# Patient Record
Sex: MALE | Race: WHITE | NOT HISPANIC OR LATINO
[De-identification: names, ages, dates, MRNs, and addresses within clinical notes are randomized per-mention and may not be internally consistent; named-entity substitution may affect disease eponyms.]

---

## 2017-12-08 ENCOUNTER — APPOINTMENT (OUTPATIENT)
Dept: HEART AND VASCULAR | Facility: CLINIC | Age: 67
End: 2017-12-08

## 2018-11-07 ENCOUNTER — APPOINTMENT (OUTPATIENT)
Dept: HEART AND VASCULAR | Facility: CLINIC | Age: 68
End: 2018-11-07
Payer: MEDICARE

## 2018-11-07 VITALS
WEIGHT: 217 LBS | SYSTOLIC BLOOD PRESSURE: 126 MMHG | HEIGHT: 70 IN | BODY MASS INDEX: 31.07 KG/M2 | DIASTOLIC BLOOD PRESSURE: 80 MMHG | HEART RATE: 64 BPM

## 2018-11-07 DIAGNOSIS — I07.1 RHEUMATIC TRICUSPID INSUFFICIENCY: ICD-10-CM

## 2018-11-07 DIAGNOSIS — Z01.810 ENCOUNTER FOR PREPROCEDURAL CARDIOVASCULAR EXAMINATION: ICD-10-CM

## 2018-11-07 PROCEDURE — 99214 OFFICE O/P EST MOD 30 MIN: CPT | Mod: 25

## 2018-11-07 PROCEDURE — 93306 TTE W/DOPPLER COMPLETE: CPT

## 2018-11-07 PROCEDURE — 93000 ELECTROCARDIOGRAM COMPLETE: CPT

## 2018-11-07 NOTE — DISCUSSION/SUMMARY
[FreeTextEntry1] : EKG:NSR,WNL\par ECHO:trivial TR, otherwise normal\par diet and f/u labs for lipids.\par His cardiac status is stable enough to permit ENT surgery

## 2018-11-07 NOTE — PHYSICAL EXAM
[General Appearance - Well Developed] : well developed [Normal Appearance] : normal appearance [Well Groomed] : well groomed [General Appearance - Well Nourished] : well nourished [No Deformities] : no deformities [General Appearance - In No Acute Distress] : no acute distress [Normal Conjunctiva] : the conjunctiva exhibited no abnormalities [Eyelids - No Xanthelasma] : the eyelids demonstrated no xanthelasmas [Normal Oral Mucosa] : normal oral mucosa [Normal Jugular Venous A Waves Present] : normal jugular venous A waves present [Normal Jugular Venous V Waves Present] : normal jugular venous V waves present [No Jugular Venous Velasco A Waves] : no jugular venous velasco A waves [] : no respiratory distress [Respiration, Rhythm And Depth] : normal respiratory rhythm and effort [Exaggerated Use Of Accessory Muscles For Inspiration] : no accessory muscle use [Heart Rate And Rhythm] : heart rate and rhythm were normal [Heart Sounds] : normal S1 and S2 [Murmurs] : no murmurs present [Arterial Pulses Normal] : the arterial pulses were normal [Edema] : no peripheral edema present [Bowel Sounds] : normal bowel sounds [Abdomen Soft] : soft [Abdomen Tenderness] : non-tender [Abnormal Walk] : normal gait [Nail Clubbing] : no clubbing of the fingernails [Skin Color & Pigmentation] : normal skin color and pigmentation [Oriented To Time, Place, And Person] : oriented to person, place, and time [FreeTextEntry1] : minimally decreased BS

## 2018-11-07 NOTE — REVIEW OF SYSTEMS
[Eyeglasses] : currently wearing eyeglasses [see HPI] : see HPI [Loss Of Hearing] : hearing loss [Nocturia] : nocturia [Joint Pain] : joint pain [Shoulder Problem] : shoulder problems [Hip Problem] : hip problems [Knee Problem] : knee problems [Numbness (Hypesthesia)] : numbness [Memory Lapses Or Loss] : memory lapses or loss [Negative] : Heme/Lymph [Hematuria] : no hematuria [Joint Swelling] : no joint swelling [Limb Weakness (Paresis)] : no limb weakness [Dizziness] : no dizziness [Tremor] : no tremor was seen [Convulsions] : no convulsions [Tingling (Paresthesia)] : no tingling [Confusion] : no confusion was observed [Depression] : no depression [Anxiety] : no anxiety [Under Stress] : not under stress [Suicidal] : not suicidal

## 2023-05-30 ENCOUNTER — APPOINTMENT (OUTPATIENT)
Dept: INTERNAL MEDICINE | Facility: CLINIC | Age: 73
End: 2023-05-30
Payer: MEDICARE

## 2023-05-30 VITALS
DIASTOLIC BLOOD PRESSURE: 89 MMHG | WEIGHT: 205 LBS | TEMPERATURE: 98.6 F | OXYGEN SATURATION: 92 % | BODY MASS INDEX: 29.35 KG/M2 | HEART RATE: 81 BPM | HEIGHT: 70 IN | SYSTOLIC BLOOD PRESSURE: 147 MMHG

## 2023-05-30 DIAGNOSIS — Z12.11 ENCOUNTER FOR SCREENING FOR MALIGNANT NEOPLASM OF COLON: ICD-10-CM

## 2023-05-30 DIAGNOSIS — Z00.00 ENCOUNTER FOR GENERAL ADULT MEDICAL EXAMINATION W/OUT ABNORMAL FINDINGS: ICD-10-CM

## 2023-05-30 PROCEDURE — 99204 OFFICE O/P NEW MOD 45 MIN: CPT | Mod: 25

## 2023-05-30 PROCEDURE — 36415 COLL VENOUS BLD VENIPUNCTURE: CPT

## 2023-05-30 RX ORDER — MEMANTINE HYDROCHLORIDE AND DONEPEZIL HYDROCHLORIDE 7; 10 MG/1; MG/1
7-10 CAPSULE ORAL
Qty: 90 | Refills: 0 | Status: ACTIVE | COMMUNITY
Start: 2023-04-27

## 2023-05-30 NOTE — ASSESSMENT
[FreeTextEntry1] : Labs drawn in office.\par Provided scripts for necessary imaging and/or referrals.\par Further management to be completed pending lab results and/or imaging studies.\par All of the patient's questions and concerns were answered in detail.\par \par Records to be requested from cardiologist office \par

## 2023-05-30 NOTE — HISTORY OF PRESENT ILLNESS
[FreeTextEntry1] : Establish care with new provider  [de-identified] : The patient is a 73 year old M who presents to the office for the following concerns:\par \par He has no acute concerns today. \par \par Patient says he completed stress test followed by angiogram at [place]\par \par Nancy \par Prilosec \par Claritin \par Statin therapy (patient unsure of the name)\par Namzaric \par \par Cardiologist: Dr. Kashmir Pope; [facility]. Last appointment in February 2023. \par Neurologist: Dr. Ruma King - seen for memory impairment. Started on Namzaric \par Smoked from 12 yo. Quit 20 years ago \par Colonoscopy: Never done

## 2023-05-30 NOTE — PHYSICAL EXAM
[Normal] : normal rate, regular rhythm, normal S1 and S2 and no murmur heard [No Edema] : there was no peripheral edema [Non-distended] : non-distended

## 2023-05-30 NOTE — HEALTH RISK ASSESSMENT
[Good] : ~his/her~  mood as  good [Yes] : Yes [Monthly or less (1 pt)] : Monthly or less (1 point) [1 or 2 (0 pts)] : 1 or 2 (0 points) [Never (0 pts)] : Never (0 points) [No] : In the past 12 months have you used drugs other than those required for medical reasons? No [No falls in past year] : Patient reported no falls in the past year [0] : 2) Feeling down, depressed, or hopeless: Not at all (0) [HIV test declined] : HIV test declined [Hepatitis C test declined] : Hepatitis C test declined [Alone] : lives alone [Retired] : retired [Single] : single [Fully functional (bathing, dressing, toileting, transferring, walking, feeding)] : Fully functional (bathing, dressing, toileting, transferring, walking, feeding) [Fully functional (using the telephone, shopping, preparing meals, housekeeping, doing laundry, using] : Fully functional and needs no help or supervision to perform IADLs (using the telephone, shopping, preparing meals, housekeeping, doing laundry, using transportation, managing medications and managing finances) [Reports changes in hearing] : Reports changes in hearing [Smoke Detector] : smoke detector [Carbon Monoxide Detector] : carbon monoxide detector [Seat Belt] :  uses seat belt [Sunscreen] : uses sunscreen [Former] : Former [< 15 Years] : < 15 Years [de-identified] : p [Audit-CScore] : 1 [de-identified] : walking  [de-identified] : fair [Reports changes in vision] : Reports no changes in vision [Reports changes in dental health] : Reports no changes in dental health [de-identified] : pt feels his hearing is getting work

## 2023-06-02 LAB
ALBUMIN SERPL ELPH-MCNC: 4.9 G/DL
ALP BLD-CCNC: 77 U/L
ALT SERPL-CCNC: 18 U/L
ANION GAP SERPL CALC-SCNC: 14 MMOL/L
AST SERPL-CCNC: 22 U/L
BILIRUB SERPL-MCNC: 0.9 MG/DL
BUN SERPL-MCNC: 15 MG/DL
CALCIUM SERPL-MCNC: 9.9 MG/DL
CHLORIDE SERPL-SCNC: 106 MMOL/L
CHOLEST SERPL-MCNC: 197 MG/DL
CO2 SERPL-SCNC: 23 MMOL/L
CREAT SERPL-MCNC: 1.05 MG/DL
EGFR: 74 ML/MIN/1.73M2
ESTIMATED AVERAGE GLUCOSE: 114 MG/DL
FOLATE SERPL-MCNC: >20 NG/ML
GLUCOSE SERPL-MCNC: 110 MG/DL
HBA1C MFR BLD HPLC: 5.6 %
HDLC SERPL-MCNC: 39 MG/DL
LDLC SERPL CALC-MCNC: 134 MG/DL
NONHDLC SERPL-MCNC: 158 MG/DL
POTASSIUM SERPL-SCNC: 4.5 MMOL/L
PROT SERPL-MCNC: 7.1 G/DL
PSA SERPL-MCNC: 4.23 NG/ML
SODIUM SERPL-SCNC: 143 MMOL/L
TRIGL SERPL-MCNC: 121 MG/DL
TSH SERPL-ACNC: 1.6 UIU/ML
VIT B12 SERPL-MCNC: 450 PG/ML

## 2023-08-30 ENCOUNTER — APPOINTMENT (OUTPATIENT)
Dept: INTERNAL MEDICINE | Facility: CLINIC | Age: 73
End: 2023-08-30

## 2023-11-25 ENCOUNTER — RX RENEWAL (OUTPATIENT)
Age: 73
End: 2023-11-25

## 2024-05-17 ENCOUNTER — APPOINTMENT (OUTPATIENT)
Dept: INTERNAL MEDICINE | Facility: CLINIC | Age: 74
End: 2024-05-17
Payer: MEDICARE

## 2024-05-17 VITALS
BODY MASS INDEX: 29.63 KG/M2 | HEIGHT: 70 IN | HEART RATE: 66 BPM | SYSTOLIC BLOOD PRESSURE: 138 MMHG | TEMPERATURE: 98.1 F | OXYGEN SATURATION: 97 % | DIASTOLIC BLOOD PRESSURE: 81 MMHG | WEIGHT: 207 LBS

## 2024-05-17 DIAGNOSIS — F01.50 VASCULAR DEMENTIA W/OUT BEHAVIORAL DISTURBANCE: ICD-10-CM

## 2024-05-17 DIAGNOSIS — R26.89 OTHER ABNORMALITIES OF GAIT AND MOBILITY: ICD-10-CM

## 2024-05-17 DIAGNOSIS — Z13.228 ENCOUNTER FOR SCREENING FOR OTHER METABOLIC DISORDERS: ICD-10-CM

## 2024-05-17 DIAGNOSIS — E78.2 MIXED HYPERLIPIDEMIA: ICD-10-CM

## 2024-05-17 PROCEDURE — G2211 COMPLEX E/M VISIT ADD ON: CPT

## 2024-05-17 PROCEDURE — 36415 COLL VENOUS BLD VENIPUNCTURE: CPT

## 2024-05-17 PROCEDURE — 99214 OFFICE O/P EST MOD 30 MIN: CPT

## 2024-05-17 NOTE — HEALTH RISK ASSESSMENT
[Yes] : Yes [Monthly or less (1 pt)] : Monthly or less (1 point) [1 or 2 (0 pts)] : 1 or 2 (0 points) [Never (0 pts)] : Never (0 points) [No] : In the past 12 months have you used drugs other than those required for medical reasons? No [No falls in past year] : Patient reported no falls in the past year [0] : 2) Feeling down, depressed, or hopeless: Not at all (0) [Former] : Former [de-identified] : No [de-identified] : Cardiologist Dr. Magana, Neurologist Dr. López [Audit-CScore] : 1 [de-identified] : Walking his dog Two to three times a day  [de-identified] : Regular  [de-identified] : Feels a little dizzy but no fall

## 2024-05-17 NOTE — HISTORY OF PRESENT ILLNESS
[Other: _____] : [unfilled] [de-identified] : The patient is a 74 year old M who presents to the office for the following concerns:  #Dementia (vascular) - Patient says he stopped taking the Namzaric due to side effects of hallucinations. He is having some difficulty with adjusting to diagnosis of dementia. Family is supportive.  #Depression - was taking sertraline for about 6 months but started having vivid dreams and hallucinations. So he stopped taking the medication. Notes mood is stable. 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 He has not had any falls. No dizziness or lightheadedness. MRI showed minor vascular dementia.   Patient had evaluation with cardiologist which was unremarkable.

## 2024-05-20 ENCOUNTER — RX RENEWAL (OUTPATIENT)
Age: 74
End: 2024-05-20

## 2024-05-22 ENCOUNTER — APPOINTMENT (OUTPATIENT)
Dept: UROLOGY | Facility: CLINIC | Age: 74
End: 2024-05-22

## 2024-05-23 LAB
ALBUMIN SERPL ELPH-MCNC: 4.9 G/DL
ALP BLD-CCNC: 68 U/L
ALT SERPL-CCNC: 18 U/L
ANION GAP SERPL CALC-SCNC: 12 MMOL/L
AST SERPL-CCNC: 21 U/L
BASOPHILS # BLD AUTO: 0.09 K/UL
BASOPHILS NFR BLD AUTO: 1.1 %
BILIRUB SERPL-MCNC: 0.9 MG/DL
BUN SERPL-MCNC: 16 MG/DL
CALCIUM SERPL-MCNC: 10.3 MG/DL
CHLORIDE SERPL-SCNC: 104 MMOL/L
CHOLEST SERPL-MCNC: 170 MG/DL
CO2 SERPL-SCNC: 24 MMOL/L
CREAT SERPL-MCNC: 1.09 MG/DL
EGFR: 71 ML/MIN/1.73M2
EOSINOPHIL # BLD AUTO: 0.13 K/UL
EOSINOPHIL NFR BLD AUTO: 1.6 %
ESTIMATED AVERAGE GLUCOSE: 105 MG/DL
FOLATE SERPL-MCNC: >20 NG/ML
GLUCOSE SERPL-MCNC: 113 MG/DL
HBA1C MFR BLD HPLC: 5.3 %
HCT VFR BLD CALC: 48.7 %
HDLC SERPL-MCNC: 41 MG/DL
HGB BLD-MCNC: 16.1 G/DL
IMM GRANULOCYTES NFR BLD AUTO: 0.1 %
LDLC SERPL CALC-MCNC: 101 MG/DL
LYMPHOCYTES # BLD AUTO: 1.8 K/UL
LYMPHOCYTES NFR BLD AUTO: 22.8 %
MAN DIFF?: NORMAL
MCHC RBC-ENTMCNC: 31.3 PG
MCHC RBC-ENTMCNC: 33.1 GM/DL
MCV RBC AUTO: 94.6 FL
MONOCYTES # BLD AUTO: 0.54 K/UL
MONOCYTES NFR BLD AUTO: 6.8 %
NEUTROPHILS # BLD AUTO: 5.32 K/UL
NEUTROPHILS NFR BLD AUTO: 67.6 %
NONHDLC SERPL-MCNC: 129 MG/DL
PLATELET # BLD AUTO: 249 K/UL
POTASSIUM SERPL-SCNC: 5.1 MMOL/L
PROT SERPL-MCNC: 7.5 G/DL
RBC # BLD: 5.15 M/UL
RBC # FLD: 14.8 %
SODIUM SERPL-SCNC: 140 MMOL/L
TRIGL SERPL-MCNC: 160 MG/DL
TSH SERPL-ACNC: 1.74 UIU/ML
VIT B12 SERPL-MCNC: 710 PG/ML
WBC # FLD AUTO: 7.89 K/UL

## 2024-05-23 RX ORDER — PRAVASTATIN SODIUM 10 MG/1
10 TABLET ORAL
Qty: 90 | Refills: 1 | Status: DISCONTINUED | COMMUNITY
Start: 2023-06-02 | End: 2024-05-23

## 2024-09-26 ENCOUNTER — APPOINTMENT (OUTPATIENT)
Dept: INTERNAL MEDICINE | Facility: CLINIC | Age: 74
End: 2024-09-26
Payer: MEDICARE

## 2024-09-26 VITALS
SYSTOLIC BLOOD PRESSURE: 117 MMHG | DIASTOLIC BLOOD PRESSURE: 74 MMHG | WEIGHT: 202 LBS | TEMPERATURE: 98.4 F | BODY MASS INDEX: 28.92 KG/M2 | HEIGHT: 70 IN | OXYGEN SATURATION: 97 % | HEART RATE: 71 BPM

## 2024-09-26 DIAGNOSIS — F01.50 VASCULAR DEMENTIA W/OUT BEHAVIORAL DISTURBANCE: ICD-10-CM

## 2024-09-26 DIAGNOSIS — F32.A DEPRESSION, UNSPECIFIED: ICD-10-CM

## 2024-09-26 DIAGNOSIS — E78.2 MIXED HYPERLIPIDEMIA: ICD-10-CM

## 2024-09-26 DIAGNOSIS — Z00.00 ENCOUNTER FOR GENERAL ADULT MEDICAL EXAMINATION W/OUT ABNORMAL FINDINGS: ICD-10-CM

## 2024-09-26 PROCEDURE — 36415 COLL VENOUS BLD VENIPUNCTURE: CPT

## 2024-09-26 PROCEDURE — G0439: CPT

## 2024-09-26 NOTE — HEALTH RISK ASSESSMENT
[Yes] : Yes [Monthly or less (1 pt)] : Monthly or less (1 point) [1 or 2 (0 pts)] : 1 or 2 (0 points) [Never (0 pts)] : Never (0 points) [No] : In the past 12 months have you used drugs other than those required for medical reasons? No [No falls in past year] : Patient reported no falls in the past year [HIV test declined] : HIV test declined [Hepatitis C test declined] : Hepatitis C test declined [Fully functional (bathing, dressing, toileting, transferring, walking, feeding)] : Fully functional (bathing, dressing, toileting, transferring, walking, feeding) [Fully functional (using the telephone, shopping, preparing meals, housekeeping, doing laundry, using] : Fully functional and needs no help or supervision to perform IADLs (using the telephone, shopping, preparing meals, housekeeping, doing laundry, using transportation, managing medications and managing finances) [Smoke Detector] : smoke detector [Carbon Monoxide Detector] : carbon monoxide detector [Seat Belt] :  uses seat belt [Sunscreen] : uses sunscreen [Good] : ~his/her~ current health as good [Fair] :  ~his/her~ mood as fair [3] : 2) Feeling down, depressed, or hopeless for nearly every day (3) [Former] : Former [Patient declined colonoscopy] : Patient declined colonoscopy [Reports changes in vision] : Reports changes in vision [Reports changes in hearing] : Reports changes in hearing [FreeTextEntry1] : Coughing and sneezing (ongoing for months) & memory loss  [PHQ-2 Positive] : PHQ-2 Positive [de-identified] : no [de-identified] : Opthamologist & Cardiologist  [Audit-CScore] : 1 [de-identified] : walking the dog  [de-identified] : regular  [de-identified] : Off balance at times  [DXB2Txkoa] : 6 [> 15 Years] : > 15 Years [Change in mental status noted] : No change in mental status noted [Reports changes in dental health] : Reports no changes in dental health [de-identified] : some hearing loss  [de-identified] : small cataract, blurry vision

## 2024-09-26 NOTE — ASSESSMENT
[FreeTextEntry1] : The patient is a 74 year old M who presents to the office for His annual wellness examination  - Fasting labs to screen for anemia, electrolyte disturbances, DM, lipid disorders, and additional metabolic disorders - PHQ2 performed: positive. Declines medications now. Recommend therapy. Depression surrounding memory loss. No SI or HI. Has good family support  - Encouraged routine dental, vision, dermatology screenings & age-appropriate physical activity   Labs drawn in office. Appropriate medication renewal(s) provided. Provided scripts for necessary imaging and/or referrals. Further management to be completed pending lab results and/or imaging studies. All of the patient's questions and concerns were answered in detail.

## 2024-09-26 NOTE — HISTORY OF PRESENT ILLNESS
[Other: _____] : [unfilled] [FreeTextEntry1] : JYOTI [de-identified] : The patient is a 74 year old M who presents to the office for annual wellness examination.  Patient has known h/o vascular dementia, which is stable. 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 Daughter notes he is highly independent and completes ADLs and IADLS on his own.   Pt d/c'd pravastatin. Notes distrust with medications.    Routine Health maintenance (as applicable) Colonoscopy (45+):declines COVID vaccine series: Flu: declines  Shingles (50+, immunocompetent): PCV (>64yo/other conditions):

## 2024-10-18 ENCOUNTER — APPOINTMENT (OUTPATIENT)
Dept: INTERNAL MEDICINE | Facility: CLINIC | Age: 74
End: 2024-10-18
Payer: MEDICARE

## 2024-10-18 VITALS
WEIGHT: 204 LBS | SYSTOLIC BLOOD PRESSURE: 123 MMHG | TEMPERATURE: 98.1 F | DIASTOLIC BLOOD PRESSURE: 78 MMHG | BODY MASS INDEX: 29.2 KG/M2 | HEART RATE: 70 BPM | HEIGHT: 70 IN | OXYGEN SATURATION: 95 %

## 2024-10-18 DIAGNOSIS — R59.0 LOCALIZED ENLARGED LYMPH NODES: ICD-10-CM

## 2024-10-18 DIAGNOSIS — J30.9 ALLERGIC RHINITIS, UNSPECIFIED: ICD-10-CM

## 2024-10-18 PROCEDURE — 99213 OFFICE O/P EST LOW 20 MIN: CPT

## 2024-10-18 PROCEDURE — G2211 COMPLEX E/M VISIT ADD ON: CPT

## 2024-10-22 LAB
RAPID RVP RESULT: NOT DETECTED
SARS-COV-2 RNA PNL RESP NAA+PROBE: NOT DETECTED

## 2024-10-30 DIAGNOSIS — D49.0 NEOPLASM OF UNSPECIFIED BEHAVIOR OF DIGESTIVE SYSTEM: ICD-10-CM

## 2024-11-05 ENCOUNTER — NON-APPOINTMENT (OUTPATIENT)
Age: 74
End: 2024-11-05

## 2024-11-05 PROBLEM — D49.0: Status: ACTIVE | Noted: 2024-11-05

## 2024-11-11 ENCOUNTER — APPOINTMENT (OUTPATIENT)
Dept: OTOLARYNGOLOGY | Facility: CLINIC | Age: 74
End: 2024-11-11
Payer: MEDICARE

## 2024-11-11 VITALS
OXYGEN SATURATION: 96 % | HEART RATE: 67 BPM | SYSTOLIC BLOOD PRESSURE: 168 MMHG | WEIGHT: 204 LBS | HEIGHT: 70 IN | BODY MASS INDEX: 29.2 KG/M2 | DIASTOLIC BLOOD PRESSURE: 84 MMHG

## 2024-11-11 PROBLEM — R59.0 CERVICAL ADENOPATHY: Status: ACTIVE | Noted: 2024-11-11

## 2024-11-11 PROCEDURE — 99204 OFFICE O/P NEW MOD 45 MIN: CPT | Mod: 25

## 2024-11-11 PROCEDURE — 31575 DIAGNOSTIC LARYNGOSCOPY: CPT

## 2024-11-12 ENCOUNTER — NON-APPOINTMENT (OUTPATIENT)
Age: 74
End: 2024-11-12

## 2024-11-19 ENCOUNTER — LABORATORY RESULT (OUTPATIENT)
Age: 74
End: 2024-11-19

## 2024-11-19 ENCOUNTER — APPOINTMENT (OUTPATIENT)
Dept: OTOLARYNGOLOGY | Facility: CLINIC | Age: 74
End: 2024-11-19
Payer: MEDICARE

## 2024-11-19 VITALS
SYSTOLIC BLOOD PRESSURE: 135 MMHG | DIASTOLIC BLOOD PRESSURE: 89 MMHG | OXYGEN SATURATION: 98 % | BODY MASS INDEX: 29.35 KG/M2 | HEART RATE: 67 BPM | HEIGHT: 70 IN | WEIGHT: 205 LBS

## 2024-11-19 DIAGNOSIS — R59.0 LOCALIZED ENLARGED LYMPH NODES: ICD-10-CM

## 2024-11-19 DIAGNOSIS — D49.0 NEOPLASM OF UNSPECIFIED BEHAVIOR OF DIGESTIVE SYSTEM: ICD-10-CM

## 2024-11-19 PROCEDURE — 76536 US EXAM OF HEAD AND NECK: CPT

## 2024-11-19 PROCEDURE — 10005 FNA BX W/US GDN 1ST LES: CPT

## 2024-11-19 PROCEDURE — 99214 OFFICE O/P EST MOD 30 MIN: CPT

## 2024-11-26 DIAGNOSIS — C10.9 MALIGNANT NEOPLASM OF OROPHARYNX, UNSPECIFIED: ICD-10-CM

## 2024-12-07 ENCOUNTER — APPOINTMENT (OUTPATIENT)
Dept: NUCLEAR MEDICINE | Facility: CLINIC | Age: 74
End: 2024-12-07

## 2024-12-07 PROCEDURE — 78815 PET IMAGE W/CT SKULL-THIGH: CPT | Mod: PI

## 2024-12-07 PROCEDURE — A9552: CPT

## 2024-12-17 ENCOUNTER — OUTPATIENT (OUTPATIENT)
Dept: OUTPATIENT SERVICES | Facility: HOSPITAL | Age: 74
LOS: 1 days | Discharge: HOME | End: 2024-12-17
Payer: MEDICARE

## 2024-12-17 ENCOUNTER — APPOINTMENT (OUTPATIENT)
Dept: OTOLARYNGOLOGY | Facility: CLINIC | Age: 74
End: 2024-12-17
Payer: MEDICARE

## 2024-12-17 PROCEDURE — 99215 OFFICE O/P EST HI 40 MIN: CPT | Mod: 25

## 2024-12-17 PROCEDURE — 31575 DIAGNOSTIC LARYNGOSCOPY: CPT

## 2024-12-18 ENCOUNTER — APPOINTMENT (OUTPATIENT)
Dept: RADIATION ONCOLOGY | Facility: CLINIC | Age: 74
End: 2024-12-18
Payer: MEDICARE

## 2024-12-18 ENCOUNTER — RESULT REVIEW (OUTPATIENT)
Age: 74
End: 2024-12-18

## 2024-12-18 ENCOUNTER — APPOINTMENT (OUTPATIENT)
Dept: HEMATOLOGY ONCOLOGY | Facility: CLINIC | Age: 74
End: 2024-12-18
Payer: MEDICARE

## 2024-12-18 VITALS
TEMPERATURE: 97 F | WEIGHT: 211.06 LBS | HEART RATE: 68 BPM | BODY MASS INDEX: 30.22 KG/M2 | DIASTOLIC BLOOD PRESSURE: 82 MMHG | RESPIRATION RATE: 16 BRPM | HEIGHT: 70 IN | SYSTOLIC BLOOD PRESSURE: 157 MMHG | OXYGEN SATURATION: 99 %

## 2024-12-18 VITALS
RESPIRATION RATE: 16 BRPM | TEMPERATURE: 97.3 F | HEART RATE: 66 BPM | SYSTOLIC BLOOD PRESSURE: 149 MMHG | OXYGEN SATURATION: 97 % | DIASTOLIC BLOOD PRESSURE: 92 MMHG | HEIGHT: 70 IN | BODY MASS INDEX: 29.98 KG/M2 | WEIGHT: 209.44 LBS

## 2024-12-18 DIAGNOSIS — R91.1 SOLITARY PULMONARY NODULE: ICD-10-CM

## 2024-12-18 DIAGNOSIS — K62.1 RECTAL POLYP: ICD-10-CM

## 2024-12-18 DIAGNOSIS — F01.50 VASCULAR DEMENTIA W/OUT BEHAVIORAL DISTURBANCE: ICD-10-CM

## 2024-12-18 DIAGNOSIS — Z01.812 ENCOUNTER FOR PREPROCEDURAL LABORATORY EXAMINATION: ICD-10-CM

## 2024-12-18 DIAGNOSIS — C10.9 MALIGNANT NEOPLASM OF OROPHARYNX, UNSPECIFIED: ICD-10-CM

## 2024-12-18 DIAGNOSIS — C09.9 MALIGNANT NEOPLASM OF TONSIL, UNSPECIFIED: ICD-10-CM

## 2024-12-18 DIAGNOSIS — Z78.9 OTHER SPECIFIED HEALTH STATUS: ICD-10-CM

## 2024-12-18 DIAGNOSIS — Z60.2 PROBLEMS RELATED TO LIVING ALONE: ICD-10-CM

## 2024-12-18 DIAGNOSIS — C77.0 SECONDARY AND UNSPECIFIED MALIGNANT NEOPLASM OF LYMPH NODES OF HEAD, FACE AND NECK: ICD-10-CM

## 2024-12-18 LAB
BASOPHILS # BLD AUTO: 0.08 K/UL — SIGNIFICANT CHANGE UP (ref 0–0.2)
BASOPHILS NFR BLD AUTO: 1.1 % — SIGNIFICANT CHANGE UP (ref 0–2)
EOSINOPHIL # BLD AUTO: 0.12 K/UL — SIGNIFICANT CHANGE UP (ref 0–0.5)
EOSINOPHIL NFR BLD AUTO: 1.6 % — SIGNIFICANT CHANGE UP (ref 0–6)
HCT VFR BLD CALC: 44.5 % — SIGNIFICANT CHANGE UP (ref 39–50)
HGB BLD-MCNC: 15.8 G/DL — SIGNIFICANT CHANGE UP (ref 13–17)
IMM GRANULOCYTES NFR BLD AUTO: 0.1 % — SIGNIFICANT CHANGE UP (ref 0–0.9)
LYMPHOCYTES # BLD AUTO: 1.85 K/UL — SIGNIFICANT CHANGE UP (ref 1–3.3)
LYMPHOCYTES # BLD AUTO: 24.8 % — SIGNIFICANT CHANGE UP (ref 13–44)
MCHC RBC-ENTMCNC: 31.4 PG — SIGNIFICANT CHANGE UP (ref 27–34)
MCHC RBC-ENTMCNC: 35.5 G/DL — SIGNIFICANT CHANGE UP (ref 32–36)
MCV RBC AUTO: 88.5 FL — SIGNIFICANT CHANGE UP (ref 80–100)
MONOCYTES # BLD AUTO: 0.54 K/UL — SIGNIFICANT CHANGE UP (ref 0–0.9)
MONOCYTES NFR BLD AUTO: 7.2 % — SIGNIFICANT CHANGE UP (ref 2–14)
NEUTROPHILS # BLD AUTO: 4.85 K/UL — SIGNIFICANT CHANGE UP (ref 1.8–7.4)
NEUTROPHILS NFR BLD AUTO: 65.2 % — SIGNIFICANT CHANGE UP (ref 43–77)
NRBC # BLD: 0 /100 WBCS — SIGNIFICANT CHANGE UP (ref 0–0)
NRBC BLD-RTO: 0 /100 WBCS — SIGNIFICANT CHANGE UP (ref 0–0)
PLATELET # BLD AUTO: 228 K/UL — SIGNIFICANT CHANGE UP (ref 150–400)
RBC # BLD: 5.03 M/UL — SIGNIFICANT CHANGE UP (ref 4.2–5.8)
RBC # FLD: 13.2 % — SIGNIFICANT CHANGE UP (ref 10.3–14.5)
WBC # BLD: 7.45 K/UL — SIGNIFICANT CHANGE UP (ref 3.8–10.5)
WBC # FLD AUTO: 7.45 K/UL — SIGNIFICANT CHANGE UP (ref 3.8–10.5)

## 2024-12-18 PROCEDURE — G2212 PROLONG OUTPT/OFFICE VIS: CPT

## 2024-12-18 PROCEDURE — 99205 OFFICE O/P NEW HI 60 MIN: CPT

## 2024-12-18 PROCEDURE — G2211 COMPLEX E/M VISIT ADD ON: CPT

## 2024-12-18 RX ORDER — METOCLOPRAMIDE 10 MG/1
10 TABLET ORAL
Qty: 60 | Refills: 5 | Status: ACTIVE | COMMUNITY
Start: 2024-12-18 | End: 1900-01-01

## 2024-12-18 RX ORDER — FEXOFENADINE HCL 60 MG
CAPSULE ORAL
Refills: 0 | Status: ACTIVE | COMMUNITY

## 2024-12-18 SDOH — SOCIAL STABILITY - SOCIAL INSECURITY: PROBLEMS RELATED TO LIVING ALONE: Z60.2

## 2024-12-19 LAB
ALBUMIN SERPL ELPH-MCNC: 4.9 G/DL
ALP BLD-CCNC: 76 U/L
ALT SERPL-CCNC: 15 U/L
ANION GAP SERPL CALC-SCNC: 16 MMOL/L
AST SERPL-CCNC: 15 U/L
BILIRUB SERPL-MCNC: 0.8 MG/DL
BUN SERPL-MCNC: 19 MG/DL
CALCIUM SERPL-MCNC: 10.2 MG/DL
CHLORIDE SERPL-SCNC: 99 MMOL/L
CO2 SERPL-SCNC: 24 MMOL/L
CREAT SERPL-MCNC: 1.1 MG/DL
EGFR: 70 ML/MIN/1.73M2
GLUCOSE SERPL-MCNC: 96 MG/DL
HBV CORE IGG+IGM SER QL: NONREACTIVE
HBV SURFACE AB SER QL: NONREACTIVE
HBV SURFACE AG SER QL: NONREACTIVE
HCV AB SER QL: NONREACTIVE
HCV S/CO RATIO: 0.12 S/CO
LDH SERPL-CCNC: 186 U/L
MAGNESIUM SERPL-MCNC: 2.1 MG/DL
POTASSIUM SERPL-SCNC: 4.4 MMOL/L
PROT SERPL-MCNC: 7.7 G/DL
SODIUM SERPL-SCNC: 138 MMOL/L
TSH SERPL-ACNC: 2.63 UIU/ML

## 2024-12-23 ENCOUNTER — NON-APPOINTMENT (OUTPATIENT)
Age: 74
End: 2024-12-23

## 2024-12-27 PROCEDURE — 77470 SPECIAL RADIATION TREATMENT: CPT | Mod: 26

## 2024-12-27 PROCEDURE — 77263 THER RADIOLOGY TX PLNG CPLX: CPT

## 2024-12-27 PROCEDURE — 77334 RADIATION TREATMENT AID(S): CPT | Mod: 26

## 2025-01-05 PROCEDURE — 77338 DESIGN MLC DEVICE FOR IMRT: CPT | Mod: 26

## 2025-01-05 PROCEDURE — 77300 RADIATION THERAPY DOSE PLAN: CPT | Mod: 26

## 2025-01-05 PROCEDURE — 77301 RADIOTHERAPY DOSE PLAN IMRT: CPT | Mod: 26

## 2025-01-14 ENCOUNTER — NON-APPOINTMENT (OUTPATIENT)
Age: 75
End: 2025-01-14

## 2025-01-16 ENCOUNTER — OUTPATIENT (OUTPATIENT)
Dept: OUTPATIENT SERVICES | Facility: HOSPITAL | Age: 75
LOS: 1 days | Discharge: HOME | End: 2025-01-16

## 2025-01-16 ENCOUNTER — APPOINTMENT (OUTPATIENT)
Dept: OTOLARYNGOLOGY | Facility: CLINIC | Age: 75
End: 2025-01-16

## 2025-01-16 PROCEDURE — XXXXX: CPT | Mod: 1L

## 2025-01-16 PROCEDURE — 92610 EVALUATE SWALLOWING FUNCTION: CPT | Mod: NC,1L,GN

## 2025-01-21 ENCOUNTER — NON-APPOINTMENT (OUTPATIENT)
Age: 75
End: 2025-01-21

## 2025-01-21 PROCEDURE — 77427 RADIATION TX MANAGEMENT X5: CPT

## 2025-01-21 PROCEDURE — 77387B: CUSTOM | Mod: 26

## 2025-01-22 PROCEDURE — 77387B: CUSTOM | Mod: 26

## 2025-01-23 PROCEDURE — 77387B: CUSTOM | Mod: 26

## 2025-01-24 ENCOUNTER — APPOINTMENT (OUTPATIENT)
Dept: INFUSION THERAPY | Facility: HOSPITAL | Age: 75
End: 2025-01-24

## 2025-01-24 ENCOUNTER — NON-APPOINTMENT (OUTPATIENT)
Age: 75
End: 2025-01-24

## 2025-01-24 PROCEDURE — 93010 ELECTROCARDIOGRAM REPORT: CPT

## 2025-01-24 PROCEDURE — 77014: CPT | Mod: 26

## 2025-01-27 ENCOUNTER — NON-APPOINTMENT (OUTPATIENT)
Age: 75
End: 2025-01-27

## 2025-01-27 DIAGNOSIS — Z51.11 ENCOUNTER FOR ANTINEOPLASTIC CHEMOTHERAPY: ICD-10-CM

## 2025-01-27 DIAGNOSIS — R11.2 NAUSEA WITH VOMITING, UNSPECIFIED: ICD-10-CM

## 2025-01-27 PROCEDURE — 77387B: CUSTOM | Mod: 26

## 2025-01-28 ENCOUNTER — APPOINTMENT (OUTPATIENT)
Dept: INFUSION THERAPY | Facility: HOSPITAL | Age: 75
End: 2025-01-28

## 2025-01-28 ENCOUNTER — NON-APPOINTMENT (OUTPATIENT)
Age: 75
End: 2025-01-28

## 2025-01-28 ENCOUNTER — RESULT REVIEW (OUTPATIENT)
Age: 75
End: 2025-01-28

## 2025-01-28 VITALS
SYSTOLIC BLOOD PRESSURE: 158 MMHG | DIASTOLIC BLOOD PRESSURE: 87 MMHG | TEMPERATURE: 98 F | WEIGHT: 203 LBS | HEART RATE: 82 BPM | BODY MASS INDEX: 29.46 KG/M2 | RESPIRATION RATE: 16 BRPM | OXYGEN SATURATION: 99 %

## 2025-01-28 PROCEDURE — 77427 RADIATION TX MANAGEMENT X5: CPT

## 2025-01-28 PROCEDURE — 77387B: CUSTOM | Mod: 26

## 2025-01-29 ENCOUNTER — NON-APPOINTMENT (OUTPATIENT)
Age: 75
End: 2025-01-29

## 2025-01-29 DIAGNOSIS — E86.0 DEHYDRATION: ICD-10-CM

## 2025-01-29 LAB
ANION GAP SERPL CALC-SCNC: 14 MMOL/L — SIGNIFICANT CHANGE UP (ref 5–17)
BUN SERPL-MCNC: 17 MG/DL — SIGNIFICANT CHANGE UP (ref 7–23)
CALCIUM SERPL-MCNC: 9.5 MG/DL — SIGNIFICANT CHANGE UP (ref 8.4–10.5)
CHLORIDE SERPL-SCNC: 101 MMOL/L — SIGNIFICANT CHANGE UP (ref 96–108)
CO2 SERPL-SCNC: 24 MMOL/L — SIGNIFICANT CHANGE UP (ref 22–31)
CREAT SERPL-MCNC: 0.94 MG/DL — SIGNIFICANT CHANGE UP (ref 0.5–1.3)
EGFR: 85 ML/MIN/1.73M2 — SIGNIFICANT CHANGE UP
GLUCOSE SERPL-MCNC: 55 MG/DL — LOW (ref 70–99)
MAGNESIUM SERPL-MCNC: 2.2 MG/DL — SIGNIFICANT CHANGE UP (ref 1.6–2.6)
POTASSIUM SERPL-MCNC: 5.3 MMOL/L — SIGNIFICANT CHANGE UP (ref 3.5–5.3)
POTASSIUM SERPL-SCNC: 5.3 MMOL/L — SIGNIFICANT CHANGE UP (ref 3.5–5.3)
SODIUM SERPL-SCNC: 138 MMOL/L — SIGNIFICANT CHANGE UP (ref 135–145)

## 2025-01-29 PROCEDURE — 77387B: CUSTOM | Mod: 26

## 2025-01-30 PROCEDURE — 77387B: CUSTOM | Mod: 26

## 2025-01-31 ENCOUNTER — APPOINTMENT (OUTPATIENT)
Dept: INFUSION THERAPY | Facility: HOSPITAL | Age: 75
End: 2025-01-31

## 2025-01-31 ENCOUNTER — RESULT REVIEW (OUTPATIENT)
Age: 75
End: 2025-01-31

## 2025-01-31 ENCOUNTER — APPOINTMENT (OUTPATIENT)
Dept: HEMATOLOGY ONCOLOGY | Facility: CLINIC | Age: 75
End: 2025-01-31
Payer: MEDICARE

## 2025-01-31 PROBLEM — R43.2 DYSGEUSIA: Status: ACTIVE | Noted: 2025-01-31

## 2025-01-31 PROBLEM — K11.7 XEROSTOMIA DUE TO RADIOTHERAPY: Status: ACTIVE | Noted: 2025-01-31

## 2025-01-31 PROBLEM — K59.00 CONSTIPATION: Status: ACTIVE | Noted: 2025-01-31

## 2025-01-31 LAB
BASOPHILS # BLD AUTO: 0.04 K/UL — SIGNIFICANT CHANGE UP (ref 0–0.2)
BASOPHILS NFR BLD AUTO: 0.6 % — SIGNIFICANT CHANGE UP (ref 0–2)
EOSINOPHIL # BLD AUTO: 0.08 K/UL — SIGNIFICANT CHANGE UP (ref 0–0.5)
EOSINOPHIL NFR BLD AUTO: 1.2 % — SIGNIFICANT CHANGE UP (ref 0–6)
HCT VFR BLD CALC: 39.6 % — SIGNIFICANT CHANGE UP (ref 39–50)
HGB BLD-MCNC: 14.2 G/DL — SIGNIFICANT CHANGE UP (ref 13–17)
IMM GRANULOCYTES NFR BLD AUTO: 0.9 % — SIGNIFICANT CHANGE UP (ref 0–0.9)
LYMPHOCYTES # BLD AUTO: 0.95 K/UL — LOW (ref 1–3.3)
LYMPHOCYTES # BLD AUTO: 14.2 % — SIGNIFICANT CHANGE UP (ref 13–44)
MCHC RBC-ENTMCNC: 31.2 PG — SIGNIFICANT CHANGE UP (ref 27–34)
MCHC RBC-ENTMCNC: 35.9 G/DL — SIGNIFICANT CHANGE UP (ref 32–36)
MCV RBC AUTO: 87 FL — SIGNIFICANT CHANGE UP (ref 80–100)
MONOCYTES # BLD AUTO: 0.55 K/UL — SIGNIFICANT CHANGE UP (ref 0–0.9)
MONOCYTES NFR BLD AUTO: 8.2 % — SIGNIFICANT CHANGE UP (ref 2–14)
NEUTROPHILS # BLD AUTO: 5.01 K/UL — SIGNIFICANT CHANGE UP (ref 1.8–7.4)
NEUTROPHILS NFR BLD AUTO: 74.9 % — SIGNIFICANT CHANGE UP (ref 43–77)
NRBC # BLD: 0 /100 WBCS — SIGNIFICANT CHANGE UP (ref 0–0)
NRBC BLD-RTO: 0 /100 WBCS — SIGNIFICANT CHANGE UP (ref 0–0)
PLATELET # BLD AUTO: 195 K/UL — SIGNIFICANT CHANGE UP (ref 150–400)
RBC # BLD: 4.55 M/UL — SIGNIFICANT CHANGE UP (ref 4.2–5.8)
RBC # FLD: 12.9 % — SIGNIFICANT CHANGE UP (ref 10.3–14.5)
WBC # BLD: 6.69 K/UL — SIGNIFICANT CHANGE UP (ref 3.8–10.5)
WBC # FLD AUTO: 6.69 K/UL — SIGNIFICANT CHANGE UP (ref 3.8–10.5)

## 2025-01-31 PROCEDURE — 99214 OFFICE O/P EST MOD 30 MIN: CPT

## 2025-01-31 PROCEDURE — 77014: CPT | Mod: 26

## 2025-01-31 RX ORDER — SENNOSIDES 8.6 MG TABLETS 8.6 MG/1
8.6 TABLET ORAL
Qty: 60 | Refills: 3 | Status: ACTIVE | COMMUNITY
Start: 2025-01-31 | End: 1900-01-01

## 2025-01-31 RX ORDER — DOCUSATE SODIUM 100 MG/1
100 CAPSULE, LIQUID FILLED ORAL TWICE DAILY
Qty: 60 | Refills: 3 | Status: ACTIVE | COMMUNITY
Start: 2025-01-31 | End: 1900-01-01

## 2025-02-03 ENCOUNTER — APPOINTMENT (OUTPATIENT)
Dept: OTOLARYNGOLOGY | Facility: CLINIC | Age: 75
End: 2025-02-03

## 2025-02-03 DIAGNOSIS — R52 PAIN, UNSPECIFIED: ICD-10-CM

## 2025-02-03 PROCEDURE — 77387B: CUSTOM | Mod: 26

## 2025-02-04 ENCOUNTER — RESULT REVIEW (OUTPATIENT)
Age: 75
End: 2025-02-04

## 2025-02-04 ENCOUNTER — APPOINTMENT (OUTPATIENT)
Dept: INFUSION THERAPY | Facility: HOSPITAL | Age: 75
End: 2025-02-04

## 2025-02-04 ENCOUNTER — NON-APPOINTMENT (OUTPATIENT)
Age: 75
End: 2025-02-04

## 2025-02-04 VITALS
BODY MASS INDEX: 28.88 KG/M2 | HEART RATE: 71 BPM | RESPIRATION RATE: 16 BRPM | HEIGHT: 69 IN | TEMPERATURE: 96.98 F | WEIGHT: 195 LBS | OXYGEN SATURATION: 97 % | SYSTOLIC BLOOD PRESSURE: 159 MMHG | DIASTOLIC BLOOD PRESSURE: 90 MMHG

## 2025-02-04 LAB
ALBUMIN SERPL ELPH-MCNC: 4.3 G/DL — SIGNIFICANT CHANGE UP (ref 3.3–5)
ALP SERPL-CCNC: 58 U/L — SIGNIFICANT CHANGE UP (ref 40–120)
ALT FLD-CCNC: 12 U/L — SIGNIFICANT CHANGE UP (ref 10–45)
ANION GAP SERPL CALC-SCNC: 11 MMOL/L — SIGNIFICANT CHANGE UP (ref 5–17)
AST SERPL-CCNC: 23 U/L — SIGNIFICANT CHANGE UP (ref 10–40)
BILIRUB SERPL-MCNC: 1.2 MG/DL — SIGNIFICANT CHANGE UP (ref 0.2–1.2)
BUN SERPL-MCNC: 18 MG/DL — SIGNIFICANT CHANGE UP (ref 7–23)
CALCIUM SERPL-MCNC: 9.7 MG/DL — SIGNIFICANT CHANGE UP (ref 8.4–10.5)
CHLORIDE SERPL-SCNC: 104 MMOL/L — SIGNIFICANT CHANGE UP (ref 96–108)
CO2 SERPL-SCNC: 24 MMOL/L — SIGNIFICANT CHANGE UP (ref 22–31)
CREAT SERPL-MCNC: 0.98 MG/DL — SIGNIFICANT CHANGE UP (ref 0.5–1.3)
EGFR: 80 ML/MIN/1.73M2 — SIGNIFICANT CHANGE UP
GLUCOSE SERPL-MCNC: 94 MG/DL — SIGNIFICANT CHANGE UP (ref 70–99)
MAGNESIUM SERPL-MCNC: 2.1 MG/DL — SIGNIFICANT CHANGE UP (ref 1.6–2.6)
POTASSIUM SERPL-MCNC: 4.7 MMOL/L — SIGNIFICANT CHANGE UP (ref 3.5–5.3)
POTASSIUM SERPL-SCNC: 4.7 MMOL/L — SIGNIFICANT CHANGE UP (ref 3.5–5.3)
PROT SERPL-MCNC: 7.3 G/DL — SIGNIFICANT CHANGE UP (ref 6–8.3)
SODIUM SERPL-SCNC: 139 MMOL/L — SIGNIFICANT CHANGE UP (ref 135–145)

## 2025-02-04 PROCEDURE — 77427 RADIATION TX MANAGEMENT X5: CPT

## 2025-02-04 PROCEDURE — 77387B: CUSTOM | Mod: 26

## 2025-02-05 ENCOUNTER — NON-APPOINTMENT (OUTPATIENT)
Age: 75
End: 2025-02-05

## 2025-02-05 PROCEDURE — 77387B: CUSTOM | Mod: 26

## 2025-02-06 PROCEDURE — 77387B: CUSTOM | Mod: 26

## 2025-02-07 ENCOUNTER — RESULT REVIEW (OUTPATIENT)
Age: 75
End: 2025-02-07

## 2025-02-07 ENCOUNTER — APPOINTMENT (OUTPATIENT)
Dept: HEMATOLOGY ONCOLOGY | Facility: CLINIC | Age: 75
End: 2025-02-07
Payer: MEDICARE

## 2025-02-07 ENCOUNTER — APPOINTMENT (OUTPATIENT)
Dept: INFUSION THERAPY | Facility: HOSPITAL | Age: 75
End: 2025-02-07

## 2025-02-07 ENCOUNTER — APPOINTMENT (OUTPATIENT)
Dept: OTOLARYNGOLOGY | Facility: CLINIC | Age: 75
End: 2025-02-07
Payer: MEDICARE

## 2025-02-07 ENCOUNTER — NON-APPOINTMENT (OUTPATIENT)
Age: 75
End: 2025-02-07

## 2025-02-07 DIAGNOSIS — R43.2 PARAGEUSIA: ICD-10-CM

## 2025-02-07 DIAGNOSIS — C10.9 MALIGNANT NEOPLASM OF OROPHARYNX, UNSPECIFIED: ICD-10-CM

## 2025-02-07 DIAGNOSIS — B37.0 CANDIDAL STOMATITIS: ICD-10-CM
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
## 2025-02-07 DIAGNOSIS — K11.7 DISTURBANCES OF SALIVARY SECRETION: ICD-10-CM

## 2025-02-07 PROBLEM — R13.10 ODYNOPHAGIA: Status: ACTIVE | Noted: 2025-02-07

## 2025-02-07 LAB
BASOPHILS # BLD AUTO: 0.05 K/UL — SIGNIFICANT CHANGE UP (ref 0–0.2)
BASOPHILS NFR BLD AUTO: 0.7 % — SIGNIFICANT CHANGE UP (ref 0–2)
EOSINOPHIL # BLD AUTO: 0.05 K/UL — SIGNIFICANT CHANGE UP (ref 0–0.5)
EOSINOPHIL NFR BLD AUTO: 0.7 % — SIGNIFICANT CHANGE UP (ref 0–6)
HCT VFR BLD CALC: 38.3 % — LOW (ref 39–50)
HGB BLD-MCNC: 13.7 G/DL — SIGNIFICANT CHANGE UP (ref 13–17)
IMM GRANULOCYTES NFR BLD AUTO: 0.5 % — SIGNIFICANT CHANGE UP (ref 0–0.9)
LYMPHOCYTES # BLD AUTO: 0.55 K/UL — LOW (ref 1–3.3)
LYMPHOCYTES # BLD AUTO: 7.4 % — LOW (ref 13–44)
MCHC RBC-ENTMCNC: 31.4 PG — SIGNIFICANT CHANGE UP (ref 27–34)
MCHC RBC-ENTMCNC: 35.8 G/DL — SIGNIFICANT CHANGE UP (ref 32–36)
MCV RBC AUTO: 87.8 FL — SIGNIFICANT CHANGE UP (ref 80–100)
MONOCYTES # BLD AUTO: 0.51 K/UL — SIGNIFICANT CHANGE UP (ref 0–0.9)
MONOCYTES NFR BLD AUTO: 6.9 % — SIGNIFICANT CHANGE UP (ref 2–14)
NEUTROPHILS # BLD AUTO: 6.2 K/UL — SIGNIFICANT CHANGE UP (ref 1.8–7.4)
NEUTROPHILS NFR BLD AUTO: 83.8 % — HIGH (ref 43–77)
NRBC # BLD: 0 /100 WBCS — SIGNIFICANT CHANGE UP (ref 0–0)
NRBC BLD-RTO: 0 /100 WBCS — SIGNIFICANT CHANGE UP (ref 0–0)
PLATELET # BLD AUTO: 186 K/UL — SIGNIFICANT CHANGE UP (ref 150–400)
RBC # BLD: 4.36 M/UL — SIGNIFICANT CHANGE UP (ref 4.2–5.8)
RBC # FLD: 12.9 % — SIGNIFICANT CHANGE UP (ref 10.3–14.5)
WBC # BLD: 7.4 K/UL — SIGNIFICANT CHANGE UP (ref 3.8–10.5)
WBC # FLD AUTO: 7.4 K/UL — SIGNIFICANT CHANGE UP (ref 3.8–10.5)

## 2025-02-07 PROCEDURE — 99214 OFFICE O/P EST MOD 30 MIN: CPT | Mod: 25

## 2025-02-07 PROCEDURE — 99214 OFFICE O/P EST MOD 30 MIN: CPT

## 2025-02-07 PROCEDURE — 77014: CPT | Mod: 26

## 2025-02-07 PROCEDURE — 31575 DIAGNOSTIC LARYNGOSCOPY: CPT

## 2025-02-07 RX ORDER — NYSTATIN 100000 [USP'U]/ML
100000 SUSPENSION ORAL 4 TIMES DAILY
Qty: 150 | Refills: 0 | Status: ACTIVE | COMMUNITY
Start: 2025-02-07 | End: 1900-01-01

## 2025-02-07 RX ORDER — DIPHENHYDRAMINE HYDROCHLORIDE AND LIDOCAINE HYDROCHLORIDE AND ALUMINUM HYDROXIDE AND MAGNESIUM HYDRO
KIT EVERY 4 HOURS
Qty: 1 | Refills: 4 | Status: DISCONTINUED | COMMUNITY
Start: 2025-01-31 | End: 2025-02-07

## 2025-02-07 RX ORDER — LIDOCAINE HYDROCHLORIDE 20 MG/ML
2 SOLUTION ORAL; TOPICAL
Qty: 240 | Refills: 2 | Status: ACTIVE | COMMUNITY
Start: 2025-02-07 | End: 1900-01-01

## 2025-02-10 PROCEDURE — 77387B: CUSTOM | Mod: 26

## 2025-02-11 ENCOUNTER — APPOINTMENT (OUTPATIENT)
Dept: OTOLARYNGOLOGY | Facility: CLINIC | Age: 75
End: 2025-02-11

## 2025-02-11 ENCOUNTER — NON-APPOINTMENT (OUTPATIENT)
Age: 75
End: 2025-02-11

## 2025-02-11 ENCOUNTER — RESULT REVIEW (OUTPATIENT)
Age: 75
End: 2025-02-11

## 2025-02-11 ENCOUNTER — APPOINTMENT (OUTPATIENT)
Dept: INFUSION THERAPY | Facility: HOSPITAL | Age: 75
End: 2025-02-11

## 2025-02-11 VITALS
RESPIRATION RATE: 16 BRPM | TEMPERATURE: 96.98 F | OXYGEN SATURATION: 99 % | WEIGHT: 189 LBS | SYSTOLIC BLOOD PRESSURE: 152 MMHG | BODY MASS INDEX: 27.99 KG/M2 | DIASTOLIC BLOOD PRESSURE: 88 MMHG | HEIGHT: 69 IN | HEART RATE: 79 BPM

## 2025-02-11 PROCEDURE — 77427 RADIATION TX MANAGEMENT X5: CPT

## 2025-02-11 PROCEDURE — 77387B: CUSTOM | Mod: 26

## 2025-02-12 ENCOUNTER — NON-APPOINTMENT (OUTPATIENT)
Age: 75
End: 2025-02-12

## 2025-02-12 LAB
ANION GAP SERPL CALC-SCNC: 16 MMOL/L — SIGNIFICANT CHANGE UP (ref 5–17)
BUN SERPL-MCNC: 29 MG/DL — HIGH (ref 7–23)
CALCIUM SERPL-MCNC: 10.1 MG/DL — SIGNIFICANT CHANGE UP (ref 8.4–10.5)
CHLORIDE SERPL-SCNC: 97 MMOL/L — SIGNIFICANT CHANGE UP (ref 96–108)
CO2 SERPL-SCNC: 23 MMOL/L — SIGNIFICANT CHANGE UP (ref 22–31)
CREAT SERPL-MCNC: 1.08 MG/DL — SIGNIFICANT CHANGE UP (ref 0.5–1.3)
EGFR: 72 ML/MIN/1.73M2 — SIGNIFICANT CHANGE UP
GLUCOSE SERPL-MCNC: 52 MG/DL — CRITICAL LOW (ref 70–99)
MAGNESIUM SERPL-MCNC: 2.2 MG/DL — SIGNIFICANT CHANGE UP (ref 1.6–2.6)
POTASSIUM SERPL-MCNC: 5.5 MMOL/L — HIGH (ref 3.5–5.3)
POTASSIUM SERPL-SCNC: 5.5 MMOL/L — HIGH (ref 3.5–5.3)
SODIUM SERPL-SCNC: 136 MMOL/L — SIGNIFICANT CHANGE UP (ref 135–145)

## 2025-02-12 PROCEDURE — 77387B: CUSTOM | Mod: 26

## 2025-02-13 PROCEDURE — 77387B: CUSTOM | Mod: 26

## 2025-02-14 ENCOUNTER — APPOINTMENT (OUTPATIENT)
Dept: INFUSION THERAPY | Facility: HOSPITAL | Age: 75
End: 2025-02-14

## 2025-02-14 ENCOUNTER — RESULT REVIEW (OUTPATIENT)
Age: 75
End: 2025-02-14

## 2025-02-14 ENCOUNTER — APPOINTMENT (OUTPATIENT)
Dept: HEMATOLOGY ONCOLOGY | Facility: CLINIC | Age: 75
End: 2025-02-14
Payer: MEDICARE

## 2025-02-14 DIAGNOSIS — C09.9 MALIGNANT NEOPLASM OF TONSIL, UNSPECIFIED: ICD-10-CM

## 2025-02-14 LAB
BASOPHILS # BLD AUTO: 0.02 K/UL — SIGNIFICANT CHANGE UP (ref 0–0.2)
BASOPHILS NFR BLD AUTO: 0.4 % — SIGNIFICANT CHANGE UP (ref 0–2)
EOSINOPHIL # BLD AUTO: 0.02 K/UL — SIGNIFICANT CHANGE UP (ref 0–0.5)
EOSINOPHIL NFR BLD AUTO: 0.4 % — SIGNIFICANT CHANGE UP (ref 0–6)
HCT VFR BLD CALC: 34.9 % — LOW (ref 39–50)
HGB BLD-MCNC: 12.8 G/DL — LOW (ref 13–17)
IMM GRANULOCYTES NFR BLD AUTO: 0.2 % — SIGNIFICANT CHANGE UP (ref 0–0.9)
LYMPHOCYTES # BLD AUTO: 0.4 K/UL — LOW (ref 1–3.3)
LYMPHOCYTES # BLD AUTO: 8.3 % — LOW (ref 13–44)
MCHC RBC-ENTMCNC: 31.4 PG — SIGNIFICANT CHANGE UP (ref 27–34)
MCHC RBC-ENTMCNC: 36.7 G/DL — HIGH (ref 32–36)
MCV RBC AUTO: 85.7 FL — SIGNIFICANT CHANGE UP (ref 80–100)
MONOCYTES # BLD AUTO: 0.59 K/UL — SIGNIFICANT CHANGE UP (ref 0–0.9)
MONOCYTES NFR BLD AUTO: 12.2 % — SIGNIFICANT CHANGE UP (ref 2–14)
NEUTROPHILS # BLD AUTO: 3.8 K/UL — SIGNIFICANT CHANGE UP (ref 1.8–7.4)
NEUTROPHILS NFR BLD AUTO: 78.5 % — HIGH (ref 43–77)
NRBC BLD AUTO-RTO: 0 /100 WBCS — SIGNIFICANT CHANGE UP (ref 0–0)
PLATELET # BLD AUTO: 186 K/UL — SIGNIFICANT CHANGE UP (ref 150–400)
RBC # BLD: 4.07 M/UL — LOW (ref 4.2–5.8)
RBC # FLD: 13.1 % — SIGNIFICANT CHANGE UP (ref 10.3–14.5)
WBC # BLD: 4.84 K/UL — SIGNIFICANT CHANGE UP (ref 3.8–10.5)
WBC # FLD AUTO: 4.84 K/UL — SIGNIFICANT CHANGE UP (ref 3.8–10.5)

## 2025-02-14 PROCEDURE — 99214 OFFICE O/P EST MOD 30 MIN: CPT

## 2025-02-14 PROCEDURE — 77014: CPT | Mod: 26

## 2025-02-17 ENCOUNTER — OUTPATIENT (OUTPATIENT)
Dept: OUTPATIENT SERVICES | Facility: HOSPITAL | Age: 75
LOS: 1 days | Discharge: HOME | End: 2025-02-17

## 2025-02-18 ENCOUNTER — RESULT REVIEW (OUTPATIENT)
Age: 75
End: 2025-02-18

## 2025-02-18 ENCOUNTER — NON-APPOINTMENT (OUTPATIENT)
Age: 75
End: 2025-02-18

## 2025-02-18 ENCOUNTER — APPOINTMENT (OUTPATIENT)
Dept: INFUSION THERAPY | Facility: HOSPITAL | Age: 75
End: 2025-02-18

## 2025-02-18 PROCEDURE — 77387B: CUSTOM | Mod: 26

## 2025-02-19 ENCOUNTER — NON-APPOINTMENT (OUTPATIENT)
Age: 75
End: 2025-02-19

## 2025-02-19 VITALS
HEIGHT: 69 IN | SYSTOLIC BLOOD PRESSURE: 149 MMHG | OXYGEN SATURATION: 98 % | BODY MASS INDEX: 27.99 KG/M2 | DIASTOLIC BLOOD PRESSURE: 85 MMHG | HEART RATE: 73 BPM | TEMPERATURE: 96.98 F | WEIGHT: 189 LBS | RESPIRATION RATE: 16 BRPM

## 2025-02-19 VITALS — BODY MASS INDEX: 28.5 KG/M2 | WEIGHT: 193 LBS

## 2025-02-19 DIAGNOSIS — E86.0 DEHYDRATION: ICD-10-CM

## 2025-02-19 LAB
ALBUMIN SERPL ELPH-MCNC: 3.8 G/DL — SIGNIFICANT CHANGE UP (ref 3.3–5)
ALP SERPL-CCNC: 60 U/L — SIGNIFICANT CHANGE UP (ref 40–120)
ALT FLD-CCNC: 12 U/L — SIGNIFICANT CHANGE UP (ref 10–45)
ANION GAP SERPL CALC-SCNC: 14 MMOL/L — SIGNIFICANT CHANGE UP (ref 5–17)
AST SERPL-CCNC: 39 U/L — SIGNIFICANT CHANGE UP (ref 10–40)
BILIRUB SERPL-MCNC: 0.9 MG/DL — SIGNIFICANT CHANGE UP (ref 0.2–1.2)
BUN SERPL-MCNC: 33 MG/DL — HIGH (ref 7–23)
CALCIUM SERPL-MCNC: 9 MG/DL — SIGNIFICANT CHANGE UP (ref 8.4–10.5)
CHLORIDE SERPL-SCNC: 97 MMOL/L — SIGNIFICANT CHANGE UP (ref 96–108)
CO2 SERPL-SCNC: 21 MMOL/L — LOW (ref 22–31)
CREAT SERPL-MCNC: 1.03 MG/DL — SIGNIFICANT CHANGE UP (ref 0.5–1.3)
EGFR: 76 ML/MIN/1.73M2 — SIGNIFICANT CHANGE UP
EGFR: 76 ML/MIN/1.73M2 — SIGNIFICANT CHANGE UP
GLUCOSE SERPL-MCNC: 115 MG/DL — HIGH (ref 70–99)
MAGNESIUM SERPL-MCNC: 1.9 MG/DL — SIGNIFICANT CHANGE UP (ref 1.6–2.6)
POTASSIUM SERPL-MCNC: 5.6 MMOL/L — HIGH (ref 3.5–5.3)
POTASSIUM SERPL-SCNC: 5.6 MMOL/L — HIGH (ref 3.5–5.3)
PROT SERPL-MCNC: 7.2 G/DL — SIGNIFICANT CHANGE UP (ref 6–8.3)
SODIUM SERPL-SCNC: 132 MMOL/L — LOW (ref 135–145)

## 2025-02-19 PROCEDURE — 77427 RADIATION TX MANAGEMENT X5: CPT

## 2025-02-19 PROCEDURE — 77387B: CUSTOM | Mod: 26

## 2025-02-20 PROCEDURE — 77387B: CUSTOM | Mod: 26

## 2025-02-21 ENCOUNTER — RESULT REVIEW (OUTPATIENT)
Age: 75
End: 2025-02-21

## 2025-02-21 ENCOUNTER — APPOINTMENT (OUTPATIENT)
Dept: HEMATOLOGY ONCOLOGY | Facility: CLINIC | Age: 75
End: 2025-02-21
Payer: MEDICARE

## 2025-02-21 ENCOUNTER — APPOINTMENT (OUTPATIENT)
Dept: INFUSION THERAPY | Facility: HOSPITAL | Age: 75
End: 2025-02-21

## 2025-02-21 DIAGNOSIS — C10.9 MALIGNANT NEOPLASM OF OROPHARYNX, UNSPECIFIED: ICD-10-CM

## 2025-02-21 DIAGNOSIS — B37.0 CANDIDAL STOMATITIS: ICD-10-CM

## 2025-02-21 PROBLEM — K12.1 STOMATITIS: Status: ACTIVE | Noted: 2025-02-21

## 2025-02-21 LAB
BASOPHILS # BLD AUTO: 0.03 K/UL — SIGNIFICANT CHANGE UP (ref 0–0.2)
BASOPHILS NFR BLD AUTO: 0.7 % — SIGNIFICANT CHANGE UP (ref 0–2)
EOSINOPHIL # BLD AUTO: 0.01 K/UL — SIGNIFICANT CHANGE UP (ref 0–0.5)
EOSINOPHIL NFR BLD AUTO: 0.2 % — SIGNIFICANT CHANGE UP (ref 0–6)
HCT VFR BLD CALC: 32.4 % — LOW (ref 39–50)
HGB BLD-MCNC: 11.9 G/DL — LOW (ref 13–17)
IMM GRANULOCYTES NFR BLD AUTO: 0.7 % — SIGNIFICANT CHANGE UP (ref 0–0.9)
LYMPHOCYTES # BLD AUTO: 0.37 K/UL — LOW (ref 1–3.3)
LYMPHOCYTES # BLD AUTO: 8.7 % — LOW (ref 13–44)
MCHC RBC-ENTMCNC: 31.5 PG — SIGNIFICANT CHANGE UP (ref 27–34)
MCHC RBC-ENTMCNC: 36.7 G/DL — HIGH (ref 32–36)
MCV RBC AUTO: 85.7 FL — SIGNIFICANT CHANGE UP (ref 80–100)
MONOCYTES # BLD AUTO: 0.46 K/UL — SIGNIFICANT CHANGE UP (ref 0–0.9)
MONOCYTES NFR BLD AUTO: 10.8 % — SIGNIFICANT CHANGE UP (ref 2–14)
NEUTROPHILS # BLD AUTO: 3.34 K/UL — SIGNIFICANT CHANGE UP (ref 1.8–7.4)
NEUTROPHILS NFR BLD AUTO: 78.9 % — HIGH (ref 43–77)
NRBC BLD AUTO-RTO: 0 /100 WBCS — SIGNIFICANT CHANGE UP (ref 0–0)
PLATELET # BLD AUTO: 137 K/UL — LOW (ref 150–400)
RBC # BLD: 3.78 M/UL — LOW (ref 4.2–5.8)
RBC # FLD: 13.4 % — SIGNIFICANT CHANGE UP (ref 10.3–14.5)
WBC # BLD: 4.24 K/UL — SIGNIFICANT CHANGE UP (ref 3.8–10.5)
WBC # FLD AUTO: 4.24 K/UL — SIGNIFICANT CHANGE UP (ref 3.8–10.5)

## 2025-02-21 PROCEDURE — 99214 OFFICE O/P EST MOD 30 MIN: CPT

## 2025-02-21 PROCEDURE — 77014: CPT | Mod: 26

## 2025-02-21 RX ORDER — DEXAMETHASONE 0.5 MG/5ML
0.5 ELIXIR ORAL 4 TIMES DAILY
Qty: 200 | Refills: 0 | Status: ACTIVE | COMMUNITY
Start: 2025-02-21 | End: 1900-01-01

## 2025-02-21 RX ORDER — IBUPROFEN 100 MG/5ML
100 SUSPENSION ORAL EVERY 6 HOURS
Qty: 500 | Refills: 1 | Status: ACTIVE | COMMUNITY
Start: 2025-02-21 | End: 1900-01-01

## 2025-02-21 RX ORDER — ACETAMINOPHEN 160 MG/5ML
160 SOLUTION ORAL EVERY 6 HOURS
Qty: 800 | Refills: 1 | Status: ACTIVE | COMMUNITY
Start: 2025-02-21 | End: 1900-01-01

## 2025-02-24 ENCOUNTER — NON-APPOINTMENT (OUTPATIENT)
Age: 75
End: 2025-02-24

## 2025-02-24 DIAGNOSIS — R11.2 NAUSEA WITH VOMITING, UNSPECIFIED: ICD-10-CM

## 2025-02-24 DIAGNOSIS — Z51.11 ENCOUNTER FOR ANTINEOPLASTIC CHEMOTHERAPY: ICD-10-CM

## 2025-02-24 PROCEDURE — 77387B: CUSTOM | Mod: 26

## 2025-02-25 ENCOUNTER — NON-APPOINTMENT (OUTPATIENT)
Age: 75
End: 2025-02-25

## 2025-02-25 ENCOUNTER — RESULT REVIEW (OUTPATIENT)
Age: 75
End: 2025-02-25

## 2025-02-25 ENCOUNTER — APPOINTMENT (OUTPATIENT)
Dept: HEMATOLOGY ONCOLOGY | Facility: CLINIC | Age: 75
End: 2025-02-25
Payer: MEDICARE

## 2025-02-25 ENCOUNTER — APPOINTMENT (OUTPATIENT)
Dept: INFUSION THERAPY | Facility: HOSPITAL | Age: 75
End: 2025-02-25

## 2025-02-25 ENCOUNTER — APPOINTMENT (OUTPATIENT)
Dept: OTOLARYNGOLOGY | Facility: CLINIC | Age: 75
End: 2025-02-25

## 2025-02-25 VITALS
SYSTOLIC BLOOD PRESSURE: 135 MMHG | BODY MASS INDEX: 27.47 KG/M2 | OXYGEN SATURATION: 100 % | HEART RATE: 69 BPM | WEIGHT: 186 LBS | TEMPERATURE: 96.8 F | RESPIRATION RATE: 16 BRPM | DIASTOLIC BLOOD PRESSURE: 82 MMHG

## 2025-02-25 DIAGNOSIS — F01.50 VASCULAR DEMENTIA W/OUT BEHAVIORAL DISTURBANCE: ICD-10-CM

## 2025-02-25 DIAGNOSIS — K11.7 DISTURBANCES OF SALIVARY SECRETION: ICD-10-CM

## 2025-02-25 DIAGNOSIS — K59.00 CONSTIPATION, UNSPECIFIED: ICD-10-CM
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
## 2025-02-25 LAB
ANION GAP SERPL CALC-SCNC: 10 MMOL/L — SIGNIFICANT CHANGE UP (ref 5–17)
BUN SERPL-MCNC: 31 MG/DL — HIGH (ref 7–23)
CALCIUM SERPL-MCNC: 9.3 MG/DL — SIGNIFICANT CHANGE UP (ref 8.4–10.5)
CHLORIDE SERPL-SCNC: 99 MMOL/L — SIGNIFICANT CHANGE UP (ref 96–108)
CO2 SERPL-SCNC: 28 MMOL/L — SIGNIFICANT CHANGE UP (ref 22–31)
CREAT SERPL-MCNC: 1.12 MG/DL — SIGNIFICANT CHANGE UP (ref 0.5–1.3)
EGFR: 69 ML/MIN/1.73M2 — SIGNIFICANT CHANGE UP
EGFR: 69 ML/MIN/1.73M2 — SIGNIFICANT CHANGE UP
GLUCOSE SERPL-MCNC: 96 MG/DL — SIGNIFICANT CHANGE UP (ref 70–99)
MAGNESIUM SERPL-MCNC: 2.3 MG/DL — SIGNIFICANT CHANGE UP (ref 1.6–2.6)
POTASSIUM SERPL-MCNC: 4.5 MMOL/L — SIGNIFICANT CHANGE UP (ref 3.5–5.3)
POTASSIUM SERPL-SCNC: 4.5 MMOL/L — SIGNIFICANT CHANGE UP (ref 3.5–5.3)
SODIUM SERPL-SCNC: 137 MMOL/L — SIGNIFICANT CHANGE UP (ref 135–145)

## 2025-02-25 PROCEDURE — 99214 OFFICE O/P EST MOD 30 MIN: CPT

## 2025-02-25 PROCEDURE — 77387B: CUSTOM | Mod: 26

## 2025-02-25 RX ORDER — GABAPENTIN 250 MG/5ML
300 SOLUTION ORAL 3 TIMES DAILY
Qty: 300 | Refills: 0 | Status: ACTIVE | COMMUNITY
Start: 2025-02-25 | End: 1900-01-01

## 2025-02-26 PROCEDURE — 77387B: CUSTOM | Mod: 26

## 2025-02-26 PROCEDURE — 77427 RADIATION TX MANAGEMENT X5: CPT

## 2025-02-27 PROCEDURE — 77387B: CUSTOM | Mod: 26

## 2025-02-28 ENCOUNTER — APPOINTMENT (OUTPATIENT)
Dept: INFUSION THERAPY | Facility: HOSPITAL | Age: 75
End: 2025-02-28

## 2025-02-28 ENCOUNTER — RESULT REVIEW (OUTPATIENT)
Age: 75
End: 2025-02-28

## 2025-02-28 ENCOUNTER — APPOINTMENT (OUTPATIENT)
Dept: HEMATOLOGY ONCOLOGY | Facility: CLINIC | Age: 75
End: 2025-02-28

## 2025-02-28 LAB
BASOPHILS # BLD AUTO: 0.02 K/UL — SIGNIFICANT CHANGE UP (ref 0–0.2)
BASOPHILS NFR BLD AUTO: 0.6 % — SIGNIFICANT CHANGE UP (ref 0–2)
EOSINOPHIL # BLD AUTO: 0.01 K/UL — SIGNIFICANT CHANGE UP (ref 0–0.5)
EOSINOPHIL NFR BLD AUTO: 0.3 % — SIGNIFICANT CHANGE UP (ref 0–6)
HCT VFR BLD CALC: 30.2 % — LOW (ref 39–50)
HGB BLD-MCNC: 10.9 G/DL — LOW (ref 13–17)
IMM GRANULOCYTES NFR BLD AUTO: 1.3 % — HIGH (ref 0–0.9)
LYMPHOCYTES # BLD AUTO: 0.23 K/UL — LOW (ref 1–3.3)
LYMPHOCYTES # BLD AUTO: 7.3 % — LOW (ref 13–44)
MCHC RBC-ENTMCNC: 31.1 PG — SIGNIFICANT CHANGE UP (ref 27–34)
MCHC RBC-ENTMCNC: 36.1 G/DL — HIGH (ref 32–36)
MCV RBC AUTO: 86.3 FL — SIGNIFICANT CHANGE UP (ref 80–100)
MONOCYTES # BLD AUTO: 0.35 K/UL — SIGNIFICANT CHANGE UP (ref 0–0.9)
MONOCYTES NFR BLD AUTO: 11.2 % — SIGNIFICANT CHANGE UP (ref 2–14)
NEUTROPHILS # BLD AUTO: 2.48 K/UL — SIGNIFICANT CHANGE UP (ref 1.8–7.4)
NEUTROPHILS NFR BLD AUTO: 79.3 % — HIGH (ref 43–77)
NRBC BLD AUTO-RTO: 0 /100 WBCS — SIGNIFICANT CHANGE UP (ref 0–0)
PLATELET # BLD AUTO: 141 K/UL — LOW (ref 150–400)
RBC # BLD: 3.5 M/UL — LOW (ref 4.2–5.8)
RBC # FLD: 13.7 % — SIGNIFICANT CHANGE UP (ref 10.3–14.5)
WBC # BLD: 3.13 K/UL — LOW (ref 3.8–10.5)
WBC # FLD AUTO: 3.13 K/UL — LOW (ref 3.8–10.5)

## 2025-02-28 PROCEDURE — 77014: CPT | Mod: 26

## 2025-03-03 ENCOUNTER — APPOINTMENT (OUTPATIENT)
Dept: OTOLARYNGOLOGY | Facility: CLINIC | Age: 75
End: 2025-03-03

## 2025-03-03 PROCEDURE — 77387B: CUSTOM | Mod: 26

## 2025-03-04 ENCOUNTER — APPOINTMENT (OUTPATIENT)
Dept: HEMATOLOGY ONCOLOGY | Facility: CLINIC | Age: 75
End: 2025-03-04
Payer: MEDICARE

## 2025-03-04 ENCOUNTER — NON-APPOINTMENT (OUTPATIENT)
Age: 75
End: 2025-03-04

## 2025-03-04 ENCOUNTER — APPOINTMENT (OUTPATIENT)
Dept: INFUSION THERAPY | Facility: HOSPITAL | Age: 75
End: 2025-03-04

## 2025-03-04 ENCOUNTER — RESULT REVIEW (OUTPATIENT)
Age: 75
End: 2025-03-04

## 2025-03-04 DIAGNOSIS — R43.2 PARAGEUSIA: ICD-10-CM

## 2025-03-04 DIAGNOSIS — C77.0 SECONDARY AND UNSPECIFIED MALIGNANT NEOPLASM OF LYMPH NODES OF HEAD, FACE AND NECK: ICD-10-CM

## 2025-03-04 DIAGNOSIS — R63.0 ANOREXIA: ICD-10-CM

## 2025-03-04 DIAGNOSIS — R23.4 CHANGES IN SKIN TEXTURE: ICD-10-CM

## 2025-03-04 DIAGNOSIS — R63.4 ABNORMAL WEIGHT LOSS: ICD-10-CM

## 2025-03-04 DIAGNOSIS — C09.9 MALIGNANT NEOPLASM OF TONSIL, UNSPECIFIED: ICD-10-CM

## 2025-03-04 DIAGNOSIS — K12.1 OTHER FORMS OF STOMATITIS: ICD-10-CM

## 2025-03-04 DIAGNOSIS — R13.10 DYSPHAGIA, UNSPECIFIED: ICD-10-CM

## 2025-03-04 LAB
ALBUMIN SERPL ELPH-MCNC: 4.1 G/DL — SIGNIFICANT CHANGE UP (ref 3.3–5)
ALP SERPL-CCNC: 76 U/L — SIGNIFICANT CHANGE UP (ref 40–120)
ALT FLD-CCNC: 18 U/L — SIGNIFICANT CHANGE UP (ref 10–45)
ANION GAP SERPL CALC-SCNC: 8 MMOL/L — SIGNIFICANT CHANGE UP (ref 5–17)
AST SERPL-CCNC: 23 U/L — SIGNIFICANT CHANGE UP (ref 10–40)
BILIRUB SERPL-MCNC: 1.3 MG/DL — HIGH (ref 0.2–1.2)
BUN SERPL-MCNC: 38 MG/DL — HIGH (ref 7–23)
CALCIUM SERPL-MCNC: 9.3 MG/DL — SIGNIFICANT CHANGE UP (ref 8.4–10.5)
CHLORIDE SERPL-SCNC: 102 MMOL/L — SIGNIFICANT CHANGE UP (ref 96–108)
CO2 SERPL-SCNC: 27 MMOL/L — SIGNIFICANT CHANGE UP (ref 22–31)
CREAT SERPL-MCNC: 1.16 MG/DL — SIGNIFICANT CHANGE UP (ref 0.5–1.3)
EGFR: 66 ML/MIN/1.73M2 — SIGNIFICANT CHANGE UP
EGFR: 66 ML/MIN/1.73M2 — SIGNIFICANT CHANGE UP
GLUCOSE SERPL-MCNC: 106 MG/DL — HIGH (ref 70–99)
MAGNESIUM SERPL-MCNC: 2 MG/DL — SIGNIFICANT CHANGE UP (ref 1.6–2.6)
POTASSIUM SERPL-MCNC: 5.1 MMOL/L — SIGNIFICANT CHANGE UP (ref 3.5–5.3)
POTASSIUM SERPL-SCNC: 5.1 MMOL/L — SIGNIFICANT CHANGE UP (ref 3.5–5.3)
PROT SERPL-MCNC: 7.2 G/DL — SIGNIFICANT CHANGE UP (ref 6–8.3)
SODIUM SERPL-SCNC: 137 MMOL/L — SIGNIFICANT CHANGE UP (ref 135–145)

## 2025-03-04 PROCEDURE — 77387B: CUSTOM | Mod: 26

## 2025-03-04 PROCEDURE — 99214 OFFICE O/P EST MOD 30 MIN: CPT

## 2025-03-04 RX ORDER — SILVER SULFADIAZINE 10 MG/G
1 CREAM TOPICAL TWICE DAILY
Qty: 1 | Refills: 0 | Status: ACTIVE | COMMUNITY
Start: 2025-03-04 | End: 1900-01-01

## 2025-03-05 ENCOUNTER — NON-APPOINTMENT (OUTPATIENT)
Age: 75
End: 2025-03-05

## 2025-03-05 ENCOUNTER — EMERGENCY (EMERGENCY)
Facility: HOSPITAL | Age: 75
LOS: 1 days | Discharge: HOME | End: 2025-03-05
Attending: EMERGENCY MEDICINE | Admitting: EMERGENCY MEDICINE
Payer: MEDICARE

## 2025-03-05 VITALS
TEMPERATURE: 98 F | RESPIRATION RATE: 18 BRPM | OXYGEN SATURATION: 100 % | HEIGHT: 70 IN | WEIGHT: 177.91 LBS | SYSTOLIC BLOOD PRESSURE: 149 MMHG | HEART RATE: 83 BPM | DIASTOLIC BLOOD PRESSURE: 87 MMHG

## 2025-03-05 LAB
ALBUMIN SERPL ELPH-MCNC: 4.1 G/DL — SIGNIFICANT CHANGE UP (ref 3.3–5)
ALP SERPL-CCNC: 77 U/L — SIGNIFICANT CHANGE UP (ref 40–120)
ALT FLD-CCNC: 21 U/L — SIGNIFICANT CHANGE UP (ref 4–41)
ANION GAP SERPL CALC-SCNC: 12 MMOL/L — SIGNIFICANT CHANGE UP (ref 7–14)
ANISOCYTOSIS BLD QL: SLIGHT — SIGNIFICANT CHANGE UP
AST SERPL-CCNC: 23 U/L — SIGNIFICANT CHANGE UP (ref 4–40)
BASOPHILS # BLD AUTO: 0.02 K/UL — SIGNIFICANT CHANGE UP (ref 0–0.2)
BASOPHILS NFR BLD AUTO: 0.9 % — SIGNIFICANT CHANGE UP (ref 0–2)
BILIRUB SERPL-MCNC: 1.3 MG/DL — HIGH (ref 0.2–1.2)
BUN SERPL-MCNC: 38 MG/DL — HIGH (ref 7–23)
CALCIUM SERPL-MCNC: 9.3 MG/DL — SIGNIFICANT CHANGE UP (ref 8.4–10.5)
CHLORIDE SERPL-SCNC: 101 MMOL/L — SIGNIFICANT CHANGE UP (ref 98–107)
CO2 SERPL-SCNC: 24 MMOL/L — SIGNIFICANT CHANGE UP (ref 22–31)
CREAT SERPL-MCNC: 1.11 MG/DL — SIGNIFICANT CHANGE UP (ref 0.5–1.3)
EGFR: 69 ML/MIN/1.73M2 — SIGNIFICANT CHANGE UP
EGFR: 69 ML/MIN/1.73M2 — SIGNIFICANT CHANGE UP
EOSINOPHIL # BLD AUTO: 0.02 K/UL — SIGNIFICANT CHANGE UP (ref 0–0.5)
EOSINOPHIL NFR BLD AUTO: 0.8 % — SIGNIFICANT CHANGE UP (ref 0–6)
GAS PNL BLDV: SIGNIFICANT CHANGE UP
GLUCOSE SERPL-MCNC: 99 MG/DL — SIGNIFICANT CHANGE UP (ref 70–99)
HCT VFR BLD CALC: 27.1 % — LOW (ref 39–50)
HGB BLD-MCNC: 9.7 G/DL — LOW (ref 13–17)
HYPOCHROMIA BLD QL: SLIGHT — SIGNIFICANT CHANGE UP
IANC: 1.95 K/UL — SIGNIFICANT CHANGE UP (ref 1.8–7.4)
LYMPHOCYTES # BLD AUTO: 0.24 K/UL — LOW (ref 1–3.3)
LYMPHOCYTES # BLD AUTO: 9.5 % — LOW (ref 13–44)
MAGNESIUM SERPL-MCNC: 2.2 MG/DL — SIGNIFICANT CHANGE UP (ref 1.6–2.6)
MCHC RBC-ENTMCNC: 31.4 PG — SIGNIFICANT CHANGE UP (ref 27–34)
MCHC RBC-ENTMCNC: 35.8 G/DL — SIGNIFICANT CHANGE UP (ref 32–36)
MCV RBC AUTO: 87.7 FL — SIGNIFICANT CHANGE UP (ref 80–100)
MONOCYTES # BLD AUTO: 0.24 K/UL — SIGNIFICANT CHANGE UP (ref 0–0.9)
MONOCYTES NFR BLD AUTO: 9.5 % — SIGNIFICANT CHANGE UP (ref 2–14)
NEUTROPHILS # BLD AUTO: 2.04 K/UL — SIGNIFICANT CHANGE UP (ref 1.8–7.4)
NEUTROPHILS NFR BLD AUTO: 78.4 % — HIGH (ref 43–77)
NEUTS BAND # BLD: 0.9 % — SIGNIFICANT CHANGE UP (ref 0–6)
NEUTS BAND NFR BLD: 0.9 % — SIGNIFICANT CHANGE UP (ref 0–6)
PHOSPHATE SERPL-MCNC: 2.6 MG/DL — SIGNIFICANT CHANGE UP (ref 2.5–4.5)
PLAT MORPH BLD: NORMAL — SIGNIFICANT CHANGE UP
PLATELET # BLD AUTO: 125 K/UL — LOW (ref 150–400)
PLATELET COUNT - ESTIMATE: ABNORMAL
POLYCHROMASIA BLD QL SMEAR: SLIGHT — SIGNIFICANT CHANGE UP
POTASSIUM SERPL-MCNC: 4.9 MMOL/L — SIGNIFICANT CHANGE UP (ref 3.5–5.3)
POTASSIUM SERPL-SCNC: 4.9 MMOL/L — SIGNIFICANT CHANGE UP (ref 3.5–5.3)
PROT SERPL-MCNC: 6.9 G/DL — SIGNIFICANT CHANGE UP (ref 6–8.3)
RBC # BLD: 3.09 M/UL — LOW (ref 4.2–5.8)
RBC # FLD: 14.3 % — SIGNIFICANT CHANGE UP (ref 10.3–14.5)
RBC BLD AUTO: ABNORMAL
SODIUM SERPL-SCNC: 137 MMOL/L — SIGNIFICANT CHANGE UP (ref 135–145)
WBC # BLD: 2.57 K/UL — LOW (ref 3.8–10.5)
WBC # FLD AUTO: 2.57 K/UL — LOW (ref 3.8–10.5)

## 2025-03-05 PROCEDURE — 71046 X-RAY EXAM CHEST 2 VIEWS: CPT | Mod: 26

## 2025-03-05 PROCEDURE — 70491 CT SOFT TISSUE NECK W/DYE: CPT | Mod: 26

## 2025-03-05 PROCEDURE — 99285 EMERGENCY DEPT VISIT HI MDM: CPT

## 2025-03-05 RX ORDER — LIDOCAINE HYDROCHLORIDE 20 MG/ML
20 JELLY TOPICAL ONCE
Refills: 0 | Status: COMPLETED | OUTPATIENT
Start: 2025-03-05 | End: 2025-03-05

## 2025-03-05 RX ORDER — ACETAMINOPHEN 500 MG/5ML
1000 LIQUID (ML) ORAL ONCE
Refills: 0 | Status: COMPLETED | OUTPATIENT
Start: 2025-03-05 | End: 2025-03-05

## 2025-03-05 RX ADMIN — Medication 1000 MILLILITER(S): at 19:56

## 2025-03-05 RX ADMIN — Medication 400 MILLIGRAM(S): at 19:57

## 2025-03-05 RX ADMIN — LIDOCAINE HYDROCHLORIDE 20 MILLILITER(S): 20 JELLY TOPICAL at 20:40

## 2025-03-06 ENCOUNTER — INPATIENT (INPATIENT)
Facility: HOSPITAL | Age: 75
LOS: 31 days | Discharge: NURSING, REHAB OR LONG-TERM CARE | End: 2025-04-07
Attending: HOSPITALIST | Admitting: HOSPITALIST
Payer: MEDICARE

## 2025-03-06 ENCOUNTER — APPOINTMENT (OUTPATIENT)
Dept: INFUSION THERAPY | Facility: HOSPITAL | Age: 75
End: 2025-03-06

## 2025-03-06 VITALS
OXYGEN SATURATION: 100 % | SYSTOLIC BLOOD PRESSURE: 127 MMHG | TEMPERATURE: 99 F | HEIGHT: 70 IN | DIASTOLIC BLOOD PRESSURE: 60 MMHG | HEART RATE: 86 BPM | RESPIRATION RATE: 18 BRPM

## 2025-03-06 VITALS
DIASTOLIC BLOOD PRESSURE: 81 MMHG | RESPIRATION RATE: 18 BRPM | HEART RATE: 72 BPM | OXYGEN SATURATION: 98 % | SYSTOLIC BLOOD PRESSURE: 128 MMHG | TEMPERATURE: 98 F

## 2025-03-06 DIAGNOSIS — C09.9 MALIGNANT NEOPLASM OF TONSIL, UNSPECIFIED: ICD-10-CM

## 2025-03-06 DIAGNOSIS — R62.7 ADULT FAILURE TO THRIVE: ICD-10-CM

## 2025-03-06 DIAGNOSIS — R13.10 DYSPHAGIA, UNSPECIFIED: ICD-10-CM

## 2025-03-06 LAB
ALBUMIN SERPL ELPH-MCNC: 4.3 G/DL — SIGNIFICANT CHANGE UP (ref 3.3–5)
ALP SERPL-CCNC: 83 U/L — SIGNIFICANT CHANGE UP (ref 40–120)
ALT FLD-CCNC: 19 U/L — SIGNIFICANT CHANGE UP (ref 4–41)
ANION GAP SERPL CALC-SCNC: 13 MMOL/L — SIGNIFICANT CHANGE UP (ref 7–14)
APPEARANCE UR: CLEAR — SIGNIFICANT CHANGE UP
AST SERPL-CCNC: 17 U/L — SIGNIFICANT CHANGE UP (ref 4–40)
BASOPHILS # BLD AUTO: 0.01 K/UL — SIGNIFICANT CHANGE UP (ref 0–0.2)
BASOPHILS NFR BLD AUTO: 0.4 % — SIGNIFICANT CHANGE UP (ref 0–2)
BILIRUB SERPL-MCNC: 1.2 MG/DL — SIGNIFICANT CHANGE UP (ref 0.2–1.2)
BILIRUB UR-MCNC: ABNORMAL
BUN SERPL-MCNC: 34 MG/DL — HIGH (ref 7–23)
CALCIUM SERPL-MCNC: 9.5 MG/DL — SIGNIFICANT CHANGE UP (ref 8.4–10.5)
CHLORIDE SERPL-SCNC: 103 MMOL/L — SIGNIFICANT CHANGE UP (ref 98–107)
CO2 SERPL-SCNC: 22 MMOL/L — SIGNIFICANT CHANGE UP (ref 22–31)
COLOR SPEC: ABNORMAL
CREAT SERPL-MCNC: 1.04 MG/DL — SIGNIFICANT CHANGE UP (ref 0.5–1.3)
DIFF PNL FLD: NEGATIVE — SIGNIFICANT CHANGE UP
EGFR: 75 ML/MIN/1.73M2 — SIGNIFICANT CHANGE UP
EGFR: 75 ML/MIN/1.73M2 — SIGNIFICANT CHANGE UP
EOSINOPHIL # BLD AUTO: 0.01 K/UL — SIGNIFICANT CHANGE UP (ref 0–0.5)
EOSINOPHIL NFR BLD AUTO: 0.4 % — SIGNIFICANT CHANGE UP (ref 0–6)
GAS PNL BLDV: SIGNIFICANT CHANGE UP
GLUCOSE SERPL-MCNC: 103 MG/DL — HIGH (ref 70–99)
GLUCOSE UR QL: NEGATIVE MG/DL — SIGNIFICANT CHANGE UP
HCT VFR BLD CALC: 28.4 % — LOW (ref 39–50)
HGB BLD-MCNC: 10.1 G/DL — LOW (ref 13–17)
IANC: 2.11 K/UL — SIGNIFICANT CHANGE UP (ref 1.8–7.4)
IMM GRANULOCYTES NFR BLD AUTO: 0.4 % — SIGNIFICANT CHANGE UP (ref 0–0.9)
KETONES UR-MCNC: 15 MG/DL
LEUKOCYTE ESTERASE UR-ACNC: NEGATIVE — SIGNIFICANT CHANGE UP
LYMPHOCYTES # BLD AUTO: 0.25 K/UL — LOW (ref 1–3.3)
LYMPHOCYTES # BLD AUTO: 9.2 % — LOW (ref 13–44)
MAGNESIUM SERPL-MCNC: 2.1 MG/DL — SIGNIFICANT CHANGE UP (ref 1.6–2.6)
MCHC RBC-ENTMCNC: 31.1 PG — SIGNIFICANT CHANGE UP (ref 27–34)
MCHC RBC-ENTMCNC: 35.6 G/DL — SIGNIFICANT CHANGE UP (ref 32–36)
MCV RBC AUTO: 87.4 FL — SIGNIFICANT CHANGE UP (ref 80–100)
MONOCYTES # BLD AUTO: 0.32 K/UL — SIGNIFICANT CHANGE UP (ref 0–0.9)
MONOCYTES NFR BLD AUTO: 11.8 % — SIGNIFICANT CHANGE UP (ref 2–14)
NEUTROPHILS # BLD AUTO: 2.11 K/UL — SIGNIFICANT CHANGE UP (ref 1.8–7.4)
NEUTROPHILS NFR BLD AUTO: 77.8 % — HIGH (ref 43–77)
NITRITE UR-MCNC: NEGATIVE — SIGNIFICANT CHANGE UP
NRBC # BLD AUTO: 0 K/UL — SIGNIFICANT CHANGE UP (ref 0–0)
NRBC # FLD: 0 K/UL — SIGNIFICANT CHANGE UP (ref 0–0)
NRBC BLD AUTO-RTO: 0 /100 WBCS — SIGNIFICANT CHANGE UP (ref 0–0)
PH UR: 5.5 — SIGNIFICANT CHANGE UP (ref 5–8)
PLATELET # BLD AUTO: 138 K/UL — LOW (ref 150–400)
POTASSIUM SERPL-MCNC: 5 MMOL/L — SIGNIFICANT CHANGE UP (ref 3.5–5.3)
POTASSIUM SERPL-SCNC: 5 MMOL/L — SIGNIFICANT CHANGE UP (ref 3.5–5.3)
PROT SERPL-MCNC: 7.4 G/DL — SIGNIFICANT CHANGE UP (ref 6–8.3)
PROT UR-MCNC: 30 MG/DL
RBC # BLD: 3.25 M/UL — LOW (ref 4.2–5.8)
RBC # FLD: 14.3 % — SIGNIFICANT CHANGE UP (ref 10.3–14.5)
SODIUM SERPL-SCNC: 138 MMOL/L — SIGNIFICANT CHANGE UP (ref 135–145)
SP GR SPEC: 1.05 — HIGH (ref 1–1.03)
UROBILINOGEN FLD QL: 2 MG/DL (ref 0.2–1)
WBC # BLD: 2.71 K/UL — LOW (ref 3.8–10.5)
WBC # FLD AUTO: 2.71 K/UL — LOW (ref 3.8–10.5)

## 2025-03-06 PROCEDURE — 99223 1ST HOSP IP/OBS HIGH 75: CPT

## 2025-03-06 PROCEDURE — 99285 EMERGENCY DEPT VISIT HI MDM: CPT

## 2025-03-06 PROCEDURE — 99497 ADVNCD CARE PLAN 30 MIN: CPT | Mod: 25

## 2025-03-06 RX ORDER — ENOXAPARIN SODIUM 100 MG/ML
40 INJECTION SUBCUTANEOUS EVERY 24 HOURS
Refills: 0 | Status: DISCONTINUED | OUTPATIENT
Start: 2025-03-07 | End: 2025-03-17

## 2025-03-06 RX ORDER — MELATONIN 5 MG
3 TABLET ORAL AT BEDTIME
Refills: 0 | Status: DISCONTINUED | OUTPATIENT
Start: 2025-03-06 | End: 2025-03-10

## 2025-03-06 RX ORDER — SODIUM CHLORIDE 9 G/1000ML
1000 INJECTION, SOLUTION INTRAVENOUS ONCE
Refills: 0 | Status: COMPLETED | OUTPATIENT
Start: 2025-03-06 | End: 2025-03-06

## 2025-03-06 RX ORDER — DIPHENHYDRAMINE HYDROCHLORIDE AND LIDOCAINE HYDROCHLORIDE AND ALUMINUM HYDROXIDE AND MAGNESIUM HYDRO
5 KIT EVERY 4 HOURS
Refills: 0 | Status: DISCONTINUED | OUTPATIENT
Start: 2025-03-06 | End: 2025-03-07

## 2025-03-06 RX ORDER — SODIUM CHLORIDE 9 G/1000ML
1000 INJECTION, SOLUTION INTRAVENOUS
Refills: 0 | Status: DISCONTINUED | OUTPATIENT
Start: 2025-03-06 | End: 2025-03-08

## 2025-03-06 RX ORDER — ACETAMINOPHEN 500 MG/5ML
650 LIQUID (ML) ORAL EVERY 6 HOURS
Refills: 0 | Status: DISCONTINUED | OUTPATIENT
Start: 2025-03-06 | End: 2025-03-08

## 2025-03-06 RX ORDER — ACETAMINOPHEN 500 MG/5ML
1000 LIQUID (ML) ORAL ONCE
Refills: 0 | Status: COMPLETED | OUTPATIENT
Start: 2025-03-06 | End: 2025-03-06

## 2025-03-06 RX ORDER — GABAPENTIN 400 MG/1
300 CAPSULE ORAL THREE TIMES A DAY
Refills: 0 | Status: DISCONTINUED | OUTPATIENT
Start: 2025-03-06 | End: 2025-03-07

## 2025-03-06 RX ORDER — MAGNESIUM, ALUMINUM HYDROXIDE 200-200 MG
30 TABLET,CHEWABLE ORAL EVERY 4 HOURS
Refills: 0 | Status: DISCONTINUED | OUTPATIENT
Start: 2025-03-06 | End: 2025-03-20

## 2025-03-06 RX ORDER — LIDOCAINE HYDROCHLORIDE 20 MG/ML
20 JELLY TOPICAL ONCE
Refills: 0 | Status: COMPLETED | OUTPATIENT
Start: 2025-03-06 | End: 2025-03-06

## 2025-03-06 RX ORDER — ONDANSETRON HCL/PF 4 MG/2 ML
4 VIAL (ML) INJECTION EVERY 8 HOURS
Refills: 0 | Status: DISCONTINUED | OUTPATIENT
Start: 2025-03-06 | End: 2025-04-07

## 2025-03-06 RX ORDER — LIDOCAINE HYDROCHLORIDE 20 MG/ML
15 JELLY TOPICAL
Qty: 7 | Refills: 0
Start: 2025-03-06 | End: 2025-03-19

## 2025-03-06 RX ADMIN — SODIUM CHLORIDE 1000 MILLILITER(S): 9 INJECTION, SOLUTION INTRAVENOUS at 17:47

## 2025-03-06 RX ADMIN — SODIUM CHLORIDE 75 MILLILITER(S): 9 INJECTION, SOLUTION INTRAVENOUS at 22:24

## 2025-03-06 RX ADMIN — GABAPENTIN 300 MILLIGRAM(S): 400 CAPSULE ORAL at 22:24

## 2025-03-06 RX ADMIN — Medication 400 MILLIGRAM(S): at 19:01

## 2025-03-06 NOTE — ED ADULT TRIAGE NOTE - CHIEF COMPLAINT QUOTE
Pt coming in for decreased po intake and throat pain related to throat cancer (does radiation 5x a week and chemo x1 a week). Pt able to swallow own spit. Seen yesterday and chose to not be admitted but would like to be admitted today. Denies chest pain, SOB, worsening sx, dizziness, n/v. phx throat CA , hernia, dementia

## 2025-03-06 NOTE — H&P ADULT - NSICDXPASTMEDICALHX_GEN_ALL_CORE_FT
PAST MEDICAL HISTORY:  Acid reflux     Osteoarthritis of right hip     Tonsil cancer     Ventral hernia

## 2025-03-06 NOTE — H&P ADULT - PROBLEM SELECTOR PLAN 3
Active treatment   Follows with Dr. Dominic Gibson -Oncology and Dr. [facility]- OncologyOncology not emailed as admitted to Onc[facility] service

## 2025-03-06 NOTE — H&P ADULT - NSHPREVIEWOFSYSTEMS_GEN_ALL_CORE
REVIEW OF SYSTEMS:    CONSTITUTIONAL: No weakness, fevers or chills + decreased PO intake   EYES/ENT: No visual changes;  +odynophagia ; No sore throat; No rhinorrhea; No sinus pain/pressure  NECK: No pain or stiffness  RESPIRATORY: No cough, wheezing, hemoptysis; No shortness of breath  CARDIOVASCULAR: No chest pain or palpitations; No lower extremity edema  GASTROINTESTINAL: No abdominal or epigastric pain. No nausea, vomiting, or hematemesis; No diarrhea or constipation. No melena or hematochezia.  GENITOURINARY: No dysuria, frequency or hematuria  NEUROLOGICAL: No numbness or weakness  MSK: ambulates without assistance   SKIN: No itching, burning, rashes, or lesions   All other review of systems is negative unless indicated above.

## 2025-03-06 NOTE — ED ADULT NURSE NOTE - IN THE PAST 12 MONTHS HAVE YOU USED DRUGS OTHER THAN THOSE REQUIRED FOR MEDICAL REASON?
ER Documentation


Chief Complaint


Date/Time


DATE: 17 


TIME: 19:24


Chief Complaint


Cough x 1 wk intermittent with post tussive emesis





HPI


4-year-old girl who was accompanied by Chela, her mother here in the 

emergency department for productive cough for about 1 week.  Mother stated that 

she had a fever at home.





Patients mother said that patient has no ear discharges, difficulty swallowing

, loss of appetite, cough, difficulty breathing, nausea, vomiting, changes in 

bowel or bladder habits, recent exposure to illness, night sweats, chills, 

recent antibiotic use in the last three months, exposure to cigarette smoking.





Good hydration at home. Good intake and output at home. Age-appropriate. Acting 

appropriately.





Allergy: No known drug allergies.


Full term when born. Normal vaginal delivery.  No complications.


Last Pediatric visit: 


PMH: Denies.


Family medical history: Denies.


Surgery: Denies.


Medications:


Up-to-date on vaccinations.





ROS


All systems reviewed and are negative except as per history of present illness.





Medications


Home Meds


Active Scripts


Dextromethorphan Hb-Promethazine Hcl (Promethazine DM Syrup) 473 Ml Syrup, 5 ML 

PO Q6H Y for COUGH, #4 OZ


   Prov:ALFONSO HAGEN F         17


Azithromycin* (Azithromycin*) 200 Mg/5 Ml Susp.recon, 7.7 ML PO DAILY for 1 Day

, BOTTLE


   Prov:GILBERTILAANIKAPORTERAR F         17


Azithromycin* (Azithromycin*) 200 Mg/5 Ml Susp.recon, 3.8 ML PO DAILY for 4 Days

, BOTTLE


   Prov:GILBERTILAPORTER DONALDSONAR F         17


Neomycin/Polymyxin/Hydrocort* (Cortisporin* Otic) 10 Ml Susp, 4 DROP RIGHT EAR 

QID for 7 Days, EA


   Prov:CARLOS BOWMAN MD         3/10/17


Amoxicillin/Potassium Clav* (Augmentin*) 250 Mg/5 Ml Susp.recon, 25 ML PO Q12 

for 10 Days


   Prov:CARLOS BOWMAN MD         3/10/17


Acetaminophen* (Tylenol*) 160 Mg/5 Ml Soln, 10 ML PO Q4H Y for PAIN AND OR 

ELEVATED TEMP, #4 OZ


   Prov:JOSE LUIS RICHEY NP         16


Cephalexin* (Keflex* Susp) 50 Mg/Ml Susp, 5 ML PO Q6 for 7 Days, BOTTLE


   Prov:JOSE LUIS RICHEY NP         16


Acetaminophen* (Tylenol*) 160 Mg/5 Ml Soln, 10 ML PO Q8H Y for PAIN AND OR 

ELEVATED TEMP, #4 OZ


   Prov:ADALBERTO KEITA PA-C         12/29/15


Cephalexin* (Keflex* Susp) 50 Mg/Ml Susp, 5 ML PO QID for 7 Days, BOTTLE


   Prov:ADALBERTO KEITA PA-C         12/29/15


Reported Medications


[Unk]   No Conflict Check


   7/3/12





Allergies


Allergies:  


Coded Allergies:  


     No Known Drug Allergies (Verified  Allergy, Unknown, 16)





PMhx/Soc


History of Surgery:  No


Anesthesia Reaction:  No


Hx Neurological Disorder:  No


Hx Respiratory Disorders:  No


Hx Cardiac Disorders:  No


Hx Psychiatric Problems:  No


Hx Miscellaneous Medical Probl:  No


Hx Alcohol Use:  No


Hx Substance Use:  No


Hx Tobacco Use:  No





Physical Exam


Vitals





Vital Signs








  Date Time  Temp Pulse Resp B/P Pulse Ox O2 Delivery O2 Flow Rate FiO2


 


17 19:08 99.6 113 20  99   








Physical Exam


GENERAL SURVEY: Alert, oriented and playful. Age appropriate.


HEENT:


Head: Atraumatic, normocephalic


EARS: 


Right Ear: External canal has no erythema or edema. Tympanic membrane pearly 

gray and intact. There is no obstructions or discharges noted.


Left Ear: External canal has no erythema or edema. Tympanic membrane pearly 

gray and intact. There is no obstructions or discharges noted.


EYES: PERRLA. No redness, discharges or obstructions noted.


NOSE: Mild congestion.  Midline without deviation. No polyps or exudates noted. 

Frontal and maxillary sinuses are non-tender to palpation.


THROAT: Right tonsils grade is +1 left tonsils grade is +1. No redness. No 

exudates. Oral mucosa, pink, and intact, and uvula is in midline.


NECK: Supple, without lymphadenopathy, or swelling.


LYMPH: Supple, without lymphadenopathy, or swelling. No masses. 


CARDIO:RRR. No murmur, gallops, or thrills


RESP/CHEST: Chest is symmetrical. No accessory muscle use. Clear to 

auscultation. No retractions noted


GI: Active bowel sounds. Soft, round, non-distended, non-guarding, non-tender 

to light and deep palpation. No peritoneal signs. 


: N/A    


SKIN: Skin is intact and warm to touch. No rashes noted. No hives. No vesicular 

rash. No lesions. 


MUSC: Ambulatory with steady gait/moves all of extremities with good ROM and 

has no limitations.


NEURO: Alert and oriented. Age appropriate.





Procedures/MDM


Examination: Please see physical examination.





Disease process, medical treatment was explained to parents. They verbalized 

understanding and agreed with the medical treatment, and follow-up care.





Differential diagnosis: Pneumonia versus bronchitis versus upper respiratory 

infection





Medical decision m[age]-year-old girl who was accompanied by Chela, her 

mother here in the emergency department for productive cough for about 1 week.  

Mother stated that she had a fever at home.  Mother's history about the patient'

s complaint, my physical findings are consistent with my final diagnosis of 

bronchitis.





Medications prescribed are the following: Azithromycin.  Promethazine.





Patient and family member are made aware of the side effects and adverse 

reactions of the medications prescribed. Instructed on when to seek emergent 

and medical attention in case allergic/anaphylactic reactions or severe side 

effects and or adverse reactions to medications. Patient and family member 

verbalized understanding. 





Patient instructed


Instructed to follow-up with his Pediatrician in 24 hours.  Community resources 

was also provided.


Instructed to Call 911 for chest pain, shortness of breath. Advised to come 

back here in ED as soon as possible for severity of symptoms which includes but 

not limited to: any new symptoms; shortness of breath/difficulty of breathing; 

cardiovascular changes; severe gastrointestinal symptoms; signs and symptoms of 

bleeding and or infection; signs of compartment syndrome/neurovascular changes; 

neurological changes/deficits. 


Patient and family member verbalized understanding. 





Pediatrics: 


Upon discharge, patient is alert, age appropriate, and playful. Speaks full and 

clear sentences; no difficulty swallowing; tolerating secretions; denies pain, 

has no neurological deficits; has no neurovascular deficits; has no difficulty 

of breathing. Breathing even, regular and unlabored. Lung sounds are clear to 

auscultation. Not in distress. Appears comfortable.  Moves all 4 extremities. 

Parents appears satisfied with the care provided here in ED.





Departure


Diagnosis:  


 Primary Impression:  


 Cough


Condition:  Good





Additional Instructions:  


Patient instructed


Instructed to follow-up with his Pediatrician in 24 hours.  Community resources 

was also provided.


Instructed to Call 911 for chest pain, shortness of breath. Advised to come 

back here in ED as soon as possible for severity of symptoms which includes but 

not limited to: any new symptoms; shortness of breath/difficulty of breathing; 

cardiovascular changes; severe gastrointestinal symptoms; signs and symptoms of 

bleeding and or infection; signs of compartment syndrome/neurovascular changes; 

neurological changes/deficits. 


Patient and family member verbalized understanding.











ALFONSO HAGEN 2017 19:28


Patient and family member verbalized understanding.











ALFONSO HAGEN 2017 19:28 No

## 2025-03-06 NOTE — ED PROVIDER NOTE - PHYSICAL EXAMINATION
On Physical Exam:  General: ill appearing, in NAD, speaking clearly in full sentences and without difficulty; cooperative with exam  HEENT: PERRL, MMM  Neck: no neck tenderness, no nuchal rigidity  Cardiac: normal s1, s2; RRR; no MGR  Lungs: CTABL  Abdomen: soft nontender/nondistended  : no bladder tenderness or distension  Skin: pale warm, intact, no rash  Extremities: no peripheral edema, no gross deformities  Neuro: no gross neurologic deficits On Physical Exam:  General: ill appearing, in NAD, speaking clearly in full sentences and without difficulty; cooperative with exam  HEENT: PERRL, MMM  Neck: no neck tenderness, no nuchal rigidity  Cardiac: normal s1, s2; RRR; no MGR  Lungs: CTABL  Abdomen: soft nontender/nondistended  : no bladder tenderness or distension  Skin: pale warm, intact, no rash  Extremities: no peripheral edema, no gross deformities  Neuro: no gross neurologic deficits  ATTENDING PHYSICAL EXAM  GEN - NAD; well appearing; A+O x3  HEAD - NC/AT; EYES/NOSE - PERRL, EOMI, mucous membranes moist, no discharge; THROAT: mild erythema on hard palate.  NECK: Neck supple, non-tender without lymphadenopathy, no masses, no JVD  PULMONARY - CTA b/l, symmetric breath sounds, no w/r/r  CARDIAC -s1s2, RRR, no M,R,G  ABDOMEN - +NABS, ND, NT, soft, no guarding, no rebound, no masses   BACK - no CVA tenderness, No vertebral or paravertebral tenderness  EXTREMITIES - symmetric pulses, 2+ dp, capillary refill < 2 seconds, no clubbing, no cyanosis, no edema  SKIN - no rash or bruising   NEUROLOGIC - alert, CN 2-12 intact

## 2025-03-06 NOTE — H&P ADULT - HISTORY OF PRESENT ILLNESS
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 He does not like to take magic mouthwash due to the way it makes things taste. Reports his taste buds are already altered from the radiation therapy and the magic mouthwash makes it worse. Does not take his gabapentin as prescribed either due to the odynophagia.   Seen in ED 3/5/25 but opted to not be admitted. Presenting today as no improvement in symptoms.   Denies  headache, dizziness, chest pain, palpitations, SOB, abdominal pain, joint pain, diarrhea urinary symptoms.   Vitals: T99.7, HR 80, /66, RR 18 satting 99% RA

## 2025-03-06 NOTE — H&P ADULT - NSHPPHYSICALEXAM_GEN_ALL_CORE
PHYSICAL EXAM:  GENERAL: NAD, comfortable at bedside   HEAD:  Atraumatic, Normocephalic  EYES: EOMI, PERRL, conjunctiva and sclera clear  NECK: Supple, No JVD  CHEST/LUNG: Clear to auscultation bilaterally; No wheezes, rales or rhonchi  HEART: Regular rate and rhythm; No murmurs, rubs, or gallops, (+)S1, S2  ABDOMEN: Soft, Nontender, Nondistended; Normal Bowel sounds   EXTREMITIES:  2+ Peripheral Pulses, No clubbing, cyanosis, or edema  PSYCH: normal mood and affect  NEUROLOGY:  moving all extremities spontaneously  SKIN: post radiation changes on the neck

## 2025-03-06 NOTE — ED PROVIDER NOTE - NSICDXPASTSURGICALHX_GEN_ALL_CORE_FT
PAST SURGICAL HISTORY:  History of bilateral inguinal hernia repair 1974    S/P carpal tunnel release right ; 1993

## 2025-03-06 NOTE — ED ADULT NURSE NOTE - NSFALLHARMRISKINTERV_ED_ALL_ED
Assistance OOB with selected safe patient handling equipment if applicable/Assistance with ambulation/Communicate risk of Fall with Harm to all staff, patient, and family/Monitor gait and stability/Provide patient with walking aids/Provide visual cue: red socks, yellow wristband, yellow gown, etc/Reinforce activity limits and safety measures with patient and family/Use of alarms - bed, stretcher, chair and/or video monitoring/Bed in lowest position, wheels locked, appropriate side rails in place/Call bell, personal items and telephone in reach/Instruct patient to call for assistance before getting out of bed/chair/stretcher/Non-slip footwear applied when patient is off stretcher/[place] to call system/Physically safe environment - no spills, clutter or unnecessary equipment/Purposeful Proactive Rounding/Room/bathroom lighting operational, light cord in reach

## 2025-03-06 NOTE — ED PROVIDER NOTE - OBJECTIVE STATEMENT
- A 64-year-old male with a past medical history of stage 4 tonsil cancer and vascular dementia presents to the emergency department for failure to thrive, dehydration and pain management. The patient is currently undergoing chemo-radiation treatment at [facility] and is on day 29 of 35 radiation/ chemo treatments. He has been experiencing difficulty eating and drinking due to pain when swallowing, resulting in poor oral intake for more than 10 days. The patient was seen in the ED the last night told to be admitted but did not want to stay and was discharged after chest CT, chest x-ray, and electrolytes were found to be normal. pt reportedly has been not having any oral intake since DC. The patient's mental status has reportedly worsened during the course of treatment. patient reporting no injuries or complaints at this time. - A 64-year-old male with a past medical history of stage 4 tonsil cancer and vascular dementia presents to the emergency department for failure to thrive, dehydration and pain management. The patient is currently undergoing chemo-radiation treatment at [facility] and is on day 29 of 35 radiation/ chemo treatments. He has been experiencing difficulty eating and drinking due to pain when swallowing, resulting in poor oral intake for more than 10 days. The patient was seen in the ED the last night told to be admitted but did not want to stay and was discharged after chest CT, chest x-ray, and electrolytes were found to be normal. pt reportedly has been not having any oral intake since DC. The patient's mental status has reportedly worsened during the course of treatment. patient reporting no injuries or complaints at this time.  Attending - Agree with above.  I evaluated patient myself. 73 y/o M with son and daughter at bedside. hx of tonsillar cancer, receiving EBR since 1/21/25.  Onc Dr. Macdonald.  Reports increasing throat pain with swallowing.  Decreased PO intake.  To ED yesterday.  RTER as unable to tolerate any PO.

## 2025-03-06 NOTE — H&P ADULT - PROBLEM SELECTOR PLAN 1
Progressive   Encourage magic mouthwash - will order at 5ml Q4   Encourage prescribed gabapentin 300mmg TID (solution)  Speech eval  Palliative care and Nutritional consult

## 2025-03-06 NOTE — H&P ADULT - NSHPPOAPULMEMBOLUS_GEN_A_CORE
Miconazole - monostat or other over the counter products    For pediatric patients under 12 and younger, please schedule any follow-up with Dr. Kaylee Morin    If you would prefer to schedule at another clinic the below locations in our Medical group are accepting new pediatric patients. [address] [facility]. [address]  [address] [facility], [address]  Get Directions  Tel: 115 Norma Correa of [address]   [address] [address]  Willian Shukla  Get Directions  Tel: [phone]    For patients 15 or older, the following family practice locations are available.      113 Nadia Wallace NP   - Leonardo Anderson NP    [address] [facility]  [facility]  [place], [address]  Tel: [phone]    [facility] - ages 11 and older   - Felipe James MD      [address] [place], [place] [id number] [facility] no

## 2025-03-06 NOTE — ED ADULT NURSE NOTE - OBJECTIVE STATEMENT
Receive dpt in bed  and Ox3in NAD c/o feeling cold, not eating or drinking properly for the past week, reports being treated for tonsilar chancer, on chemo and radiation, last completed treatement yesterday, reports it is painful to swallow. Pt's skin color appropriate for ethnicity, breathing is even and unlabored, abd soft non distended non tender, IV initialed 20 G right CA labs drawn and sent.

## 2025-03-06 NOTE — ED PROVIDER NOTE - CLINICAL SUMMARY MEDICAL DECISION MAKING FREE TEXT BOX
- A 64-year-old male with a past medical history of stage 4 tonsil cancer and vascular dementia presents to the emergency department for failure to thrive, dehydration and pain management. The patient is currently undergoing chemo-radiation treatment at [facility] and is on day 29 of 35 radiation/ chemo treatments. He has been experiencing difficulty eating and drinking due to pain when swallowing, resulting in poor oral intake for more than 10 days. The patient was seen in the ED the last night told to be admitted but did not want to stay and was discharged after chest CT, chest x-ray, and electrolytes were found to be normal. pt reportedly has been not having any oral intake since DC. The patient's mental status has reportedly worsened during the course of treatment.  failure to thrive  labs done last night will repeat and admit, reassess

## 2025-03-06 NOTE — PATIENT PROFILE ADULT - SAFE PLACE TO LIVE
Clinic Note  [facility]    Subjective:      Ming Harrington is a 58 y.o. male with PMHx COPD, CKD3b with chronic BL leg swelling, long-term methadone maintenance, HTN, poor adherence. Patient here today all-around feeling better than baseline, for follow up and refills.    HTN- hasn't taken anything in past week, normally on losartan 100 mg daily    CKD/swelling - down 13 lbs since last time, taking 40 mg BID lasix. Says he missed nephro appt Couldn't get ride to nephrologist. Says he'll call again.    COPD - Reports adherence to symbicort and albuterol     Opioid induced constipation - now taking sennosides    Review of Systems   Constitutional:  Negative for chills and fever.   HENT:  Negative for congestion and sore throat.    Respiratory:  Positive for cough.    Cardiovascular:  Positive for leg swelling. Negative for chest pain and palpitations.   Gastrointestinal:  Negative for abdominal pain, diarrhea, nausea and vomiting.   Genitourinary:  Negative for dysuria.   Musculoskeletal:  Negative for joint pain and myalgias.   Neurological:  Negative for dizziness, tingling, weakness and headaches.   Psychiatric/Behavioral:  Negative for depression. The patient is not nervous/anxious.           Objective:      Vitals:    12/21/23 1039   BP: (!) 151/94   Pulse: 87     Body mass index is 43.01 kg/m².      Physical Exam  Constitutional:       Appearance: Normal appearance.   HENT:      Mouth/Throat:      Mouth: Mucous membranes are moist.      Pharynx: Oropharynx is clear.   Cardiovascular:      Rate and Rhythm: Normal rate and regular rhythm.      Pulses: Normal pulses.      Heart sounds: Normal heart sounds.   Pulmonary:      Effort: Pulmonary effort is normal.      Breath sounds: Normal breath sounds.   Abdominal:      General: Abdomen is flat. Bowel sounds are normal.      Palpations: Abdomen is soft.   Musculoskeletal:      Right lower leg: No edema.      Left lower leg: No edema.   Skin:     General: Skin is  warm and dry.      Capillary Refill: Capillary refill takes less than 2 seconds.   Neurological:      General: No focal deficit present.      Mental Status: He is alert and oriented to person, place, and time. Mental status is at baseline.   Psychiatric:         Mood and Affect: Mood normal.         Behavior: Behavior normal.            Assessment/Plan:      In spite week of meds, BP only moderately elevated - patient states motivation to re-start losartan today. Still needs nephro - though fluid status is improved today from last visit. Given methadone use, phenergan syrup for nausea/cough liquid to be switched to tablet form. RTC in 1 month for follow up.    1. Stage 3b chronic kidney disease    - losartan (COZAAR) 100 MG tablet; Take 1 tablet (100 mg total) by mouth once daily.  Dispense: 180 tablet; Refill: 1  - furosemide (LASIX) 40 MG tablet; Take 1 tablet (40 mg total) by mouth 2 (two) times a day.  Dispense: 180 tablet; Refill: 1  - CBC Auto Differential; Future  - Comprehensive Metabolic Panel; Future  - Hemoglobin A1C; Future  - Lipid Panel; Future  - Microalbumin/Creatinine Ratio, Urine; Future    2. Chronic obstructive pulmonary disease, unspecified COPD type    - albuterol (VENTOLIN HFA) 90 mcg/actuation inhaler; Inhale 2 puffs into the lungs every 6 (six) hours as needed for Wheezing. Rescue  Dispense: 18 g; Refill: 2  - SYMBICORT 160-4.5 mcg/actuation HFAA; Inhale 2 puffs into the lungs every 12 (twelve) hours. Controller  Dispense: 10.2 g; Refill: 2    3. Hypertension, well controlled    - losartan (COZAAR) 100 MG tablet; Take 1 tablet (100 mg total) by mouth once daily.  Dispense: 180 tablet; Refill: 1  - CBC Auto Differential; Future  - Comprehensive Metabolic Panel; Future  - Hemoglobin A1C; Future  - Lipid Panel; Future  - Microalbumin/Creatinine Ratio, Urine; Future    4. Generalized anxiety disorder    - EScitalopram oxalate (LEXAPRO) 10 MG tablet; Take 1 tablet (10 mg total) by mouth once  daily.  Dispense: 180 tablet; Refill: 1    5. Chronic cough    - promethazine (PHENERGAN) 25 MG tablet; Take 1 tablet (25 mg total) by mouth every 4 (four) hours.  Dispense: 90 tablet; Refill: 1      Patient discussed with attending physician, Dr. Beth Berumen MD  [facility] PGY-3  12/21/2023      The following information is provided to all patients.  This information is to help you find resources for any of the problems found today that may be affecting your health:                Living healthy guide: www.[facility].[place].[facility]       Understanding Diabetes: www.diabetes.org       Eating healthy: www.cdc.gov/healthyweight      CDC home safety checklist: www.cdc.gov/steadi/patient.html      Agency on Aging: www.goea.[place].gov       Alcoholics anonymous (AA): www.aa.org      Physical Activity: www.richard.nih.gov/rg2axdm       Tobacco use: www.quitwithusla.org             no

## 2025-03-06 NOTE — H&P ADULT - NSHPLABSRESULTS_GEN_ALL_CORE
10.1   2.71  )-----------( 138      ( 06 Mar 2025 18:10 )             28.4     138  |  103  |  34[H]  ----------------------------<  103[H]     03-06  5.0   |  22  |  1.04    Ca    9.5      06 Mar 2025 18:10  Phos  2.6     03-05  Mg     2.10     -06    TPro  7.4  /  Alb  4.3  /  TBili  1.2  /  DBili  x   /  AST  17  /  ALT  19  /  AlkPhos  83  03-06        18:07 - VBG - pH: 7.37  | pCO2: 48    | pO2: <20   | Lactate: 1.1    20:05 - VBG - pH: 7.39  | pCO2: 46    | pO2: 20    | Lactate: 0.9        Urinalysis Basic - ( 06 Mar 2025 18:07 )  Color: Orange / Appearance: Clear / S.052 / pH: 5.5  Gluc: Negative mg/dL / Ketone: 15 mg/dL  / Bili: Small / Urobili: 2.0 mg/dL   Blood: Negative / Protein: 30 mg/dL / Nitrite: Negative   Leuk Esterase: Negative / RBC: 4 /HPF / WBC 2 /HPF   Sq Epi: 2 /HPF / Bacteria: Negative /HPF  Hyaline Casts: x/WBC Casts: x          Urinalysis with Rflx Culture (collected 06 Mar 2025 18:07)    CT neck soft tissue interpreted by radiology:   1. Mucosal thickening and enhancement in the larynx and oropharynx may be due  to infectious laryngopharyngitis or sequela of previous radiation performed.  2. No evidence of residual or recurrent tumor within the left tonsillar location.  3. No new or enlarging cervical lymph nodes.  4. Small fluid aspiration in the left lung.  5. Advanced degenerative spondylosis causing multilevel cervical spinal canal    EKG interpreted by myself: nsr   stenosis.

## 2025-03-06 NOTE — H&P ADULT - CONVERSATION DETAILS
Pt initially said " I would not want to burden my children" but then discussing with HCP at bedside they/he would like to remain full code at this time until further discussions are had once the pt feels better.

## 2025-03-06 NOTE — PATIENT PROFILE ADULT - FALL HARM RISK - HARM RISK INTERVENTIONS

## 2025-03-07 ENCOUNTER — APPOINTMENT (OUTPATIENT)
Dept: HEMATOLOGY ONCOLOGY | Facility: CLINIC | Age: 75
End: 2025-03-07

## 2025-03-07 ENCOUNTER — APPOINTMENT (OUTPATIENT)
Dept: INFUSION THERAPY | Facility: HOSPITAL | Age: 75
End: 2025-03-07

## 2025-03-07 DIAGNOSIS — Z71.89 OTHER SPECIFIED COUNSELING: ICD-10-CM

## 2025-03-07 DIAGNOSIS — Z51.5 ENCOUNTER FOR PALLIATIVE CARE: ICD-10-CM

## 2025-03-07 DIAGNOSIS — C76.0 MALIGNANT NEOPLASM OF HEAD, FACE AND NECK: ICD-10-CM

## 2025-03-07 DIAGNOSIS — C09.9 MALIGNANT NEOPLASM OF TONSIL, UNSPECIFIED: ICD-10-CM

## 2025-03-07 LAB
ANION GAP SERPL CALC-SCNC: 12 MMOL/L — SIGNIFICANT CHANGE UP (ref 7–14)
BASOPHILS # BLD AUTO: 0.01 K/UL — SIGNIFICANT CHANGE UP (ref 0–0.2)
BASOPHILS NFR BLD AUTO: 0.5 % — SIGNIFICANT CHANGE UP (ref 0–2)
BUN SERPL-MCNC: 26 MG/DL — HIGH (ref 7–23)
CALCIUM SERPL-MCNC: 8.7 MG/DL — SIGNIFICANT CHANGE UP (ref 8.4–10.5)
CHLORIDE SERPL-SCNC: 100 MMOL/L — SIGNIFICANT CHANGE UP (ref 98–107)
CO2 SERPL-SCNC: 22 MMOL/L — SIGNIFICANT CHANGE UP (ref 22–31)
CREAT SERPL-MCNC: 0.9 MG/DL — SIGNIFICANT CHANGE UP (ref 0.5–1.3)
EGFR: 89 ML/MIN/1.73M2 — SIGNIFICANT CHANGE UP
EGFR: 89 ML/MIN/1.73M2 — SIGNIFICANT CHANGE UP
EOSINOPHIL # BLD AUTO: 0.01 K/UL — SIGNIFICANT CHANGE UP (ref 0–0.5)
EOSINOPHIL NFR BLD AUTO: 0.5 % — SIGNIFICANT CHANGE UP (ref 0–6)
FOLATE SERPL-MCNC: 7.6 NG/ML — SIGNIFICANT CHANGE UP (ref 3.1–17.5)
GLUCOSE SERPL-MCNC: 90 MG/DL — SIGNIFICANT CHANGE UP (ref 70–99)
HCT VFR BLD CALC: 25.3 % — LOW (ref 39–50)
HGB BLD-MCNC: 9.3 G/DL — LOW (ref 13–17)
IANC: 1.76 K/UL — LOW (ref 1.8–7.4)
IMM GRANULOCYTES NFR BLD AUTO: 0.5 % — SIGNIFICANT CHANGE UP (ref 0–0.9)
LYMPHOCYTES # BLD AUTO: 0.15 K/UL — LOW (ref 1–3.3)
LYMPHOCYTES # BLD AUTO: 6.8 % — LOW (ref 13–44)
MAGNESIUM SERPL-MCNC: 1.9 MG/DL — SIGNIFICANT CHANGE UP (ref 1.6–2.6)
MCHC RBC-ENTMCNC: 32.3 PG — SIGNIFICANT CHANGE UP (ref 27–34)
MCHC RBC-ENTMCNC: 36.8 G/DL — HIGH (ref 32–36)
MCV RBC AUTO: 87.8 FL — SIGNIFICANT CHANGE UP (ref 80–100)
MONOCYTES # BLD AUTO: 0.27 K/UL — SIGNIFICANT CHANGE UP (ref 0–0.9)
MONOCYTES NFR BLD AUTO: 12.2 % — SIGNIFICANT CHANGE UP (ref 2–14)
NEUTROPHILS # BLD AUTO: 1.76 K/UL — LOW (ref 1.8–7.4)
NEUTROPHILS NFR BLD AUTO: 79.5 % — HIGH (ref 43–77)
NRBC # BLD AUTO: 0 K/UL — SIGNIFICANT CHANGE UP (ref 0–0)
NRBC # FLD: 0 K/UL — SIGNIFICANT CHANGE UP (ref 0–0)
NRBC BLD AUTO-RTO: 0 /100 WBCS — SIGNIFICANT CHANGE UP (ref 0–0)
PHOSPHATE SERPL-MCNC: 2.6 MG/DL — SIGNIFICANT CHANGE UP (ref 2.5–4.5)
PLATELET # BLD AUTO: 129 K/UL — LOW (ref 150–400)
POTASSIUM SERPL-MCNC: 4.2 MMOL/L — SIGNIFICANT CHANGE UP (ref 3.5–5.3)
POTASSIUM SERPL-SCNC: 4.2 MMOL/L — SIGNIFICANT CHANGE UP (ref 3.5–5.3)
RBC # BLD: 2.88 M/UL — LOW (ref 4.2–5.8)
RBC # FLD: 14.6 % — HIGH (ref 10.3–14.5)
SODIUM SERPL-SCNC: 134 MMOL/L — LOW (ref 135–145)
T4 FREE+ TSH PNL SERPL: 0.32 UIU/ML — SIGNIFICANT CHANGE UP (ref 0.27–4.2)
VIT B12 SERPL-MCNC: 867 PG/ML — SIGNIFICANT CHANGE UP (ref 200–900)
WBC # BLD: 2.21 K/UL — LOW (ref 3.8–10.5)
WBC # FLD AUTO: 2.21 K/UL — LOW (ref 3.8–10.5)

## 2025-03-07 PROCEDURE — 99232 SBSQ HOSP IP/OBS MODERATE 35: CPT

## 2025-03-07 PROCEDURE — 99223 1ST HOSP IP/OBS HIGH 75: CPT

## 2025-03-07 RX ORDER — LIDOCAINE HYDROCHLORIDE 20 MG/ML
20 JELLY TOPICAL EVERY 6 HOURS
Refills: 0 | Status: DISCONTINUED | OUTPATIENT
Start: 2025-03-07 | End: 2025-03-10

## 2025-03-07 RX ADMIN — LIDOCAINE HYDROCHLORIDE 20 MILLILITER(S): 20 JELLY TOPICAL at 14:49

## 2025-03-07 RX ADMIN — LIDOCAINE HYDROCHLORIDE 20 MILLILITER(S): 20 JELLY TOPICAL at 17:12

## 2025-03-07 RX ADMIN — Medication 2 MILLIGRAM(S): at 12:17

## 2025-03-07 RX ADMIN — Medication 2 MILLIGRAM(S): at 13:09

## 2025-03-07 RX ADMIN — ENOXAPARIN SODIUM 40 MILLIGRAM(S): 100 INJECTION SUBCUTANEOUS at 17:12

## 2025-03-07 RX ADMIN — Medication 2 MILLIGRAM(S): at 17:48

## 2025-03-07 RX ADMIN — Medication 2 MILLIGRAM(S): at 18:34

## 2025-03-07 NOTE — DIETITIAN INITIAL EVALUATION ADULT - ADD RECOMMEND
1. Defer fluid management to medical team   2. Monitor weights, labs, BM's, skin integrity, PO intake   3. Please monitor % PO intake on flowsheets   4. Honor food preferences as able within therapeutic diet order

## 2025-03-07 NOTE — PROGRESS NOTE ADULT - ASSESSMENT
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     Active problems  tonsillar ca  vascular dementia   odynophagia/lack of appetite   FTT  Anemia in neoplastic disease  hyponatremia  hypercoagulable state        tonsillar ca  odynophagia/lack of appetite   Hyponatremia  FTT  (s/p C6 of 7 of cisplatin and on RT -should've been completed on 3/7)  Palliative care consulted,   Will start 2mg IV morphine q6hr to be given 30 min prior to schedule feeds, can give in conjunction with magic mouthwash  - Continue with liquid gabapentin TID.   Hyponatremia likely 2/2 reduced po intake, repelete and monitor   Will continue to optimize nutrition for FTT  ENT consulted, airway patent on scope    Hypercoagulable state 2/2 malignancy  On lovenox 40mg daily

## 2025-03-07 NOTE — CONSULT NOTE ADULT - ASSESSMENT
74-year-old M Phmx Right hip OA, ventral hernia, vascular dementia, stage 4 tonsil cancer (T2 N2, p16 negative, concurrent chemo/RT 1/21/25, Started first weekly cisplatin 1/24/25, last Tx on 2/28, was to complete 7cycles of Cisplatin on 3/7) who presented to the emergency department for failure to thrive, dehydration and pain management. Patient is on day 29 of 35 radiation/ chemo treatments. ENT consulted for dysphagia and airway evaluation. Physical exam with + left tonsillar mass and left level II cervical lymphopathy FOE with post radiation changes of thickened nonobstructive epiglottis and widely patent airway.

## 2025-03-07 NOTE — CONSULT NOTE ADULT - PROBLEM SELECTOR RECOMMENDATION 5
[facility]   Symptom mgmt     In the event of worsening symptoms, please contact the Palliative Medicine team via pager (if the patient is at [facility] #[phone] or if the patient is at [facility] #[phone]) The Geriatric and Palliative Medicine service has coverage 24 hours a day/ 7 days a week to provide medical recommendations regarding symptom management needs via telephone [facility]   Symptom mgmt    In the event of worsening symptoms, please contact the Palliative Medicine team via pager (if the patient is at [facility] #[phone] or if the patient is at [facility] #[phone]) The Geriatric and Palliative Medicine service has coverage 24 hours a day/ 7 days a week to provide medical recommendations regarding symptom management needs via telephone

## 2025-03-07 NOTE — CONSULT NOTE ADULT - PROBLEM SELECTOR RECOMMENDATION 9
Per family patient has been having progressive decrease in PO intake over course of last few weeks, it has now progressed to virtually zero. He has been following with his oncologist who have been doing IV hydration however he continues to exhibit dehydration and weight loss. He has been prescribed magic mouthwash and liquid lidocaine however this hasnt improved his symptoms. Patient states that everything tastes off. Currently he mixes lidocaine and maalox which he states just makes his tongue numb and food taste poor. He states that itr wears off quickly and for these reasons he has not been interested or compliant with the medication and only takes it 1-2 per day despite family encouragement. Son states that he had a good response with the gabapentin however he decided he wanted to stop taking it one day and convincing him to take it has been difficult. They report he hasnt ate anything in 2 weeks and the last calories he consumed were on Monday and he has only had sips of water since. He doesnt have interest in food due to the taste and pain, however he is reluctant to take anything for pain.     Plan:  - Continue to encourage magic mouthwash and liquid gabapentin (patient currently refusing)  - Patient with significant oral and esophogeal pain with swallowing, has discolored mucoid oral lesions- would consider infectious causes   - Given patients significant pain and inability to trial anything PO, offering IV pain medications prior to feeding. Family reluctant at this time given potential side effects of narcotics. Risk/benefit discussion was had and ultimate decision was to trial one time dose of IV morphine 2mg to see if improves ability to tolerate PO Per family patient has been having progressive decrease in PO intake over course of last few weeks, it has now progressed to virtually zero. He has been following with his oncologist who have been doing IV hydration however he continues to exhibit dehydration and weight loss. He has been prescribed magic mouthwash and liquid lidocaine however this hasnt improved his symptoms. Patient states that everything tastes off. Currently he mixes lidocaine and maalox which he states just makes his tongue numb and food taste poor. He states that itr wears off quickly and for these reasons he has not been interested or compliant with the medication and only takes it 1-2 per day despite family encouragement. Son states that he had a good response with the gabapentin however he decided he wanted to stop taking it one day and convincing him to take it has been difficult. They report he hasnt ate anything in 2 weeks and the last calories he consumed were on Monday and he has only had sips of water since. He doesnt have interest in food due to the taste and pain, however he is reluctant to take anything for pain.     Plan:  - Patient with significant oral and esophogeal pain with swallowing, has discolored mucoid oral lesions- would consider infectious causes   - Given patients significant pain and inability to trial anything PO, offering IV pain medications prior to feeding. Family reluctant at this time given potential side effects of narcotics. Risk/benefit discussion was had and ultimate decision was to trial one time dose of IV morphine 2mg to see if improves ability to tolerate PO- patient had some improvement  - Will start 2mg IV morphine q6hr to be given 30 min prior to schedule feeds, can give in conjunction with magic mouthwash  - Continue with liquid gabapentin TID Per family patient has been having progressive decrease in PO intake over course of last few weeks, it has now progressed to virtually zero. He has been following with his oncologist who have been doing IV hydration however he continues to exhibit dehydration and weight loss. He has been prescribed magic mouthwash and liquid lidocaine however this hasn't improved his symptoms. Patient states that everything tastes off. Currently he mixes lidocaine and maalox which he states just makes his tongue numb and food taste poor. He states that it wears off quickly and for these reasons he has not been interested or compliant with the medication and only takes it 1-2 per day despite family encouragement. Son states that he had a good response with the gabapentin however he decided he wanted to stop taking it one day and convincing him to take it has been difficult. They report he hasn't ate anything in 2 weeks and the last calories he consumed were on Monday and he has only had sips of water since. He doesn't have interest in food due to the taste and pain, however he is reluctant to take anything for pain.     Plan:  - Patient with significant oral and esophogeal pain with swallowing, has discolored mucoid oral lesions- would consider infectious causes   - Given patients significant pain and inability to trial anything PO, offering IV pain medications prior to feeding. Family reluctant at this time given potential side effects of narcotics. Risk/benefit discussion was had and ultimate decision was to trial one time dose of IV morphine 2mg to see if improves ability to tolerate PO- patient had some improvement  - Will start 2mg IV morphine q6hr to be given 30 min prior to schedule feeds, can give in conjunction with magic mouthwash  - Continue with liquid gabapentin 300 mg TID

## 2025-03-07 NOTE — CONSULT NOTE ADULT - SUBJECTIVE AND OBJECTIVE BOX
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 He does not like to take magic mouthwash due to the way it makes things taste. Reports his taste buds are already altered from the radiation therapy and the magic mouthwash makes it worse. Does not take his gabapentin as prescribed either due to the odynophagia.   Seen in ED 3/5/25 but opted to not be admitted. Presenting today as no improvement in symptoms.   Denies  headache, dizziness, chest pain, palpitations, SOB, abdominal pain, joint pain, diarrhea urinary symptoms.   Vitals: T99.7, HR 80, /66, RR 18 satting 99% RA (06 Mar 2025 19:39)    Additional History:    Patient lives alone with his dog in [place], he is no longer , he has 5 children, three of whom live close and are very involved. His Daughter, Ellen is his health care proxy. She and his son Raphael are at bedside. Patient is retired from his job at [employer] where he was a very successful employee for a long time. In 2022, family began noticing subtle signs of dementia. However he was only diagnosed in 2024. Patient was doing well independently and doing all his ADL/IADLs until about the time of his cancer diagnosis which was in Oct 2024. Family states that he used to drive, go shopping, take the dogs out on walks, manage finances/medications etc, however now they have noticed that his ability to perform these tasks has deteriorated. He can still do laundry, eat, toilet and bath himself, however they feel like his cancer treatment has really "accelerated the dementia". Family has really had to step and become more involved. Multiple different family members visit him throughout the day however no one formally lives with him.     Patient officially started treatment with chemo and radiation in Jan, he has been receiving weekly cisplatin and has completed 30/35 RT sessions. According to family recent visits with oncologist have been optimistic in regards to his treatment response. 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 He was scheduled to complete his last chemo session this week. Per family patient has been having progressive decrease in PO intake over course of last few weeks, it has now progressed to virtually zero. He has been following with his oncologist who have been doing IV hydration however he continues to exhibit dehydration and weight loss. He has been prescribed magic mouthwash and liquid lidocaine however this hasnt improved his symptoms. Patient states that everything tastes off. Currently he mixes lidocaine and maalox which he states just makes his tongue numb and food taste poor. He states that itr wears off quickly and for these reasons he has not been interested or compliant with the medication and only takes it 1-2 per day despite family encouragement. Son states that he had a good response with the gabapentin however he decided he wanted to stop taking it one day and convincing him to take it has been difficult. They report he hasnt ate anything in 2 weeks and the last calories he consumed were on Monday and he has only had sips of water since. He doesnt have interest in food due to the taste and pain, however he is reluctant to take anything for pain. Patients family states that he has no prior MOLST forms on record.        PERTINENT PM/SXH:   Ventral hernia    Acid reflux    Osteoarthritis of right hip    Tonsil cancer    History of bilateral inguinal hernia repair    S/P carpal tunnel release    FAMILY HISTORY:  No pertinent family history in first degree relatives    Family Hx substance abuse [ ]yes [x ]no    ITEMS NOT CHECKED ARE NOT PRESENT    SOCIAL HISTORY:   Significant other/partner[ ]  Children[x ]  Mormon/Spirituality:  Substance hx:  [ ]   Tobacco hx:  [x ]   Alcohol hx: [ ]   Home Opioid hx:  [ ] I-Stop Reference No:  Living Situation: [x ]Home  [ ]Long term care  [ ]Rehab [ ]Other  Home Services: [ ] HHA [ ] Visting RN [ ] Hospice      ADVANCE DIRECTIVES:    MOLST  [ ]  Living Will  [ ]   DECISION MAKER(s):  [x ] Health Care Proxy(s)  [ ] Surrogate(s)  [ ] Guardian           Name(s): Ellen Sanchez Phone Number(s):[phone]    BASELINE (I)ADL(s) (prior to admission): IADLs, needs help with many ADLS  [place]: [ ]Total  [x ] Moderate [ ]Dependent    Allergies    No Known Allergies    Intolerances    MEDICATIONS  (STANDING):  enoxaparin Injectable 40 milliGRAM(s) SubCutaneous every 24 hours  FIRST- Mouthwash  BLM 5 milliLiter(s) Swish and Swallow every 4 hours  gabapentin Solution 300 milliGRAM(s) Oral three times a day  lactated ringers. 1000 milliLiter(s) (75 mL/Hr) IV Continuous <Continuous>    MEDICATIONS  (PRN):  acetaminophen     Tablet .. 650 milliGRAM(s) Oral every 6 hours PRN Temp greater or equal to 38C (100.4F), Mild Pain (1 - 3)  aluminum hydroxide/magnesium hydroxide/simethicone Suspension 30 milliLiter(s) Oral every 4 hours PRN Dyspepsia  melatonin 3 milliGRAM(s) Oral at bedtime PRN Insomnia  ondansetron Injectable 4 milliGRAM(s) IV Push every 8 hours PRN Nausea and/or Vomiting    ITEMS UNCHECKED ARE NOT PRESENT     PRESENT SYMPTOMS: [ ]Unable to self-report due to altered mental status  [ ] CPOT [ ] PAINADs [ ] RDOS  Source if other than patient:  [x ]Family   [ ]Team     Pain: [x ]yes [ ]no  QOL impact - Severe  Location -    Throat and esophagus                 Aggravating factors - swallowing   Quality - sharp  Radiation - retrosternal  Timing-  Severity (0-10 scale): 10  Minimal acceptable level / Pain goal (0-10 scale):     CPOT:    https://www.[facility].org/getattachment/[email or web address]-[email or web address]-4t4b-2455v9716l8h/Critical-Care-Pain-Observation-Tool-(CPOT)    Dyspnea:                           [ ]Mild [ ]Moderate [ ]Severe  Anxiety:                             [ ]Mild [ ]Moderate [ ]Severe  Agitation:                          [ ]Mild [ ]Moderate [ ]Severe  Fatigue:                             [ ]Mild [ ]Moderate [ ]Severe  Nausea:                             [ ]Mild [ ]Moderate [ ]Severe  Loss of appetite:              [ ]Mild [ ]Moderate [ ]Severe  Constipation:                   [ ]Mild [ ]Moderate [ ]Severe  Diarrhea:                          [ ]Mild [ ]Moderate [ ]Severe  Pruritus:                            [ ]Mild [ ]Moderate [ ]Severe  Depression:                      [ ]Mild [ ]Moderate [ ]Severe    PCSSQ[Palliative Care Spiritual Screening Question]   Severity (0-10):  Score of 4 or > indicate consideration of Chaplaincy referral.  Chaplaincy Referral: [ ] yes [ ] refused [ ] following [ x] deferred    Caregiver [place]? : [ ] yes [ ] no [ ] Declined   [x ] Deferred            Social work referral [ ] Patient & Family Centered Care Referral [ ]     Anticipatory Grief present?:  [ ] yes [ ] no  [x ] Deferred                  Social work referral [ ] Chaplaincy Referral[ ] Caregiver Support Referral [ ]     Other Symptoms:  [ x]All other review of systems negative   [ ] Unable to obtain due to poor mentation     PHYSICAL EXAM:  Vital Signs Last 24 Hrs  T(C): 37 (07 Mar 2025 05:21), Max: 37.6 (06 Mar 2025 16:49)  T(F): 98.6 (07 Mar 2025 05:21), Max: 99.7 (06 Mar 2025 16:49)  HR: 83 (07 Mar 2025 05:21) (78 - 86)  BP: 137/70 (07 Mar 2025 05:21) (112/66 - 138/80)  BP(mean): --  RR: 17 (07 Mar 2025 05:21) (17 - 18)  SpO2: 98% (07 Mar 2025 05:21) (98% - 100%)    Parameters below as of 07 Mar 2025 05:21  Patient On (Oxygen Delivery Method): room air         I&O's Summary    06 Mar 2025 07:01  -  07 Mar 2025 07:00  --------------------------------------------------------  IN: 450 mL / OUT: 0 mL / NET: 450 mL        GENERAL:  [x ]Alert  [x ]Oriented x 2  [ x]Lethargic  [ ]Cachexia  [ ]Unarousable  [ x]Verbal  [ ]Non-Verbal  [ ] No Distress  Behavioral:   [ ] Anxiety  [ ] Delirium [x ] Agitation [ ] Calm  [ ] Other  HEENT:  [ ]Normal  [ ] Temporal Wasting  [x ]Dry mouth   [ ]ET Tube/Trach  [x ]Oral lesions  [ x] Mucositis  PULMONARY: Breathing comfortably   [ ]Clear [ ]Tachypnea  [ ]Audible excessive secretions   [ ]Rhonchi        [ ]Right [ ]Left [ ]Bilateral  [ ]Crackles        [ ]Right [ ]Left [ ]Bilateral  [ ]Wheezing     [ ]Right [ ]Left [ ]Bilateral  [ ]Diminished breath sounds [ ]right [ ]left [ ]bilateral  CARDIOVASCULAR:    [x ]Regular [ ]Irregular [ ]Tachy  [ ]Hua [ ]Murmur [ ]Other  GASTROINTESTINAL:  [ ]Soft  [ ]Distended   [ ]+BS  [ ]Non tender [ ]Tender  [ ]PEG [ ]OGT/ NGT  Last BM:   GENITOURINARY:  [ ]Normal [ ] Incontinent   [ ]Oliguria/Anuria   [ ]Alan  MUSCULOSKELETAL:   [ ]Normal   [ ]Weakness  [ ]Bed/Wheelchair bound [ ]Edema  [  ] amputation  [  ] contraction  NEUROLOGIC:   [x ]No focal deficits  [ x]Cognitive impairment  [ ]Dysphagia [ ]Dysarthria [ ]Paresis [ ]Other   SKIN: See Nursing Skin Assessment for further details  [ x]Normal    [ ]Rash  [ ]Pressure ulcer(s)       Present on admission [ ]y [ ]n   [  ]  Wound    [  ] hyperpigmentation    CRITICAL CARE:  [ ] Shock Present  [ ]Septic [ ]Cardiogenic [ ]Neurologic [ ]Hypovolemic  [ ]  Vasopressors [ ]  Inotropes   [ ]Respiratory failure present [ ]Mechanical ventilation [ ]Non-invasive ventilatory support [ ]High flow    [ ]Acute  [ ]Chronic [ ]Hypoxic  [ ]Hypercarbic [ ]Other  [ ]Other organ failure     LABS:  reviewed                         9.3    2.21  )-----------( 129      ( 07 Mar 2025 05:25 )             25.3   03-07    134[L]  |  100  |  26[H]  ----------------------------<  90  4.2   |  22  |  0.90    Ca    8.7      07 Mar 2025 05:25  Phos  2.6     03-07  Mg     1.90     03-07    TPro  7.4  /  Alb  4.3  /  TBili  1.2  /  DBili  x   /  AST  17  /  ALT  19  /  AlkPhos  83  03-06    Urinalysis Basic - ( 07 Mar 2025 05:25 )    Color: x / Appearance: x / SG: x / pH: x  Gluc: 90 mg/dL / Ketone: x  / Bili: x / Urobili: x   Blood: x / Protein: x / Nitrite: x   Leuk Esterase: x / RBC: x / WBC x   Sq Epi: x / Non Sq Epi: x / Bacteria: x    CAPILLARY BLOOD GLUCOSE      POCT Blood Glucose.: 111 mg/dL (06 Mar 2025 15:52)      RADIOLOGY & ADDITIONAL STUDIES: reviewed     PROTEIN CALORIE MALNUTRITION PRESENT: [ ]mild [ ]moderate [ ]severe [ ]underweight [ ]morbid obesity  https://www.andeal.org/vault/2440/web/files/ONC/Table_Clinical%20Characteristics%20to%20Document%20Malnutrition-White%20JV%20et%20al%202012.pdf    Height (cm): 177.8 (03-06-25 @ 15:47), 177.8 (03-05-25 @ 18:20), 175 (02-28-25 @ 15:55)  Weight (kg): 81.3 (03-07-25 @ 05:32), 80.7 (03-05-25 @ 18:20), 80.8 (03-04-25 @ 16:37)  BMI (kg/m2): 25.7 (03-07-25 @ 05:32), 25.5 (03-06-25 @ 15:47), 25.5 (03-05-25 @ 18:20)    [ ]PPSV2 < or = to 30% [ ]significant weight loss  [ ]poor nutritional intake  [ ]anasarca [ ]Artificial Nutrition      REFERRALS:   [ ]Chaplaincy  [ ]Hospice  [ ]Child Life  [ ]Social Work  [ ]Case management [ ]Holistic Therapy     Goals of Care Document:  Date of Service 03-07-25 @ 12:24     Reason for Consultation:	[x] Pain		[] Goals of Care		[] Non-pain symptoms    [] End of life discussion		[] Other:    HPI:  64 yr old amel with a pmh of Vascular dementia and 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 He does not like to take magic mouthwash due to the way it makes things taste. Reports his taste buds are already altered from the radiation therapy and the magic mouthwash makes it worse. Does not take his gabapentin as prescribed either due to the odynophagia.   Seen in ED 3/5/25 but opted to not be admitted. Presenting today as no improvement in symptoms.   Denies  headache, dizziness, chest pain, palpitations, SOB, abdominal pain, joint pain, diarrhea urinary symptoms.   Vitals: T99.7, HR 80, /66, RR 18 satting 99% RA (06 Mar 2025 19:39)    Additional History:    Patient lives alone with his dog in [place], he is no longer , he has 5 children, three of whom live close and are very involved. His Daughter, Ellen is his health care proxy. She and his son Raphael are at bedside. Patient is retired from his job at [employer] where he was a very successful employee for a long time. In 2022, family began noticing subtle signs of dementia. However he was only diagnosed in 2024. Patient was doing well independently and doing all his ADL/IADLs until about the time of his cancer diagnosis which was in Oct 2024. Family states that he used to drive, go shopping, take the dogs out on walks, manage finances/medications etc, however now they have noticed that his ability to perform these tasks has deteriorated. He can still do laundry, eat, toilet and bath himself, however they feel like his cancer treatment has really "accelerated the dementia". Family has really had to step and become more involved. Multiple different family members visit him throughout the day however no one formally lives with him.     Patient officially started treatment with chemo and radiation in Jan, he has been receiving weekly cisplatin and has completed 30/35 RT sessions. According to family recent visits with oncologist have been optimistic in regards to his treatment response. 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 He was scheduled to complete his last chemo session this week. Per family patient has been having progressive decrease in PO intake over course of last few weeks, it has now progressed to virtually zero. He has been following with his oncologist who have been doing IV hydration however he continues to exhibit dehydration and weight loss. He has been prescribed magic mouthwash and liquid lidocaine however this hasn't improved his symptoms. Patient states that everything tastes off. Currently he mixes lidocaine and maalox which he states just makes his tongue numb and food taste poor. He states that itr wears off quickly and for these reasons he has not been interested or compliant with the medication and only takes it 1-2 per day despite family encouragement. Son states that he had a good response with the gabapentin however he decided he wanted to stop taking it one day and convincing him to take it has been difficult. They report he hasn't ate anything in 2 weeks and the last calories he consumed were on Monday and he has only had sips of water since. He doesn't have interest in food due to the taste and pain, however he is reluctant to take anything for pain.    PERTINENT PM/SXH:   Ventral hernia    Acid reflux    Osteoarthritis of right hip    Tonsil cancer    History of bilateral inguinal hernia repair    S/P carpal tunnel release    FAMILY HISTORY:  No pertinent family history in first degree relatives    Family Hx substance abuse [ ]yes [x ]no    ITEMS NOT CHECKED ARE NOT PRESENT    SOCIAL HISTORY:   Significant other/partner[ ]  Children[x ]  Caodaism/Spirituality:  Substance hx:  [ ]   Tobacco hx:  [x ]   Alcohol hx: [ ]   Home Opioid hx:  [ ] I-Stop Reference No: [id number]  Living Situation: [x ]Home  [ ]Long term care  [ ]Rehab [ ]Other  Home Services: [ ] HHA [ ] Visting RN [ ] Hospice      ADVANCE DIRECTIVES:    MOLST  [ ]  Living Will  [ ]   DECISION MAKER(s):  [x ] Health Care Proxy(s)  [ ] Surrogate(s)  [ ] Guardian           Name(s): Ellen Sanchez Phone Number(s):[phone]    BASELINE (I)ADL(s) (prior to admission): IADLs, needs help with many ADLS  [place]: [ ]Total  [x ] Moderate [ ]Dependent    Allergies    No Known Allergies    Intolerances    MEDICATIONS  (STANDING):  enoxaparin Injectable 40 milliGRAM(s) SubCutaneous every 24 hours  FIRST- Mouthwash  BLM 5 milliLiter(s) Swish and Swallow every 4 hours  gabapentin Solution 300 milliGRAM(s) Oral three times a day  lactated ringers. 1000 milliLiter(s) (75 mL/Hr) IV Continuous <Continuous>    MEDICATIONS  (PRN):  acetaminophen     Tablet .. 650 milliGRAM(s) Oral every 6 hours PRN Temp greater or equal to 38C (100.4F), Mild Pain (1 - 3)  aluminum hydroxide/magnesium hydroxide/simethicone Suspension 30 milliLiter(s) Oral every 4 hours PRN Dyspepsia  melatonin 3 milliGRAM(s) Oral at bedtime PRN Insomnia  ondansetron Injectable 4 milliGRAM(s) IV Push every 8 hours PRN Nausea and/or Vomiting    ITEMS UNCHECKED ARE NOT PRESENT     PRESENT SYMPTOMS: [ ]Unable to self-report due to altered mental status  [ ] CPOT [ ] PAINADs [ ] RDOS  Source if other than patient:  [x ]Family   [ ]Team     Pain: [x ]yes [ ]no  QOL impact - Severe  Location -    Throat and esophagus                 Aggravating factors - swallowing   Quality - sharp  Radiation - retrosternal  Timing-  Severity (0-10 scale): 10  Minimal acceptable level / Pain goal (0-10 scale):     CPOT:    https://www.[facility].org/getattachment/[email or web address]-5z1n-3870o8108o8s/Critical-Care-Pain-Observation-Tool-(CPOT)    Dyspnea:                           [ ]Mild [ ]Moderate [ ]Severe  Anxiety:                             [ ]Mild [ ]Moderate [ ]Severe  Agitation:                          [ ]Mild [ ]Moderate [ ]Severe  Fatigue:                             [ ]Mild [ ]Moderate [ ]Severe  Nausea:                             [ ]Mild [ ]Moderate [ ]Severe  Loss of appetite:              [ ]Mild [ ]Moderate [ ]Severe  Constipation:                   [ ]Mild [ ]Moderate [ ]Severe  Diarrhea:                          [ ]Mild [ ]Moderate [ ]Severe  Pruritus:                            [ ]Mild [ ]Moderate [ ]Severe  Depression:                      [ ]Mild [ ]Moderate [ ]Severe    PCSSQ[Palliative Care Spiritual Screening Question]   Severity (0-10):  Score of 4 or > indicate consideration of Chaplaincy referral.  Chaplaincy Referral: [ ] yes [ ] refused [ ] following [ x] deferred    Caregiver [place]? : [x ] yes [ ] no [ ] Declined   [ ] Deferred            Social work referral [ ] Patient & Family Centered Care Referral [ ]     Anticipatory Grief present?:  [ ] yes [ ] no  [x ] Deferred                  Social work referral [ ] Chaplaincy Referral[ ] Caregiver Support Referral [x ]     Other Symptoms:  [ x]All other review of systems negative   [ ] Unable to obtain due to poor mentation     PHYSICAL EXAM:  Vital Signs Last 24 Hrs  T(C): 37 (07 Mar 2025 05:21), Max: 37.6 (06 Mar 2025 16:49)  T(F): 98.6 (07 Mar 2025 05:21), Max: 99.7 (06 Mar 2025 16:49)  HR: 83 (07 Mar 2025 05:21) (78 - 86)  BP: 137/70 (07 Mar 2025 05:21) (112/66 - 138/80)  BP(mean): --  RR: 17 (07 Mar 2025 05:21) (17 - 18)  SpO2: 98% (07 Mar 2025 05:21) (98% - 100%)    Parameters below as of 07 Mar 2025 05:21  Patient On (Oxygen Delivery Method): room air         I&O's Summary    06 Mar 2025 07:01  -  07 Mar 2025 07:00  --------------------------------------------------------  IN: 450 mL / OUT: 0 mL / NET: 450 mL        GENERAL:  [x ]Alert  [x ]Oriented x 2  [ x]Lethargic  [ ]Cachexia  [ ]Unarousable  [ x]Verbal  [ ]Non-Verbal  [ ] No Distress  Behavioral:   [ ] Anxiety  [ ] Delirium [x ] Agitation [ ] Calm  [ ] Other  HEENT:  [ ]Normal  [ ] Temporal Wasting  [x ]Dry mouth   [ ]ET Tube/Trach  [x ]Oral lesions  [ x] Mucositis  PULMONARY: Breathing comfortably   [ ]Clear [ ]Tachypnea  [ ]Audible excessive secretions   [ ]Rhonchi        [ ]Right [ ]Left [ ]Bilateral  [ ]Crackles        [ ]Right [ ]Left [ ]Bilateral  [ ]Wheezing     [ ]Right [ ]Left [ ]Bilateral  [ ]Diminished breath sounds [ ]right [ ]left [ ]bilateral  CARDIOVASCULAR:    [x ]Regular [ ]Irregular [ ]Tachy  [ ]Hua [ ]Murmur [ ]Other  GASTROINTESTINAL:  [x ]Soft  [ ]Distended   [x ]+BS  [x ]Non tender [ ]Tender  [ ]PEG [ ]OGT/ NGT  Last BM:   GENITOURINARY:  [x ]Normal [ ] Incontinent   [ ]Oliguria/Anuria   [ ]Alan  MUSCULOSKELETAL:   [ ]Normal   [ ]Weakness  [ ]Bed/Wheelchair bound [ ]Edema  [  ] amputation  [  ] contraction  NEUROLOGIC:   [x ]No focal deficits  [ x]Cognitive impairment  [ ]Dysphagia [ ]Dysarthria [ ]Paresis [ ]Other   SKIN: See Nursing Skin Assessment for further details  [ x]Normal    [ ]Rash  [ ]Pressure ulcer(s)       Present on admission [ ]y [ ]n   [  ]  Wound    [  ] hyperpigmentation    CRITICAL CARE:  [ ] Shock Present  [ ]Septic [ ]Cardiogenic [ ]Neurologic [ ]Hypovolemic  [ ]  Vasopressors [ ]  Inotropes   [ ]Respiratory failure present [ ]Mechanical ventilation [ ]Non-invasive ventilatory support [ ]High flow    [ ]Acute  [ ]Chronic [ ]Hypoxic  [ ]Hypercarbic [ ]Other  [ ]Other organ failure     LABS:  reviewed                         9.3    2.21  )-----------( 129      ( 07 Mar 2025 05:25 )             25.3   03-07    134[L]  |  100  |  26[H]  ----------------------------<  90  4.2   |  22  |  0.90    Ca    8.7      07 Mar 2025 05:25  Phos  2.6     03-07  Mg     1.90     03-07    TPro  7.4  /  Alb  4.3  /  TBili  1.2  /  DBili  x   /  AST  17  /  ALT  19  /  AlkPhos  83  03-06    Urinalysis Basic - ( 07 Mar 2025 05:25 )    Color: x / Appearance: x / SG: x / pH: x  Gluc: 90 mg/dL / Ketone: x  / Bili: x / Urobili: x   Blood: x / Protein: x / Nitrite: x   Leuk Esterase: x / RBC: x / WBC x   Sq Epi: x / Non Sq Epi: x / Bacteria: x    CAPILLARY BLOOD GLUCOSE      POCT Blood Glucose.: 111 mg/dL (06 Mar 2025 15:52)      RADIOLOGY & ADDITIONAL STUDIES: reviewed     PROTEIN CALORIE MALNUTRITION PRESENT: [ ]mild [ ]moderate [ ]severe [ ]underweight [ ]morbid obesity  https://www.andeal.org/vault/2440/web/files/ONC/Table_Clinical%20Characteristics%20to%20Document%20Malnutrition-White%20JV%20et%20al%202012.pdf    Height (cm): 177.8 (03-06-25 @ 15:47), 177.8 (03-05-25 @ 18:20), 175 (02-28-25 @ 15:55)  Weight (kg): 81.3 (03-07-25 @ 05:32), 80.7 (03-05-25 @ 18:20), 80.8 (03-04-25 @ 16:37)  BMI (kg/m2): 25.7 (03-07-25 @ 05:32), 25.5 (03-06-25 @ 15:47), 25.5 (03-05-25 @ 18:20)    [ ]PPSV2 < or = to 30% [ ]significant weight loss  [x ]poor nutritional intake  [ ]anasarca [ ]Artificial Nutrition      REFERRALS:   [ ]Chaplaincy  [ ]Hospice  [ ]Child Life  [ ]Social Work  [ ]Case management [ ]Holistic Therapy     Goals of Care Document:

## 2025-03-07 NOTE — DIETITIAN INITIAL EVALUATION ADULT - PERTINENT LABORATORY DATA
03-07    134[L]  |  100  |  26[H]  ----------------------------<  90  4.2   |  22  |  0.90    Ca    8.7      07 Mar 2025 05:25  Phos  2.6     03-07  Mg     1.90     03-07    TPro  7.4  /  Alb  4.3  /  TBili  1.2  /  DBili  x   /  AST  17  /  ALT  19  /  AlkPhos  83  03-06  POCT Blood Glucose.: 111 mg/dL (03-06-25 @ 15:52)

## 2025-03-07 NOTE — DIETITIAN INITIAL EVALUATION ADULT - OTHER CALCULATIONS
IBW: 166 lb +/- 10%, %%  Per Annelise POLANCO, noted the following weight history: 80.8kg (3/7), 88.5kg (2/7), 95.7kg (12/18)  Objective weight measurements suggest 14.9kg (16%) weight loss x3 months period of time (significant)

## 2025-03-07 NOTE — CONSULT NOTE ADULT - SUBJECTIVE AND OBJECTIVE BOX
CC: Dysphagia    HPI: 74 year old Male PHMx of Right hip OA, ventral hernia, vascular dementia, stage 4 tonsil cancer (T2 N2, p16 negative, concurrent chemo/RT 1/21/25, Started first weekly cisplatin 1/24/25, last Tx on 2/28, was to complete 7cycles of Cisplatin on 3/7) who presented to the emergency department for failure to thrive, dehydration and pain management. The patient is currently undergoing chemo-radiation treatment at [facility] and is on day 29 of 35 radiation/ chemo treatments. He has been experiencing difficulty eating and drinking due to pain when swallowing, resulting in poor oral intake for more than 10 days. ENT consulted for dysphagia and airway evaluation. Patient seen and examined at bedside with daughter. Reports feeling tired and weak in general. Denies fever, chills and SOB.     PAST MEDICAL & SURGICAL HISTORY:  Ventral hernia      Acid reflux      Osteoarthritis of right hip      Tonsil cancer      History of bilateral inguinal hernia repair  1974      S/P carpal tunnel release  right ; 1993        Allergies    No Known Allergies    Intolerances      MEDICATIONS  (STANDING):  enoxaparin Injectable 40 milliGRAM(s) SubCutaneous every 24 hours  FIRST- Mouthwash  BLM 5 milliLiter(s) Swish and Swallow every 4 hours  gabapentin Solution 300 milliGRAM(s) Oral three times a day  lactated ringers. 1000 milliLiter(s) (75 mL/Hr) IV Continuous <Continuous>    MEDICATIONS  (PRN):  acetaminophen     Tablet .. 650 milliGRAM(s) Oral every 6 hours PRN Temp greater or equal to 38C (100.4F), Mild Pain (1 - 3)  aluminum hydroxide/magnesium hydroxide/simethicone Suspension 30 milliLiter(s) Oral every 4 hours PRN Dyspepsia  melatonin 3 milliGRAM(s) Oral at bedtime PRN Insomnia  ondansetron Injectable 4 milliGRAM(s) IV Push every 8 hours PRN Nausea and/or Vomiting      Social History: no ETOH use    Family history: No pertinent family history in first degree relatives    ROS:   ENT: all negative except as noted in HPI   CV: denies palpitations  Pulm: denies SOB, cough, hemoptysis  GI: denies change in appetite, indigestion, n/v  : denies pertinent urinary symptoms, urgency  Neuro: denies numbness/tingling, loss of sensation  Psych: denies anxiety  MS: denies muscle weakness, instability  Heme: denies easy bruising or bleeding  Endo: denies heat/cold intolerance, excessive sweating  Vascular: denies LE edema    Vital Signs Last 24 Hrs  T(C): 37 (07 Mar 2025 05:21), Max: 37.6 (06 Mar 2025 16:49)  T(F): 98.6 (07 Mar 2025 05:21), Max: 99.7 (06 Mar 2025 16:49)  HR: 83 (07 Mar 2025 05:21) (78 - 86)  BP: 137/70 (07 Mar 2025 05:21) (112/66 - 138/80)  BP(mean): --  RR: 17 (07 Mar 2025 05:21) (17 - 18)  SpO2: 98% (07 Mar 2025 05:21) (98% - 100%)    Parameters below as of 07 Mar 2025 05:21  Patient On (Oxygen Delivery Method): room air                              9.3    2.21  )-----------( 129      ( 07 Mar 2025 05:25 )             25.3    03-07    134[L]  |  100  |  26[H]  ----------------------------<  90  4.2   |  22  |  0.90    Ca    8.7      07 Mar 2025 05:25  Phos  2.6     03-07  Mg     1.90     03-07    TPro  7.4  /  Alb  4.3  /  TBili  1.2  /  DBili  x   /  AST  17  /  ALT  19  /  AlkPhos  83  03-06       PHYSICAL EXAM:  Gen: NAD  Skin: No rashes, bruises, or lesions  Head: Normocephalic, Atraumatic  Face: no edema, erythema, or fluctuance. Parotid glands soft without mass  Eyes: no scleral injection  Ears: No evidence of any fluid drainage. No mastoid tenderness, erythema, or ear bulging  Nose: Nares bilaterally patent, no discharge  Mouth: + left tonsillar mass. No stridor, no drooling, no trismus.  Mucosa moist, tongue/uvula midline  Neck:  Firm, somewhat immobile mass in left level II, approx. 2.5-3 cm   Lymphatic: left cervical lymphadenopathy  Resp: breathing easily, no stridor  CV: no peripheral edema/cyanosis  GI: nondistended   Peripheral vascular: no JVD or edema  Neuro: facial nerve intact, no facial droop      Fiberoptic Indirect laryngoscopy:  (Scope #2 used)  Flexible laryngoscopy was performed and revealed the following:    -- Nasopharynx had no mass or exudate.    -- Posterior pharyngeal wall clear, no edema, and no erythema    -- Base of tongue was symmetric and not enlarged     -- Vallecula was clear    -- thickened Epiglottis, likely post radiation changes    -- Arytenoids both without edema and erythema     -- True vocal folds were fully mobile and without lesions.     -- Post cricoid area clear, no edema and no erythema    -- Interarytenoid edema was absent    -- Airway patent

## 2025-03-07 NOTE — DIETITIAN INITIAL EVALUATION ADULT - ORAL NUTRITION SUPPLEMENTS
Continue Ensure Plus High Protein (provides 350kcal, 20gms protein per serving) TID  Recommend Magic Cup (provides 290kcal, 9gms protein per serving) BID which Food and Nutrition Department will provide

## 2025-03-07 NOTE — DIETITIAN INITIAL EVALUATION ADULT - PERTINENT MEDS FT
MEDICATIONS  (STANDING):  enoxaparin Injectable 40 milliGRAM(s) SubCutaneous every 24 hours  FIRST- Mouthwash  BLM 5 milliLiter(s) Swish and Swallow every 4 hours  gabapentin Solution 300 milliGRAM(s) Oral three times a day  lactated ringers. 1000 milliLiter(s) (75 mL/Hr) IV Continuous <Continuous>    MEDICATIONS  (PRN):  acetaminophen     Tablet .. 650 milliGRAM(s) Oral every 6 hours PRN Temp greater or equal to 38C (100.4F), Mild Pain (1 - 3)  aluminum hydroxide/magnesium hydroxide/simethicone Suspension 30 milliLiter(s) Oral every 4 hours PRN Dyspepsia  melatonin 3 milliGRAM(s) Oral at bedtime PRN Insomnia  ondansetron Injectable 4 milliGRAM(s) IV Push every 8 hours PRN Nausea and/or Vomiting

## 2025-03-07 NOTE — CONSULT NOTE ADULT - PROBLEM SELECTOR RECOMMENDATION 2
Significant weight loss, decline in ADLS  PPS 50-60  Awaiting PT and speech eval  Continue to encourage PO intake with aid of pain medications  IV hydration per primary team

## 2025-03-07 NOTE — PROGRESS NOTE ADULT - SUBJECTIVE AND OBJECTIVE BOX
Cruz Diaz MD  Academic Hospitalist  Pager [phone]  Email: [email or web address]  Available on [employer] Teams        PROGRESS NOTE:     Patient is a 74y old  Male who presents with a chief complaint of odynophagia with decreased PO intake (07 Mar 2025 13:01)      SUBJECTIVE / OVERNIGHT EVENTS:  Patient seen and examined this morning. Patient continues to report that he is unable to eat anything including ice cream. He describes a sensation on his throat like it's being frozen, just like when fingers get frostbites. Family at bedside.       MEDICATIONS  (STANDING):  enoxaparin Injectable 40 milliGRAM(s) SubCutaneous every 24 hours  FIRST- Mouthwash  BLM 5 milliLiter(s) Swish and Swallow every 4 hours  gabapentin Solution 300 milliGRAM(s) Oral three times a day  lactated ringers. 1000 milliLiter(s) (75 mL/Hr) IV Continuous <Continuous>  lidocaine 2% Viscous 20 milliLiter(s) Swish and Spit every 6 hours    MEDICATIONS  (PRN):  acetaminophen     Tablet .. 650 milliGRAM(s) Oral every 6 hours PRN Temp greater or equal to 38C (100.4F), Mild Pain (1 - 3)  aluminum hydroxide/magnesium hydroxide/simethicone Suspension 30 milliLiter(s) Oral every 4 hours PRN Dyspepsia  melatonin 3 milliGRAM(s) Oral at bedtime PRN Insomnia  morphine  - Injectable 2 milliGRAM(s) IV Push every 6 hours PRN Severe Pain (7 - 10)  ondansetron Injectable 4 milliGRAM(s) IV Push every 8 hours PRN Nausea and/or Vomiting      CAPILLARY BLOOD GLUCOSE      POCT Blood Glucose.: 111 mg/dL (06 Mar 2025 15:52)    I&O's Summary    06 Mar 2025 07:01  -  07 Mar 2025 07:00  --------------------------------------------------------  IN: 450 mL / OUT: 0 mL / NET: 450 mL        PHYSICAL EXAM:  Vital Signs Last 24 Hrs  T(C): 36.7 (07 Mar 2025 14:36), Max: 37.6 (06 Mar 2025 16:49)  T(F): 98 (07 Mar 2025 14:36), Max: 99.7 (06 Mar 2025 16:49)  HR: 81 (07 Mar 2025 14:36) (78 - 86)  BP: 116/73 (07 Mar 2025 14:36) (112/66 - 138/80)  BP(mean): --  RR: 17 (07 Mar 2025 14:36) (17 - 18)  SpO2: 96% (07 Mar 2025 14:36) (96% - 100%)    Parameters below as of 07 Mar 2025 14:36  Patient On (Oxygen Delivery Method): room air        CONSTITUTIONAL: NAD, pleasant, no thrush  RESPIRATORY: Normal respiratory effort; no respiratory distress, CTAB  CARDIOVASCULAR: No visible JVD, No lower extremity edema; S1S2, no m,r,g  ABDOMEN: Not guarding, does not appear distended, BS+  MUSCLOSKELETAL: no clubbing or cyanosis of digits; no joint swelling   PSYCH: AOx3    LABS:                        9.3    2.21  )-----------( 129      ( 07 Mar 2025 05:25 )             25.3     03-07    134[L]  |  100  |  26[H]  ----------------------------<  90  4.2   |  22  |  0.90    Ca    8.7      07 Mar 2025 05:25  Phos  2.6     03-07  Mg     1.90     03-07    TPro  7.4  /  Alb  4.3  /  TBili  1.2  /  DBili  x   /  AST  17  /  ALT  19  /  AlkPhos  83  03-06          Urinalysis Basic - ( 07 Mar 2025 05:25 )    Color: x / Appearance: x / SG: x / pH: x  Gluc: 90 mg/dL / Ketone: x  / Bili: x / Urobili: x   Blood: x / Protein: x / Nitrite: x   Leuk Esterase: x / RBC: x / WBC x   Sq Epi: x / Non Sq Epi: x / Bacteria: x        Urinalysis with Rflx Culture (collected 06 Mar 2025 18:07)        RADIOLOGY & ADDITIONAL TESTS:  Results Reviewed:   Imaging Personally Reviewed:  Electrocardiogram Personally Reviewed:    COORDINATION OF CARE:  Care Discussed with Consultants/Other Providers [Y/N]: Case discussed during interdisciplinary rounds with social work and case management  Prior or Outpatient Records Reviewed [Y/N]:

## 2025-03-07 NOTE — DIETITIAN INITIAL EVALUATION ADULT - REASON INDICATOR FOR ASSESSMENT
Nutrition Consult for MST score 2 or greater, Assessment    Per chart, patient is a 74y Male with PMH stage 4 tonsil cancer and vascular dementia who presented to [facility] for failure to thrive, dehydration, and pain management.

## 2025-03-07 NOTE — CHART NOTE - NSCHARTNOTEFT_GEN_A_CORE
Palliative Care Biopsychosocial Assessment:     Discussed patient in morning Interdisciplinary rounds   Patient identified for need of assessment by Palliative Care .  Pt is a 64 yr old amel with a pmh of Vascular dementia and 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 Introduced [facility] team to patient and explained our role in their care. Patient amenable to our participation in their care. Reviewed patient's medical and social history, as well as the events leading to her hospitalization. Pt follows with Dr. Gibson at [facility] [place].  Goal is to f/u with Dr. Gibson and Dr. Mitchell for 5 more RT sessions.     Patient Mental Status:   [ X  ]   alert     [ X  ]  oriented   [    ]  confused   [   ] lethargic  [   ]   non-responsive        Patient Coping Status:     [   ] coping well    [  X ] coping with  some difficulty    [   ] difficulty coping   [   ] Unable to assess due to mental status                                                 Patient Emotional Status:   [   ] anxious   [   ] depressed    [   ] overwhelmed    [   ] angry   [  X ]accepting    [   ] not accepting          Advance Directives: HCP form signed and placed in chart, Ellen (Daughter) is his HCP  - MOLST form and Code status brought up, no conversations/decisions on code status to date, patient remains full code at this time  [   ]    Health Care Surrogate:                            [  X ]  Health Care Proxy: Ellen Santacruz Daniel [phone]  [   ]    MOLST  [   ]    Living Will  [   ]    DNR  [   ]    DNI    Social Support:   Evaluated patients social support system. Pt has strong support system.    Patient lives alone with his dog in [place], he is no longer , he has 5 children, three of whom live close and are very involved. His Daughter, Ellen is his health care proxy. She and his son Raphael are at bedside. Patient is retired from his job at [employer] where he was a very successful employee for a long time. In 2022, family began noticing subtle signs of dementia. However he was only diagnosed in 2024. Patient was doing well independently and doing all his ADL/IADLs until about the time of his cancer diagnosis which was in Oct 2024. Family states that he used to drive, go shopping, take the dogs out on walks, manage finances/medications etc, however now they have noticed that his ability to perform these tasks has deteriorated. He can still do laundry, eat, toilet and bath himself, however they feel like his cancer treatment has really "accelerated the dementia". Family has really had to step and become more involved. Multiple different family members visit him throughout the day however no one formally lives with him.     Patient Needs:  [   ] Supportive Counseling      [   ] Family Meeting     [   ] Education     [ X  ] Advance Care Planning         Caregiver needs:   [ X  ]  Supportive Counseling    [   ] Family Conference     [   ] Education      Referral:    [   ]  Community Resources    [   ]  Cancer Support Group     [   ]  Hospice     [   ]  Bereavement support     [   ]   Pastoral Care      [   ]  Live On [place]    [   ]  Child Life Services   [ X  ]  Caregiver Support Group   [  ] Supportive Oncology  [   ] Behavioral Health   [   ]  No needs identified no referrals made    No further palliative care  intervention indicated at this time. Palliative care attending to follow for optimization of symptom management. Goals to pursue DMT. Pt with strong social supports.         Maggie Shelton LMSW  Palliative Care   Geriatrics and Palliative Care Division at [facility]  Pager #[phone] Adcbezbii [phone]

## 2025-03-07 NOTE — CONSULT NOTE ADULT - PROBLEM SELECTOR RECOMMENDATION 4
- HCP form signed and placed in chart, Ellen (Daughter) is his HCP  - MOLST form and Code status brought up, no conversations/decisions on code status to date, patient remains full code at this time  - At this time patient is responding to treatment and will to pursue further DMT   - Patient likely to be discharged home, given further decline in ADLs and inability to reliably take medications to aid with feeding, explained to family the benefit of getting additional layer of support in home such as HHA

## 2025-03-07 NOTE — CONSULT NOTE ADULT - ASSESSMENT
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

## 2025-03-07 NOTE — DIETITIAN NUTRITION RISK NOTIFICATION - ADDITIONAL COMMENTS/DIETITIAN RECOMMENDATIONS
Please see Dietitian Initial Assessment for complete recommendations.    Marce Michel MS RDN CDN  On Microsoft Teams (Preferred), Pager #[phone]

## 2025-03-07 NOTE — DIETITIAN INITIAL EVALUATION ADULT - ORAL INTAKE PTA/DIET HISTORY
Patient seen at bedside with son and daughter present. Patient reports no known food allergies or food intolerances. Patient reports he has received 30 radiation treatments, and starting with the 12th treatment onward his appetite/PO intake has significantly declined (>1 month period of time) to small amounts of food such as a couple bites of eggs. Radiation-related side effects reported include taste changes and "throat burning" thus making it difficult for him to comfortably consume solids. "Slowly weaned himself off solids" to then consuming Ensure shakes only. Stopped consuming the Ensure shakes on Monday (3/3/25). Likely meeting <75% estimated energy needs.

## 2025-03-07 NOTE — CONSULT NOTE ADULT - PROBLEM SELECTOR RECOMMENDATION 3
- Follows with Dr. Gibson and Dr. Mitchell at [facility] for onc/rad onc  - ultrasound of his neck performed in mid October 2024 revealing a left cervical 2.4 cm nodule suspicious for enlarged lymph node.   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   - Patient was sent for PET CT scan performed in early December 2024 that revealed left tonsillar primary tumor with findings concerning for bilateral cervical lymph node involvement. There was a focus of uptake in the anterior mediastinum for which dedicated CT chest with contrast was unremarkable; there was also findings concerning for possible rectal polyp. Patient was referred for nonoperative management. He started concurrent chemo/RT 1/21/25 with weekly cisplatin started 1/24/25  - He has completed 30/35 RT sessions. According to family recent visits with oncologist have been optimistic in regards to his treatment response. 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 He was scheduled to complete his last chemo session this week.

## 2025-03-07 NOTE — CONSULT NOTE ADULT - PROBLEM SELECTOR RECOMMENDATION 9
- Patent airway on scope, able to be intubated orally  - Pain management per palliative and primary team  - Encourge PO intake  - No acute ENT intervention needed at this time  - Appreciated [facility] recs  - Case discussed with Dr. Macdonald

## 2025-03-07 NOTE — DIETITIAN INITIAL EVALUATION ADULT - OTHER INFO
Patient is currently ordered for a PO diet with soft and bite sized textures. Consult for SLP evaluation pending, follow recommendations once completed. Patient reports a continued poor appetite and PO intake at meals during course of admission secondary to taste changes and "throat burning." Writer offered to downgrade diet texture in the interim, however patient deferred. Amenable to trial Magic Cup supplementation (chocolate flavor), coordinated via CBORD. No report of GI distress (nausea, vomiting, diarrhea, constipation). Last  3/5/25 RN flowsheet documentation. Not noted to be on a bowel regimen. On IV hydration support with lactated ringers.    Writer provided verbal education regarding current diet order, plan for SLP evaluation, and nutrition recommendations for after discharge. Patient and family verbalized understanding to the discussion.

## 2025-03-08 LAB
ALBUMIN SERPL ELPH-MCNC: 3.9 G/DL — SIGNIFICANT CHANGE UP (ref 3.3–5)
ALP SERPL-CCNC: 75 U/L — SIGNIFICANT CHANGE UP (ref 40–120)
ALT FLD-CCNC: 12 U/L — SIGNIFICANT CHANGE UP (ref 4–41)
ANION GAP SERPL CALC-SCNC: 13 MMOL/L — SIGNIFICANT CHANGE UP (ref 7–14)
AST SERPL-CCNC: 13 U/L — SIGNIFICANT CHANGE UP (ref 4–40)
BASOPHILS # BLD AUTO: 0.01 K/UL — SIGNIFICANT CHANGE UP (ref 0–0.2)
BASOPHILS NFR BLD AUTO: 0.4 % — SIGNIFICANT CHANGE UP (ref 0–2)
BILIRUB SERPL-MCNC: 0.9 MG/DL — SIGNIFICANT CHANGE UP (ref 0.2–1.2)
BUN SERPL-MCNC: 24 MG/DL — HIGH (ref 7–23)
CALCIUM SERPL-MCNC: 9.2 MG/DL — SIGNIFICANT CHANGE UP (ref 8.4–10.5)
CHLORIDE SERPL-SCNC: 103 MMOL/L — SIGNIFICANT CHANGE UP (ref 98–107)
CO2 SERPL-SCNC: 22 MMOL/L — SIGNIFICANT CHANGE UP (ref 22–31)
CREAT SERPL-MCNC: 0.91 MG/DL — SIGNIFICANT CHANGE UP (ref 0.5–1.3)
EGFR: 88 ML/MIN/1.73M2 — SIGNIFICANT CHANGE UP
EGFR: 88 ML/MIN/1.73M2 — SIGNIFICANT CHANGE UP
EOSINOPHIL # BLD AUTO: 0.01 K/UL — SIGNIFICANT CHANGE UP (ref 0–0.5)
EOSINOPHIL NFR BLD AUTO: 0.4 % — SIGNIFICANT CHANGE UP (ref 0–6)
GLUCOSE SERPL-MCNC: 86 MG/DL — SIGNIFICANT CHANGE UP (ref 70–99)
HCT VFR BLD CALC: 26.8 % — LOW (ref 39–50)
HGB BLD-MCNC: 9.6 G/DL — LOW (ref 13–17)
IANC: 1.99 K/UL — SIGNIFICANT CHANGE UP (ref 1.8–7.4)
IMM GRANULOCYTES NFR BLD AUTO: 0.4 % — SIGNIFICANT CHANGE UP (ref 0–0.9)
LYMPHOCYTES # BLD AUTO: 0.31 K/UL — LOW (ref 1–3.3)
LYMPHOCYTES # BLD AUTO: 11.8 % — LOW (ref 13–44)
MAGNESIUM SERPL-MCNC: 1.9 MG/DL — SIGNIFICANT CHANGE UP (ref 1.6–2.6)
MCHC RBC-ENTMCNC: 31.3 PG — SIGNIFICANT CHANGE UP (ref 27–34)
MCHC RBC-ENTMCNC: 35.8 G/DL — SIGNIFICANT CHANGE UP (ref 32–36)
MCV RBC AUTO: 87.3 FL — SIGNIFICANT CHANGE UP (ref 80–100)
MONOCYTES # BLD AUTO: 0.3 K/UL — SIGNIFICANT CHANGE UP (ref 0–0.9)
MONOCYTES NFR BLD AUTO: 11.4 % — SIGNIFICANT CHANGE UP (ref 2–14)
NEUTROPHILS # BLD AUTO: 1.99 K/UL — SIGNIFICANT CHANGE UP (ref 1.8–7.4)
NEUTROPHILS NFR BLD AUTO: 75.6 % — SIGNIFICANT CHANGE UP (ref 43–77)
NRBC # BLD AUTO: 0 K/UL — SIGNIFICANT CHANGE UP (ref 0–0)
NRBC # FLD: 0 K/UL — SIGNIFICANT CHANGE UP (ref 0–0)
NRBC BLD AUTO-RTO: 0 /100 WBCS — SIGNIFICANT CHANGE UP (ref 0–0)
PHOSPHATE SERPL-MCNC: 2.6 MG/DL — SIGNIFICANT CHANGE UP (ref 2.5–4.5)
PLATELET # BLD AUTO: 139 K/UL — LOW (ref 150–400)
POTASSIUM SERPL-MCNC: 4.4 MMOL/L — SIGNIFICANT CHANGE UP (ref 3.5–5.3)
POTASSIUM SERPL-SCNC: 4.4 MMOL/L — SIGNIFICANT CHANGE UP (ref 3.5–5.3)
PROT SERPL-MCNC: 6.8 G/DL — SIGNIFICANT CHANGE UP (ref 6–8.3)
RBC # BLD: 3.07 M/UL — LOW (ref 4.2–5.8)
RBC # FLD: 14.5 % — SIGNIFICANT CHANGE UP (ref 10.3–14.5)
SODIUM SERPL-SCNC: 138 MMOL/L — SIGNIFICANT CHANGE UP (ref 135–145)
WBC # BLD: 2.63 K/UL — LOW (ref 3.8–10.5)
WBC # FLD AUTO: 2.63 K/UL — LOW (ref 3.8–10.5)

## 2025-03-08 PROCEDURE — 99232 SBSQ HOSP IP/OBS MODERATE 35: CPT

## 2025-03-08 RX ADMIN — ENOXAPARIN SODIUM 40 MILLIGRAM(S): 100 INJECTION SUBCUTANEOUS at 18:05

## 2025-03-08 RX ADMIN — Medication 2 MILLIGRAM(S): at 14:50

## 2025-03-08 RX ADMIN — Medication 2 MILLIGRAM(S): at 09:05

## 2025-03-08 RX ADMIN — LIDOCAINE HYDROCHLORIDE 20 MILLILITER(S): 20 JELLY TOPICAL at 14:50

## 2025-03-08 RX ADMIN — Medication 2 MILLIGRAM(S): at 15:46

## 2025-03-08 RX ADMIN — LIDOCAINE HYDROCHLORIDE 20 MILLILITER(S): 20 JELLY TOPICAL at 08:22

## 2025-03-08 RX ADMIN — Medication 2 MILLIGRAM(S): at 10:03

## 2025-03-08 NOTE — PROGRESS NOTE ADULT - ASSESSMENT
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     Active problems  tonsillar ca  vascular dementia   odynophagia/lack of appetite   FTT  Anemia in neoplastic disease  hyponatremia  hypercoagulable state        tonsillar ca  odynophagia/lack of appetite   Hyponatremia  FTT  (s/p C6 of 7 of cisplatin and on RT -should've been completed on 3/7)  Palliative care consulted, morphine and lidocaine q4 before meals  - Continue with liquid gabapentin TID.   Hyponatremia likely 2/2 reduced po intake, repelete and monitor   Will continue to optimize nutrition for FTT  ENT consulted, airway patent on scope    Hypercoagulable state 2/2 malignancy  On lovenox 40mg daily

## 2025-03-08 NOTE — SWALLOW BEDSIDE ASSESSMENT ADULT - SWALLOW EVAL: RECOMMENDED DIET
Unable to make diet recommendations secondary to patient refusal of PO trials; defer diet to physician discretion

## 2025-03-08 NOTE — SWALLOW BEDSIDE ASSESSMENT ADULT - SWALLOW EVAL: DIAGNOSIS
This is a limited assessment as patient roused, participate in oral motor exam, accepted 1 teaspoon mildly thick liquids, and 1 sip of water, orally held bolus despite cues/encouragement to initiate swallow. Patient eventually spit out bolus and reported "it's burning, I cannot take anything right now." Unable to assess oral/pharyngeal phases of the swallow.

## 2025-03-08 NOTE — PROGRESS NOTE ADULT - SUBJECTIVE AND OBJECTIVE BOX
Cruz Diaz MD  Academic Hospitalist  Pager [phone]  Email: [email or web address]  Available on Microsoft Teams        PROGRESS NOTE:     Patient is a 74y old  Male who presents with a chief complaint of odynophagia with decreased PO intake (07 Mar 2025 14:58)      SUBJECTIVE / OVERNIGHT EVENTS:  Patient seen and examined this morning. Patient feeling better with lidocaine and morphine. Before stated that food tastes bad, but now just states that the food has no taste.   ADDITIONAL REVIEW OF SYSTEMS:  No f.c    MEDICATIONS  (STANDING):  enoxaparin Injectable 40 milliGRAM(s) SubCutaneous every 24 hours  lidocaine 2% Viscous 20 milliLiter(s) Swish and Spit every 6 hours    MEDICATIONS  (PRN):  aluminum hydroxide/magnesium hydroxide/simethicone Suspension 30 milliLiter(s) Oral every 4 hours PRN Dyspepsia  melatonin 3 milliGRAM(s) Oral at bedtime PRN Insomnia  morphine  - Injectable 2 milliGRAM(s) IV Push every 6 hours PRN Severe Pain (7 - 10)  ondansetron Injectable 4 milliGRAM(s) IV Push every 8 hours PRN Nausea and/or Vomiting      CAPILLARY BLOOD GLUCOSE        I&O's Summary    07 Mar 2025 07:01  -  08 Mar 2025 07:00  --------------------------------------------------------  IN: 125 mL / OUT: 0 mL / NET: 125 mL        PHYSICAL EXAM:  Vital Signs Last 24 Hrs  T(C): 37 (08 Mar 2025 12:51), Max: 37.5 (07 Mar 2025 20:35)  T(F): 98.6 (08 Mar 2025 12:51), Max: 99.5 (07 Mar 2025 20:35)  HR: 83 (08 Mar 2025 12:51) (70 - 85)  BP: 155/78 (08 Mar 2025 12:51) (116/73 - 155/78)  BP(mean): --  RR: 18 (08 Mar 2025 12:51) (17 - 18)  SpO2: 98% (08 Mar 2025 12:51) (94% - 98%)    Parameters below as of 08 Mar 2025 12:51  Patient On (Oxygen Delivery Method): room air            CONSTITUTIONAL: NAD, pleasant, no thrush  RESPIRATORY: Normal respiratory effort; no respiratory distress, CTAB  CARDIOVASCULAR: No visible JVD, No lower extremity edema; S1S2, no m,r,g  ABDOMEN: Not guarding, does not appear distended, BS+  MUSCLOSKELETAL: no clubbing or cyanosis of digits; no joint swelling   PSYCH: Calm and pleasant, sometime has trouble finding words    LABS:                        9.6    2.63  )-----------( 139      ( 08 Mar 2025 05:43 )             26.8     03-08    138  |  103  |  24[H]  ----------------------------<  86  4.4   |  22  |  0.91    Ca    9.2      08 Mar 2025 05:43  Phos  2.6     03-08  Mg     1.90     03-08    TPro  6.8  /  Alb  3.9  /  TBili  0.9  /  DBili  x   /  AST  13  /  ALT  12  /  AlkPhos  75  03-08          Urinalysis Basic - ( 08 Mar 2025 05:43 )    Color: x / Appearance: x / SG: x / pH: x  Gluc: 86 mg/dL / Ketone: x  / Bili: x / Urobili: x   Blood: x / Protein: x / Nitrite: x   Leuk Esterase: x / RBC: x / WBC x   Sq Epi: x / Non Sq Epi: x / Bacteria: x        Urinalysis with Rflx Culture (collected 06 Mar 2025 18:07)        RADIOLOGY & ADDITIONAL TESTS:  Results Reviewed:   Imaging Personally Reviewed:  Electrocardiogram Personally Reviewed:    COORDINATION OF CARE:  Care Discussed with Consultants/Other Providers [Y/N]:  Prior or Outpatient Records Reviewed [Y/N]:

## 2025-03-08 NOTE — SWALLOW BEDSIDE ASSESSMENT ADULT - ADDITIONAL RECOMMENDATIONS
Medical team advised to reconsult this service once patient becomes willing to participate in PO trials for swallow assessment.     Recommend follow up for Swallowing Therapy pending discharge plans. Outpatient Speech/Swallow Clinic at [facility] (#[phone]).

## 2025-03-08 NOTE — SWALLOW BEDSIDE ASSESSMENT ADULT - COMMENTS
ENT PA 3/7: "74-year-old M Phmx Right hip OA, ventral hernia, vascular dementia, stage 4 tonsil cancer (T2 N2, p16 negative, concurrent chemo/RT 1/21/25, Started first weekly cisplatin 1/24/25, last Tx on 2/28, was to complete 7cycles of Cisplatin on 3/7) who presented to the emergency department for failure to thrive, dehydration and pain management. Patient is on day 29 of 35 radiation/ chemo treatments. ENT consulted for dysphagia and airway evaluation. Physical exam with + left tonsillar mass and left level II cervical lymphopathy FOE with post radiation changes of thickened nonobstructive epiglottis and widely patent airway."    CXR 3/5: IMPRESSION: Clear lungs.    OF Note: patient known to [facility] (see allscripts for detailed notes).     Patient received asleep, wakened easily, responded to simple questions, complains of odynophagia 7/10, recently received morphine prior to SLP arrival per daughter. Patient's daughter states ~20lb weight loss since initiation of radiation and most recently notes patient has sores making it verbal painful to tolerate oral intake.

## 2025-03-09 LAB
ALBUMIN SERPL ELPH-MCNC: 4 G/DL — SIGNIFICANT CHANGE UP (ref 3.3–5)
ALP SERPL-CCNC: 76 U/L — SIGNIFICANT CHANGE UP (ref 40–120)
ALT FLD-CCNC: 11 U/L — SIGNIFICANT CHANGE UP (ref 4–41)
ANION GAP SERPL CALC-SCNC: 16 MMOL/L — HIGH (ref 7–14)
AST SERPL-CCNC: 13 U/L — SIGNIFICANT CHANGE UP (ref 4–40)
BASOPHILS # BLD AUTO: 0.01 K/UL — SIGNIFICANT CHANGE UP (ref 0–0.2)
BASOPHILS NFR BLD AUTO: 0.4 % — SIGNIFICANT CHANGE UP (ref 0–2)
BILIRUB SERPL-MCNC: 0.9 MG/DL — SIGNIFICANT CHANGE UP (ref 0.2–1.2)
BUN SERPL-MCNC: 27 MG/DL — HIGH (ref 7–23)
CALCIUM SERPL-MCNC: 9.4 MG/DL — SIGNIFICANT CHANGE UP (ref 8.4–10.5)
CHLORIDE SERPL-SCNC: 99 MMOL/L — SIGNIFICANT CHANGE UP (ref 98–107)
CO2 SERPL-SCNC: 21 MMOL/L — LOW (ref 22–31)
CREAT SERPL-MCNC: 0.98 MG/DL — SIGNIFICANT CHANGE UP (ref 0.5–1.3)
EGFR: 80 ML/MIN/1.73M2 — SIGNIFICANT CHANGE UP
EGFR: 80 ML/MIN/1.73M2 — SIGNIFICANT CHANGE UP
EOSINOPHIL # BLD AUTO: 0.01 K/UL — SIGNIFICANT CHANGE UP (ref 0–0.5)
EOSINOPHIL NFR BLD AUTO: 0.4 % — SIGNIFICANT CHANGE UP (ref 0–6)
GLUCOSE SERPL-MCNC: 83 MG/DL — SIGNIFICANT CHANGE UP (ref 70–99)
HCT VFR BLD CALC: 26.7 % — LOW (ref 39–50)
HGB BLD-MCNC: 9.6 G/DL — LOW (ref 13–17)
IANC: 1.91 K/UL — SIGNIFICANT CHANGE UP (ref 1.8–7.4)
IMM GRANULOCYTES NFR BLD AUTO: 0.4 % — SIGNIFICANT CHANGE UP (ref 0–0.9)
LYMPHOCYTES # BLD AUTO: 0.26 K/UL — LOW (ref 1–3.3)
LYMPHOCYTES # BLD AUTO: 10.3 % — LOW (ref 13–44)
MAGNESIUM SERPL-MCNC: 1.9 MG/DL — SIGNIFICANT CHANGE UP (ref 1.6–2.6)
MCHC RBC-ENTMCNC: 31.2 PG — SIGNIFICANT CHANGE UP (ref 27–34)
MCHC RBC-ENTMCNC: 36 G/DL — SIGNIFICANT CHANGE UP (ref 32–36)
MCV RBC AUTO: 86.7 FL — SIGNIFICANT CHANGE UP (ref 80–100)
MONOCYTES # BLD AUTO: 0.33 K/UL — SIGNIFICANT CHANGE UP (ref 0–0.9)
MONOCYTES NFR BLD AUTO: 13 % — SIGNIFICANT CHANGE UP (ref 2–14)
NEUTROPHILS # BLD AUTO: 1.91 K/UL — SIGNIFICANT CHANGE UP (ref 1.8–7.4)
NEUTROPHILS NFR BLD AUTO: 75.5 % — SIGNIFICANT CHANGE UP (ref 43–77)
NRBC # BLD AUTO: 0 K/UL — SIGNIFICANT CHANGE UP (ref 0–0)
NRBC # FLD: 0 K/UL — SIGNIFICANT CHANGE UP (ref 0–0)
NRBC BLD AUTO-RTO: 0 /100 WBCS — SIGNIFICANT CHANGE UP (ref 0–0)
PHOSPHATE SERPL-MCNC: 3 MG/DL — SIGNIFICANT CHANGE UP (ref 2.5–4.5)
PLATELET # BLD AUTO: 161 K/UL — SIGNIFICANT CHANGE UP (ref 150–400)
POTASSIUM SERPL-MCNC: 4.3 MMOL/L — SIGNIFICANT CHANGE UP (ref 3.5–5.3)
POTASSIUM SERPL-SCNC: 4.3 MMOL/L — SIGNIFICANT CHANGE UP (ref 3.5–5.3)
PROT SERPL-MCNC: 7.2 G/DL — SIGNIFICANT CHANGE UP (ref 6–8.3)
RBC # BLD: 3.08 M/UL — LOW (ref 4.2–5.8)
RBC # FLD: 14.8 % — HIGH (ref 10.3–14.5)
SODIUM SERPL-SCNC: 136 MMOL/L — SIGNIFICANT CHANGE UP (ref 135–145)
WBC # BLD: 2.53 K/UL — LOW (ref 3.8–10.5)
WBC # FLD AUTO: 2.53 K/UL — LOW (ref 3.8–10.5)

## 2025-03-09 PROCEDURE — 99232 SBSQ HOSP IP/OBS MODERATE 35: CPT

## 2025-03-09 RX ORDER — MIRTAZAPINE 30 MG/1
15 TABLET, FILM COATED ORAL AT BEDTIME
Refills: 0 | Status: DISCONTINUED | OUTPATIENT
Start: 2025-03-09 | End: 2025-03-10

## 2025-03-09 RX ADMIN — ENOXAPARIN SODIUM 40 MILLIGRAM(S): 100 INJECTION SUBCUTANEOUS at 17:23

## 2025-03-09 RX ADMIN — LIDOCAINE HYDROCHLORIDE 20 MILLILITER(S): 20 JELLY TOPICAL at 11:09

## 2025-03-09 RX ADMIN — Medication 2 MILLIGRAM(S): at 13:45

## 2025-03-09 RX ADMIN — Medication 2 MILLIGRAM(S): at 14:45

## 2025-03-09 NOTE — PROGRESS NOTE ADULT - ASSESSMENT
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     Active problems  tonsillar ca  vascular dementia   odynophagia/lack of appetite   FTT  Anemia in neoplastic disease  hyponatremia  Vascular dementia  hypercoagulable state        tonsillar ca  odynophagia/lack of appetite   Hyponatremia  FTT  (s/p C6 of 7 of cisplatin and on RT -should've been completed on 3/7)  Palliative care consulted, morphine and lidocaine q4 before meals  Continue with liquid gabapentin TID.  Hyponatremia likely 2/2 reduced po intake, improved  Will continue to optimize nutrition for FTT  ENT consulted, airway is patent on scope    Anemia in neoplastic disease  Hb 9.6, otherwise stable, continue to monitor    Vascular dementia  Frequent reorientation     Hypercoagulable state 2/2 malignancy  On lovenox 40mg daily

## 2025-03-09 NOTE — PROGRESS NOTE ADULT - SUBJECTIVE AND OBJECTIVE BOX
Cruz Diaz MD  Academic Hospitalist  Pager [phone]  Email: [email or web address]  Available on [employer] Teams        PROGRESS NOTE:     Patient is a 74y old  Male who presents with a chief complaint of odynophagia with decreased PO intake (08 Mar 2025 13:09)      SUBJECTIVE / OVERNIGHT EVENTS:  Patient seen and examined this morning. Pain improved, however food still doesn't have much of taste.   ADDITIONAL REVIEW OF SYSTEMS:  No f/c    MEDICATIONS  (STANDING):  enoxaparin Injectable 40 milliGRAM(s) SubCutaneous every 24 hours  lidocaine 2% Viscous 20 milliLiter(s) Swish and Spit every 6 hours    MEDICATIONS  (PRN):  aluminum hydroxide/magnesium hydroxide/simethicone Suspension 30 milliLiter(s) Oral every 4 hours PRN Dyspepsia  melatonin 3 milliGRAM(s) Oral at bedtime PRN Insomnia  morphine  - Injectable 2 milliGRAM(s) IV Push every 4 hours PRN Severe Pain (7 - 10)  ondansetron Injectable 4 milliGRAM(s) IV Push every 8 hours PRN Nausea and/or Vomiting      CAPILLARY BLOOD GLUCOSE        I&O's Summary    08 Mar 2025 06:01  -  09 Mar 2025 07:00  --------------------------------------------------------  IN: 890 mL / OUT: 0 mL / NET: 890 mL        PHYSICAL EXAM:  Vital Signs Last 24 Hrs  T(C): 37.2 (09 Mar 2025 04:48), Max: 37.7 (08 Mar 2025 21:00)  T(F): 98.9 (09 Mar 2025 04:48), Max: 99.9 (08 Mar 2025 21:00)  HR: 73 (09 Mar 2025 04:48) (73 - 84)  BP: 133/78 (09 Mar 2025 04:48) (133/78 - 155/78)  BP(mean): --  RR: 18 (09 Mar 2025 04:48) (18 - 18)  SpO2: 99% (09 Mar 2025 04:48) (97% - 99%)    Parameters below as of 09 Mar 2025 04:48  Patient On (Oxygen Delivery Method): room air          CONSTITUTIONAL: NAD, pleasant, no thrush  RESPIRATORY: Normal respiratory effort; no respiratory distress, CTAB  CARDIOVASCULAR: No visible JVD, No lower extremity edema; S1S2, no m,r,g  ABDOMEN: Not guarding, does not appear distended, BS+  MUSCLOSKELETAL: no clubbing or cyanosis of digits; no joint swelling   PSYCH: Calm and pleasant, sometimes has trouble finding words    LABS:                        9.6    2.53  )-----------( 161      ( 09 Mar 2025 05:30 )             26.7     03-09    136  |  99  |  27[H]  ----------------------------<  83  4.3   |  21[L]  |  0.98    Ca    9.4      09 Mar 2025 05:30  Phos  3.0     03-09  Mg     1.90     03-09    TPro  7.2  /  Alb  4.0  /  TBili  0.9  /  DBili  x   /  AST  13  /  ALT  11  /  AlkPhos  76  03-09          Urinalysis Basic - ( 09 Mar 2025 05:30 )    Color: x / Appearance: x / SG: x / pH: x  Gluc: 83 mg/dL / Ketone: x  / Bili: x / Urobili: x   Blood: x / Protein: x / Nitrite: x   Leuk Esterase: x / RBC: x / WBC x   Sq Epi: x / Non Sq Epi: x / Bacteria: x        Urinalysis with Rflx Culture (collected 06 Mar 2025 18:07)        RADIOLOGY & ADDITIONAL TESTS:  Results Reviewed:   Imaging Personally Reviewed:  Electrocardiogram Personally Reviewed:    COORDINATION OF CARE:  Care Discussed with Consultants/Other Providers [Y/N]:  Prior or Outpatient Records Reviewed [Y/N]:

## 2025-03-10 DIAGNOSIS — F01.50 VASCULAR DEMENTIA, UNSPECIFIED SEVERITY, WITHOUT BEHAVIORAL DISTURBANCE, PSYCHOTIC DISTURBANCE, MOOD DISTURBANCE, AND ANXIETY: ICD-10-CM

## 2025-03-10 LAB
ALBUMIN SERPL ELPH-MCNC: 3.7 G/DL — SIGNIFICANT CHANGE UP (ref 3.3–5)
ALP SERPL-CCNC: 67 U/L — SIGNIFICANT CHANGE UP (ref 40–120)
ALT FLD-CCNC: 10 U/L — SIGNIFICANT CHANGE UP (ref 4–41)
ANION GAP SERPL CALC-SCNC: 15 MMOL/L — HIGH (ref 7–14)
AST SERPL-CCNC: 12 U/L — SIGNIFICANT CHANGE UP (ref 4–40)
BASOPHILS # BLD AUTO: 0.01 K/UL — SIGNIFICANT CHANGE UP (ref 0–0.2)
BASOPHILS NFR BLD AUTO: 0.5 % — SIGNIFICANT CHANGE UP (ref 0–2)
BILIRUB SERPL-MCNC: 0.8 MG/DL — SIGNIFICANT CHANGE UP (ref 0.2–1.2)
BUN SERPL-MCNC: 30 MG/DL — HIGH (ref 7–23)
CALCIUM SERPL-MCNC: 9.3 MG/DL — SIGNIFICANT CHANGE UP (ref 8.4–10.5)
CHLORIDE SERPL-SCNC: 102 MMOL/L — SIGNIFICANT CHANGE UP (ref 98–107)
CO2 SERPL-SCNC: 22 MMOL/L — SIGNIFICANT CHANGE UP (ref 22–31)
CREAT SERPL-MCNC: 0.88 MG/DL — SIGNIFICANT CHANGE UP (ref 0.5–1.3)
EGFR: 90 ML/MIN/1.73M2 — SIGNIFICANT CHANGE UP
EGFR: 90 ML/MIN/1.73M2 — SIGNIFICANT CHANGE UP
EOSINOPHIL # BLD AUTO: 0.01 K/UL — SIGNIFICANT CHANGE UP (ref 0–0.5)
EOSINOPHIL NFR BLD AUTO: 0.5 % — SIGNIFICANT CHANGE UP (ref 0–6)
GLUCOSE SERPL-MCNC: 79 MG/DL — SIGNIFICANT CHANGE UP (ref 70–99)
HCT VFR BLD CALC: 23.8 % — LOW (ref 39–50)
HGB BLD-MCNC: 8.6 G/DL — LOW (ref 13–17)
IANC: 1.36 K/UL — LOW (ref 1.8–7.4)
IMM GRANULOCYTES NFR BLD AUTO: 0 % — SIGNIFICANT CHANGE UP (ref 0–0.9)
LYMPHOCYTES # BLD AUTO: 0.21 K/UL — LOW (ref 1–3.3)
LYMPHOCYTES # BLD AUTO: 11.1 % — LOW (ref 13–44)
MAGNESIUM SERPL-MCNC: 2 MG/DL — SIGNIFICANT CHANGE UP (ref 1.6–2.6)
MCHC RBC-ENTMCNC: 31.3 PG — SIGNIFICANT CHANGE UP (ref 27–34)
MCHC RBC-ENTMCNC: 36.1 G/DL — HIGH (ref 32–36)
MCV RBC AUTO: 86.5 FL — SIGNIFICANT CHANGE UP (ref 80–100)
MONOCYTES # BLD AUTO: 0.3 K/UL — SIGNIFICANT CHANGE UP (ref 0–0.9)
MONOCYTES NFR BLD AUTO: 15.9 % — HIGH (ref 2–14)
NEUTROPHILS # BLD AUTO: 1.36 K/UL — LOW (ref 1.8–7.4)
NEUTROPHILS NFR BLD AUTO: 72 % — SIGNIFICANT CHANGE UP (ref 43–77)
NRBC # BLD AUTO: 0 K/UL — SIGNIFICANT CHANGE UP (ref 0–0)
NRBC # FLD: 0 K/UL — SIGNIFICANT CHANGE UP (ref 0–0)
NRBC BLD AUTO-RTO: 0 /100 WBCS — SIGNIFICANT CHANGE UP (ref 0–0)
PHOSPHATE SERPL-MCNC: 2.9 MG/DL — SIGNIFICANT CHANGE UP (ref 2.5–4.5)
PLATELET # BLD AUTO: 131 K/UL — LOW (ref 150–400)
POTASSIUM SERPL-MCNC: 4.2 MMOL/L — SIGNIFICANT CHANGE UP (ref 3.5–5.3)
POTASSIUM SERPL-SCNC: 4.2 MMOL/L — SIGNIFICANT CHANGE UP (ref 3.5–5.3)
PROT SERPL-MCNC: 6.5 G/DL — SIGNIFICANT CHANGE UP (ref 6–8.3)
RBC # BLD: 2.75 M/UL — LOW (ref 4.2–5.8)
RBC # FLD: 14.6 % — HIGH (ref 10.3–14.5)
SODIUM SERPL-SCNC: 139 MMOL/L — SIGNIFICANT CHANGE UP (ref 135–145)
WBC # BLD: 1.89 K/UL — LOW (ref 3.8–10.5)
WBC # FLD AUTO: 1.89 K/UL — LOW (ref 3.8–10.5)

## 2025-03-10 PROCEDURE — 99233 SBSQ HOSP IP/OBS HIGH 50: CPT

## 2025-03-10 RX ORDER — GABAPENTIN 400 MG/1
300 CAPSULE ORAL THREE TIMES A DAY
Refills: 0 | Status: DISCONTINUED | OUTPATIENT
Start: 2025-03-10 | End: 2025-03-10

## 2025-03-10 RX ORDER — FLUCONAZOLE 150 MG
100 TABLET ORAL DAILY
Refills: 0 | Status: DISCONTINUED | OUTPATIENT
Start: 2025-03-11 | End: 2025-03-11

## 2025-03-10 RX ORDER — FLUCONAZOLE 150 MG
200 TABLET ORAL ONCE
Refills: 0 | Status: COMPLETED | OUTPATIENT
Start: 2025-03-10 | End: 2025-03-10

## 2025-03-10 RX ORDER — MELATONIN 5 MG
3 TABLET ORAL AT BEDTIME
Refills: 0 | Status: DISCONTINUED | OUTPATIENT
Start: 2025-03-10 | End: 2025-03-14

## 2025-03-10 RX ADMIN — ENOXAPARIN SODIUM 40 MILLIGRAM(S): 100 INJECTION SUBCUTANEOUS at 18:39

## 2025-03-10 RX ADMIN — Medication 2 MILLIGRAM(S): at 13:06

## 2025-03-10 RX ADMIN — Medication 2 MILLIGRAM(S): at 13:45

## 2025-03-10 RX ADMIN — Medication 2 MILLIGRAM(S): at 19:24

## 2025-03-10 RX ADMIN — Medication 2 MILLIGRAM(S): at 19:02

## 2025-03-10 NOTE — PROGRESS NOTE ADULT - PROBLEM SELECTOR PLAN 2
Significant weight loss, decline in ADLS  PPS 50-60  Severe protein caloric malnutrition per dietary   No skilled PT needs   Unwilling to do PO trial during speech eval  Continue to encourage PO intake with aid of pain medications  IV hydration per primary team.

## 2025-03-10 NOTE — PROGRESS NOTE ADULT - TIME BILLING
Review of laboratory data, radiology results, consultants' recommendations, documentation in [facility], discussion with patient/advanced care providers and interdisciplinary staff (such as , social workers, etc). Interventions were performed as documented above.

## 2025-03-10 NOTE — PROGRESS NOTE ADULT - SUBJECTIVE AND OBJECTIVE BOX
Date of Service    Indication for Geriatrics and Palliative Care Services: Symptom mgmt     SUBJECTIVE AND OBJECTIVE:  Patient seen and examined at bedside. Patient being followed up for : his odynophagia     INTERVAL HPI/OVERNIGHT EVENTS:    Seen at bedside with three of his children. Trialed IV morphine periodically throughout the weekend. Per family somewhat helpful for pain, however does not want the viscious lidocaine because it results in significant oropharyngeal burning from ulcers. They think he has been negatively reinforced from taking that to the point where anything "swish or spit" would be triggering to him and he would not want to take it. He has mild pain relief and PO intake was not much improved over the weekend. They report that the morphine has been making him sleep as well throughout the day. Not sleeping at night because of bed alarm, loud neighbors, unfamiliar place etc. Primary team had recommended remeron over the weekend to aid with sleep and appetite, however family not amendable to this due to prior AE to anti depressants and potential sedating SE.     Allergies    No Known Allergies    Intolerances    MEDICATIONS  (STANDING):  enoxaparin Injectable 40 milliGRAM(s) SubCutaneous every 24 hours  lidocaine 2% Viscous 20 milliLiter(s) Swish and Spit every 6 hours  mirtazapine 15 milliGRAM(s) Oral at bedtime    MEDICATIONS  (PRN):  aluminum hydroxide/magnesium hydroxide/simethicone Suspension 30 milliLiter(s) Oral every 4 hours PRN Dyspepsia  melatonin 3 milliGRAM(s) Oral at bedtime PRN Insomnia  morphine  - Injectable 2 milliGRAM(s) IV Push every 4 hours PRN Severe Pain (7 - 10)  ondansetron Injectable 4 milliGRAM(s) IV Push every 8 hours PRN Nausea and/or Vomiting      ITEMS UNCHECKED ARE NOT PRESENT    PRESENT SYMPTOMS: [x ]Unable to self-report due to altered mental status- see [ ] CPOT [ ] PAINADS [ ] RDOS  Source if other than patient:  [x ]Family   [ ]Team     Pain:  [x ]yes [ ]no  QOL impact - Severe  Location -  Oropharyngeal                   Aggravating factors - Eating, swish and spit medications  Quality - Burning   Radiation - oral and then down esophagus   Timing- some base level pain 24/7   Severity (0-10 scale):  Minimal acceptable level / Pain Goal (0-10 scale):     Dyspnea:                           [ ]Mild [ ]Moderate [ ]Severe    Anxiety:                             [ ]Mild [ ]Moderate [ ]Severe  Agitation:                          [x ]Mild [ ]Moderate [ ]Severe  Fatigue:                             [x]Mild [ ]Moderate [ ]Severe  Nausea:                             [ ]Mild [ ]Moderate [ ]Severe  Loss of appetite:              [x ]Mild [ ]Moderate [ ]Severe  Constipation:                   [ ]Mild [ ]Moderate [ ]Severe  Diarrhea:                          [ ]Mild [ ]Moderate [ ]Severe  Pruritus:                            [ ]Mild [ ]Moderate [ ]Severe  Depression:                      [ ]Mild [ ]Moderate [ ]Severe    CPOT:    https://www.[facility].org/getattachment/[email or web address]/Critical-Care-Pain-Observation-Tool-(CPOT)    PCSSQ[Palliative Care Spiritual Screening Question]   Severity (0-10):  Score of 4 or > indicate consideration of Chaplaincy referral.  Chaplaincy Referral: [ ] yes [ ] refused [ ] following [x ] deferred    Caregiver [place]? : [ ] yes [ ] no [ ] Declined [x ] Deferred              Social work referral [ ] Patient & Family Centered Care Referral [ ]     Anticipatory Grief present?:  [ ] yes [ ] no  [ x] Deferred                  Social work referral [ ] Chaplaincy Referral[ ] Caregiver Support Referral [ ]    Other Symptoms:  [ ]All other review of systems negative   [ ] Unable to obtain due to poor mentation     PHYSICAL EXAM:  Vital Signs Last 24 Hrs  T(C): 37.3 (10 Mar 2025 12:31), Max: 37.3 (10 Mar 2025 05:23)  T(F): 99.1 (10 Mar 2025 12:31), Max: 99.1 (10 Mar 2025 05:23)  HR: 77 (10 Mar 2025 12:31) (77 - 88)  BP: 131/76 (10 Mar 2025 12:31) (131/76 - 138/80)  BP(mean): --  RR: 18 (10 Mar 2025 12:31) (18 - 18)  SpO2: 95% (10 Mar 2025 12:31) (95% - 98%)    Parameters below as of 10 Mar 2025 12:31  Patient On (Oxygen Delivery Method): room air    I&O's Summary     GENERAL:  [x ]Alert  [x ]Oriented x  1 [x] Lethargic  [ ]Cachexia  [ ]Unarousable  [x ]Verbal  [ ]Non-Verbal  [x ] No Distress  Behavioral:   [ ] Anxiety  [ ] Delirium [ ] Agitation [x ] Calm  [ ] Other  HEENT:  [ ]Normal  [ ] Temporal Wasting  [x ]Dry mouth   [ ]ET Tube/Trach  [ ]Oral lesions  [ ] Mucositis  PULMONARY: Breathing comfortably   [ ]Clear [ ]Tachypnea  [ ]Audible excessive secretions   [ ]Rhonchi        [ ]Right [ ]Left [ ]Bilateral  [ ]Crackles        [ ]Right [ ]Left [ ]Bilateral  [ ]Wheezing     [ ]Right [ ]Left [ ]Bilateral  [ ]Diminished breath sounds [ ]right [ ]left [ ]bilateral  CARDIOVASCULAR:    [x ]Regular [ ]Irregular [ ]Tachy  [ ]Hua [ ]Murmur [ ]Other  GASTROINTESTINAL:  [ ]Soft  [ ]Distended   [ ]+BS  [ ]Non tender [ ]Tender  [ ]PEG [ ]OGT/ NGT  Last BM:   GENITOURINARY:  [x ]Normal [ ] Incontinent   [ ]Oliguria/Anuria   [ ]Alan  MUSCULOSKELETAL:   [ ]Normal   [x ]Weakness  [ ]Bed/Wheelchair bound [ ]Edema  [  ] amputation  [  ] contraction  NEUROLOGIC:   [ ]No focal deficits  [x ]Cognitive impairment  [ ]Dysphagia [ ]Dysarthria [ ]Paresis [ ]Other   SKIN: See Nursing Skin Assessment for further details  [x ]Normal    [ ]Rash  [ ]Pressure ulcer(s)       Present on admission [ ]y [ ]n   [  ]  Wound    [  ] hyperpigmentation    CRITICAL CARE:  [ ]Shock Present  [ ]Septic [ ]Cardiogenic [ ]Neurologic [ ]Hypovolemic  [ ]Vasopressors [ ]Inotropes  [ ]Respiratory failure present [ ]Mechanical Ventilation [ ]Non-invasive ventilatory support [ ]High-Flow   [ ]Acute  [ ]Chronic [ ]Hypoxic  [ ]Hypercarbic [ ]Other  [ ]Other organ failure     LABS:  reviewed                         8.6    1.89  )-----------( 131      ( 10 Mar 2025 06:20 )             23.8   03-10    139  |  102  |  30[H]  ----------------------------<  79  4.2   |  22  |  0.88    Ca    9.3      10 Mar 2025 06:20  Phos  2.9     03-10  Mg     2.00     03-10    TPro  6.5  /  Alb  3.7  /  TBili  0.8  /  DBili  x   /  AST  12  /  ALT  10  /  AlkPhos  67  03-10    Urinalysis Basic - ( 10 Mar 2025 06:20 )    Color: x / Appearance: x / SG: x / pH: x  Gluc: 79 mg/dL / Ketone: x  / Bili: x / Urobili: x   Blood: x / Protein: x / Nitrite: x   Leuk Esterase: x / RBC: x / WBC x   Sq Epi: x / Non Sq Epi: x / Bacteria: x      CAPILLARY BLOOD GLUCOSE      RADIOLOGY & ADDITIONAL STUDIES: reviewed     Protein Calorie Malnutrition Present: [ ]mild [ ]moderate [x]severe [ ]underweight [ ]morbid obesity  https://www.andeal.org/vault/2440/web/files/ONC/Table_Clinical%20Characteristics%20to%20Document%20Malnutrition-White%20JV%20et%20al%202012.pdf    Height (cm): 177.8 (03-06-25 @ 15:47), 177.8 (03-05-25 @ 18:20), 175 (02-28-25 @ 15:55)  Weight (kg): 81.3 (03-07-25 @ 05:32), 80.7 (03-05-25 @ 18:20), 80.8 (03-04-25 @ 16:37)  BMI (kg/m2): 25.7 (03-07-25 @ 05:32), 25.5 (03-06-25 @ 15:47), 25.5 (03-05-25 @ 18:20)    [ ]PPSV2 < or = 30%  [ ]significant weight loss [x ]poor nutritional intake [ ]anasarca   [ ]Artificial Nutrition    REFERRALS:   [ ]Chaplaincy  [ ]Hospice  [ ]Child Life  [ x]Social Work  [ ]Case management [ ]Holistic Therapy     Goals of Care Document:     Date of Service    Indication for Geriatrics and Palliative Care Services: Symptom mgmt     SUBJECTIVE AND OBJECTIVE:  Patient seen and examined at bedside. Patient being followed up for : his odynophagia     INTERVAL HPI/OVERNIGHT EVENTS:    Seen at bedside with three of his children. Trialed IV morphine periodically throughout the weekend. Per family somewhat helpful for pain, however does not want the viscious lidocaine because it results in significant oropharyngeal burning from ulcers. They think he has been negatively reinforced from taking that to the point where anything "swish or spit" would be triggering to him and he would not want to take it. He has mild pain relief and PO intake was not much improved over the weekend. They report that the morphine has been making him sleep as well throughout the day. Not sleeping at night because of bed alarm, loud neighbors, unfamiliar place etc. Primary team had recommended remeron over the weekend to aid with sleep and appetite, however family not amenable to this due to prior AE to anti depressants and potential sedating SE.     Allergies    No Known Allergies    Intolerances    MEDICATIONS  (STANDING):  enoxaparin Injectable 40 milliGRAM(s) SubCutaneous every 24 hours  lidocaine 2% Viscous 20 milliLiter(s) Swish and Spit every 6 hours  mirtazapine 15 milliGRAM(s) Oral at bedtime    MEDICATIONS  (PRN):  aluminum hydroxide/magnesium hydroxide/simethicone Suspension 30 milliLiter(s) Oral every 4 hours PRN Dyspepsia  melatonin 3 milliGRAM(s) Oral at bedtime PRN Insomnia  morphine  - Injectable 2 milliGRAM(s) IV Push every 4 hours PRN Severe Pain (7 - 10)  ondansetron Injectable 4 milliGRAM(s) IV Push every 8 hours PRN Nausea and/or Vomiting      ITEMS UNCHECKED ARE NOT PRESENT    PRESENT SYMPTOMS: [x ]Unable to self-report due to altered mental status- see [ ] CPOT [ ] PAINADS [ ] RDOS  Source if other than patient:  [x ]Family   [ ]Team     Pain:  [x ]yes [ ]no  QOL impact - Severe  Location -  Oropharyngeal                   Aggravating factors - Eating, swish and spit medications  Quality - Burning   Radiation - oral and then down esophagus   Timing- some base level pain 24/7   Severity (0-10 scale):  Minimal acceptable level / Pain Goal (0-10 scale):     Dyspnea:                           [ ]Mild [ ]Moderate [ ]Severe    Anxiety:                             [ ]Mild [ ]Moderate [ ]Severe  Agitation:                          [x ]Mild [ ]Moderate [ ]Severe  Fatigue:                             [x]Mild [ ]Moderate [ ]Severe  Nausea:                             [ ]Mild [ ]Moderate [ ]Severe  Loss of appetite:              [x ]Mild [ ]Moderate [ ]Severe  Constipation:                   [ ]Mild [ ]Moderate [ ]Severe  Diarrhea:                          [ ]Mild [ ]Moderate [ ]Severe  Pruritus:                            [ ]Mild [ ]Moderate [ ]Severe  Depression:                      [ ]Mild [ ]Moderate [ ]Severe    CPOT:    https://www.[facility].org/getattachment/[email or web address]/Critical-Care-Pain-Observation-Tool-(CPOT)    PCSSQ[Palliative Care Spiritual Screening Question]   Severity (0-10):  Score of 4 or > indicate consideration of Chaplaincy referral.  Chaplaincy Referral: [ ] yes [ ] refused [ ] following [x ] deferred    Caregiver [place]? : [ ] yes [ ] no [ ] Declined [x ] Deferred              Social work referral [ ] Patient & Family Centered Care Referral [ ]     Anticipatory Grief present?:  [ ] yes [ ] no  [ x] Deferred                  Social work referral [ ] Chaplaincy Referral[ ] Caregiver Support Referral [ ]    Other Symptoms:  [ ]All other review of systems negative   [ ] Unable to obtain due to poor mentation     PHYSICAL EXAM:  Vital Signs Last 24 Hrs  T(C): 37.3 (10 Mar 2025 12:31), Max: 37.3 (10 Mar 2025 05:23)  T(F): 99.1 (10 Mar 2025 12:31), Max: 99.1 (10 Mar 2025 05:23)  HR: 77 (10 Mar 2025 12:31) (77 - 88)  BP: 131/76 (10 Mar 2025 12:31) (131/76 - 138/80)  BP(mean): --  RR: 18 (10 Mar 2025 12:31) (18 - 18)  SpO2: 95% (10 Mar 2025 12:31) (95% - 98%)    Parameters below as of 10 Mar 2025 12:31  Patient On (Oxygen Delivery Method): room air    I&O's Summary     GENERAL:  [x ]Alert  [x ]Oriented x  1 [x] Lethargic  [ ]Cachexia  [ ]Unarousable  [x ]Verbal  [ ]Non-Verbal  [x ] No Distress  Behavioral:   [ ] Anxiety  [ ] Delirium [ ] Agitation [x ] Calm  [ ] Other  HEENT:  [ ]Normal  [ ] Temporal Wasting  [x ]Dry mouth   [ ]ET Tube/Trach  [ ]Oral lesions  [ ] Mucositis  PULMONARY: Breathing comfortably   [ ]Clear [ ]Tachypnea  [ ]Audible excessive secretions   [ ]Rhonchi        [ ]Right [ ]Left [ ]Bilateral  [ ]Crackles        [ ]Right [ ]Left [ ]Bilateral  [ ]Wheezing     [ ]Right [ ]Left [ ]Bilateral  [ ]Diminished breath sounds [ ]right [ ]left [ ]bilateral  CARDIOVASCULAR:    [x ]Regular [ ]Irregular [ ]Tachy  [ ]Hua [ ]Murmur [ ]Other  GASTROINTESTINAL:  [ ]Soft  [ ]Distended   [ ]+BS  [ ]Non tender [ ]Tender  [ ]PEG [ ]OGT/ NGT  Last BM:   GENITOURINARY:  [x ]Normal [ ] Incontinent   [ ]Oliguria/Anuria   [ ]Alan  MUSCULOSKELETAL:   [ ]Normal   [x ]Weakness  [ ]Bed/Wheelchair bound [ ]Edema  [  ] amputation  [  ] contraction  NEUROLOGIC:   [ ]No focal deficits  [x ]Cognitive impairment  [ ]Dysphagia [ ]Dysarthria [ ]Paresis [ ]Other   SKIN: See Nursing Skin Assessment for further details  [x ]Normal    [ ]Rash  [ ]Pressure ulcer(s)       Present on admission [ ]y [ ]n   [  ]  Wound    [  ] hyperpigmentation    CRITICAL CARE:  [ ]Shock Present  [ ]Septic [ ]Cardiogenic [ ]Neurologic [ ]Hypovolemic  [ ]Vasopressors [ ]Inotropes  [ ]Respiratory failure present [ ]Mechanical Ventilation [ ]Non-invasive ventilatory support [ ]High-Flow   [ ]Acute  [ ]Chronic [ ]Hypoxic  [ ]Hypercarbic [ ]Other  [ ]Other organ failure     LABS:  reviewed                         8.6    1.89  )-----------( 131      ( 10 Mar 2025 06:20 )             23.8   03-10    139  |  102  |  30[H]  ----------------------------<  79  4.2   |  22  |  0.88    Ca    9.3      10 Mar 2025 06:20  Phos  2.9     03-10  Mg     2.00     03-10    TPro  6.5  /  Alb  3.7  /  TBili  0.8  /  DBili  x   /  AST  12  /  ALT  10  /  AlkPhos  67  03-10    Urinalysis Basic - ( 10 Mar 2025 06:20 )    Color: x / Appearance: x / SG: x / pH: x  Gluc: 79 mg/dL / Ketone: x  / Bili: x / Urobili: x   Blood: x / Protein: x / Nitrite: x   Leuk Esterase: x / RBC: x / WBC x   Sq Epi: x / Non Sq Epi: x / Bacteria: x      CAPILLARY BLOOD GLUCOSE      RADIOLOGY & ADDITIONAL STUDIES: reviewed     Protein Calorie Malnutrition Present: [ ]mild [ ]moderate [x]severe [ ]underweight [ ]morbid obesity  https://www.andeal.org/vault/2440/web/files/ONC/Table_Clinical%20Characteristics%20to%20Document%20Malnutrition-White%20JV%20et%20al%202012.pdf    Height (cm): 177.8 (03-06-25 @ 15:47), 177.8 (03-05-25 @ 18:20), 175 (02-28-25 @ 15:55)  Weight (kg): 81.3 (03-07-25 @ 05:32), 80.7 (03-05-25 @ 18:20), 80.8 (03-04-25 @ 16:37)  BMI (kg/m2): 25.7 (03-07-25 @ 05:32), 25.5 (03-06-25 @ 15:47), 25.5 (03-05-25 @ 18:20)    [ ]PPSV2 < or = 30%  [ ]significant weight loss [x ]poor nutritional intake [ ]anasarca   [ ]Artificial Nutrition    REFERRALS:   [ ]Chaplaincy  [ ]Hospice  [ ]Child Life  [ x]Social Work  [ ]Case management [ ]Holistic Therapy     Goals of Care Document:

## 2025-03-10 NOTE — PROGRESS NOTE ADULT - ASSESSMENT
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     Active problems  tonsillar ca  vascular dementia   odynophagia/lack of appetite   FTT  Anemia in neoplastic disease  hyponatremia  Vascular dementia  hypercoagulable state        tonsillar ca  odynophagia/lack of appetite   Hyponatremia  FTT  Pt reporting odynophagia and dysphagia w/ regurgitation of food. Dysphagia to solids and liquids.   (s/p C6 of 7 of cisplatin and on RT -should've been completed on 3/7)  Reviewed CT neck,  mucosal thickening and enhancement in the larynx and oropharynx may be due to infectious laryngopharyngitis or sequelaof previous radiation performed.  Palliative care consulted  Patient does not like viscous lidocaine or liquid gabapentin - will d/c   Pain control with morphine 30 minutes prior to meals   Patient did not participate with S&S  Will obtain barium esophogram to assess dysphagia   Empiric treatment of thrush/esophagitis with fluconazole   CMV PCR in AM pending     Anemia in neoplastic disease  Hb 9.6, otherwise stable, continue to monitor    Vascular dementia  Frequent reorientation     Hypercoagulable state 2/2 malignancy  On lovenox 40mg daily

## 2025-03-10 NOTE — PROGRESS NOTE ADULT - PROBLEM SELECTOR PLAN 3
Follows with Dr. Gibson and Dr. 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   - Patient was sent for PET CT scan performed in December 2024 that revealed left tonsillar primary tumor with findings concerning for bilateral cervical lymph node involvement. There was a focus of uptake in the anterior mediastinum for which dedicated CT chest with contrast was unremarkable; there was also findings concerning for possible rectal polyp. Patient was referred for nonoperative management. He started concurrent chemo/RT 1/21/25 with weekly cisplatin started 1/24/25  - He has completed 30/35 RT sessions. According to family recent visits with oncologist have been optimistic in regards to his treatment response. 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 He was scheduled to complete his last chemo session this week.

## 2025-03-10 NOTE — PROGRESS NOTE ADULT - PROBLEM SELECTOR PLAN 1
On admission: "Per family patient has been having progressive decrease in PO intake over course of last few weeks, it has now progressed to virtually zero. He has been prescribed magic mouthwash and liquid gabapentin however this hasn't improved his symptoms. Patient states that everything tastes off. Prior to admission, he mixes lidocaine and maalox which he states just makes his tongue numb and food taste poor. He states that it wears off quickly and for these reasons he has not been interested or compliant with the medication and only takes it 1-2 per day despite family encouragement. They report he hasn't ate anything in 2 weeks and the last calories he consumed were on Monday and he has only had sips of water since. He doesn't have interest in food due to the taste and pain, however he is reluctant to take anything for pain."     Over the weekend- IV morphine was trialed 1-2 x per day, family reports some mild improvements in pain but only marginal improvements in intake. Unwilling to try liquid solutions as they trigger patient due to prior bad experiences from viscous lidocaine. Also concerned about going up on doses due to level of sedation.     Plan:  - Patient with significant oral and esophogeal pain with swallowing, has discolored mucoid oral lesions- would consider infectious causes such as candida and trial empiric treatment    - Please discontinue viscous lidocaine and liquid gabapentin   - Please continue IV morphine 2mg PRN q4hrs for odynophagia, please give 30 min prior to meals. Asked patients family to stimulate during the day and continue to encourage medication compliance and PO intake. Also asked to keep pain and sedation log to aid us with medication titration

## 2025-03-10 NOTE — PROGRESS NOTE ADULT - SUBJECTIVE AND OBJECTIVE BOX
Hospitalist Progress Note  Laurel Payan MD Pager # [phone]    OVERNIGHT EVENTS: CHELE     SUBJECTIVE / INTERVAL HPI: Patient seen and examined at bedside. Family is at bedside and helps to describe symptoms. Pt has been experiencing pain with eating. There is pain/burning in his mouth and also with swallowing. He also has been experiencing inability to swallow and will regurgitate his food. Family reporting that he does not like viscous lidocaine and that it causes burning so he has an aversion to it.     VITAL SIGNS:  Vital Signs Last 24 Hrs  T(C): 37.3 (10 Mar 2025 12:31), Max: 37.3 (10 Mar 2025 05:23)  T(F): 99.1 (10 Mar 2025 12:31), Max: 99.1 (10 Mar 2025 05:23)  HR: 77 (10 Mar 2025 12:31) (77 - 88)  BP: 131/76 (10 Mar 2025 12:31) (131/76 - 138/80)  BP(mean): --  RR: 18 (10 Mar 2025 12:31) (18 - 18)  SpO2: 95% (10 Mar 2025 12:31) (95% - 98%)    Parameters below as of 10 Mar 2025 12:31  Patient On (Oxygen Delivery Method): room air        PHYSICAL EXAM:    General: Comfortable  HEENT: NC/AT; PERRL, anicteric sclera; left palate with white plaque - no other white plaques seen. Oropharynx appears normal.   Neck: supple  Cardiovascular: +S1/S2; RRR  Respiratory: CTA B/L; no W/R/R  Gastrointestinal: soft, NT/ND; +BSx4  Extremities: WWP; no edema, clubbing or cyanosis  Vascular: 2+ radial, DP/PT pulses B/L  Neurological: AAOx2-3; no focal deficits    MEDICATIONS:  MEDICATIONS  (STANDING):  enoxaparin Injectable 40 milliGRAM(s) SubCutaneous every 24 hours  gabapentin Solution 300 milliGRAM(s) Oral three times a day  lidocaine 2% Viscous 20 milliLiter(s) Swish and Spit every 6 hours  mirtazapine 15 milliGRAM(s) Oral at bedtime    MEDICATIONS  (PRN):  aluminum hydroxide/magnesium hydroxide/simethicone Suspension 30 milliLiter(s) Oral every 4 hours PRN Dyspepsia  melatonin 3 milliGRAM(s) Oral at bedtime PRN Insomnia  morphine  - Injectable 2 milliGRAM(s) IV Push every 4 hours PRN Severe Pain (7 - 10)  ondansetron Injectable 4 milliGRAM(s) IV Push every 8 hours PRN Nausea and/or Vomiting      ALLERGIES:  Allergies    No Known Allergies    Intolerances        LABS:                        8.6    1.89  )-----------( 131      ( 10 Mar 2025 06:20 )             23.8     03-10    139  |  102  |  30[H]  ----------------------------<  79  4.2   |  22  |  0.88    Ca    9.3      10 Mar 2025 06:20  Phos  2.9     03-10  Mg     2.00     03-10    TPro  6.5  /  Alb  3.7  /  TBili  0.8  /  DBili  x   /  AST  12  /  ALT  10  /  AlkPhos  67  03-10      Urinalysis Basic - ( 10 Mar 2025 06:20 )    Color: x / Appearance: x / SG: x / pH: x  Gluc: 79 mg/dL / Ketone: x  / Bili: x / Urobili: x   Blood: x / Protein: x / Nitrite: x   Leuk Esterase: x / RBC: x / WBC x   Sq Epi: x / Non Sq Epi: x / Bacteria: x      CAPILLARY BLOOD GLUCOSE          RADIOLOGY & ADDITIONAL TESTS: Reviewed.    ASSESSMENT:    PLAN:

## 2025-03-10 NOTE — PROGRESS NOTE ADULT - PROBLEM SELECTOR PLAN 4
Patient with mild to moderate vascular dementia with some intermittent agitation. He currently is living independently, however cancer treatment has likely contributed to a precipitous decline in ADLs  - Patient with mild agitation and defiance to certain medications at times, but overall redirectable by family. Currently not amenable to psychoactive medications such as antidepressants incl remeron.   - Also having altered sleep wake cycle, encouraged stimulation during the day in order to encourage him to sleep at night, can also consider melatonin to aid with sleep/wake.

## 2025-03-10 NOTE — CHART NOTE - NSCHARTNOTEFT_GEN_A_CORE
Follow-up with patient and family to address psychosocial needs, assess coping with recent diagnosis & prognosis, and provide emotional support. Mr. Sanchez was sleeping during encounter.  Family shared he has been up at night secondary to bed alarm, loud neighbors and unfamiliar environment. One of his children shared that he was "sundowning" at home experiencing altered sleep wake cycle.  Attempted to broach advanced care planning with his daughter, Ellen, pt's HCP, however she stated the family was not ready to discuss code status at this time. Patient remains a full code. In regards to pt's diminished PO intake family did share they would not want a NG tube  or PEG for artificial nutrition. Family are hopeful that pt's PO intake will improve with symptom management and time for recovery. He has completed 30/35 RT sessions. Family shared that pt's oncologist have been optimistic in regards to his treatment response. Goal remains for Mr. Sanchez to pursue further DMT. Family are all supportive and involved in pt's care. Support provided to family. Palliative care team to remain for on-going symptom management and goals of care.

## 2025-03-10 NOTE — PROGRESS NOTE ADULT - PROBLEM SELECTOR PLAN 6
[facility]   Symptom mgmt    In the event of worsening symptoms, please contact the Palliative Medicine team via pager (if the patient is at [facility] #[phone] or if the patient is at [facility] #[phone]) The Geriatric and Palliative Medicine service has coverage 24 hours a day/ 7 days a week to provide medical recommendations regarding symptom management needs via telephone.

## 2025-03-10 NOTE — PROGRESS NOTE ADULT - PROBLEM SELECTOR PLAN 5
- HCP form signed and placed in chart, Ellen (Daughter) is his HCP  - Attempted to bring up MOLST form and Code status again today- family not amenable to discussing at this time,, no conversations/decisions on code status to date, patient remains full code at this time  - Per family they would not want anything drastic such as Nasogastric or PEG tube for artificial nutrition   - At this time patient is responding to treatment and will to pursue further DMT   - Patient likely to be discharged home, given further decline in ADLs and inability to reliably take medications to aid with feeding, explained to family the benefit of getting additional layer of support in home such as HHA.

## 2025-03-11 ENCOUNTER — APPOINTMENT (OUTPATIENT)
Dept: OTOLARYNGOLOGY | Facility: CLINIC | Age: 75
End: 2025-03-11

## 2025-03-11 ENCOUNTER — APPOINTMENT (OUTPATIENT)
Dept: INFUSION THERAPY | Facility: HOSPITAL | Age: 75
End: 2025-03-11

## 2025-03-11 DIAGNOSIS — F03.918 UNSPECIFIED DEMENTIA, UNSPECIFIED SEVERITY, WITH OTHER BEHAVIORAL DISTURBANCE: ICD-10-CM

## 2025-03-11 LAB
APPEARANCE UR: ABNORMAL
BACTERIA # UR AUTO: NEGATIVE /HPF — SIGNIFICANT CHANGE UP
BILIRUB UR-MCNC: ABNORMAL
CAST: 8 /LPF — HIGH (ref 0–4)
COLOR SPEC: ABNORMAL
DIFF PNL FLD: NEGATIVE — SIGNIFICANT CHANGE UP
GLUCOSE UR QL: NEGATIVE MG/DL — SIGNIFICANT CHANGE UP
KETONES UR-MCNC: 40 MG/DL
LEUKOCYTE ESTERASE UR-ACNC: ABNORMAL
NITRITE UR-MCNC: NEGATIVE — SIGNIFICANT CHANGE UP
PH UR: 5.5 — SIGNIFICANT CHANGE UP (ref 5–8)
PROT UR-MCNC: 30 MG/DL
RBC CASTS # UR COMP ASSIST: 4 /HPF — SIGNIFICANT CHANGE UP (ref 0–4)
REVIEW: SIGNIFICANT CHANGE UP
SP GR SPEC: 1.03 — SIGNIFICANT CHANGE UP (ref 1–1.03)
SQUAMOUS # UR AUTO: 2 /HPF — SIGNIFICANT CHANGE UP (ref 0–5)
UROBILINOGEN FLD QL: 2 MG/DL (ref 0.2–1)
WBC UR QL: 3 /HPF — SIGNIFICANT CHANGE UP (ref 0–5)

## 2025-03-11 PROCEDURE — 90792 PSYCH DIAG EVAL W/MED SRVCS: CPT

## 2025-03-11 PROCEDURE — 99233 SBSQ HOSP IP/OBS HIGH 50: CPT

## 2025-03-11 PROCEDURE — G0316 PROLONG INPT EVAL ADD15 M: CPT

## 2025-03-11 RX ORDER — FLUCONAZOLE 150 MG
200 TABLET ORAL ONCE
Refills: 0 | Status: COMPLETED | OUTPATIENT
Start: 2025-03-11 | End: 2025-03-11

## 2025-03-11 RX ORDER — DIPHENHYDRAMINE HCL 12.5MG/5ML
25 ELIXIR ORAL ONCE
Refills: 0 | Status: DISCONTINUED | OUTPATIENT
Start: 2025-03-11 | End: 2025-03-11

## 2025-03-11 RX ORDER — FLUCONAZOLE 150 MG
200 TABLET ORAL ONCE
Refills: 0 | Status: DISCONTINUED | OUTPATIENT
Start: 2025-03-11 | End: 2025-03-11

## 2025-03-11 RX ORDER — DIPHENHYDRAMINE HCL 12.5MG/5ML
50 ELIXIR ORAL ONCE
Refills: 0 | Status: DISCONTINUED | OUTPATIENT
Start: 2025-03-11 | End: 2025-03-11

## 2025-03-11 RX ORDER — LORAZEPAM 4 MG/ML
2 VIAL (ML) INJECTION ONCE
Refills: 0 | Status: DISCONTINUED | OUTPATIENT
Start: 2025-03-11 | End: 2025-03-11

## 2025-03-11 RX ORDER — LORAZEPAM 4 MG/ML
0.5 VIAL (ML) INJECTION EVERY 8 HOURS
Refills: 0 | Status: DISCONTINUED | OUTPATIENT
Start: 2025-03-11 | End: 2025-03-11

## 2025-03-11 RX ORDER — HALOPERIDOL 10 MG/1
0.5 TABLET ORAL EVERY 6 HOURS
Refills: 0 | Status: DISCONTINUED | OUTPATIENT
Start: 2025-03-11 | End: 2025-03-23

## 2025-03-11 RX ORDER — FLUCONAZOLE 150 MG
100 TABLET ORAL EVERY 24 HOURS
Refills: 0 | Status: DISCONTINUED | OUTPATIENT
Start: 2025-03-12 | End: 2025-03-20

## 2025-03-11 RX ORDER — LORAZEPAM 4 MG/ML
0.5 VIAL (ML) INJECTION EVERY 6 HOURS
Refills: 0 | Status: DISCONTINUED | OUTPATIENT
Start: 2025-03-11 | End: 2025-03-11

## 2025-03-11 RX ORDER — FLUCONAZOLE 150 MG
TABLET ORAL
Refills: 0 | Status: DISCONTINUED | OUTPATIENT
Start: 2025-03-11 | End: 2025-03-11

## 2025-03-11 RX ORDER — FLUCONAZOLE 150 MG
TABLET ORAL
Refills: 0 | Status: DISCONTINUED | OUTPATIENT
Start: 2025-03-11 | End: 2025-03-20

## 2025-03-11 RX ADMIN — Medication 2 MILLIGRAM(S): at 01:56

## 2025-03-11 RX ADMIN — Medication 100 MILLIGRAM(S): at 16:23

## 2025-03-11 RX ADMIN — Medication 0.5 MILLIGRAM(S): at 08:53

## 2025-03-11 RX ADMIN — ENOXAPARIN SODIUM 40 MILLIGRAM(S): 100 INJECTION SUBCUTANEOUS at 18:27

## 2025-03-11 NOTE — PROGRESS NOTE ADULT - PROBLEM SELECTOR PLAN 3
Follows with Dr. Gibson and Dr. Mitchell at [facility] for onc/rad onc  - ultrasound of his neck in mid October 2024 revealing a left cervical 2.4 cm nodule suspicious   - He started concurrent chemo/RT 1/21/25 with weekly cisplatin started 1/24/25  - He has completed 30/35 RT sessions. According to family recent visits with oncologist have been optimistic in regards to his treatment response. 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 He was scheduled to complete his last chemo session

## 2025-03-11 NOTE — PROGRESS NOTE ADULT - TIME BILLING
Review of laboratory data, radiology results, consultants' recommendations, documentation in [place], discussion with patient/advanced care providers and interdisciplinary staff (such as , social workers, etc). Interventions were performed as documented above.

## 2025-03-11 NOTE — CHART NOTE - NSCHARTNOTEFT_GEN_A_CORE
MEDICINE [facility] OVERNIGHT COVERAGE      Code MIKE called on overhead for patient. Notified by RN patient was out of bed, agitated, and unable to be reoriented. Patient was confrontational and attempted to attack staff. Patient was placed in 4 point restraints, given 2mg Ativan, and placed on constant observation. Urinalysis ordered.     Philomena Arguelles PA-C  Department of Medicine  Available on [id number] MEDICINE [facility] OVERNIGHT COVERAGE      Code MIKE called on overhead for patient. Notified by RN patient was out of bed, agitated, and unable to be reoriented. Patient was confrontational and attempted to attack staff. Patient was given 2mg Ativan without relief, placed on 4 pt restraints and placed on constant observation. Urinalysis ordered.     Philomena Arguelles PA-C  Department of Medicine  Available on [id number] MEDICINE [facility] OVERNIGHT COVERAGE      Code MIKE called on overhead for patient. Notified by RN patient was out of bed, agitated, and unable to be reoriented. Patient was confrontational and attempted to attack staff. Patient was given 2mg Ativan without relief, placed on 4 pt restraints and placed on constant observation. Safety maintained. Urinalysis ordered.     Philomena Arguelles PA-C  Department of Medicine  Available on [id number]

## 2025-03-11 NOTE — BH CONSULTATION LIAISON ASSESSMENT NOTE - HPI (INCLUDE ILLNESS QUALITY, SEVERITY, DURATION, TIMING, CONTEXT, MODIFYING FACTORS, ASSOCIATED SIGNS AND SYMPTOMS)
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 Psychiatry consulted for aggression last night. Received Ativan prn.     Met with patient. Drowsy, confused, restless when woken up. Otherwise unable to engage in a logical manner. Examined-- no cogwheeling.     Discussed extensively with daughter Janae in person [phone]. States that patient had a fulfilling career, was a drummer in a band, is retired and has a very supportive family- 2 daughters+ 3 son+ 11 grandchildren. States that patient has been declining cognitively for the last 4-5 years, and was diagnosed with dementia by a neurologist. Lived alone. Does not drive. In the last several months, has been struggling with STM, orientation, ability to take care of dog, adl's and iadl's. Daughter Janae has been staying with him for the last 3 weeks. Has been having poor sleep, confusion, distress, intermittent paranoia, possible AH which has been much worse since start of RT. Painful throat ulcers affect his ability to swallow. Pain better managed with morphine now. Last alcohol use as per daughter-- months ago.   Family struggles with coming to terms with his much declined cognition, and his prognosis.   Educated daughter about the below plan. Daughter keen on focusing on reducing patient's distress, agitation. Discussed black box warning of use of antipsychotics in patients with dementia (2-4% increased risk for stroke or sudden death). Daughter consents.

## 2025-03-11 NOTE — PROGRESS NOTE ADULT - ASSESSMENT
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     Active problems  tonsillar ca  vascular dementia   odynophagia/lack of appetite   FTT  Anemia in neoplastic disease  hyponatremia  Vascular dementia  hypercoagulable state    tonsillar ca  odynophagia/lack of appetite   Hyponatremia  FTT  Pt reporting odynophagia and dysphagia w/ regurgitation of food. Dysphagia to solids and liquids.   (s/p C6 of 7 of cisplatin and on RT -should've been completed on 3/7)  Reviewed CT neck,  mucosal thickening and enhancement in the larynx and oropharynx may be due to infectious laryngopharyngitis or sequelaof previous radiation performed.  Palliative care consulted  Patient does not like viscous lidocaine or liquid gabapentin - will d/c   Pain control with morphine 30 minutes prior to meals   Patient did not participate with S&S  Will obtain barium esophogram to assess dysphagia - pt unable to participate today d/t lethargy  Empiric treatment of thrush/esophagitis with fluconazole IV  CMV PCR pending     Anemia in neoplastic disease  Hb 9.6, otherwise stable, continue to monitor    Vascular dementia  - Pt agitated overnight requiring ativan - sleeping today.   - Psych consulted.   - Given decline in mental status over 3 week period - will obtain CTH    Hypercoagulable state 2/2 malignancy  On lovenox 40mg daily

## 2025-03-11 NOTE — BH CONSULTATION LIAISON ASSESSMENT NOTE - CURRENT MEDICATION
MEDICATIONS  (STANDING):  enoxaparin Injectable 40 milliGRAM(s) SubCutaneous every 24 hours  fluconAZOLE   Tablet 100 milliGRAM(s) Oral daily  fluconAZOLE IVPB 200 milliGRAM(s) IV Intermittent once  fluconAZOLE IVPB      melatonin 3 milliGRAM(s) Oral at bedtime    MEDICATIONS  (PRN):  aluminum hydroxide/magnesium hydroxide/simethicone Suspension 30 milliLiter(s) Oral every 4 hours PRN Dyspepsia  morphine  - Injectable 2 milliGRAM(s) IV Push every 4 hours PRN Severe Pain (7 - 10)  ondansetron Injectable 4 milliGRAM(s) IV Push every 8 hours PRN Nausea and/or Vomiting

## 2025-03-11 NOTE — PROGRESS NOTE ADULT - SUBJECTIVE AND OBJECTIVE BOX
Hospitalist Progress Note  Laurel Payan MD Pager # [phone]    OVERNIGHT EVENTS: Agitated - threw garbage can. Received ativan.     SUBJECTIVE / INTERVAL HPI: Patient seen and examined at bedside. Patient is sleeping. ROS not obtained from patient. Daughter at bedside reporting that he was able to eat peas yesterday.     VITAL SIGNS:  Vital Signs Last 24 Hrs  T(C): 37.2 (10 Mar 2025 20:48), Max: 37.2 (10 Mar 2025 20:48)  T(F): 98.9 (10 Mar 2025 20:48), Max: 98.9 (10 Mar 2025 20:48)  HR: 87 (10 Mar 2025 20:48) (87 - 87)  BP: 123/71 (10 Mar 2025 20:48) (123/71 - 123/71)  BP(mean): --  RR: 18 (10 Mar 2025 20:48) (18 - 18)  SpO2: 95% (10 Mar 2025 20:48) (95% - 95%)    Parameters below as of 10 Mar 2025 20:48  Patient On (Oxygen Delivery Method): room air        PHYSICAL EXAM:    General: Sleeping/lethargic s/p ativan administration - protecting airway   HEENT: NC/AT; PERRL, anicteric sclera; MMM  Neck: supple  Cardiovascular: +S1/S2; RRR  Respiratory: CTA B/L; no W/R/R  Gastrointestinal: soft, NT/ND; +BSx4  Extremities: WWP; no edema, clubbing or cyanosis  Vascular: 2+ radial, DP/PT pulses B/L  Neurological: unable to assess     MEDICATIONS:  MEDICATIONS  (STANDING):  enoxaparin Injectable 40 milliGRAM(s) SubCutaneous every 24 hours  fluconAZOLE   Tablet 100 milliGRAM(s) Oral daily  fluconAZOLE IVPB      melatonin 3 milliGRAM(s) Oral at bedtime    MEDICATIONS  (PRN):  aluminum hydroxide/magnesium hydroxide/simethicone Suspension 30 milliLiter(s) Oral every 4 hours PRN Dyspepsia  haloperidol    Injectable 0.5 milliGRAM(s) IV Push every 6 hours PRN Combative/agitation  morphine  - Injectable 2 milliGRAM(s) IV Push every 4 hours PRN Severe Pain (7 - 10)  ondansetron Injectable 4 milliGRAM(s) IV Push every 8 hours PRN Nausea and/or Vomiting      ALLERGIES:  Allergies    No Known Allergies    Intolerances        LABS:                        8.6    1.89  )-----------( 131      ( 10 Mar 2025 06:20 )             23.8     03-10    139  |  102  |  30[H]  ----------------------------<  79  4.2   |  22  |  0.88    Ca    9.3      10 Mar 2025 06:20  Phos  2.9     03-10  Mg     2.00     03-10    TPro  6.5  /  Alb  3.7  /  TBili  0.8  /  DBili  x   /  AST  12  /  ALT  10  /  AlkPhos  67  03-10      Urinalysis Basic - ( 10 Mar 2025 06:20 )    Color: x / Appearance: x / SG: x / pH: x  Gluc: 79 mg/dL / Ketone: x  / Bili: x / Urobili: x   Blood: x / Protein: x / Nitrite: x   Leuk Esterase: x / RBC: x / WBC x   Sq Epi: x / Non Sq Epi: x / Bacteria: x      CAPILLARY BLOOD GLUCOSE          RADIOLOGY & ADDITIONAL TESTS: Reviewed.    ASSESSMENT:    PLAN:

## 2025-03-11 NOTE — BH CONSULTATION LIAISON ASSESSMENT NOTE - NSBHCHARTREVIEWVS_PSY_A_CORE FT
Vital Signs Last 24 Hrs  T(C): 37.2 (10 Mar 2025 20:48), Max: 37.2 (10 Mar 2025 20:48)  T(F): 98.9 (10 Mar 2025 20:48), Max: 98.9 (10 Mar 2025 20:48)  HR: 87 (10 Mar 2025 20:48) (87 - 87)  BP: 123/71 (10 Mar 2025 20:48) (123/71 - 123/71)  BP(mean): --  RR: 18 (10 Mar 2025 20:48) (18 - 18)  SpO2: 95% (10 Mar 2025 20:48) (95% - 95%)    Parameters below as of 10 Mar 2025 20:48  Patient On (Oxygen Delivery Method): room air

## 2025-03-11 NOTE — PROGRESS NOTE ADULT - PROBLEM SELECTOR PLAN 1
- CT neck - Mucosal thickening and enhancement in the larynx and oropharynx may be due to infectious laryngopharyngitis or sequela of previous radiation performed.  - c/w IV morphine 2mg PRN q4hrs for odynophagia, please give 30 min prior to meals. Patient having some improvement,   - c/w fluconazole -> to be changed from PO to IV

## 2025-03-11 NOTE — PROGRESS NOTE ADULT - PROBLEM SELECTOR PLAN 5
[facility]   Symptom mgmt    In the event of worsening symptoms, please contact the Palliative Medicine team via pager (if the patient is at [facility] #[phone] or if the patient is at [facility] #[phone]) The Geriatric and Palliative Medicine service has coverage 24 hours a day/ 7 days a week to provide medical recommendations regarding symptom management needs via telephone.

## 2025-03-11 NOTE — PROGRESS NOTE ADULT - SUBJECTIVE AND OBJECTIVE BOX
Date of Service    Indication for Geriatrics and Palliative Care Services: Symptom mgmt     SUBJECTIVE AND OBJECTIVE:  Patient seen and examined at bedside. Patient being followed up for : his odynophagia     INTERVAL HPI/OVERNIGHT EVENTS:  Patient seen this AM with primary team, with daughter Janae at bedside. Last night patient had become aggressive and MIKE called, was given 2 mg IV ativan. Patient has been somnolent, unable to do barium swallow this AM. Extensive discussions with daughter about plan, will involve psych. For pain management, daughter states yesterday patient was able to eat some peas and drink some water.     Allergies    No Known Allergies    Intolerances    MEDICATIONS  (STANDING):  enoxaparin Injectable 40 milliGRAM(s) SubCutaneous every 24 hours  fluconAZOLE   Tablet 100 milliGRAM(s) Oral daily  melatonin 3 milliGRAM(s) Oral at bedtime    MEDICATIONS  (PRN):  aluminum hydroxide/magnesium hydroxide/simethicone Suspension 30 milliLiter(s) Oral every 4 hours PRN Dyspepsia  LORazepam   Injectable 0.5 milliGRAM(s) IV Push every 6 hours PRN Agitation  morphine  - Injectable 2 milliGRAM(s) IV Push every 4 hours PRN Severe Pain (7 - 10)  ondansetron Injectable 4 milliGRAM(s) IV Push every 8 hours PRN Nausea and/or Vomiting      ITEMS UNCHECKED ARE NOT PRESENT    PRESENT SYMPTOMS: [x ]Unable to self-report due to altered mental status- see [ ] CPOT [ ] PAINADS [ ] RDOS  Source if other than patient:  [x ]Family   [ ]Team     Pain:  [x ]yes [ ]no  QOL impact - Severe  Location -  Oropharyngeal                   Aggravating factors - Eating, swish and spit medications  Quality - Burning   Radiation - oral and then down esophagus   Timing- some base level pain 24/7   Severity (0-10 scale):  Minimal acceptable level / Pain Goal (0-10 scale):     Dyspnea:                           [ ]Mild [ ]Moderate [ ]Severe    Anxiety:                             [ ]Mild [ ]Moderate [ ]Severe  Agitation:                          [x ]Mild [ ]Moderate [ ]Severe  Fatigue:                             [x]Mild [ ]Moderate [ ]Severe  Nausea:                             [ ]Mild [ ]Moderate [ ]Severe  Loss of appetite:              [x ]Mild [ ]Moderate [ ]Severe  Constipation:                   [ ]Mild [ ]Moderate [ ]Severe  Diarrhea:                          [ ]Mild [ ]Moderate [ ]Severe  Pruritus:                            [ ]Mild [ ]Moderate [ ]Severe  Depression:                      [ ]Mild [ ]Moderate [ ]Severe    CPOT:    https://www.[email or web address]/getattachment/[email or web address]/Critical-Care-Pain-Observation-Tool-(CPOT)    PCSSQ[Palliative Care Spiritual Screening Question]   Severity (0-10):  Score of 4 or > indicate consideration of Chaplaincy referral.  Chaplaincy Referral: [ ] yes [ ] refused [ ] following [x ] deferred    Caregiver Kirby? : [ ] yes [ ] no [ ] Declined [x ] Deferred              Social work referral [ ] Patient & Family Centered Care Referral [ ]     Anticipatory Grief present?:  [ ] yes [ ] no  [ x] Deferred                  Social work referral [ ] Chaplaincy Referral[ ] Caregiver Support Referral [ ]    Other Symptoms:  [ ]All other review of systems negative   [ ] Unable to obtain due to poor mentation     PHYSICAL EXAM:  Vital Signs Last 24 Hrs  T(C): 37.2 (10 Mar 2025 20:48), Max: 37.2 (10 Mar 2025 20:48)  T(F): 98.9 (10 Mar 2025 20:48), Max: 98.9 (10 Mar 2025 20:48)  HR: 87 (10 Mar 2025 20:48) (87 - 87)  BP: 123/71 (10 Mar 2025 20:48) (123/71 - 123/71)  BP(mean): --  RR: 18 (10 Mar 2025 20:48) (18 - 18)  SpO2: 95% (10 Mar 2025 20:48) (95% - 95%)    Parameters below as of 10 Mar 2025 20:48  Patient On (Oxygen Delivery Method): room air      GENERAL:  [x ]Alert  [x ]Oriented x  1 [x] Lethargic  [ ]Cachexia  [ ]Unarousable  [x ]Verbal  [ ]Non-Verbal  [x ] No Distress  Behavioral:   [ ] Anxiety  [ ] Delirium [ ] Agitation [x ] Calm  [ ] Other  HEENT:  [ ]Normal  [ ] Temporal Wasting  [x ]Dry mouth   [ ]ET Tube/Trach  [ ]Oral lesions  [ ] Mucositis  PULMONARY: Breathing comfortably   [ ]Clear [ ]Tachypnea  [ ]Audible excessive secretions   [ ]Rhonchi        [ ]Right [ ]Left [ ]Bilateral  [ ]Crackles        [ ]Right [ ]Left [ ]Bilateral  [ ]Wheezing     [ ]Right [ ]Left [ ]Bilateral  [ ]Diminished breath sounds [ ]right [ ]left [ ]bilateral  CARDIOVASCULAR:    [x ]Regular [ ]Irregular [ ]Tachy  [ ]Hua [ ]Murmur [ ]Other  GASTROINTESTINAL:  [ ]Soft  [ ]Distended   [ ]+BS  [ ]Non tender [ ]Tender  [ ]PEG [ ]OGT/ NGT  Last BM:   GENITOURINARY:  [x ]Normal [ ] Incontinent   [ ]Oliguria/Anuria   [ ]Alan  MUSCULOSKELETAL:   [ ]Normal   [x ]Weakness  [ ]Bed/Wheelchair bound [ ]Edema  [  ] amputation  [  ] contraction  NEUROLOGIC:   [ ]No focal deficits  [x ]Cognitive impairment  [ ]Dysphagia [ ]Dysarthria [ ]Paresis [ ]Other   SKIN: See Nursing Skin Assessment for further details  [x ]Normal    [ ]Rash  [ ]Pressure ulcer(s)       Present on admission [ ]y [ ]n   [  ]  Wound    [  ] hyperpigmentation    CRITICAL CARE:  [ ]Shock Present  [ ]Septic [ ]Cardiogenic [ ]Neurologic [ ]Hypovolemic  [ ]Vasopressors [ ]Inotropes  [ ]Respiratory failure present [ ]Mechanical Ventilation [ ]Non-invasive ventilatory support [ ]High-Flow   [ ]Acute  [ ]Chronic [ ]Hypoxic  [ ]Hypercarbic [ ]Other  [ ]Other organ failure     LABS:  reviewed                                    8.6    1.89  )-----------( 131      ( 10 Mar 2025 06:20 )             23.8     03-10    139  |  102  |  30[H]  ----------------------------<  79  4.2   |  22  |  0.88    Ca    9.3      10 Mar 2025 06:20  Phos  2.9     03-10  Mg     2.00     03-10    TPro  6.5  /  Alb  3.7  /  TBili  0.8  /  DBili  x   /  AST  12  /  ALT  10  /  AlkPhos  67  03-10      CAPILLARY BLOOD GLUCOSE      RADIOLOGY & ADDITIONAL STUDIES: reviewed     Protein Calorie Malnutrition Present: [ ]mild [ ]moderate [x]severe [ ]underweight [ ]morbid obesity  https://www.andeal.org/vault/4959/web/files/ONC/Table_Clinical%20Characteristics%20to%20Document%20Malnutrition-White%20JV%20et%20al%358133.pdf    Height (cm): 177.8 (03-06-25 @ 15:47), 177.8 (03-05-25 @ 18:20), 175 (02-28-25 @ 15:55)  Weight (kg): 81.3 (03-07-25 @ 05:32), 80.7 (03-05-25 @ 18:20), 80.8 (03-04-25 @ 16:37)  BMI (kg/m2): 25.7 (03-07-25 @ 05:32), 25.5 (03-06-25 @ 15:47), 25.5 (03-05-25 @ 18:20)    [ ]PPSV2 < or = 30%  [ ]significant weight loss [x ]poor nutritional intake [ ]anasarca   [ ]Artificial Nutrition    REFERRALS:   [ ]Chaplaincy  [ ]Hospice  [ ]Child Life  [ x]Social Work  [ ]Case management [ ]Holistic Therapy     Goals of Care Document:

## 2025-03-11 NOTE — PROGRESS NOTE ADULT - PROBLEM SELECTOR PLAN 4
- HCP form signed and placed in chart, Ellen (Daughter) is his HCP  - Attempted to bring up MOLST form and Code status again today- family not amenable to discussing at this time,, no conversations/decisions on code status to date, patient remains full code at this time  - Per family they would not want anything drastic such as Nasogastric or PEG tube for artificial nutrition

## 2025-03-11 NOTE — BH CONSULTATION LIAISON ASSESSMENT NOTE - SUMMARY
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 Psychiatry consulted for aggression last night. Received Ativan prn.     Patient carries a diagnosis of dementia for the last 4-5 years, declining cognitively over time, with worsening symptoms along with paranoia, distress, poor sleep since the start of RT. Pain/throat discomfort, poor PO intake has been ongoing.     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 Psychiatry consulted for aggression last night. Received Ativan prn.     Patient carries a diagnosis of dementia for the last 4-5 years, declining cognitively over time, with worsening symptoms along with paranoia, distress, poor sleep since the start of RT. Pain/throat discomfort, poor PO intake has been ongoing.     Recommendations  - Deferring observation status to primary team  - Ensure that TSH, vitamin b12 level, folate level, UA has been checked if not already done so  - Frequent orientation  - Avoid bzd, anticholinergics, antihistamines  - Not sure if this is in alignment with patient's GOC, and/or if it would , but consider a head CT (worsening mentation in the last 3 weeks in a patient with stage 4 tonsilar carcinoma). Deferring to medicine team.    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 Psychiatry consulted for aggression last night. Received Ativan prn.     Patient carries a diagnosis of dementia for the last 4-5 years, declining cognitively over time, with worsening symptoms along with paranoia, distress, poor sleep since the start of RT. Pain/throat discomfort, poor PO intake has been ongoing.     Recommendations  - Deferring observation status to primary team  - Ensure that TSH, vitamin b12 level, folate level, UA has been checked if not already done so  - Frequent orientation  - Avoid bzd, anticholinergics, antihistamines  - Not sure if this is in alignment with patient's GOC, and/or if it would , but consider a head CT vs MRI brain (worsening mentation in the last 3 weeks in a patient with stage 4 tonsilar carcinoma). Deferring to medicine team.    - START Melatonin 3mg at 8PM PO  - ONLY FOR COMBATIVE BEHAVIORS--------Haldol 0.5mg q 6hrs prn IM/IV/PO

## 2025-03-11 NOTE — CHART NOTE - NSCHARTNOTEFT_GEN_A_CORE
Evaluated patient. Elaborate note to follow.   Coordinated below plan with daughter Janae    Recommendations  - Deferring observation status to primary team  - Ensure that TSH, vitamin b12 level, folate level, UA has been checked if not already done so  - Frequent orientation  - Avoid bzd, anticholinergics, antihistamines    - Melatonin 3mg at 8PM PO  - ONLY FOR COMBATIVE BEHAVIORS--------Haldol 0.5mg q 6hrs prn IM/IV/PO      Hilda Quezada MD  Attending psychiatrist

## 2025-03-12 ENCOUNTER — NON-APPOINTMENT (OUTPATIENT)
Age: 75
End: 2025-03-12

## 2025-03-12 LAB
ALBUMIN SERPL ELPH-MCNC: 3.9 G/DL — SIGNIFICANT CHANGE UP (ref 3.3–5)
ALP SERPL-CCNC: 69 U/L — SIGNIFICANT CHANGE UP (ref 40–120)
ALT FLD-CCNC: 11 U/L — SIGNIFICANT CHANGE UP (ref 4–41)
ANION GAP SERPL CALC-SCNC: 17 MMOL/L — HIGH (ref 7–14)
AST SERPL-CCNC: 13 U/L — SIGNIFICANT CHANGE UP (ref 4–40)
BASOPHILS # BLD AUTO: 0.01 K/UL — SIGNIFICANT CHANGE UP (ref 0–0.2)
BASOPHILS NFR BLD AUTO: 0.5 % — SIGNIFICANT CHANGE UP (ref 0–2)
BILIRUB SERPL-MCNC: 1 MG/DL — SIGNIFICANT CHANGE UP (ref 0.2–1.2)
BUN SERPL-MCNC: 41 MG/DL — HIGH (ref 7–23)
CALCIUM SERPL-MCNC: 9.4 MG/DL — SIGNIFICANT CHANGE UP (ref 8.4–10.5)
CHLORIDE SERPL-SCNC: 101 MMOL/L — SIGNIFICANT CHANGE UP (ref 98–107)
CO2 SERPL-SCNC: 21 MMOL/L — LOW (ref 22–31)
CREAT SERPL-MCNC: 1.05 MG/DL — SIGNIFICANT CHANGE UP (ref 0.5–1.3)
EGFR: 74 ML/MIN/1.73M2 — SIGNIFICANT CHANGE UP
EGFR: 74 ML/MIN/1.73M2 — SIGNIFICANT CHANGE UP
EOSINOPHIL # BLD AUTO: 0.01 K/UL — SIGNIFICANT CHANGE UP (ref 0–0.5)
EOSINOPHIL NFR BLD AUTO: 0.5 % — SIGNIFICANT CHANGE UP (ref 0–6)
GLUCOSE SERPL-MCNC: 89 MG/DL — SIGNIFICANT CHANGE UP (ref 70–99)
HCT VFR BLD CALC: 26.1 % — LOW (ref 39–50)
HGB BLD-MCNC: 9.1 G/DL — LOW (ref 13–17)
IANC: 1.36 K/UL — LOW (ref 1.8–7.4)
IMM GRANULOCYTES NFR BLD AUTO: 0 % — SIGNIFICANT CHANGE UP (ref 0–0.9)
LYMPHOCYTES # BLD AUTO: 0.32 K/UL — LOW (ref 1–3.3)
LYMPHOCYTES # BLD AUTO: 15.5 % — SIGNIFICANT CHANGE UP (ref 13–44)
MAGNESIUM SERPL-MCNC: 2.1 MG/DL — SIGNIFICANT CHANGE UP (ref 1.6–2.6)
MCHC RBC-ENTMCNC: 30.8 PG — SIGNIFICANT CHANGE UP (ref 27–34)
MCHC RBC-ENTMCNC: 34.9 G/DL — SIGNIFICANT CHANGE UP (ref 32–36)
MCV RBC AUTO: 88.5 FL — SIGNIFICANT CHANGE UP (ref 80–100)
MONOCYTES # BLD AUTO: 0.37 K/UL — SIGNIFICANT CHANGE UP (ref 0–0.9)
MONOCYTES NFR BLD AUTO: 17.9 % — HIGH (ref 2–14)
NEUTROPHILS # BLD AUTO: 1.36 K/UL — LOW (ref 1.8–7.4)
NEUTROPHILS NFR BLD AUTO: 65.6 % — SIGNIFICANT CHANGE UP (ref 43–77)
NRBC # BLD AUTO: 0 K/UL — SIGNIFICANT CHANGE UP (ref 0–0)
NRBC # FLD: 0 K/UL — SIGNIFICANT CHANGE UP (ref 0–0)
NRBC BLD AUTO-RTO: 0 /100 WBCS — SIGNIFICANT CHANGE UP (ref 0–0)
PHOSPHATE SERPL-MCNC: 2.9 MG/DL — SIGNIFICANT CHANGE UP (ref 2.5–4.5)
PLATELET # BLD AUTO: 148 K/UL — LOW (ref 150–400)
POTASSIUM SERPL-MCNC: 4 MMOL/L — SIGNIFICANT CHANGE UP (ref 3.5–5.3)
POTASSIUM SERPL-SCNC: 4 MMOL/L — SIGNIFICANT CHANGE UP (ref 3.5–5.3)
PROT SERPL-MCNC: 7.1 G/DL — SIGNIFICANT CHANGE UP (ref 6–8.3)
RBC # BLD: 2.95 M/UL — LOW (ref 4.2–5.8)
RBC # FLD: 15.7 % — HIGH (ref 10.3–14.5)
SODIUM SERPL-SCNC: 139 MMOL/L — SIGNIFICANT CHANGE UP (ref 135–145)
WBC # BLD: 2.07 K/UL — LOW (ref 3.8–10.5)
WBC # FLD AUTO: 2.07 K/UL — LOW (ref 3.8–10.5)

## 2025-03-12 PROCEDURE — 99233 SBSQ HOSP IP/OBS HIGH 50: CPT

## 2025-03-12 RX ORDER — SODIUM CHLORIDE 9 G/1000ML
1000 INJECTION, SOLUTION INTRAVENOUS
Refills: 0 | Status: DISCONTINUED | OUTPATIENT
Start: 2025-03-12 | End: 2025-03-13

## 2025-03-12 RX ADMIN — Medication 4 MILLIGRAM(S): at 09:57

## 2025-03-12 RX ADMIN — ENOXAPARIN SODIUM 40 MILLIGRAM(S): 100 INJECTION SUBCUTANEOUS at 17:42

## 2025-03-12 RX ADMIN — Medication 50 MILLIGRAM(S): at 12:35

## 2025-03-12 RX ADMIN — Medication 1 MILLIGRAM(S): at 18:42

## 2025-03-12 RX ADMIN — Medication 2 MILLIGRAM(S): at 10:40

## 2025-03-12 RX ADMIN — Medication 2 MILLIGRAM(S): at 11:40

## 2025-03-12 RX ADMIN — SODIUM CHLORIDE 75 MILLILITER(S): 9 INJECTION, SOLUTION INTRAVENOUS at 17:19

## 2025-03-12 RX ADMIN — Medication 1 MILLIGRAM(S): at 17:42

## 2025-03-12 RX ADMIN — Medication 100 MILLIGRAM(S): at 17:18

## 2025-03-12 NOTE — PROGRESS NOTE ADULT - PROBLEM SELECTOR PLAN 2
- MIKE 3/10  - f/u psych, haldol prn   - Encouraged family at bedside overnight for reorientation and for them to encourage proper sleep wake cycle - MIKE 3/10  - f/u psych, haldol prn   - Encouraged family at bedside overnight for reorientation and for them to encourage proper sleep wake cycle  - f/u [facility]

## 2025-03-12 NOTE — PROGRESS NOTE ADULT - PROBLEM SELECTOR PLAN 1
- CT neck - Mucosal thickening and enhancement in the larynx and oropharynx may be due to infectious laryngopharyngitis or sequela of previous radiation performed.  - c/w IV morphine 2mg PRN q4hrs for odynophagia, please give 30 min prior to meals. Patient having some improvement, and tolerating small sips.       ---Unable to assess if morphine was helpful on 3/11 given his level of sedation post MIKE. Patient tolerated some po intake post morphine this morning. Instructed family to attempt feeds twice more today pre-medicated with morphine. Instructed them to keep log of pain and sedation level pre & post morphine. Printed scale was provided. Will review tomorrow and potentially make dose adjustments tomorrow  - c/w fluconazole -> to be changed from PO to IV - CT neck - Mucosal thickening and enhancement in the larynx and oropharynx may be due to infectious laryngopharyngitis or sequela of previous radiation performed.  - c/w IV morphine- decreased to 1 mg PRN q4hrs for odynophagia, please give 30 min prior to meals. Patient having some improvement, and tolerating small sips.       ---Unable to assess if morphine was helpful on 3/11 given his level of sedation post MIKE. Patient tolerated some po intake post morphine this morning. Instructed family to attempt feeds twice more today pre-medicated with morphine. Instructed them to keep log of pain and sedation level pre & post morphine. Printed scale was provided. Will review tomorrow and potentially make dose adjustments tomorrow  - c/w IV fluconazole

## 2025-03-12 NOTE — PROGRESS NOTE ADULT - PROBLEM SELECTOR PLAN 5
[facility]   Symptom mgmt    In the event of worsening symptoms, please contact the Palliative Medicine team via pager (if the patient is at [facility] #[phone] or if the patient is at [facility] #[phone]) The Geriatric and Palliative Medicine service has coverage 24 hours a day/ 7 days a week to provide medical recommendations regarding symptom management needs via telephone. For pain management     In the event of worsening symptoms, please contact the Palliative Medicine team via pager (if the patient is at [facility] #[phone] or if the patient is at [facility] #[phone]) The Geriatric and Palliative Medicine service has coverage 24 hours a day/ 7 days a week to provide medical recommendations regarding symptom management needs via telephone.

## 2025-03-12 NOTE — PROGRESS NOTE ADULT - TIME BILLING
Review of laboratory data, radiology results, consultants' recommendations, documentation in [place], discussion with patient/advanced care providers and interdisciplinary staff (such as , social workers, etc). Interventions were performed as documented above.

## 2025-03-12 NOTE — PROGRESS NOTE ADULT - SUBJECTIVE AND OBJECTIVE BOX
Date of Service    Indication for Geriatrics and Palliative Care Services:     SUBJECTIVE AND OBJECTIVE:  Patient seen and examined at bedside. Patient being followed up for : Pain and Odynophagia     He is sleeping post morphine, did have some pain relief per family and able to tolerate several sips of water and ensures.     INTERVAL HPI/OVERNIGHT EVENTS:    Overnight 3/10 MIKE was called due to significant sundowning and agitation- required 2mg of ativan total and 4 point restraints. Now with 1:1 and family staying with him overnight. He was lethargic all day yesterday and did not attempt to take any morphine or eat anything.     Seen by  yesterday- who recommended haldol prn for acute agitation     Fluconazole for empiric treatment, unable to go down for barium yesterday     Allergies    No Known Allergies    Intolerances    MEDICATIONS  (STANDING):  enoxaparin Injectable 40 milliGRAM(s) SubCutaneous every 24 hours  fluconAZOLE IVPB 100 milliGRAM(s) IV Intermittent every 24 hours  fluconAZOLE IVPB      melatonin 3 milliGRAM(s) Oral at bedtime    MEDICATIONS  (PRN):  aluminum hydroxide/magnesium hydroxide/simethicone Suspension 30 milliLiter(s) Oral every 4 hours PRN Dyspepsia  haloperidol    Injectable 0.5 milliGRAM(s) IV Push every 6 hours PRN Combative/agitation  morphine  - Injectable 2 milliGRAM(s) IV Push every 4 hours PRN Severe Pain (7 - 10)  ondansetron Injectable 4 milliGRAM(s) IV Push every 8 hours PRN Nausea and/or Vomiting    ITEMS UNCHECKED ARE NOT PRESENT    PRESENT SYMPTOMS: [ x]Unable to self-report due to altered mental status- see [ ] CPOT [ ] PAINADS [ ] RDOS  Source if other than patient:  [x ]Family   [ ]Team     Pain:  [x ]yes [ ]no  QOL impact - great  Location -  throat       Aggravating factors - eating/drinking   Quality - burning  Radiation - down esphogus  Timing- a/w meals   Severity (0-10 scale): unable to give  Minimal acceptable level / Pain Goal (0-10 scale):     Dyspnea:                           [ ]Mild [ ]Moderate [ ]Severe    Anxiety:                             [ ]Mild [ ]Moderate [ ]Severe  Agitation:                          [ ]Mild [ ]Moderate [ ]Severe  Fatigue:                             [ ]Mild [ ]Moderate [ ]Severe  Nausea:                             [ ]Mild [ ]Moderate [ ]Severe  Loss of appetite:              [ ]Mild [ ]Moderate [ ]Severe  Constipation:                   [ ]Mild [ ]Moderate [ ]Severe  Diarrhea:                          [ ]Mild [ ]Moderate [ ]Severe  Pruritus:                            [ ]Mild [ ]Moderate [ ]Severe  Depression:                      [ ]Mild [ ]Moderate [ ]Severe    CPOT:    https://www.[facility].org/getattachment/[email or web address]/Critical-Care-Pain-Observation-Tool-(CPOT)    PCSSQ[Palliative Care Spiritual Screening Question]   Severity (0-10):  Score of 4 or > indicate consideration of Chaplaincy referral.  Chaplaincy Referral: [ ] yes [ ] refused [ ] following [x ] deferred    Caregiver [place]? : [ ] yes [ ] no [ ] Declined [x ] Deferred              Social work referral [ ] Patient & Family Centered Care Referral [ ]     Anticipatory Grief present?:  [ ] yes [ ] no  [ x] Deferred                  Social work referral [ ] Chaplaincy Referral[ ] Caregiver Support Referral [ ]    Other Symptoms:  [x ]All other review of systems negative   [ ] Unable to obtain due to poor mentation     PHYSICAL EXAM:  Vital Signs Last 24 Hrs  T(C): 37.2 (12 Mar 2025 05:57), Max: 37.2 (12 Mar 2025 05:57)  T(F): 98.9 (12 Mar 2025 05:57), Max: 98.9 (12 Mar 2025 05:57)  HR: 81 (12 Mar 2025 05:57) (81 - 81)  BP: 113/65 (12 Mar 2025 05:57) (113/65 - 134/82)  BP(mean): --  RR: 17 (12 Mar 2025 05:57) (17 - 18)  SpO2: 97% (12 Mar 2025 05:57) (97% - 98%)    Parameters below as of 12 Mar 2025 05:57  Patient On (Oxygen Delivery Method): room air    I&O's Summary       GENERAL:  [ ]Alert  [ ]Oriented x   [ x]Lethargic  [ ]Cachexia  [ ]Unarousable  [ ]Verbal  [ ]Non-Verbal  [x ] No Distress  Behavioral:   [ ] Anxiety  [ ] Delirium [ ] Agitation x[ ] Calm  [ ] Other  HEENT:  [ ]Normal  [ ] Temporal Wasting  [x ]Dry mouth   [ ]ET Tube/Trach  [x ]Oral lesions  [x ] Mucositis  PULMONARY: breathing comfortably   [ ]Clear [ ]Tachypnea  [ ]Audible excessive secretions   [ ]Rhonchi        [ ]Right [ ]Left [ ]Bilateral  [ ]Crackles        [ ]Right [ ]Left [ ]Bilateral  [ ]Wheezing     [ ]Right [ ]Left [ ]Bilateral  [ ]Diminished breath sounds [ ]right [ ]left [ ]bilateral  CARDIOVASCULAR:    [ x]Regular [ ]Irregular [ ]Tachy  [ ]Hua [ ]Murmur [ ]Other  GASTROINTESTINAL:  [ ]Soft  [ ]Distended   [ ]+BS  [ ]Non tender [ ]Tender  [ ]PEG [ ]OGT/ NGT  Last BM:   GENITOURINARY:  [ ]Normal [ ] Incontinent   [ ]Oliguria/Anuria   [ ]Alan  MUSCULOSKELETAL:   [x ]Normal   [ ]Weakness  [ ]Bed/Wheelchair bound [ ]Edema  [  ] amputation  [  ] contraction  NEUROLOGIC:   [x ]No focal deficits  [ x]Cognitive impairment  [ ]Dysphagia [ ]Dysarthria [ ]Paresis [ ]Other   SKIN: See Nursing Skin Assessment for further details  [ x]Normal    [ ]Rash  [ ]Pressure ulcer(s)       Present on admission [ ]y [ ]n   [  ]  Wound    [  ] hyperpigmentation    CRITICAL CARE:  [ ]Shock Present  [ ]Septic [ ]Cardiogenic [ ]Neurologic [ ]Hypovolemic  [ ]Vasopressors [ ]Inotropes  [ ]Respiratory failure present [ ]Mechanical Ventilation [ ]Non-invasive ventilatory support [ ]High-Flow   [ ]Acute  [ ]Chronic [ ]Hypoxic  [ ]Hypercarbic [ ]Other  [ ]Other organ failure     LABS:  reviewed                         9.1    2.07  )-----------( 148      ( 12 Mar 2025 05:10 )             26.1   03-12    139  |  101  |  41[H]  ----------------------------<  89  4.0   |  21[L]  |  1.05    Ca    9.4      12 Mar 2025 05:10  Phos  2.9     03-12  Mg     2.10     03-12    TPro  7.1  /  Alb  3.9  /  TBili  1.0  /  DBili  x   /  AST  13  /  ALT  11  /  AlkPhos  69  03-12    Urinalysis Basic - ( 12 Mar 2025 05:10 )    Color: x / Appearance: x / SG: x / pH: x  Gluc: 89 mg/dL / Ketone: x  / Bili: x / Urobili: x   Blood: x / Protein: x / Nitrite: x   Leuk Esterase: x / RBC: x / WBC x   Sq Epi: x / Non Sq Epi: x / Bacteria: x    CAPILLARY BLOOD GLUCOSE    RADIOLOGY & ADDITIONAL STUDIES: reviewed     Protein Calorie Malnutrition Present: [ ]mild [ ]moderate [ ]severe [ ]underweight [ ]morbid obesity  https://www.andeal.org/vault/2440/web/files/ONC/Table_Clinical%20Characteristics%20to%20Document%20Malnutrition-White%20JV%20et%20al%202012.pdf    Height (cm): 177.8 (03-06-25 @ 15:47), 177.8 (03-05-25 @ 18:20), 175 (02-28-25 @ 15:55)  Weight (kg): 81.3 (03-07-25 @ 05:32), 80.7 (03-05-25 @ 18:20), 80.8 (03-04-25 @ 16:37)  BMI (kg/m2): 25.7 (03-07-25 @ 05:32), 25.5 (03-06-25 @ 15:47), 25.5 (03-05-25 @ 18:20)    [ ]PPSV2 < or = 30%  [ ]significant weight loss [x ]poor nutritional intake [ ]anasarca   [ ]Artificial Nutrition    REFERRALS:   [ ]Chaplaincy  [ ]Hospice  [ ]Child Life  [x ]Social Work  [ ]Case management [ ]Holistic Therapy     Goals of Care Document:     Date of Service    Indication for Geriatrics and Palliative Care Services:     SUBJECTIVE AND OBJECTIVE:  Patient seen and examined at bedside. Patient being followed up for : Pain and Odynophagia     He is sleeping post morphine, did have some pain relief per family and able to tolerate several sips of water and ensures.     INTERVAL HPI/OVERNIGHT EVENTS:    Overnight 3/10 MIKE was called due to significant sundowning and agitation- required 2mg of ativan total and 4 point restraints. Now with 1:1 and family staying with him overnight. He was lethargic all day yesterday and did not attempt to take any morphine or eat anything.     Seen by  yesterday- who recommended haldol prn for acute agitation and CTH.     IV Fluconazole for empiric treatment, unable to go down for barium yesterday     Allergies    No Known Allergies    Intolerances    MEDICATIONS  (STANDING):  enoxaparin Injectable 40 milliGRAM(s) SubCutaneous every 24 hours  fluconAZOLE IVPB 100 milliGRAM(s) IV Intermittent every 24 hours  fluconAZOLE IVPB      melatonin 3 milliGRAM(s) Oral at bedtime    MEDICATIONS  (PRN):  aluminum hydroxide/magnesium hydroxide/simethicone Suspension 30 milliLiter(s) Oral every 4 hours PRN Dyspepsia  haloperidol    Injectable 0.5 milliGRAM(s) IV Push every 6 hours PRN Combative/agitation  morphine  - Injectable 2 milliGRAM(s) IV Push every 4 hours PRN Severe Pain (7 - 10)  ondansetron Injectable 4 milliGRAM(s) IV Push every 8 hours PRN Nausea and/or Vomiting    ITEMS UNCHECKED ARE NOT PRESENT    PRESENT SYMPTOMS: [ x]Unable to self-report due to altered mental status- see [ ] CPOT [ ] PAINADS [ ] RDOS  Source if other than patient:  [x ]Family   [ ]Team     Pain:  [x ]yes [ ]no  QOL impact - great  Location -  throat       Aggravating factors - eating/drinking   Quality - burning  Radiation - down esphogus  Timing- a/w meals   Severity (0-10 scale): unable to give  Minimal acceptable level / Pain Goal (0-10 scale):     Dyspnea:                           [ ]Mild [ ]Moderate [ ]Severe    Anxiety:                             [ ]Mild [ ]Moderate [ ]Severe  Agitation:                          [ ]Mild [ ]Moderate [ ]Severe  Fatigue:                             [ ]Mild [ ]Moderate [ ]Severe  Nausea:                             [ ]Mild [ ]Moderate [ ]Severe  Loss of appetite:              [ ]Mild [ ]Moderate [ ]Severe  Constipation:                   [ ]Mild [ ]Moderate [ ]Severe  Diarrhea:                          [ ]Mild [ ]Moderate [ ]Severe  Pruritus:                            [ ]Mild [ ]Moderate [ ]Severe  Depression:                      [ ]Mild [ ]Moderate [ ]Severe    CPOT:    https://www.[facility].org/getattachment/[email or web address]/Critical-Care-Pain-Observation-Tool-(CPOT)    PCSSQ[Palliative Care Spiritual Screening Question]   Severity (0-10):  Score of 4 or > indicate consideration of Chaplaincy referral.  Chaplaincy Referral: [ ] yes [ ] refused [ ] following [x ] deferred    Caregiver [place]? : [ ] yes [ ] no [ ] Declined [x ] Deferred              Social work referral [ ] Patient & Family Centered Care Referral [ ]     Anticipatory Grief present?:  [ ] yes [ ] no  [ x] Deferred                  Social work referral [ ] Chaplaincy Referral[ ] Caregiver Support Referral [ ]    Other Symptoms:  [x ]All other review of systems negative   [ ] Unable to obtain due to poor mentation     PHYSICAL EXAM:  Vital Signs Last 24 Hrs  T(C): 36.9 (12 Mar 2025 14:36), Max: 37.2 (12 Mar 2025 05:57)  T(F): 98.4 (12 Mar 2025 14:36), Max: 98.9 (12 Mar 2025 05:57)  HR: 72 (12 Mar 2025 14:36) (72 - 81)  BP: 117/70 (12 Mar 2025 14:36) (113/65 - 134/82)  BP(mean): --  RR: 18 (12 Mar 2025 14:36) (17 - 18)  SpO2: 99% (12 Mar 2025 14:36) (97% - 99%)    Parameters below as of 12 Mar 2025 14:36  Patient On (Oxygen Delivery Method): room air    GENERAL:  [ ]Alert  [ ]Oriented x   [ x]Lethargic  [ ]Cachexia  [ ]Unarousable  [ ]Verbal  [ ]Non-Verbal  [x ] No Distress  Behavioral:   [ ] Anxiety  [ ] Delirium [ ] Agitation x[ ] Calm  [ ] Other  HEENT:  [ ]Normal  [ ] Temporal Wasting  [x ]Dry mouth   [ ]ET Tube/Trach  [x ]Oral lesions  [x ] Mucositis  PULMONARY: breathing comfortably   [ ]Clear [ ]Tachypnea  [ ]Audible excessive secretions   [ ]Rhonchi        [ ]Right [ ]Left [ ]Bilateral  [ ]Crackles        [ ]Right [ ]Left [ ]Bilateral  [ ]Wheezing     [ ]Right [ ]Left [ ]Bilateral  [ ]Diminished breath sounds [ ]right [ ]left [ ]bilateral  CARDIOVASCULAR:    [ x]Regular [ ]Irregular [ ]Tachy  [ ]Hua [ ]Murmur [ ]Other  GASTROINTESTINAL:  [ ]Soft  [ ]Distended   [ ]+BS  [ ]Non tender [ ]Tender  [ ]PEG [ ]OGT/ NGT  Last BM:   GENITOURINARY:  [ ]Normal [ ] Incontinent   [ ]Oliguria/Anuria   [ ]Alan  MUSCULOSKELETAL:   [x ]Normal   [ ]Weakness  [ ]Bed/Wheelchair bound [ ]Edema  [  ] amputation  [  ] contraction  NEUROLOGIC:   [x ]No focal deficits  [ x]Cognitive impairment  [ ]Dysphagia [ ]Dysarthria [ ]Paresis [ ]Other   SKIN: See Nursing Skin Assessment for further details  [ x]Normal    [ ]Rash  [ ]Pressure ulcer(s)       Present on admission [ ]y [ ]n   [  ]  Wound    [  ] hyperpigmentation    CRITICAL CARE:  [ ]Shock Present  [ ]Septic [ ]Cardiogenic [ ]Neurologic [ ]Hypovolemic  [ ]Vasopressors [ ]Inotropes  [ ]Respiratory failure present [ ]Mechanical Ventilation [ ]Non-invasive ventilatory support [ ]High-Flow   [ ]Acute  [ ]Chronic [ ]Hypoxic  [ ]Hypercarbic [ ]Other  [ ]Other organ failure     LABS:  reviewed                                    9.1    2.07  )-----------( 148      ( 12 Mar 2025 05:10 )             26.1     03-12    139  |  101  |  41[H]  ----------------------------<  89  4.0   |  21[L]  |  1.05    Ca    9.4      12 Mar 2025 05:10  Phos  2.9     03-12  Mg     2.10     03-12    TPro  7.1  /  Alb  3.9  /  TBili  1.0  /  DBili  x   /  AST  13  /  ALT  11  /  AlkPhos  69  03-12      CAPILLARY BLOOD GLUCOSE    RADIOLOGY & ADDITIONAL STUDIES: reviewed     Protein Calorie Malnutrition Present: [ ]mild [ ]moderate [ ]severe [ ]underweight [ ]morbid obesity  https://www.andeal.org/vault/2440/web/files/ONC/Table_Clinical%20Characteristics%20to%20Document%20Malnutrition-White%20JV%20et%20al%202012.pdf    Height (cm): 177.8 (03-06-25 @ 15:47), 177.8 (03-05-25 @ 18:20), 175 (02-28-25 @ 15:55)  Weight (kg): 81.3 (03-07-25 @ 05:32), 80.7 (03-05-25 @ 18:20), 80.8 (03-04-25 @ 16:37)  BMI (kg/m2): 25.7 (03-07-25 @ 05:32), 25.5 (03-06-25 @ 15:47), 25.5 (03-05-25 @ 18:20)    [ ]PPSV2 < or = 30%  [ ]significant weight loss [x ]poor nutritional intake [ ]anasarca   [ ]Artificial Nutrition    REFERRALS:   [ ]Chaplaincy  [ ]Hospice  [ ]Child Life  [x ]Social Work  [ ]Case management [ ]Holistic Therapy     Goals of Care Document:

## 2025-03-12 NOTE — PROGRESS NOTE ADULT - SUBJECTIVE AND OBJECTIVE BOX
Hospitalist Progress Note  Laurel Payan MD Pager # [phone]    OVERNIGHT EVENTS: CHELE    SUBJECTIVE / INTERVAL HPI: Patient seen and examined at bedside. Patient reports that he currently has no oral pain or discomfort. Family reports he has been able to eat and drink some sips of water.     VITAL SIGNS:  Vital Signs Last 24 Hrs  T(C): 36.9 (12 Mar 2025 14:36), Max: 37.2 (12 Mar 2025 05:57)  T(F): 98.4 (12 Mar 2025 14:36), Max: 98.9 (12 Mar 2025 05:57)  HR: 72 (12 Mar 2025 14:36) (72 - 81)  BP: 117/70 (12 Mar 2025 14:36) (113/65 - 134/82)  BP(mean): --  RR: 18 (12 Mar 2025 14:36) (17 - 18)  SpO2: 99% (12 Mar 2025 14:36) (97% - 99%)    Parameters below as of 12 Mar 2025 14:36  Patient On (Oxygen Delivery Method): room air        PHYSICAL EXAM:    General: Comfortable and resting in bed - sleeping.   HEENT: NC/AT; PERRL, anicteric sclera; MMM  Neck: supple  Cardiovascular: +S1/S2; RRR  Respiratory: CTA B/L; no W/R/R  Gastrointestinal: soft, NT/ND; +BSx4  Extremities: WWP; no edema, clubbing or cyanosis  Vascular: 2+ radial, DP/PT pulses B/L  Neurological: AAOx1-2; no focal deficits    MEDICATIONS:  MEDICATIONS  (STANDING):  dextrose 5% + sodium chloride 0.45%. 1000 milliLiter(s) (75 mL/Hr) IV Continuous <Continuous>  enoxaparin Injectable 40 milliGRAM(s) SubCutaneous every 24 hours  fluconAZOLE IVPB 100 milliGRAM(s) IV Intermittent every 24 hours  fluconAZOLE IVPB      melatonin 3 milliGRAM(s) Oral at bedtime  thiamine Injectable 100 milliGRAM(s) IV Push daily    MEDICATIONS  (PRN):  aluminum hydroxide/magnesium hydroxide/simethicone Suspension 30 milliLiter(s) Oral every 4 hours PRN Dyspepsia  haloperidol    Injectable 0.5 milliGRAM(s) IV Push every 6 hours PRN Combative/agitation  morphine  - Injectable 1 milliGRAM(s) IV Push every 4 hours PRN Severe Pain (7 - 10)  ondansetron Injectable 4 milliGRAM(s) IV Push every 8 hours PRN Nausea and/or Vomiting      ALLERGIES:  Allergies    No Known Allergies    Intolerances        LABS:                        9.1    2.07  )-----------( 148      ( 12 Mar 2025 05:10 )             26.1     03-12    139  |  101  |  41[H]  ----------------------------<  89  4.0   |  21[L]  |  1.05    Ca    9.4      12 Mar 2025 05:10  Phos  2.9     03-12  Mg     2.10     03-12    TPro  7.1  /  Alb  3.9  /  TBili  1.0  /  DBili  x   /  AST  13  /  ALT  11  /  AlkPhos  69  03-12      Urinalysis Basic - ( 12 Mar 2025 05:10 )    Color: x / Appearance: x / SG: x / pH: x  Gluc: 89 mg/dL / Ketone: x  / Bili: x / Urobili: x   Blood: x / Protein: x / Nitrite: x   Leuk Esterase: x / RBC: x / WBC x   Sq Epi: x / Non Sq Epi: x / Bacteria: x      CAPILLARY BLOOD GLUCOSE          RADIOLOGY & ADDITIONAL TESTS: Reviewed.    ASSESSMENT:    PLAN:

## 2025-03-12 NOTE — PROGRESS NOTE ADULT - PROBLEM SELECTOR PLAN 4
- HCP form signed and placed in chart, Ellen (Daughter) is his HCP  - Attempted to bring up MOLST form and Code status again today- family not amenable to discussing at this time,, no conversations/decisions on code status to date, patient remains full code at this time  - Per family they would not want anything drastic such as Nasogastric or PEG tube for artificial nutrition - HCP form signed and placed in chart, Ellen (Daughter) is his HCP  - Attempted to bring up MOLST form and Code status again today- family not amenable to discussing at this time,, no conversations/decisions on code status to date, patient remains full code at this time  - Per family they would not want anything invasive such as Nasogastric or PEG tube for artificial nutrition as it could agitate patient

## 2025-03-12 NOTE — PROGRESS NOTE ADULT - PROBLEM SELECTOR PLAN 3
Follows with Dr. Gibson and Dr. Mitchell at [facility] for onc/rad onc  - ultrasound of his neck in mid October 2024 revealing a left cervical 2.4 cm nodule suspicious   - He started concurrent chemo/RT 1/21/25 with weekly cisplatin started 1/24/25  - He has completed 30/35 RT sessions. According to family recent visits with oncologist have been optimistic in regards to his treatment response. 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 He was scheduled to complete his last chemo session

## 2025-03-12 NOTE — PROGRESS NOTE ADULT - ASSESSMENT
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     Active problems  tonsillar ca  vascular dementia   odynophagia/lack of appetite   FTT  Anemia in neoplastic disease  hyponatremia  Vascular dementia  hypercoagulable state    tonsillar ca  odynophagia/lack of appetite   Hyponatremia  FTT  Pt reporting odynophagia and dysphagia w/ regurgitation of food. Dysphagia to solids and liquids.   (s/p C6 of 7 of cisplatin and on RT -should've been completed on 3/7)  Reviewed CT neck,  mucosal thickening and enhancement in the larynx and oropharynx may be due to infectious laryngopharyngitis or sequela of previous radiation performed.  Palliative care consulted  Patient does not like viscous lidocaine or liquid gabapentin - will d/c   Pain control with morphine 30 minutes prior to meals   Patient did not participate with S&S  Will obtain barium esophogram to assess dysphagia - pt unable to participate d/t lethargy - will reorder   Empiric treatment of thrush/esophagitis with fluconazole IV  CMV PCR pending     Anemia in neoplastic disease  Hb 9.6, otherwise stable, continue to monitor    Vascular dementia  - Pt agitated 3/10 overnight requiring ativan   - Psych consulted. Haldol PRN agitation.   - Given decline in mental status over 3 week period - will obtain CTH. Last MRI in 2023 as per family.    Starvation Ketosis  - AG 17 - UA with ketones - in the setting of poor PO intake   - Will give thiamine and start D5 + NS   - Trend BMP    Hypercoagulable state 2/2 malignancy  On lovenox 40mg daily

## 2025-03-13 DIAGNOSIS — E43 UNSPECIFIED SEVERE PROTEIN-CALORIE MALNUTRITION: ICD-10-CM

## 2025-03-13 LAB
ALBUMIN SERPL ELPH-MCNC: 3.6 G/DL — SIGNIFICANT CHANGE UP (ref 3.3–5)
ALP SERPL-CCNC: 62 U/L — SIGNIFICANT CHANGE UP (ref 40–120)
ALT FLD-CCNC: 10 U/L — SIGNIFICANT CHANGE UP (ref 4–41)
ANION GAP SERPL CALC-SCNC: 11 MMOL/L — SIGNIFICANT CHANGE UP (ref 7–14)
AST SERPL-CCNC: 12 U/L — SIGNIFICANT CHANGE UP (ref 4–40)
BASOPHILS # BLD AUTO: 0.01 K/UL — SIGNIFICANT CHANGE UP (ref 0–0.2)
BASOPHILS NFR BLD AUTO: 0.7 % — SIGNIFICANT CHANGE UP (ref 0–2)
BILIRUB SERPL-MCNC: 0.7 MG/DL — SIGNIFICANT CHANGE UP (ref 0.2–1.2)
BUN SERPL-MCNC: 35 MG/DL — HIGH (ref 7–23)
CALCIUM SERPL-MCNC: 9 MG/DL — SIGNIFICANT CHANGE UP (ref 8.4–10.5)
CHLORIDE SERPL-SCNC: 105 MMOL/L — SIGNIFICANT CHANGE UP (ref 98–107)
CMV DNA CSF QL NAA+PROBE: SIGNIFICANT CHANGE UP IU/ML
CMV DNA SPEC NAA+PROBE-LOG#: SIGNIFICANT CHANGE UP LOG10IU/ML
CO2 SERPL-SCNC: 25 MMOL/L — SIGNIFICANT CHANGE UP (ref 22–31)
CREAT SERPL-MCNC: 0.99 MG/DL — SIGNIFICANT CHANGE UP (ref 0.5–1.3)
EGFR: 79 ML/MIN/1.73M2 — SIGNIFICANT CHANGE UP
EGFR: 79 ML/MIN/1.73M2 — SIGNIFICANT CHANGE UP
EOSINOPHIL # BLD AUTO: 0.01 K/UL — SIGNIFICANT CHANGE UP (ref 0–0.5)
EOSINOPHIL NFR BLD AUTO: 0.7 % — SIGNIFICANT CHANGE UP (ref 0–6)
GLUCOSE SERPL-MCNC: 115 MG/DL — HIGH (ref 70–99)
HCT VFR BLD CALC: 24.6 % — LOW (ref 39–50)
HGB BLD-MCNC: 8.6 G/DL — LOW (ref 13–17)
IANC: 0.9 K/UL — LOW (ref 1.8–7.4)
IMM GRANULOCYTES NFR BLD AUTO: 0 % — SIGNIFICANT CHANGE UP (ref 0–0.9)
LYMPHOCYTES # BLD AUTO: 0.26 K/UL — LOW (ref 1–3.3)
LYMPHOCYTES # BLD AUTO: 18.2 % — SIGNIFICANT CHANGE UP (ref 13–44)
MAGNESIUM SERPL-MCNC: 2.1 MG/DL — SIGNIFICANT CHANGE UP (ref 1.6–2.6)
MCHC RBC-ENTMCNC: 31.3 PG — SIGNIFICANT CHANGE UP (ref 27–34)
MCHC RBC-ENTMCNC: 35 G/DL — SIGNIFICANT CHANGE UP (ref 32–36)
MCV RBC AUTO: 89.5 FL — SIGNIFICANT CHANGE UP (ref 80–100)
MONOCYTES # BLD AUTO: 0.25 K/UL — SIGNIFICANT CHANGE UP (ref 0–0.9)
MONOCYTES NFR BLD AUTO: 17.5 % — HIGH (ref 2–14)
MRSA PCR RESULT.: SIGNIFICANT CHANGE UP
NEUTROPHILS # BLD AUTO: 0.9 K/UL — LOW (ref 1.8–7.4)
NEUTROPHILS NFR BLD AUTO: 62.9 % — SIGNIFICANT CHANGE UP (ref 43–77)
NRBC # BLD AUTO: 0 K/UL — SIGNIFICANT CHANGE UP (ref 0–0)
NRBC # FLD: 0 K/UL — SIGNIFICANT CHANGE UP (ref 0–0)
NRBC BLD AUTO-RTO: 0 /100 WBCS — SIGNIFICANT CHANGE UP (ref 0–0)
PHOSPHATE SERPL-MCNC: 2.8 MG/DL — SIGNIFICANT CHANGE UP (ref 2.5–4.5)
PLATELET # BLD AUTO: 140 K/UL — LOW (ref 150–400)
POTASSIUM SERPL-MCNC: 3.7 MMOL/L — SIGNIFICANT CHANGE UP (ref 3.5–5.3)
POTASSIUM SERPL-SCNC: 3.7 MMOL/L — SIGNIFICANT CHANGE UP (ref 3.5–5.3)
PROT SERPL-MCNC: 6.4 G/DL — SIGNIFICANT CHANGE UP (ref 6–8.3)
RBC # BLD: 2.75 M/UL — LOW (ref 4.2–5.8)
RBC # FLD: 15.8 % — HIGH (ref 10.3–14.5)
S AUREUS DNA NOSE QL NAA+PROBE: DETECTED
SODIUM SERPL-SCNC: 141 MMOL/L — SIGNIFICANT CHANGE UP (ref 135–145)
WBC # BLD: 1.43 K/UL — LOW (ref 3.8–10.5)
WBC # FLD AUTO: 1.43 K/UL — LOW (ref 3.8–10.5)

## 2025-03-13 PROCEDURE — 99233 SBSQ HOSP IP/OBS HIGH 50: CPT | Mod: GC

## 2025-03-13 PROCEDURE — 99233 SBSQ HOSP IP/OBS HIGH 50: CPT

## 2025-03-13 PROCEDURE — 70450 CT HEAD/BRAIN W/O DYE: CPT | Mod: 26

## 2025-03-13 RX ORDER — MUPIROCIN CALCIUM 20 MG/G
1 CREAM TOPICAL
Refills: 0 | Status: COMPLETED | OUTPATIENT
Start: 2025-03-13 | End: 2025-03-18

## 2025-03-13 RX ADMIN — Medication 4 MILLIGRAM(S): at 11:13

## 2025-03-13 RX ADMIN — Medication 1 APPLICATION(S): at 06:22

## 2025-03-13 RX ADMIN — Medication 100 MILLIGRAM(S): at 12:23

## 2025-03-13 RX ADMIN — Medication 1 MILLIGRAM(S): at 15:35

## 2025-03-13 RX ADMIN — Medication 50 MILLIGRAM(S): at 12:22

## 2025-03-13 RX ADMIN — Medication 0.5 MILLIGRAM(S): at 18:24

## 2025-03-13 RX ADMIN — Medication 1 MILLIGRAM(S): at 16:35

## 2025-03-13 RX ADMIN — SODIUM CHLORIDE 75 MILLILITER(S): 9 INJECTION, SOLUTION INTRAVENOUS at 06:22

## 2025-03-13 RX ADMIN — ENOXAPARIN SODIUM 40 MILLIGRAM(S): 100 INJECTION SUBCUTANEOUS at 19:06

## 2025-03-13 RX ADMIN — MUPIROCIN CALCIUM 1 APPLICATION(S): 20 CREAM TOPICAL at 21:30

## 2025-03-13 RX ADMIN — Medication 0.5 MILLIGRAM(S): at 17:24

## 2025-03-13 NOTE — PROGRESS NOTE ADULT - TIME BILLING
Review of laboratory data, radiology results, consultants' recommendations, documentation in [facility], discussion with patient/advanced care providers and interdisciplinary staff (such as , social workers, etc). Interventions were performed as documented above.

## 2025-03-13 NOTE — BH CONSULTATION LIAISON PROGRESS NOTE - NSBHASSESSMENTFT_PSY_ALL_CORE
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 Psychiatry consulted for aggression. Improved behaviour, has not received any PRNs since yesterday. Still has some hallucinations at night, but amenable to reorientation.     Patient carries a diagnosis of dementia for the last 4-5 years, declining cognitively over time, with worsening symptoms along with paranoia, distress, poor sleep since the start of RT. Pain/throat discomfort, poor PO intake has been ongoing.     Recommendations  - Deferring observation status to primary team  - Frequent orientation  - Avoid bzd, anticholinergics, antihistamines  - Not sure if this is in alignment with patient's GOC, and/or if it would , but consider a head CT vs MRI brain (worsening mentation in the last 3 weeks in a patient with stage 4 tonsilar carcinoma). Deferring to medicine team.    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 Psychiatry consulted for aggression. Improved behaviour, has not received any PRNs since yesterday. Still has some hallucinations at night, but amenable to reorientation.     Patient carries a diagnosis of dementia for the last 4-5 years, declining cognitively over time, with worsening symptoms along with paranoia, distress, poor sleep since the start of RT. Pain/throat discomfort, poor PO intake has been ongoing.     3/13--- has been doing better, no agitation or prn needs    Recommendations  - Deferring observation status to primary team  - Frequent orientation  - Avoid bzd, anticholinergics, antihistamines    - Continue Melatonin 3mg at 8PM PO  - ONLY FOR COMBATIVE BEHAVIORS--------Haldol 0.5mg q 6hrs prn IM/IV/PO

## 2025-03-13 NOTE — PROGRESS NOTE ADULT - PROBLEM SELECTOR PLAN 3
[facility] Discharge Summary    Attending Physician: Dr. España    Date of Intake: 07/19/2022    Date of Last Session: 07/25/2022    Chief Complaint: F11.20 Opioid Use Disorder    Admission Diagnosis: F11.20 Opioid Use Disorder    ICD-10 Diagnosis:  F11.20 Opioid Use Disorder    The above named patient was discharged from my care on 07/25/2022 for the following reason(s).     Reason for Discharge:  Other Administratively Discharged due to non-compliance with treatment agreement and continued use of illicit opioids    Treatment Provided: (check all that apply)  Group    Patient Progress towards Treatment Goals:   Lauren was formally admitted to treatment on 07/19/2022 under the diagnosis of F11.20 Opioid Use Disorder. Lauren is being administratively discharged due to non-compliance with her signed treatment agreement and continued use of illicit opioids. Lauren was in non-compliance with her treatment agreement since she arrived late multiple days to programming, and she left early on 07/20/2022 to  her prescription medication on lunch and did not return, citing that she did not have gas in her vehicle. Additionally, patient began to exhibit a disengagement in the treatment process during group programming by non-participation and sleeping during group time. Patient’s UDS results were sent out for Fentanyl confirmation. Lauren admitted to a total of two lapses while engaged in programming, one that occurred on 07/21/2022, and one that occurred on 07/22/2022; both self-reported Fentanyl uses. She indicates that she began methadone maintenance treatment on 07/22/2022 to help aide in reducing her cravings for illicit opioids. Progress towards treatment goals was immeasurable due to lack of treatment attendance. Lauren initially indicated treatment goals as to learn new coping skills and “maintain sobriety.” Lauren denied any S/I or H/I and reported no safety concerns.      Rationale for Discharge:  Due  to lack of participation in treatment it is the recommendation of the [facility] treatment team that Lauren complete a higher level of care, residential programming. Lauren was presented with the option of attending sober living or residential programming and she stated that she would not engage in either recommendation. She reports that she plans to continue methadone maintenance treatment at [facility] and stated, “I will come back in a few weeks.” Lauren can be reassessed for PHP programming in the future, but at this time it is clinically recommended she engage in residential programming to address her continued use patterns. Lauren is considered a high risk for relapse and potential overdose with her continued use of illicit Fentanyl.           Provider: AALIYAH Mcclain      Date: 07/26/2022  Time: 11:18am    Clinical Supervisor:     Date:   Time:    Follows with Dr. Gibson and Dr. Mitchell at [facility] for onc/rad onc  - ultrasound of his neck in mid October 2024 revealing a left cervical 2.4 cm nodule suspicious   - He started concurrent chemo/RT 1/21/25 with weekly cisplatin started 1/24/25  - He has completed 30/35 RT sessions. According to family recent visits with oncologist have been optimistic in regards to his treatment response. 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 He was scheduled to complete his last chemo session - CT neck - Mucosal thickening and enhancement in the larynx and oropharynx may be due to infectious laryngopharyngitis or sequela of previous radiation performed.  - c/w IV morphine- decreased to 1 mg PRN q4hrs for odynophagia, please give 30 min prior to meals. Patient having some improvement, and tolerating small sips.       ---Unable to assess if morphine was helpful on 3/11-12 given his level of sedation post MIKE. Patient tolerated some po intake post morphine yesterday morning. Instructed family to attempt feeds twice more today pre-medicated with morphine. Instructed them to keep log of pain and sedation level pre & post morphine. Printed scale was provided. Will review tomorrow and potentially make dose adjustments tomorrow  - c/w IV fluconazole

## 2025-03-13 NOTE — PROGRESS NOTE ADULT - PROBLEM SELECTOR PLAN 4
- HCP form signed and placed in chart, Ellen (Daughter) is his HCP  - Attempted to bring up MOLST form and Code status- family not amenable to discussing at this time,, no conversations/decisions on code status to date, patient remains full code at this time  - Per family they would not want anything invasive such as Nasogastric or PEG tube for artificial nutrition as it could agitate patient Nutrition recommendations appreciated

## 2025-03-13 NOTE — PROGRESS NOTE ADULT - PROBLEM SELECTOR PLAN 1
- CT neck - Mucosal thickening and enhancement in the larynx and oropharynx may be due to infectious laryngopharyngitis or sequela of previous radiation performed.  - c/w IV morphine- decreased to 1 mg PRN q4hrs for odynophagia, please give 30 min prior to meals. Patient having some improvement, and tolerating small sips.       ---Unable to assess if morphine was helpful on 3/11-12 given his level of sedation post MIKE. Patient tolerated some po intake post morphine yesterday morning. Instructed family to attempt feeds twice more today pre-medicated with morphine. Instructed them to keep log of pain and sedation level pre & post morphine. Printed scale was provided. Will review tomorrow and potentially make dose adjustments tomorrow  - c/w IV fluconazole Follows with Dr. Gibosn and Dr. Mitchell at [facility] for onc/rad onc  - ultrasound of his neck in mid October 2024 revealing a left cervical 2.4 cm nodule suspicious   - He started concurrent chemo/RT 1/21/25 with weekly cisplatin started 1/24/25  - He has completed 30/35 RT sessions. According to family recent visits with oncologist have been optimistic in regards to his treatment response. 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 He was scheduled to complete his last chemo session

## 2025-03-13 NOTE — BH CONSULTATION LIAISON PROGRESS NOTE - NSBHFUPINTERVALHXFT_PSY_A_CORE
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 Psychiatry consulted for aggression last night. Received Ativan prn.     Met with patient. Drowsy, confused, restless when woken up. Otherwise unable to engage in a logical manner. Examined-- no cogwheeling.     Discussed extensively with daughter Janae in person [phone]. States that patient had a fulfilling career, was a drummer in a band, is retired and has a very supportive family- 2 daughters+ 3 son+ 11 grandchildren. States that patient has been declining cognitively for the last 4-5 years, and was diagnosed with dementia by a neurologist. Lived alone. Does not drive. In the last several months, has been struggling with STM, orientation, ability to take care of dog, adl's and iadl's. Daughter Janae has been staying with him for the last 3 weeks. Has been having poor sleep, confusion, distress, intermittent paranoia, possible AH which has been much worse since start of RT. Painful throat ulcers affect his ability to swallow. Pain better managed with morphine now. Last alcohol use as per daughter-- months ago.   Family struggles with coming to terms with his much declined cognition, and his prognosis.   Educated daughter about the below plan. Daughter keen on focusing on reducing patient's distress, agitation. Discussed black box warning of use of antipsychotics in patients with dementia (2-4% increased risk for stroke or sudden death). Daughter consents. Patient was sitting comfortably in his bedside chair, in a pleasant mood, responsive and engaging in conversation. He told us today was his birthday and his family is planning on visiting. 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 Does not feel depressed or angry or anxious. He does feel sad about his health. But has no SI or HI. No anhedonia or aggressive behavior. He does endorse some hallucinations last night, but his daughter was able to reorient him.

## 2025-03-13 NOTE — PROGRESS NOTE ADULT - SUBJECTIVE AND OBJECTIVE BOX
Hospitalist Progress Note  Laurel Payan MD Pager # [phone]    OVERNIGHT EVENTS: CHELE    SUBJECTIVE / INTERVAL HPI: Patient seen and examined at bedside. Patient reports that he still feels pain with swallowing. Family at bedside reports that he has been drinking water without issues. He was able to eat some peas last night and was more awake today to try more food.     VITAL SIGNS:  Vital Signs Last 24 Hrs  T(C): 36.8 (13 Mar 2025 07:43), Max: 37.2 (12 Mar 2025 21:57)  T(F): 98.3 (13 Mar 2025 07:43), Max: 98.9 (12 Mar 2025 21:57)  HR: 102 (13 Mar 2025 07:43) (79 - 102)  BP: 134/75 (13 Mar 2025 07:43) (129/75 - 134/75)  BP(mean): --  RR: 18 (13 Mar 2025 07:43) (18 - 18)  SpO2: 97% (13 Mar 2025 07:43) (97% - 100%)    Parameters below as of 13 Mar 2025 07:43  Patient On (Oxygen Delivery Method): room air        PHYSICAL EXAM:    General: Comfortable  HEENT: NC/AT; PERRL, anicteric sclera; MMM - left upper buccal mucosa with a white plaque   Neck: supple  Cardiovascular: +S1/S2; RRR  Respiratory: CTA B/L; no W/R/R  Gastrointestinal: soft, NT/ND; +BSx4  Extremities: WWP; no edema, clubbing or cyanosis  Vascular: 2+ radial, DP/PT pulses B/L  Neurological: AAOx2-3; no focal deficits    MEDICATIONS:  MEDICATIONS  (STANDING):  chlorhexidine 2% Cloths 1 Application(s) Topical <User Schedule>  enoxaparin Injectable 40 milliGRAM(s) SubCutaneous every 24 hours  fluconAZOLE IVPB 100 milliGRAM(s) IV Intermittent every 24 hours  fluconAZOLE IVPB      melatonin 3 milliGRAM(s) Oral at bedtime  mupirocin 2% Ointment 1 Application(s) Both Nostrils two times a day  thiamine Injectable 100 milliGRAM(s) IV Push daily    MEDICATIONS  (PRN):  aluminum hydroxide/magnesium hydroxide/simethicone Suspension 30 milliLiter(s) Oral every 4 hours PRN Dyspepsia  haloperidol    Injectable 0.5 milliGRAM(s) IV Push every 6 hours PRN Combative/agitation  morphine  - Injectable 1 milliGRAM(s) IV Push every 4 hours PRN Severe Pain (7 - 10)  ondansetron Injectable 4 milliGRAM(s) IV Push every 8 hours PRN Nausea and/or Vomiting      ALLERGIES:  Allergies    No Known Allergies    Intolerances        LABS:                        8.6    1.43  )-----------( 140      ( 13 Mar 2025 07:29 )             24.6     03-13    141  |  105  |  35[H]  ----------------------------<  115[H]  3.7   |  25  |  0.99    Ca    9.0      13 Mar 2025 07:29  Phos  2.8     03-13  Mg     2.10     03-13    TPro  6.4  /  Alb  3.6  /  TBili  0.7  /  DBili  x   /  AST  12  /  ALT  10  /  AlkPhos  62  03-13      Urinalysis Basic - ( 13 Mar 2025 07:29 )    Color: x / Appearance: x / SG: x / pH: x  Gluc: 115 mg/dL / Ketone: x  / Bili: x / Urobili: x   Blood: x / Protein: x / Nitrite: x   Leuk Esterase: x / RBC: x / WBC x   Sq Epi: x / Non Sq Epi: x / Bacteria: x      CAPILLARY BLOOD GLUCOSE          RADIOLOGY & ADDITIONAL TESTS: Reviewed.    ASSESSMENT:    PLAN:

## 2025-03-13 NOTE — PROGRESS NOTE ADULT - ASSESSMENT
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     Active problems  tonsillar ca  vascular dementia   odynophagia/lack of appetite   FTT  Anemia in neoplastic disease  hyponatremia  Vascular dementia  hypercoagulable state    tonsillar ca  odynophagia/lack of appetite   Hyponatremia  FTT  Pt reporting odynophagia and dysphagia w/ regurgitation of food. Dysphagia to solids and liquids.   (s/p C6 of 7 of cisplatin and on RT -should've been completed on 3/7)  Reviewed CT neck, mucosal thickening and enhancement in the larynx and oropharynx may be due to infectious laryngopharyngitis or sequela of previous radiation performed.  Palliative care consulted  Patient does not like viscous lidocaine or liquid gabapentin - will d/c   Pain control with morphine 30 minutes prior to meals   Patient did not participate with S&S  GIven patient is able to keep water and peas down without regurgitation - will change esophagram to cineesophagogram   Empiric treatment of thrush/esophagitis with fluconazole IV  Pt is more awake - will monitor PO intake to see if keeping up with nutrition. If unable to keep up nutrition - may need to consider PEG evaluation   - Discussed plan of care with Dr. Gibson. Outpatient rad onc team also aware of patient's hospital course.       Anemia in neoplastic disease  Hb 9.6, otherwise stable, continue to monitor    Vascular dementia  - Pt agitated 3/10 overnight requiring ativan. Mental status improved 3/13. Family reporting continues to have hallucinations but this has been ongoing since diagnosis of dementia.   - Psych consulted. Haldol PRN agitation.   - CTH with chronic microvascular changes.   - Recommend outpatient follow up with neurology.     Starvation Ketosis  - AG 17 - UA with ketones - in the setting of poor PO intake. Improved with administration of D5+NS.     Hypercoagulable state 2/2 malignancy  On lovenox 40mg daily

## 2025-03-13 NOTE — PROGRESS NOTE ADULT - ATTENDING COMMENTS
Patient was seen and evaluated with Dr. Cruz, Geriatric Medicine Fellow, during bedside rounds.   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 Per family, patient was agitated yesterday afternoon -attempting to pack his bags and was challenging for family to redirect , but did not require any prn medication for agitation. He was calmer in evening with family.   Family state he was only able to tolerate one pea yesterday.   Patient state he was unable to "tolerate it" but unable to elaborate further. He said       Case reviewed with primary team  Thank you for allowing us to participate in your patient's care. Please page [phone] for any questions/concerns. Patient was seen and evaluated with Dr. Cruz, Geriatric Medicine Fellow, during bedside rounds.   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 Per family, patient was agitated yesterday afternoon -attempting to pack his bags and was challenging for family to redirect , but did not require any prn medication for agitation. He was calmer in evening with family.   Family state he was only able to tolerate one pea yesterday.   Patient state he was unable to "tolerate it" but unable to elaborate further. When shown a picture of [place]clean Carpenter FACES Pain Scale, patient pointed to Face with 1-3 pain scale.         Case reviewed with primary team  Thank you for allowing us to participate in your patient's care. Please page [phone] for any questions/concerns. Patient was seen and evaluated with Dr. Cruz, Geriatric Medicine Fellow, during bedside rounds.   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 Per family, patient was agitated yesterday afternoon -attempting to pack his bags and was challenging for family to redirect , but did not require any prn medication for agitation. He was calmer in evening with family.   Family state he was only able to tolerate one pea yesterday.   Patient state he was unable to "tolerate it" but unable to elaborate further. When shown a picture of Mclean Carpenter FACES Pain Scale, patient pointed to Face with 1-3 pain scale.     > Behavioral Health Recommendations appreciated   > IV Morphine 1mg q4 PRN pain/ odynophagia (hold for hypotension, oversedation, respiratory depression); can offer 30 minute before meals for odynophagia   > Narcan PRN   > Bowel regimen while on opiates if no contraindications     Case reviewed with primary team  Thank you for allowing us to participate in your patient's care. Please page [phone] for any questions/concerns.

## 2025-03-13 NOTE — PROGRESS NOTE ADULT - PROBLEM SELECTOR PLAN 5
For pain management     In the event of worsening symptoms, please contact the Palliative Medicine team via pager (if the patient is at [facility] #[phone] or if the patient is at [facility] #[phone]) The Geriatric and Palliative Medicine service has coverage 24 hours a day/ 7 days a week to provide medical recommendations regarding symptom management needs via telephone. - HCP form signed and placed in chart, Ellen (Daughter) is his HCP  - Attempted to bring up MOLST form and Code status- family not amenable to discussing at this time,, no conversations/decisions on code status to date, patient remains full code at this time  - Per family they would not want anything invasive such as Nasogastric or PEG tube for artificial nutrition as it could agitate patient

## 2025-03-13 NOTE — BH CONSULTATION LIAISON PROGRESS NOTE - NSBHFUPINTERVALCCFT_PSY_A_CORE
Patient was sitting comfortably in his bedside chair, in a pleasant mood, responsive and engaging in conversation. He told us today was his birthday and his family is planning on visiting. 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 Does not feel depressed or angry or anxious. He does feel sad about his health. But has no SI or HI. No anhedonia or aggressive behavior. He does endorse some hallucinations last night, but his daughter was able to reorient him. He has not received any PRN haldol since yesterday.  He has not received any PRN haldol since yesterday.

## 2025-03-13 NOTE — PROGRESS NOTE ADULT - PROBLEM SELECTOR PLAN 2
Mild approaching moderate stage dementia, declining ADLs at baseline accelerated by cancer treatment. Has been experiencing sundowning and behavioral sx likely from the dementia   - MIKE 3/10  - f/u psych, haldol prn   - Encouraged family at bedside overnight for reorientation and for them to encourage proper sleep wake cycle  - f/u CTH  - Can trial liquid melatonin at night, spoke with pharmacy, they will verify if family brings in own supply as not on formulary   - Family not amenable to SSRIs or atypical anti depressants

## 2025-03-13 NOTE — PROGRESS NOTE ADULT - SUBJECTIVE AND OBJECTIVE BOX
Date of Service    Indication for Geriatrics and Palliative Care Services: Symptom mgmt     SUBJECTIVE AND OBJECTIVE:  Patient seen and examined at bedside. Patient being followed up for : Odynophagia    Alert and responsive today, states pain is at a 2 on the pain scale. Slept well, no agitation currently, has not tried eating today     INTERVAL HPI/OVERNIGHT EVENTS:  Patient awoke from sleep yesterday at about 2PM- morphine and ativan per family "must have washed out of his system". He was very agitated but redirectable with family, he did not require any prns. He was more calm in the evening with family. Trialed the 1mg morphine and only was able to tolerate one spoon of peas. He slept well overnight       Allergies    No Known Allergies    Intolerances    MEDICATIONS  (STANDING):  chlorhexidine 2% Cloths 1 Application(s) Topical <User Schedule>  dextrose 5% + sodium chloride 0.45%. 1000 milliLiter(s) (75 mL/Hr) IV Continuous <Continuous>  enoxaparin Injectable 40 milliGRAM(s) SubCutaneous every 24 hours  fluconAZOLE IVPB 100 milliGRAM(s) IV Intermittent every 24 hours  fluconAZOLE IVPB      melatonin 3 milliGRAM(s) Oral at bedtime  mupirocin 2% Ointment 1 Application(s) Topical two times a day  thiamine Injectable 100 milliGRAM(s) IV Push daily    MEDICATIONS  (PRN):  aluminum hydroxide/magnesium hydroxide/simethicone Suspension 30 milliLiter(s) Oral every 4 hours PRN Dyspepsia  haloperidol    Injectable 0.5 milliGRAM(s) IV Push every 6 hours PRN Combative/agitation  morphine  - Injectable 1 milliGRAM(s) IV Push every 4 hours PRN Severe Pain (7 - 10)  ondansetron Injectable 4 milliGRAM(s) IV Push every 8 hours PRN Nausea and/or Vomiting      ITEMS UNCHECKED ARE NOT PRESENT    PRESENT SYMPTOMS: [ ]Unable to self-report due to altered mental status- see [ ] CPOT [ ] PAINADS [ ] RDOS  Source if other than patient:  [x ]Family   [ ]Team     Pain:  [x ]yes [ ]no  QOL impact - great  Location -  throat       Aggravating factors - eating/drinking   Quality - burning  Radiation - down esphogus  Timing- a/w meals   Severity (0-10 scale): 2  Minimal acceptable level / Pain Goal (0-10 scale):     Dyspnea:                           [ ]Mild [ ]Moderate [ ]Severe  Anxiety:                             [ ]Mild [ ]Moderate [ ]Severe  Agitation:                          [x ]Mild [ ]Moderate [ ]Severe  Fatigue:                             [ ]Mild [ ]Moderate [ ]Severe  Nausea:                             [x ]Mild [ ]Moderate [ ]Severe  Loss of appetite:              [ ]Mild [ x]Moderate [ ]Severe  Constipation:                   [ ]Mild [ ]Moderate [ ]Severe  Diarrhea:                          [ ]Mild [ ]Moderate [ ]Severe  Pruritus:                            [ ]Mild [ ]Moderate [ ]Severe  Depression:                      [ ]Mild [ ]Moderate [ ]Severe    CPOT:    https://www.[facility].org/getattachment/[email or web address]/Critical-Care-Pain-Observation-Tool-(CPOT)    PCSSQ[Palliative Care Spiritual Screening Question]   Severity (0-10):  Score of 4 or > indicate consideration of Chaplaincy referral.  Chaplaincy Referral: [ ] yes [ ] refused [ ] following [x ] deferred    Caregiver [place]? : [ ] yes [ ] no [ ] Declined [x ] Deferred              Social work referral [ ] Patient & Family Centered Care Referral [ ]     Anticipatory Grief present?:  [ ] yes [ ] no  [ x] Deferred                  Social work referral [ ] Chaplaincy Referral[ ] Caregiver Support Referral [ ]    Other Symptoms:  [ ]All other review of systems negative   [ ] Unable to obtain due to poor mentation     PHYSICAL EXAM:  Vital Signs Last 24 Hrs  T(C): 36.8 (13 Mar 2025 07:43), Max: 37.2 (12 Mar 2025 21:57)  T(F): 98.3 (13 Mar 2025 07:43), Max: 98.9 (12 Mar 2025 21:57)  HR: 102 (13 Mar 2025 07:43) (72 - 102)  BP: 134/75 (13 Mar 2025 07:43) (117/70 - 134/75)  BP(mean): --  RR: 18 (13 Mar 2025 07:43) (18 - 18)  SpO2: 97% (13 Mar 2025 07:43) (97% - 100%)    Parameters below as of 13 Mar 2025 07:43  Patient On (Oxygen Delivery Method): room air         I&O's Summary       GENERAL:  [x ]Alert  [x ]Oriented x2   [ ]Lethargic  [ ]Cachexia  [ ]Unarousable  [x ]Verbal  [ ]Non-Verbal  [x ] No Distress  Behavioral:   [ ] Anxiety  [ ] Delirium [ ] Agitation [x ] Calm  [ ] Other  HEENT:  [ ]Normal  [ ] Temporal Wasting  [ x]Dry mouth   [ ]ET Tube/Trach  [x ]Oral lesions  [ x] Mucositis  PULMONARY: Breathing comfortable   [ ]Clear [ ]Tachypnea  [ ]Audible excessive secretions   [ ]Rhonchi        [ ]Right [ ]Left [ ]Bilateral  [ ]Crackles        [ ]Right [ ]Left [ ]Bilateral  [ ]Wheezing     [ ]Right [ ]Left [ ]Bilateral  [ ]Diminished breath sounds [ ]right [ ]left [ ]bilateral  CARDIOVASCULAR:    [x ]Regular [ ]Irregular [ ]Tachy  [ ]Hua [ ]Murmur [ ]Other  GASTROINTESTINAL:  [ ]Soft  [ ]Distended   [ ]+BS  [ ]Non tender [ ]Tender  [ ]PEG [ ]OGT/ NGT  Last BM:   GENITOURINARY:  [ ]Normal [ ] Incontinent   [ ]Oliguria/Anuria   [ ]Alan  MUSCULOSKELETAL:   [ x]Normal   [ ]Weakness  [ ]Bed/Wheelchair bound [ ]Edema  [  ] amputation  [  ] contraction  NEUROLOGIC:   [x ]No focal deficits  [ x]Cognitive impairment  [ ]Dysphagia [ ]Dysarthria [ ]Paresis [ ]Other   SKIN: See Nursing Skin Assessment for further details  [x ]Normal    [ ]Rash  [ ]Pressure ulcer(s)       Present on admission [ ]y [ ]n   [  ]  Wound    [  ] hyperpigmentation    CRITICAL CARE:  [ ]Shock Present  [ ]Septic [ ]Cardiogenic [ ]Neurologic [ ]Hypovolemic  [ ]Vasopressors [ ]Inotropes  [ ]Respiratory failure present [ ]Mechanical Ventilation [ ]Non-invasive ventilatory support [ ]High-Flow   [ ]Acute  [ ]Chronic [ ]Hypoxic  [ ]Hypercarbic [ ]Other  [ ]Other organ failure     LABS:  reviewed                         8.6    1.43  )-----------( 140      ( 13 Mar 2025 07:29 )             24.6   03-13    141  |  105  |  35[H]  ----------------------------<  115[H]  3.7   |  25  |  0.99    Ca    9.0      13 Mar 2025 07:29  Phos  2.8     03-13  Mg     2.10     03-13    TPro  6.4  /  Alb  3.6  /  TBili  0.7  /  DBili  x   /  AST  12  /  ALT  10  /  AlkPhos  62  03-13    Urinalysis Basic - ( 13 Mar 2025 07:29 )    Color: x / Appearance: x / SG: x / pH: x  Gluc: 115 mg/dL / Ketone: x  / Bili: x / Urobili: x   Blood: x / Protein: x / Nitrite: x   Leuk Esterase: x / RBC: x / WBC x   Sq Epi: x / Non Sq Epi: x / Bacteria: x    CAPILLARY BLOOD GLUCOSE    RADIOLOGY & ADDITIONAL STUDIES: reviewed     Protein Calorie Malnutrition Present: [ ]mild [ ]moderate [ ]severe [ ]underweight [ ]morbid obesity  https://www.andeal.org/vault/2440/web/files/ONC/Table_Clinical%20Characteristics%20to%20Document%20Malnutrition-White%20JV%20et%20al%202012.pdf    Height (cm): 177.8 (03-06-25 @ 15:47), 177.8 (03-05-25 @ 18:20), 175 (02-28-25 @ 15:55)  Weight (kg): 81.3 (03-07-25 @ 05:32), 80.7 (03-05-25 @ 18:20), 80.8 (03-04-25 @ 16:37)  BMI (kg/m2): 25.7 (03-07-25 @ 05:32), 25.5 (03-06-25 @ 15:47), 25.5 (03-05-25 @ 18:20)    [ ]PPSV2 < or = 30%  [x ]significant weight loss [x ]poor nutritional intake [ ]anasarca   [ ]Artificial Nutrition    REFERRALS:   [ ]Chaplaincy  [ ]Hospice  [ ]Child Life  [ x]Social Work  [ ]Case management [ ]Holistic Therapy     Goals of Care Document:     Date of Service  : 3/13/2025  Indication for Geriatrics and Palliative Care Services: Symptom mgmt     SUBJECTIVE AND OBJECTIVE:  Patient seen and examined at bedside. Patient being followed up for : Odynophagia    Alert and responsive today, states pain is at a 2 on the pain scale. Slept well, no agitation currently, has not tried eating today     INTERVAL HPI/OVERNIGHT EVENTS:  Patient awoke from sleep yesterday at about 2PM- morphine and ativan per family "must have washed out of his system". He was very agitated but redirectable with family, he did not require any prns. He was more calm in the evening with family. Trialed the 1mg morphine and only was able to tolerate one spoon of peas. He slept well overnight       Allergies    No Known Allergies    Intolerances    MEDICATIONS  (STANDING):  chlorhexidine 2% Cloths 1 Application(s) Topical <User Schedule>  dextrose 5% + sodium chloride 0.45%. 1000 milliLiter(s) (75 mL/Hr) IV Continuous <Continuous>  enoxaparin Injectable 40 milliGRAM(s) SubCutaneous every 24 hours  fluconAZOLE IVPB 100 milliGRAM(s) IV Intermittent every 24 hours  fluconAZOLE IVPB      melatonin 3 milliGRAM(s) Oral at bedtime  mupirocin 2% Ointment 1 Application(s) Topical two times a day  thiamine Injectable 100 milliGRAM(s) IV Push daily    MEDICATIONS  (PRN):  aluminum hydroxide/magnesium hydroxide/simethicone Suspension 30 milliLiter(s) Oral every 4 hours PRN Dyspepsia  haloperidol    Injectable 0.5 milliGRAM(s) IV Push every 6 hours PRN Combative/agitation  morphine  - Injectable 1 milliGRAM(s) IV Push every 4 hours PRN Severe Pain (7 - 10)  ondansetron Injectable 4 milliGRAM(s) IV Push every 8 hours PRN Nausea and/or Vomiting      ITEMS UNCHECKED ARE NOT PRESENT    PRESENT SYMPTOMS: [ ]Unable to self-report due to altered mental status- see [ ] CPOT [ ] PAINADS [ ] RDOS  Source if other than patient:  [x ]Family   [ ]Team     Pain:  [x ]yes [ ]no  QOL impact - great  Location -  throat       Aggravating factors - eating/drinking   Quality - burning  Radiation - down esphogus  Timing- a/w meals   Severity (0-10 scale): 2  Minimal acceptable level / Pain Goal (0-10 scale):     Dyspnea:                           [ ]Mild [ ]Moderate [ ]Severe  Anxiety:                             [ ]Mild [ ]Moderate [ ]Severe  Agitation:                          [x ]Mild [ ]Moderate [ ]Severe  Fatigue:                             [ ]Mild [ ]Moderate [ ]Severe  Nausea:                             [x ]Mild [ ]Moderate [ ]Severe  Loss of appetite:              [ ]Mild [ x]Moderate [ ]Severe  Constipation:                   [ ]Mild [ ]Moderate [ ]Severe  Diarrhea:                          [ ]Mild [ ]Moderate [ ]Severe  Pruritus:                            [ ]Mild [ ]Moderate [ ]Severe  Depression:                      [ ]Mild [ ]Moderate [ ]Severe    CPOT:    https://www.[facility].org/getattachment/[email or web address]/Critical-Care-Pain-Observation-Tool-(CPOT)    PCSSQ[Palliative Care Spiritual Screening Question]   Severity (0-10):  Score of 4 or > indicate consideration of Chaplaincy referral.  Chaplaincy Referral: [ ] yes [ ] refused [ ] following [x ] deferred    Caregiver [place]? : [ ] yes [ ] no [ ] Declined [x ] Deferred              Social work referral [ ] Patient & Family Centered Care Referral [ ]     Anticipatory Grief present?:  [ ] yes [ ] no  [ x] Deferred                  Social work referral [ ] Chaplaincy Referral[ ] Caregiver Support Referral [ ]    Other Symptoms:  [ ]All other review of systems negative   [ ] Unable to obtain due to poor mentation     PHYSICAL EXAM:  Vital Signs Last 24 Hrs  T(C): 36.8 (13 Mar 2025 07:43), Max: 37.2 (12 Mar 2025 21:57)  T(F): 98.3 (13 Mar 2025 07:43), Max: 98.9 (12 Mar 2025 21:57)  HR: 102 (13 Mar 2025 07:43) (72 - 102)  BP: 134/75 (13 Mar 2025 07:43) (117/70 - 134/75)  BP(mean): --  RR: 18 (13 Mar 2025 07:43) (18 - 18)  SpO2: 97% (13 Mar 2025 07:43) (97% - 100%)    Parameters below as of 13 Mar 2025 07:43  Patient On (Oxygen Delivery Method): room air         I&O's Summary       GENERAL:  [x ]Alert  [x ]Oriented x2   [ ]Lethargic  [ ]Cachexia  [ ]Unarousable  [x ]Verbal  [ ]Non-Verbal  [x ] No Distress  Behavioral:   [ ] Anxiety  [ ] Delirium [ ] Agitation [x ] Calm  [ ] Other  HEENT:  [ ]Normal  [ ] Temporal Wasting  [ x]Dry mouth   [ ]ET Tube/Trach  [x ]Oral lesions  [ x] Mucositis  PULMONARY: Breathing comfortable   [ ]Clear [ ]Tachypnea  [ ]Audible excessive secretions   [ ]Rhonchi        [ ]Right [ ]Left [ ]Bilateral  [ ]Crackles        [ ]Right [ ]Left [ ]Bilateral  [ ]Wheezing     [ ]Right [ ]Left [ ]Bilateral  [ ]Diminished breath sounds [ ]right [ ]left [ ]bilateral  CARDIOVASCULAR:    [x ]Regular [ ]Irregular [ ]Tachy  [ ]Hua [ ]Murmur [ ]Other  GASTROINTESTINAL:  [ ]Soft  [ ]Distended   [ ]+BS  [ ]Non tender [ ]Tender  [ ]PEG [ ]OGT/ NGT  Last BM:   GENITOURINARY:  [ ]Normal [ ] Incontinent   [ ]Oliguria/Anuria   [ ]Alan  MUSCULOSKELETAL:   [ x]Normal   [ ]Weakness  [ ]Bed/Wheelchair bound [ ]Edema  [  ] amputation  [  ] contraction  NEUROLOGIC:   [x ]No focal deficits  [ x]Cognitive impairment  [ ]Dysphagia [ ]Dysarthria [ ]Paresis [ ]Other   SKIN: See Nursing Skin Assessment for further details  [x ]Normal    [ ]Rash  [ ]Pressure ulcer(s)       Present on admission [ ]y [ ]n   [  ]  Wound    [  ] hyperpigmentation    CRITICAL CARE:  [ ]Shock Present  [ ]Septic [ ]Cardiogenic [ ]Neurologic [ ]Hypovolemic  [ ]Vasopressors [ ]Inotropes  [ ]Respiratory failure present [ ]Mechanical Ventilation [ ]Non-invasive ventilatory support [ ]High-Flow   [ ]Acute  [ ]Chronic [ ]Hypoxic  [ ]Hypercarbic [ ]Other  [ ]Other organ failure     LABS:  reviewed                         8.6    1.43  )-----------( 140      ( 13 Mar 2025 07:29 )             24.6   03-13    141  |  105  |  35[H]  ----------------------------<  115[H]  3.7   |  25  |  0.99    Ca    9.0      13 Mar 2025 07:29  Phos  2.8     03-13  Mg     2.10     03-13    TPro  6.4  /  Alb  3.6  /  TBili  0.7  /  DBili  x   /  AST  12  /  ALT  10  /  AlkPhos  62  03-13    Urinalysis Basic - ( 13 Mar 2025 07:29 )    Color: x / Appearance: x / SG: x / pH: x  Gluc: 115 mg/dL / Ketone: x  / Bili: x / Urobili: x   Blood: x / Protein: x / Nitrite: x   Leuk Esterase: x / RBC: x / WBC x   Sq Epi: x / Non Sq Epi: x / Bacteria: x    CAPILLARY BLOOD GLUCOSE    RADIOLOGY & ADDITIONAL STUDIES: reviewed     Protein Calorie Malnutrition Present: [ ]mild [ ]moderate [ ]severe [ ]underweight [ ]morbid obesity  https://www.andeal.org/vault/2440/web/files/ONC/Table_Clinical%20Characteristics%20to%20Document%20Malnutrition-White%20JV%20et%20al%202012.pdf    Height (cm): 177.8 (03-06-25 @ 15:47), 177.8 (03-05-25 @ 18:20), 175 (02-28-25 @ 15:55)  Weight (kg): 81.3 (03-07-25 @ 05:32), 80.7 (03-05-25 @ 18:20), 80.8 (03-04-25 @ 16:37)  BMI (kg/m2): 25.7 (03-07-25 @ 05:32), 25.5 (03-06-25 @ 15:47), 25.5 (03-05-25 @ 18:20)    [ ]PPSV2 < or = 30%  [x ]significant weight loss [x ]poor nutritional intake [ ]anasarca   [ ]Artificial Nutrition    REFERRALS:   [ ]Chaplaincy  [ ]Hospice  [ ]Child Life  [ x]Social Work  [ ]Case management [ ]Holistic Therapy     Goals of Care Document:     Date of Service  : 3/13/2025  Indication for Geriatrics and Palliative Care Services: Symptom mgmt     SUBJECTIVE AND OBJECTIVE:  Patient seen and examined at bedside. Patient being followed up for : Odynophagia    Alert and responsive today, states pain is at a 2 on the pain scale. Slept well, no agitation currently, has not tried eating today     INTERVAL HPI/OVERNIGHT EVENTS:  Patient awoke from sleep yesterday at about 2PM- morphine and ativan per family "must have washed out of his system". He was very agitated but redirectable with family, he did not require any prns. He was more calm in the evening with family. Trialed the 1mg morphine and only was able to tolerate one spoon of peas. He slept well overnight       Allergies    No Known Allergies    Intolerances    MEDICATIONS  (STANDING):  chlorhexidine 2% Cloths 1 Application(s) Topical <User Schedule>  dextrose 5% + sodium chloride 0.45%. 1000 milliLiter(s) (75 mL/Hr) IV Continuous <Continuous>  enoxaparin Injectable 40 milliGRAM(s) SubCutaneous every 24 hours  fluconAZOLE IVPB 100 milliGRAM(s) IV Intermittent every 24 hours  fluconAZOLE IVPB      melatonin 3 milliGRAM(s) Oral at bedtime  mupirocin 2% Ointment 1 Application(s) Topical two times a day  thiamine Injectable 100 milliGRAM(s) IV Push daily    MEDICATIONS  (PRN):  aluminum hydroxide/magnesium hydroxide/simethicone Suspension 30 milliLiter(s) Oral every 4 hours PRN Dyspepsia  haloperidol    Injectable 0.5 milliGRAM(s) IV Push every 6 hours PRN Combative/agitation  morphine  - Injectable 1 milliGRAM(s) IV Push every 4 hours PRN Severe Pain (7 - 10)  ondansetron Injectable 4 milliGRAM(s) IV Push every 8 hours PRN Nausea and/or Vomiting      ITEMS UNCHECKED ARE NOT PRESENT    PRESENT SYMPTOMS: [ ]Unable to self-report due to altered mental status- see [ ] CPOT [ ] PAINADS [ ] RDOS  Source if other than patient:  [x ]Family   [ ]Team     Pain:  [x ]yes [ ]no  QOL impact - great  Location -  throat       Aggravating factors - eating/drinking   Quality - burning  Radiation - down esophagus  Timing- a/w meals   Severity (0-10 scale): 2  Minimal acceptable level / Pain Goal (0-10 scale):     Dyspnea:                           [ ]Mild [ ]Moderate [ ]Severe  Anxiety:                             [ ]Mild [ ]Moderate [ ]Severe  Agitation:                          [x ]Mild [ ]Moderate [ ]Severe  Fatigue:                             [ ]Mild [ ]Moderate [ ]Severe  Nausea:                             [x ]Mild [ ]Moderate [ ]Severe  Loss of appetite:              [ ]Mild [ x]Moderate [ ]Severe  Constipation:                   [ ]Mild [ ]Moderate [ ]Severe  Diarrhea:                          [ ]Mild [ ]Moderate [ ]Severe  Pruritus:                            [ ]Mild [ ]Moderate [ ]Severe  Depression:                      [ ]Mild [ ]Moderate [ ]Severe    CPOT:    https://www.[facility].org/getattachment/[email or web address]/Critical-Care-Pain-Observation-Tool-(CPOT)    PCSSQ[Palliative Care Spiritual Screening Question]   Severity (0-10):  Score of 4 or > indicate consideration of Chaplaincy referral.  Chaplaincy Referral: [ ] yes [ ] refused [ ] following [x ] deferred    Caregiver [place]? : [ ] yes [ ] no [ ] Declined [x ] Deferred              Social work referral [ ] Patient & Family Centered Care Referral [ ]     Anticipatory Grief present?:  [ ] yes [ ] no  [ x] Deferred                  Social work referral [ ] Chaplaincy Referral[ ] Caregiver Support Referral [ ]    Other Symptoms:  [x ]All other review of systems negative   [ ] Unable to obtain due to poor mentation     PHYSICAL EXAM:  Vital Signs Last 24 Hrs  T(C): 36.8 (13 Mar 2025 07:43), Max: 37.2 (12 Mar 2025 21:57)  T(F): 98.3 (13 Mar 2025 07:43), Max: 98.9 (12 Mar 2025 21:57)  HR: 102 (13 Mar 2025 07:43) (72 - 102)  BP: 134/75 (13 Mar 2025 07:43) (117/70 - 134/75)  BP(mean): --  RR: 18 (13 Mar 2025 07:43) (18 - 18)  SpO2: 97% (13 Mar 2025 07:43) (97% - 100%)    Parameters below as of 13 Mar 2025 07:43  Patient On (Oxygen Delivery Method): room air         I&O's Summary       GENERAL:  [x ]Alert  [x ]Oriented x2   [ ]Lethargic  [ ]Cachexia  [ ]Unarousable  [x ]Verbal  [ ]Non-Verbal  [x ] No Distress  Behavioral:   [ ] Anxiety  [ ] Delirium [ ] Agitation [x ] Calm  [ ] Other  HEENT:  [ ]Normal  [ ] Temporal Wasting  [ x]Dry mouth   [ ]ET Tube/Trach  [x ]Oral lesions  [ x] Mucositis  PULMONARY: Breathing comfortable , no accessory muscle use   [ ]Clear [ ]Tachypnea  [ ]Audible excessive secretions   [ ]Rhonchi        [ ]Right [ ]Left [ ]Bilateral  [ ]Crackles        [ ]Right [ ]Left [ ]Bilateral  [ ]Wheezing     [ ]Right [ ]Left [ ]Bilateral  [ ]Diminished breath sounds [ ]right [ ]left [ ]bilateral  CARDIOVASCULAR:    [x ]Regular [ ]Irregular [ ]Tachy  [ ]Hua [ ]Murmur [ ]Other  GASTROINTESTINAL: Non-distended   [ ]Soft  [ ]Distended   [ ]+BS  [ ]Non tender [ ]Tender  [ ]PEG [ ]OGT/ NGT  Last BM: 3/8  GENITOURINARY:  [x ]Normal [ ] Incontinent   [ ]Oliguria/Anuria   [ ]Alan  MUSCULOSKELETAL:   [ x]Normal   [ ]Weakness  [ ]Bed/Wheelchair bound [ ]Edema  [  ] amputation  [  ] contraction  NEUROLOGIC:   [x ]No focal deficits  [ x]Cognitive impairment  [ ]Dysphagia [ ]Dysarthria [ ]Paresis [ ]Other   SKIN: See Nursing Skin Assessment for further details  [x ]Normal    [ ]Rash  [ ]Pressure ulcer(s)       Present on admission [ ]y [ ]n   [  ]  Wound    [  ] hyperpigmentation    CRITICAL CARE:  [ ]Shock Present  [ ]Septic [ ]Cardiogenic [ ]Neurologic [ ]Hypovolemic  [ ]Vasopressors [ ]Inotropes  [ ]Respiratory failure present [ ]Mechanical Ventilation [ ]Non-invasive ventilatory support [ ]High-Flow   [ ]Acute  [ ]Chronic [ ]Hypoxic  [ ]Hypercarbic [ ]Other  [ ]Other organ failure     LABS:  reviewed                         8.6    1.43  )-----------( 140      ( 13 Mar 2025 07:29 )             24.6   03-13    141  |  105  |  35[H]  ----------------------------<  115[H]  3.7   |  25  |  0.99    Ca    9.0      13 Mar 2025 07:29  Phos  2.8     03-13  Mg     2.10     03-13    TPro  6.4  /  Alb  3.6  /  TBili  0.7  /  DBili  x   /  AST  12  /  ALT  10  /  AlkPhos  62  03-13    Urinalysis Basic - ( 13 Mar 2025 07:29 )    Color: x / Appearance: x / SG: x / pH: x  Gluc: 115 mg/dL / Ketone: x  / Bili: x / Urobili: x   Blood: x / Protein: x / Nitrite: x   Leuk Esterase: x / RBC: x / WBC x   Sq Epi: x / Non Sq Epi: x / Bacteria: x    CAPILLARY BLOOD GLUCOSE    RADIOLOGY & ADDITIONAL STUDIES: reviewed     Protein Calorie Malnutrition Present: [ ]mild [ ]moderate [ ]severe [ ]underweight [ ]morbid obesity  https://www.andeal.org/vault/2440/web/files/ONC/Table_Clinical%20Characteristics%20to%20Document%20Malnutrition-White%20JV%20et%20al%202012.pdf    Height (cm): 177.8 (03-06-25 @ 15:47), 177.8 (03-05-25 @ 18:20), 175 (02-28-25 @ 15:55)  Weight (kg): 81.3 (03-07-25 @ 05:32), 80.7 (03-05-25 @ 18:20), 80.8 (03-04-25 @ 16:37)  BMI (kg/m2): 25.7 (03-07-25 @ 05:32), 25.5 (03-06-25 @ 15:47), 25.5 (03-05-25 @ 18:20)    [ ]PPSV2 < or = 30%  [x ]significant weight loss [x ]poor nutritional intake [ ]anasarca   [ ]Artificial Nutrition    REFERRALS:   [ ]Chaplaincy  [ ]Hospice  [ ]Child Life  [ ]Social Work  [x ]Case management [ ]Holistic Therapy

## 2025-03-14 ENCOUNTER — APPOINTMENT (OUTPATIENT)
Dept: INFUSION THERAPY | Facility: HOSPITAL | Age: 75
End: 2025-03-14

## 2025-03-14 ENCOUNTER — APPOINTMENT (OUTPATIENT)
Dept: HEMATOLOGY ONCOLOGY | Facility: CLINIC | Age: 75
End: 2025-03-14

## 2025-03-14 PROBLEM — C09.9 MALIGNANT NEOPLASM OF TONSIL, UNSPECIFIED: Chronic | Status: ACTIVE | Noted: 2025-03-06

## 2025-03-14 LAB
ALBUMIN SERPL ELPH-MCNC: 3.5 G/DL — SIGNIFICANT CHANGE UP (ref 3.3–5)
ALP SERPL-CCNC: 61 U/L — SIGNIFICANT CHANGE UP (ref 40–120)
ALT FLD-CCNC: 10 U/L — SIGNIFICANT CHANGE UP (ref 4–41)
ANION GAP SERPL CALC-SCNC: 13 MMOL/L — SIGNIFICANT CHANGE UP (ref 7–14)
AST SERPL-CCNC: 13 U/L — SIGNIFICANT CHANGE UP (ref 4–40)
BASOPHILS # BLD AUTO: 0.01 K/UL — SIGNIFICANT CHANGE UP (ref 0–0.2)
BASOPHILS NFR BLD AUTO: 0.6 % — SIGNIFICANT CHANGE UP (ref 0–2)
BILIRUB SERPL-MCNC: 1 MG/DL — SIGNIFICANT CHANGE UP (ref 0.2–1.2)
BUN SERPL-MCNC: 32 MG/DL — HIGH (ref 7–23)
CALCIUM SERPL-MCNC: 9.1 MG/DL — SIGNIFICANT CHANGE UP (ref 8.4–10.5)
CHLORIDE SERPL-SCNC: 104 MMOL/L — SIGNIFICANT CHANGE UP (ref 98–107)
CO2 SERPL-SCNC: 24 MMOL/L — SIGNIFICANT CHANGE UP (ref 22–31)
CREAT SERPL-MCNC: 1.07 MG/DL — SIGNIFICANT CHANGE UP (ref 0.5–1.3)
EGFR: 72 ML/MIN/1.73M2 — SIGNIFICANT CHANGE UP
EGFR: 72 ML/MIN/1.73M2 — SIGNIFICANT CHANGE UP
EOSINOPHIL # BLD AUTO: 0.02 K/UL — SIGNIFICANT CHANGE UP (ref 0–0.5)
EOSINOPHIL NFR BLD AUTO: 1.2 % — SIGNIFICANT CHANGE UP (ref 0–6)
GLUCOSE SERPL-MCNC: 88 MG/DL — SIGNIFICANT CHANGE UP (ref 70–99)
HCT VFR BLD CALC: 23.5 % — LOW (ref 39–50)
HGB BLD-MCNC: 8.1 G/DL — LOW (ref 13–17)
IANC: 1.05 K/UL — LOW (ref 1.8–7.4)
IMM GRANULOCYTES NFR BLD AUTO: 0 % — SIGNIFICANT CHANGE UP (ref 0–0.9)
LYMPHOCYTES # BLD AUTO: 0.3 K/UL — LOW (ref 1–3.3)
LYMPHOCYTES # BLD AUTO: 17.5 % — SIGNIFICANT CHANGE UP (ref 13–44)
MAGNESIUM SERPL-MCNC: 2 MG/DL — SIGNIFICANT CHANGE UP (ref 1.6–2.6)
MCHC RBC-ENTMCNC: 30.9 PG — SIGNIFICANT CHANGE UP (ref 27–34)
MCHC RBC-ENTMCNC: 34.5 G/DL — SIGNIFICANT CHANGE UP (ref 32–36)
MCV RBC AUTO: 89.7 FL — SIGNIFICANT CHANGE UP (ref 80–100)
MONOCYTES # BLD AUTO: 0.33 K/UL — SIGNIFICANT CHANGE UP (ref 0–0.9)
MONOCYTES NFR BLD AUTO: 19.3 % — HIGH (ref 2–14)
NEUTROPHILS # BLD AUTO: 1.05 K/UL — LOW (ref 1.8–7.4)
NEUTROPHILS NFR BLD AUTO: 61.4 % — SIGNIFICANT CHANGE UP (ref 43–77)
NRBC # BLD AUTO: 0 K/UL — SIGNIFICANT CHANGE UP (ref 0–0)
NRBC # FLD: 0 K/UL — SIGNIFICANT CHANGE UP (ref 0–0)
NRBC BLD AUTO-RTO: 0 /100 WBCS — SIGNIFICANT CHANGE UP (ref 0–0)
PHOSPHATE SERPL-MCNC: 3 MG/DL — SIGNIFICANT CHANGE UP (ref 2.5–4.5)
PLATELET # BLD AUTO: 140 K/UL — LOW (ref 150–400)
POTASSIUM SERPL-MCNC: 4 MMOL/L — SIGNIFICANT CHANGE UP (ref 3.5–5.3)
POTASSIUM SERPL-SCNC: 4 MMOL/L — SIGNIFICANT CHANGE UP (ref 3.5–5.3)
PROT SERPL-MCNC: 6.3 G/DL — SIGNIFICANT CHANGE UP (ref 6–8.3)
RBC # BLD: 2.62 M/UL — LOW (ref 4.2–5.8)
RBC # FLD: 15.9 % — HIGH (ref 10.3–14.5)
SODIUM SERPL-SCNC: 141 MMOL/L — SIGNIFICANT CHANGE UP (ref 135–145)
WBC # BLD: 1.71 K/UL — LOW (ref 3.8–10.5)
WBC # FLD AUTO: 1.71 K/UL — LOW (ref 3.8–10.5)

## 2025-03-14 PROCEDURE — 99497 ADVNCD CARE PLAN 30 MIN: CPT | Mod: 25

## 2025-03-14 PROCEDURE — 99233 SBSQ HOSP IP/OBS HIGH 50: CPT

## 2025-03-14 PROCEDURE — 99498 ADVNCD CARE PLAN ADDL 30 MIN: CPT | Mod: 25

## 2025-03-14 PROCEDURE — 74230 X-RAY XM SWLNG FUNCJ C+: CPT | Mod: 26

## 2025-03-14 RX ORDER — MELATONIN 5 MG
3 TABLET ORAL AT BEDTIME
Refills: 0 | Status: DISCONTINUED | OUTPATIENT
Start: 2025-03-14 | End: 2025-03-14

## 2025-03-14 RX ADMIN — Medication 2 MILLIGRAM(S): at 17:42

## 2025-03-14 RX ADMIN — ENOXAPARIN SODIUM 40 MILLIGRAM(S): 100 INJECTION SUBCUTANEOUS at 18:20

## 2025-03-14 RX ADMIN — Medication 50 MILLIGRAM(S): at 13:22

## 2025-03-14 RX ADMIN — Medication 1 MILLIGRAM(S): at 13:42

## 2025-03-14 RX ADMIN — Medication 100 MILLIGRAM(S): at 12:42

## 2025-03-14 RX ADMIN — Medication 1 APPLICATION(S): at 07:25

## 2025-03-14 RX ADMIN — MUPIROCIN CALCIUM 1 APPLICATION(S): 20 CREAM TOPICAL at 07:23

## 2025-03-14 RX ADMIN — Medication 1 MILLIGRAM(S): at 12:42

## 2025-03-14 RX ADMIN — MUPIROCIN CALCIUM 1 APPLICATION(S): 20 CREAM TOPICAL at 18:20

## 2025-03-14 RX ADMIN — Medication 2 MILLIGRAM(S): at 16:42

## 2025-03-14 NOTE — PROGRESS NOTE ADULT - PROBLEM SELECTOR PLAN 1
Follows with Dr. Gibson and Dr. Mitchell at [facility] for onc/rad onc  - ultrasound of his neck in mid October 2024 revealing a left cervical 2.4 cm nodule suspicious   - He started concurrent chemo/RT 1/21/25 with weekly cisplatin started 1/24/25  - He has completed 30/35 RT sessions. According to family recent visits with oncologist have been optimistic in regards to his treatment response. 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 He was scheduled to complete his last chemo session

## 2025-03-14 NOTE — PROGRESS NOTE ADULT - ASSESSMENT
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     Active problems  tonsillar ca  vascular dementia   odynophagia/lack of appetite   FTT  Anemia in neoplastic disease  hyponatremia  Vascular dementia  hypercoagulable state    tonsillar ca  odynophagia/lack of appetite   Hyponatremia  FTT  Pt reporting odynophagia and dysphagia w/ regurgitation of food. Dysphagia to solids and liquids.   (s/p C6 of 7 of cisplatin and on RT -should've been completed on 3/7)  Reviewed CT neck, mucosal thickening and enhancement in the larynx and oropharynx may be due to infectious laryngopharyngitis or sequela of previous radiation performed.  Palliative care consulted  Patient does not like viscous lidocaine or liquid gabapentin - will d/c   Pain control with morphine 30 minutes prior to meals   Cineesophagogram completed - rec puree and thin liquid   Empiric treatment of thrush/esophagitis with fluconazole IV  As per daughter - pt with minimal PO intake. Discussed idea/option of PEG tube for nutrition. Given 5 siblings - will need to discuss and all agree. Ongoing discussions.   - Discussed plan of care with Dr. Gibson. Outpatient rad onc team also aware of patient's hospital course.   -Palliative following       Anemia in neoplastic disease  Hb 9.6, otherwise stable, continue to monitor    Vascular dementia  - Pt agitated 3/10 overnight requiring ativan. Mental status improved 3/13. Family reporting continues to have hallucinations but this has been ongoing since diagnosis of dementia.   - Psych consulted. Haldol PRN agitation.   - CTH with chronic microvascular changes.   - Recommend outpatient follow up with neurology.     Starvation Ketosis  - AG 17 - UA with ketones - in the setting of poor PO intake. Improved with administration of D5+NS.     Hypercoagulable state 2/2 malignancy  On lovenox 40mg daily

## 2025-03-14 NOTE — SWALLOW VFSS/MBS ASSESSMENT ADULT - RECOMMENDED CONSISTENCY
Puree and thin liquids 1. Puree and thin liquids  2. Aspiration/reflux precautions  3. Maintain good oral hygiene care  4. Odynophagia management 1. Puree and thin liquids. Advance as tolerated.   2. Aspiration/reflux precautions  3. Maintain good oral hygiene care  4. Odynophagia management

## 2025-03-14 NOTE — PROGRESS NOTE ADULT - SUBJECTIVE AND OBJECTIVE BOX
Date of Service    Indication for Geriatrics and Palliative Care Services:     SUBJECTIVE AND OBJECTIVE:  Patient seen and examined at bedside. Patient being followed up for :     INTERVAL HPI/OVERNIGHT EVENTS:      Allergies    No Known Allergies    Intolerances    MEDICATIONS  (STANDING):  chlorhexidine 2% Cloths 1 Application(s) Topical <User Schedule>  enoxaparin Injectable 40 milliGRAM(s) SubCutaneous every 24 hours  fluconAZOLE IVPB 100 milliGRAM(s) IV Intermittent every 24 hours  fluconAZOLE IVPB      melatonin 3 milliGRAM(s) Oral at bedtime  mupirocin 2% Ointment 1 Application(s) Both Nostrils two times a day  thiamine Injectable 100 milliGRAM(s) IV Push daily    MEDICATIONS  (PRN):  aluminum hydroxide/magnesium hydroxide/simethicone Suspension 30 milliLiter(s) Oral every 4 hours PRN Dyspepsia  haloperidol    Injectable 0.5 milliGRAM(s) IV Push every 6 hours PRN Combative/agitation  morphine  - Injectable 1 milliGRAM(s) IV Push every 4 hours PRN Severe Pain (7 - 10)  ondansetron Injectable 4 milliGRAM(s) IV Push every 8 hours PRN Nausea and/or Vomiting      ITEMS UNCHECKED ARE NOT PRESENT    PRESENT SYMPTOMS: [ ]Unable to self-report due to altered mental status- see [ ] CPOT [ ] PAINADS [ ] RDOS  Source if other than patient:  [ ]Family   [ ]Team     Pain:  [ ]yes [ ]no  QOL impact -   Location -                    Aggravating factors -  Quality -  Radiation -  Timing-  Severity (0-10 scale):  Minimal acceptable level / Pain Goal (0-10 scale):     Dyspnea:                           [ ]Mild [ ]Moderate [ ]Severe    Anxiety:                             [ ]Mild [ ]Moderate [ ]Severe  Agitation:                          [ ]Mild [ ]Moderate [ ]Severe  Fatigue:                             [ ]Mild [ ]Moderate [ ]Severe  Nausea:                             [ ]Mild [ ]Moderate [ ]Severe  Loss of appetite:              [ ]Mild [ ]Moderate [ ]Severe  Constipation:                   [ ]Mild [ ]Moderate [ ]Severe  Diarrhea:                          [ ]Mild [ ]Moderate [ ]Severe  Pruritus:                            [ ]Mild [ ]Moderate [ ]Severe  Depression:                      [ ]Mild [ ]Moderate [ ]Severe    CPOT:    https://www.[email or web address].org/getattachment/[email or web address]/Critical-Care-Pain-Observation-Tool-(CPOT)    PCSSQ[Palliative Care Spiritual Screening Question]   Severity (0-10):  Score of 4 or > indicate consideration of Chaplaincy referral.  Chaplaincy Referral: [ ] yes [ ] refused [ ] following [x ] deferred    Caregiver [place]? : [ ] yes [ ] no [ ] Declined [x ] Deferred              Social work referral [ ] Patient & Family Centered Care Referral [ ]     Anticipatory Grief present?:  [ ] yes [ ] no  [ x] Deferred                  Social work referral [ ] Chaplaincy Referral[ ] Caregiver Support Referral [ ]    Other Symptoms:  [ ]All other review of systems negative   [ ] Unable to obtain due to poor mentation     PHYSICAL EXAM:  Vital Signs Last 24 Hrs  T(C): 37.2 (14 Mar 2025 06:40), Max: 37.2 (14 Mar 2025 06:40)  T(F): 99 (14 Mar 2025 06:40), Max: 99 (14 Mar 2025 06:40)  HR: 76 (14 Mar 2025 06:40) (76 - 92)  BP: 140/87 (14 Mar 2025 06:40) (140/87 - 144/83)  BP(mean): --  RR: 17 (14 Mar 2025 06:40) (17 - 18)  SpO2: 99% (14 Mar 2025 06:40) (97% - 99%)    Parameters below as of 14 Mar 2025 06:40  Patient On (Oxygen Delivery Method): room air         I&O's Summary       GENERAL:  [ ]Alert  [ ]Oriented x   [ ]Lethargic  [ ]Cachexia  [ ]Unarousable  [ ]Verbal  [ ]Non-Verbal  [ ] No Distress  Behavioral:   [ ] Anxiety  [ ] Delirium [ ] Agitation [ ] Calm  [ ] Other  HEENT:  [ ]Normal  [ ] Temporal Wasting  [ ]Dry mouth   [ ]ET Tube/Trach  [ ]Oral lesions  [ ] Mucositis  PULMONARY:   [ ]Clear [ ]Tachypnea  [ ]Audible excessive secretions   [ ]Rhonchi        [ ]Right [ ]Left [ ]Bilateral  [ ]Crackles        [ ]Right [ ]Left [ ]Bilateral  [ ]Wheezing     [ ]Right [ ]Left [ ]Bilateral  [ ]Diminished breath sounds [ ]right [ ]left [ ]bilateral  CARDIOVASCULAR:    [ ]Regular [ ]Irregular [ ]Tachy  [ ]Hua [ ]Murmur [ ]Other  GASTROINTESTINAL:  [ ]Soft  [ ]Distended   [ ]+BS  [ ]Non tender [ ]Tender  [ ]PEG [ ]OGT/ NGT  Last BM:   GENITOURINARY:  [ ]Normal [ ] Incontinent   [ ]Oliguria/Anuria   [ ]Alan  MUSCULOSKELETAL:   [ ]Normal   [ ]Weakness  [ ]Bed/Wheelchair bound [ ]Edema  [  ] amputation  [  ] contraction  NEUROLOGIC:   [ ]No focal deficits  [ ]Cognitive impairment  [ ]Dysphagia [ ]Dysarthria [ ]Paresis [ ]Other   SKIN: See Nursing Skin Assessment for further details  [ ]Normal    [ ]Rash  [ ]Pressure ulcer(s)       Present on admission [ ]y [ ]n   [  ]  Wound    [  ] hyperpigmentation    CRITICAL CARE:  [ ]Shock Present  [ ]Septic [ ]Cardiogenic [ ]Neurologic [ ]Hypovolemic  [ ]Vasopressors [ ]Inotropes  [ ]Respiratory failure present [ ]Mechanical Ventilation [ ]Non-invasive ventilatory support [ ]High-Flow   [ ]Acute  [ ]Chronic [ ]Hypoxic  [ ]Hypercarbic [ ]Other  [ ]Other organ failure     LABS:  reviewed                         8.1    1.71  )-----------( 140      ( 14 Mar 2025 06:37 )             23.5   03-14    141  |  104  |  32[H]  ----------------------------<  88  4.0   |  24  |  1.07    Ca    9.1      14 Mar 2025 06:37  Phos  3.0     03-14  Mg     2.00     03-14    TPro  6.3  /  Alb  3.5  /  TBili  1.0  /  DBili  x   /  AST  13  /  ALT  10  /  AlkPhos  61  03-14    Urinalysis Basic - ( 14 Mar 2025 06:37 )    Color: x / Appearance: x / SG: x / pH: x  Gluc: 88 mg/dL / Ketone: x  / Bili: x / Urobili: x   Blood: x / Protein: x / Nitrite: x   Leuk Esterase: x / RBC: x / WBC x   Sq Epi: x / Non Sq Epi: x / Bacteria: x      CAPILLARY BLOOD GLUCOSE              RADIOLOGY & ADDITIONAL STUDIES: reviewed     Protein Calorie Malnutrition Present: [ ]mild [ ]moderate [ ]severe [ ]underweight [ ]morbid obesity  https://www.andeal.org/vault/1307/web/files/ONC/Table_Clinical%20Characteristics%20to%20Document%20Malnutrition-White%20JV%20et%20al%265298.pdf    Height (cm): 177.8 (03-06-25 @ 15:47), 177.8 (03-05-25 @ 18:20), 175 (02-28-25 @ 15:55)  Weight (kg): 81.3 (03-07-25 @ 05:32), 80.7 (03-05-25 @ 18:20), 80.8 (03-04-25 @ 16:37)  BMI (kg/m2): 25.7 (03-07-25 @ 05:32), 25.5 (03-06-25 @ 15:47), 25.5 (03-05-25 @ 18:20)    [ ]PPSV2 < or = 30%  [ ]significant weight loss [ ]poor nutritional intake [ ]anasarca   [ ]Artificial Nutrition    REFERRALS:   [ ]Chaplaincy  [ ]Hospice  [ ]Child Life  [ ]Social Work  [ ]Case management [ ]Holistic Therapy     Goals of Care Document:     Date of Service    Indication for Geriatrics and Palliative Care Services: Symptom mgmt, GoC    SUBJECTIVE AND OBJECTIVE:  Patient seen and examined at bedside. Patient being followed up for : Odynophagia     Still in significant pain despite 1mg morphine, not meeting nutrition needs    INTERVAL HPI/OVERNIGHT EVENTS:  Had barium swallow- patient with mild oropharyngeal dysphagia, swallowing limited due to pain     [facility] w/out acute changes- MV disease     Allergies    No Known Allergies    Intolerances    MEDICATIONS  (STANDING):  chlorhexidine 2% Cloths 1 Application(s) Topical <User Schedule>  enoxaparin Injectable 40 milliGRAM(s) SubCutaneous every 24 hours  fluconAZOLE IVPB 100 milliGRAM(s) IV Intermittent every 24 hours  fluconAZOLE IVPB      melatonin 3 milliGRAM(s) Oral at bedtime  mupirocin 2% Ointment 1 Application(s) Both Nostrils two times a day  thiamine Injectable 100 milliGRAM(s) IV Push daily    MEDICATIONS  (PRN):  aluminum hydroxide/magnesium hydroxide/simethicone Suspension 30 milliLiter(s) Oral every 4 hours PRN Dyspepsia  haloperidol    Injectable 0.5 milliGRAM(s) IV Push every 6 hours PRN Combative/agitation  morphine  - Injectable 1 milliGRAM(s) IV Push every 4 hours PRN Severe Pain (7 - 10)  ondansetron Injectable 4 milliGRAM(s) IV Push every 8 hours PRN Nausea and/or Vomiting      ITEMS UNCHECKED ARE NOT PRESENT    PRESENT SYMPTOMS: [ ]Unable to self-report due to altered mental status- see [ ] CPOT [ ] PAINADS [ ] RDOS  Source if other than patient:  [ ]Family   [ ]Team     Pain:  [x ]yes [ ]no  QOL impact - great  Location -  throat       Aggravating factors - eating/drinking   Quality - burning  Radiation - down esophagus  Timing- a/w meals   Severity (0-10 scale): 2  Minimal acceptable level / Pain Goal (0-10 scale):       Dyspnea:                           [ ]Mild [ ]Moderate [ ]Severe    Anxiety:                             [ ]Mild [ ]Moderate [ ]Severe  Agitation:                          [ ]Mild [ ]Moderate [ ]Severe  Fatigue:                             [ ]Mild [ ]Moderate [ ]Severe  Nausea:                             [ ]Mild [ ]Moderate [ ]Severe  Loss of appetite:              [ ]Mild [ ]Moderate [ ]Severe  Constipation:                   [ ]Mild [ ]Moderate [ ]Severe  Diarrhea:                          [ ]Mild [ ]Moderate [ ]Severe  Pruritus:                            [ ]Mild [ ]Moderate [ ]Severe  Depression:                      [ ]Mild [ ]Moderate [ ]Severe    CPOT:    https://www.[place].org/getattachment/[email or web address]/Critical-Care-Pain-Observation-Tool-(CPOT)    PCSSQ[Palliative Care Spiritual Screening Question]   Severity (0-10):  Score of 4 or > indicate consideration of Chaplaincy referral.  Chaplaincy Referral: [ ] yes [ ] refused [ ] following [x ] deferred    Caregiver [place]? : [ ] yes [ ] no [ ] Declined [x ] Deferred              Social work referral [ ] Patient & Family Centered Care Referral [ ]     Anticipatory Grief present?:  [ ] yes [ ] no  [ x] Deferred                  Social work referral [ ] Chaplaincy Referral[ ] Caregiver Support Referral [ ]    Other Symptoms:  [ x]All other review of systems negative   [ ] Unable to obtain due to poor mentation     PHYSICAL EXAM:  Vital Signs Last 24 Hrs  T(C): 37.2 (14 Mar 2025 06:40), Max: 37.2 (14 Mar 2025 06:40)  T(F): 99 (14 Mar 2025 06:40), Max: 99 (14 Mar 2025 06:40)  HR: 76 (14 Mar 2025 06:40) (76 - 92)  BP: 140/87 (14 Mar 2025 06:40) (140/87 - 144/83)  BP(mean): --  RR: 17 (14 Mar 2025 06:40) (17 - 18)  SpO2: 99% (14 Mar 2025 06:40) (97% - 99%)    Parameters below as of 14 Mar 2025 06:40  Patient On (Oxygen Delivery Method): room air         I&O's Summary       GENERAL:  [x ]Alert  [x ]Oriented x2   [ ]Lethargic  [ ]Cachexia  [ ]Unarousable  [x ]Verbal  [ ]Non-Verbal  [x ] No Distress  Behavioral:   [ ] Anxiety  [ ] Delirium [ ] Agitation [x ] Calm  [ ] Other  HEENT:  [ ]Normal  [ ] Temporal Wasting  [ x]Dry mouth   [ ]ET Tube/Trach  [x ]Oral lesions  [ x] Mucositis  PULMONARY: Breathing comfortable , no accessory muscle use   [ ]Clear [ ]Tachypnea  [ ]Audible excessive secretions   [ ]Rhonchi        [ ]Right [ ]Left [ ]Bilateral  [ ]Crackles        [ ]Right [ ]Left [ ]Bilateral  [ ]Wheezing     [ ]Right [ ]Left [ ]Bilateral  [ ]Diminished breath sounds [ ]right [ ]left [ ]bilateral  CARDIOVASCULAR:    [x ]Regular [ ]Irregular [ ]Tachy  [ ]Hua [ ]Murmur [ ]Other  GASTROINTESTINAL: Non-distended   [ ]Soft  [ ]Distended   [ ]+BS  [ ]Non tender [ ]Tender  [ ]PEG [ ]OGT/ NGT  Last BM: 3/8  GENITOURINARY:  [x ]Normal [ ] Incontinent   [ ]Oliguria/Anuria   [ ]Alan  MUSCULOSKELETAL:   [ x]Normal   [ ]Weakness  [ ]Bed/Wheelchair bound [ ]Edema  [  ] amputation  [  ] contraction  NEUROLOGIC:   [x ]No focal deficits  [ x]Cognitive impairment  [ ]Dysphagia [ ]Dysarthria [ ]Paresis [ ]Other   SKIN: See Nursing Skin Assessment for further details  [x ]Normal    [ ]Rash  [ ]Pressure ulcer(s)       Present on admission [ ]y [ ]n   [  ]  Wound    [  ] hyperpigmentation    CRITICAL CARE:  [ ]Shock Present  [ ]Septic [ ]Cardiogenic [ ]Neurologic [ ]Hypovolemic  [ ]Vasopressors [ ]Inotropes  [ ]Respiratory failure present [ ]Mechanical Ventilation [ ]Non-invasive ventilatory support [ ]High-Flow   [ ]Acute  [ ]Chronic [ ]Hypoxic  [ ]Hypercarbic [ ]Other  [ ]Other organ failure     LABS:  reviewed                         8.1    1.71  )-----------( 140      ( 14 Mar 2025 06:37 )             23.5   03-14    141  |  104  |  32[H]  ----------------------------<  88  4.0   |  24  |  1.07    Ca    9.1      14 Mar 2025 06:37  Phos  3.0     03-14  Mg     2.00     03-14    TPro  6.3  /  Alb  3.5  /  TBili  1.0  /  DBili  x   /  AST  13  /  ALT  10  /  AlkPhos  61  03-14    Urinalysis Basic - ( 14 Mar 2025 06:37 )    Color: x / Appearance: x / SG: x / pH: x  Gluc: 88 mg/dL / Ketone: x  / Bili: x / Urobili: x   Blood: x / Protein: x / Nitrite: x   Leuk Esterase: x / RBC: x / WBC x   Sq Epi: x / Non Sq Epi: x / Bacteria: x      CAPILLARY BLOOD GLUCOSE        RADIOLOGY & ADDITIONAL STUDIES: reviewed     Protein Calorie Malnutrition Present: [ ]mild [ ]moderate [ ]severe [ ]underweight [ ]morbid obesity  https://www.andeal.org/vault/2440/web/files/ONC/Table_Clinical%20Characteristics%20to%20Document%20Malnutrition-White%20JV%20et%20al%202012.pdf    Height (cm): 177.8 (03-06-25 @ 15:47), 177.8 (03-05-25 @ 18:20), 175 (02-28-25 @ 15:55)  Weight (kg): 81.3 (03-07-25 @ 05:32), 80.7 (03-05-25 @ 18:20), 80.8 (03-04-25 @ 16:37)  BMI (kg/m2): 25.7 (03-07-25 @ 05:32), 25.5 (03-06-25 @ 15:47), 25.5 (03-05-25 @ 18:20)    [ ]PPSV2 < or = 30%  [x ]significant weight loss [x ]poor nutritional intake [ ]anasarca   [ ]Artificial Nutrition    REFERRALS:   [ ]Chaplaincy  [ ]Hospice  [ ]Child Life  [ x]Social Work  [ x]Case management [ ]Holistic Therapy     Goals of Care Document:

## 2025-03-14 NOTE — SWALLOW VFSS/MBS ASSESSMENT ADULT - ADDITIONAL RECOMMENDATIONS
1. This service to follow as schedule permits to assess for PO tolerance/advancement as patient becomes medically optimized. 2. Medical team advised to reconsult if change in medical status. 1. This service to follow as schedule permits to assess for PO tolerance/advancement as patient becomes medically optimized. 2. Medical team advised to reconsult if change in medical status. 3. Patient was advised to follow up with outpatient for dysphagia therapy/further dysphagia management.

## 2025-03-14 NOTE — PROGRESS NOTE ADULT - PROBLEM SELECTOR PLAN 3
- CT neck - Mucosal thickening and enhancement in the larynx and oropharynx may be due to infectious laryngopharyngitis or sequela of previous radiation performed.  - c/w IV morphine- decreased to 1 mg PRN q4hrs for odynophagia, please give 30 min prior to meals. Patient having some improvement, and tolerating small sips.       ---Unable to assess if morphine was helpful on 3/11-12 given his level of sedation post MIKE. Patient tolerated some po intake post morphine yesterday morning. Instructed family to attempt feeds twice more today pre-medicated with morphine. Instructed them to keep log of pain and sedation level pre & post morphine. Printed scale was provided. Will review tomorrow and potentially make dose adjustments tomorrow  - c/w IV fluconazole - CT neck - Mucosal thickening and enhancement in the larynx and oropharynx may be due to infectious laryngopharyngitis or sequela of previous radiation performed.  - Increase morphine back to 2mg IV q6hr prn for odynophagia 30 min prior to meals   - c/w IV fluconazole

## 2025-03-14 NOTE — SWALLOW VFSS/MBS ASSESSMENT ADULT - DIAGNOSTIC IMPRESSIONS
1. Functional oral stage characterized by initial bolus holding (likely secondary to odynophagia), adequate oral grading, adequate spoon stripping, adequate labial closure, adequate anterior-posterior transport, and timely bolus transfer. 2. Mild pharyngeal dysphagia characterized by timely swallow trigger, adequate epiglottic deflection, adequate pharyngeal contractility, mild pharyngeal residue with thin liquids, and mildly reduced hyolaryngeal excursion. Patient presents with functional oral stage and functional pharyngeal stage swallowing mechanism. The oral stage is characterized by adequate oral containment, slow bolus manipulation with slow/delayed tongue motion resulting from volitional oral holding of the bolus with hesitation for anterior to posterior transfer of the bolus for puree greater than thin liquids, likely exacerbated by odynophagia stage (8 out of 10 as per patient report), with adequate oral clearance post swallow. The pharyngeal stage is characterized by adequate initiation of the pharyngeal swallow, adequate laryngeal elevation with adequate laryngeal vestibular closure, adequate tongue base retraction with adequate epiglottic deflection and adequate pharyngeal constriction. There is trace coating of residue along posterior base of tongue/vallecular post swallow for thin liquids. There was trace laryngeal penetration during the swallow for thin liquids with retrieval and airway protection maintained. There was No aspiration observed before, during, or after the swallow for puree and thin liquids.

## 2025-03-14 NOTE — PROGRESS NOTE ADULT - TIME BILLING
Review of laboratory data, radiology results, consultants' recommendations, documentation in [place], discussion with patient/advanced care providers and interdisciplinary staff (such as , social workers, etc). Interventions were performed as documented above.

## 2025-03-14 NOTE — SWALLOW VFSS/MBS ASSESSMENT ADULT - ORAL PHASE
initial bolus holding, likely secondary to odynophagia/within functional limits initial bolus holding, likely secondary to odynophagia/Delayed oral transit time initial bolus holding, likely secondary to odynophagia

## 2025-03-14 NOTE — PROGRESS NOTE ADULT - PROBLEM SELECTOR PLAN 4
Nutrition recommendations appreciated Nutrition recommendations appreciated  Family leaning towards PEG

## 2025-03-14 NOTE — SWALLOW VFSS/MBS ASSESSMENT ADULT - COMMENTS
ENT 3/7 Note: " 74 year old Male PHMx of Right hip OA, ventral hernia, vascular dementia, stage 4 tonsil cancer (T2 N2, p16 negative, concurrent chemo/RT 1/21/25, Started first weekly cisplatin 1/24/25, last Tx on 2/28, was to complete 7cycles of Cisplatin on 3/7) who presented to the emergency department for failure to thrive, dehydration and pain management. The patient is currently undergoing chemo-radiation treatment at [facility] and is on day 29 of 35 radiation/ chemo treatments. He has been experiencing difficulty eating and drinking due to pain when swallowing, resulting in poor oral intake for more than 10 days. ENT consulted for dysphagia and airway evaluation. Patient seen and examined at bedside with daughter. Reports feeling tired and weak in general. Denies fever, chills and SOB."    Patient received in radiology suite, transferred to specialized seating unit and positioned upright for lateral projection. Patient awake, alert, oriented. Patient reports odynophagia, pain scale: 8. ENT 3/7 Note: " 74 year old Male PHMx of Right hip OA, ventral hernia, vascular dementia, stage 4 tonsil cancer (T2 N2, p16 negative, concurrent chemo/RT 1/21/25, Started first weekly cisplatin 1/24/25, last Tx on 2/28, was to complete 7cycles of Cisplatin on 3/7) who presented to the emergency department for failure to thrive, dehydration and pain management. The patient is currently undergoing chemo-radiation treatment at [facility] and is on day 29 of 35 radiation/ chemo treatments. He has been experiencing difficulty eating and drinking due to pain when swallowing, resulting in poor oral intake for more than 10 days. ENT consulted for dysphagia and airway evaluation. Patient seen and examined at bedside with daughter. Reports feeling tired and weak in general. Denies fever, chills and SOB."    Patient received in radiology suite, transferred to specialized seating unit and positioned upright for lateral projection. Patient awake, alert, oriented. Patient reports odynophagia, pain scale: 8. Of note, patient known to this service for previous bedside swallow evaluation on March 8th, patient refused PO trials secondary to odynophagia. Patient further known to [facility] outpatient speech/hearing center for dysphagia therapy. Patient was advised to follow up with outpatient for dysphagia therapy/further dysphagia management. ENT 3/7 Note: " 74 year old Male PHMx of Right hip OA, ventral hernia, vascular dementia, stage 4 tonsil cancer (T2 N2, p16 negative, concurrent chemo/RT 1/21/25, Started first weekly cisplatin 1/24/25, last Tx on 2/28, was to complete 7cycles of Cisplatin on 3/7) who presented to the emergency department for failure to thrive, dehydration and pain management. The patient is currently undergoing chemo-radiation treatment at [facility] and is on day 29 of 35 radiation/ chemo treatments. He has been experiencing difficulty eating and drinking due to pain when swallowing, resulting in poor oral intake for more than 10 days. ENT consulted for dysphagia and airway evaluation. Patient seen and examined at bedside with daughter. Reports feeling tired and weak in general. Denies fever, chills and SOB."    Of note 1: patient known to this service for previous bedside swallow evaluation on March 8th, patient refused PO trials secondary to odynophagia.     Of note 2: Patient further known to [facility] outpatient speech/hearing center for dysphagia therapy. Patient last seen on Feb 11th.     Patient received in radiology suite, transferred to specialized seating unit and positioned upright for lateral projection. Patient awake, alert, oriented. Patient reports odynophagia, pain scale: 8. Medical team made aware of pain level to follow up with pain management.

## 2025-03-14 NOTE — PROGRESS NOTE ADULT - SUBJECTIVE AND OBJECTIVE BOX
Hospitalist Progress Note  Laurel Payan MD Pager # [phone]    OVERNIGHT EVENTS: CHELE    SUBJECTIVE / INTERVAL HPI: Patient seen and examined at bedside. Patient is sleeping. Daughter at bedside reporting that he has not had much of anything to eat since yesterday. He barely takes any food. He has remained calm.     VITAL SIGNS:  Vital Signs Last 24 Hrs  T(C): 37.2 (14 Mar 2025 06:40), Max: 37.2 (14 Mar 2025 06:40)  T(F): 99 (14 Mar 2025 06:40), Max: 99 (14 Mar 2025 06:40)  HR: 76 (14 Mar 2025 06:40) (76 - 92)  BP: 140/87 (14 Mar 2025 06:40) (140/87 - 144/83)  BP(mean): --  RR: 17 (14 Mar 2025 06:40) (17 - 18)  SpO2: 99% (14 Mar 2025 06:40) (97% - 99%)    Parameters below as of 14 Mar 2025 06:40  Patient On (Oxygen Delivery Method): room air        PHYSICAL EXAM:    General: Comfortable. Sleeping. Woke up on arousal.   HEENT: NC/AT; PERRL, anicteric sclera; MMM  Neck: supple  Cardiovascular: +S1/S2; RRR  Respiratory: CTA B/L; no W/R/R  Gastrointestinal: soft, NT/ND; +BSx4  Extremities: WWP; no edema, clubbing or cyanosis  Vascular: 2+ radial, DP/PT pulses B/L  Neurological: AAOx2-3; no focal deficits - ambulating     MEDICATIONS:  MEDICATIONS  (STANDING):  chlorhexidine 2% Cloths 1 Application(s) Topical <User Schedule>  enoxaparin Injectable 40 milliGRAM(s) SubCutaneous every 24 hours  fluconAZOLE IVPB 100 milliGRAM(s) IV Intermittent every 24 hours  fluconAZOLE IVPB      melatonin 3 milliGRAM(s) Oral at bedtime  mupirocin 2% Ointment 1 Application(s) Both Nostrils two times a day  thiamine Injectable 100 milliGRAM(s) IV Push daily    MEDICATIONS  (PRN):  aluminum hydroxide/magnesium hydroxide/simethicone Suspension 30 milliLiter(s) Oral every 4 hours PRN Dyspepsia  haloperidol    Injectable 0.5 milliGRAM(s) IV Push every 6 hours PRN Combative/agitation  morphine  - Injectable 1 milliGRAM(s) IV Push every 4 hours PRN Severe Pain (7 - 10)  ondansetron Injectable 4 milliGRAM(s) IV Push every 8 hours PRN Nausea and/or Vomiting      ALLERGIES:  Allergies    No Known Allergies    Intolerances        LABS:                        8.1    1.71  )-----------( 140      ( 14 Mar 2025 06:37 )             23.5     03-14    141  |  104  |  32[H]  ----------------------------<  88  4.0   |  24  |  1.07    Ca    9.1      14 Mar 2025 06:37  Phos  3.0     03-14  Mg     2.00     03-14    TPro  6.3  /  Alb  3.5  /  TBili  1.0  /  DBili  x   /  AST  13  /  ALT  10  /  AlkPhos  61  03-14      Urinalysis Basic - ( 14 Mar 2025 06:37 )    Color: x / Appearance: x / SG: x / pH: x  Gluc: 88 mg/dL / Ketone: x  / Bili: x / Urobili: x   Blood: x / Protein: x / Nitrite: x   Leuk Esterase: x / RBC: x / WBC x   Sq Epi: x / Non Sq Epi: x / Bacteria: x      CAPILLARY BLOOD GLUCOSE          RADIOLOGY & ADDITIONAL TESTS: Reviewed.    ASSESSMENT:    PLAN:

## 2025-03-14 NOTE — PROGRESS NOTE ADULT - PROBLEM SELECTOR PLAN 5
- HCP form signed and placed in chart, Ellen (Daughter) is his HCP  - Attempted to bring up MOLST form and Code status- family not amenable to discussing at this time,, no conversations/decisions on code status to date, patient remains full code at this time  - Per family they would not want anything invasive such as Nasogastric or PEG tube for artificial nutrition as it could agitate patient - HCP form signed and placed in chart, Ellen (Daughter) is his HCP  - Attempted to bring up MOLST form and Code status- family not amenable to discussing at this time,, no conversations/decisions on code status to date, patient remains full code at this time  - Family discussing LT feeding tube, see [facility] note from 3/14

## 2025-03-14 NOTE — PROGRESS NOTE ADULT - PROBLEM SELECTOR PLAN 6
For pain management     In the event of worsening symptoms, please contact the Palliative Medicine team via pager (if the patient is at [facility] #[phone] or if the patient is at [facility] #[phone]) The Geriatric and Palliative Medicine service has coverage 24 hours a day/ 7 days a week to provide medical recommendations regarding symptom management needs via telephone.

## 2025-03-14 NOTE — PROGRESS NOTE ADULT - CONVERSATION DETAILS
Discussed findings of the barium esophogram with patients daughters Ellen and Joselin. Discussed how we have yet to achieve a level of pain control that allows him to meet his nutritional demands. Explained the role of artificial feeding tube for patients with head and neck cancer. Explained that we often use feeding tubes as a bridge to recovery in hopes that they may be able to resume oral feeding in future. However, given his dementia status it is hard to know how much his lack of intake is from side effects of his cancer treatment vs underlying dementia and therefore hard to predict if he would ever be able to come off the feeding tube. Risks and complications of LT feeding tubes also discussed. Explained that if decided that feeding tube was not the best option, hospice could be a consideration. Family at this time would all like to meet and discuss ultimate plan. Palliative will follow up on Monday about ultimate decision. Discussed findings of the barium esophogram with patients daughters Ellen and Joselin. Patient has been barely eating, and not meeting his nutritional needs. Discussed MOLST, artifical nutrition and hydration. Explained the role of artificial feeding tube for patients with head and neck cancer. Explained that we often use feeding tubes as a bridge to recovery in hopes that they may be able to resume oral feeding in future. However, given his dementia status it is hard to know how much his lack of intake is from side effects of his cancer treatment vs underlying dementia and therefore hard to predict if he would ever be able to come off the feeding tube. Risks and complications of LT feeding tubes also discussed. Explained that if decided that feeding tube was not the best option, hospice could be a consideration. Daughter Joselin feels strongly for PEG, as she does not want her father "to starve." Daughter Ellen agreeing, but they will discuss with the other three siblings first before making decision.

## 2025-03-14 NOTE — SWALLOW VFSS/MBS ASSESSMENT ADULT - PHARYNGEAL PHASE COMMENTS
Timely swallow trigger, adequate epiglottic deflection, adequate pharyngeal contractility, mildly reduced hyolaryngeal excursion. Trace-mild pharyngeal residue, patient benefitted from secondary swallow. Timely swallow trigger, adequate pharyngeal contractility, adequate pharyngeal clearance, adequate epiglottic deflection, mildly reduced hyolaryngeal excursion Timely swallow trigger, adequate base of tongue retraction, adequate epiglottic deflection, adequate pharyngeal contractility, adequate hyolaryngeal excursion and vestibular closure. Trace coating of residue on posterior base of tongue/vallecula post swallow, patient benefitted from secondary swallow. Timely swallow trigger, adequate base of tongue retraction, adequate epiglottic deflection, adequate pharyngeal contractility, adequate pharyngeal clearance, adequate epiglottic deflection, adequate hyolaryngeal excursion and vestibular closure.

## 2025-03-15 LAB
ALBUMIN SERPL ELPH-MCNC: 3.7 G/DL — SIGNIFICANT CHANGE UP (ref 3.3–5)
ALP SERPL-CCNC: 66 U/L — SIGNIFICANT CHANGE UP (ref 40–120)
ALT FLD-CCNC: 9 U/L — SIGNIFICANT CHANGE UP (ref 4–41)
ANION GAP SERPL CALC-SCNC: 13 MMOL/L — SIGNIFICANT CHANGE UP (ref 7–14)
AST SERPL-CCNC: 12 U/L — SIGNIFICANT CHANGE UP (ref 4–40)
BASOPHILS # BLD AUTO: 0.01 K/UL — SIGNIFICANT CHANGE UP (ref 0–0.2)
BASOPHILS NFR BLD AUTO: 0.5 % — SIGNIFICANT CHANGE UP (ref 0–2)
BILIRUB SERPL-MCNC: 0.9 MG/DL — SIGNIFICANT CHANGE UP (ref 0.2–1.2)
BUN SERPL-MCNC: 38 MG/DL — HIGH (ref 7–23)
CALCIUM SERPL-MCNC: 9 MG/DL — SIGNIFICANT CHANGE UP (ref 8.4–10.5)
CHLORIDE SERPL-SCNC: 104 MMOL/L — SIGNIFICANT CHANGE UP (ref 98–107)
CO2 SERPL-SCNC: 24 MMOL/L — SIGNIFICANT CHANGE UP (ref 22–31)
CREAT SERPL-MCNC: 1.15 MG/DL — SIGNIFICANT CHANGE UP (ref 0.5–1.3)
EGFR: 66 ML/MIN/1.73M2 — SIGNIFICANT CHANGE UP
EGFR: 66 ML/MIN/1.73M2 — SIGNIFICANT CHANGE UP
EOSINOPHIL # BLD AUTO: 0.01 K/UL — SIGNIFICANT CHANGE UP (ref 0–0.5)
EOSINOPHIL NFR BLD AUTO: 0.5 % — SIGNIFICANT CHANGE UP (ref 0–6)
GLUCOSE SERPL-MCNC: 80 MG/DL — SIGNIFICANT CHANGE UP (ref 70–99)
HCT VFR BLD CALC: 22.3 % — LOW (ref 39–50)
HGB BLD-MCNC: 7.8 G/DL — LOW (ref 13–17)
IANC: 1.09 K/UL — LOW (ref 1.8–7.4)
IMM GRANULOCYTES NFR BLD AUTO: 0.5 % — SIGNIFICANT CHANGE UP (ref 0–0.9)
LYMPHOCYTES # BLD AUTO: 0.35 K/UL — LOW (ref 1–3.3)
LYMPHOCYTES # BLD AUTO: 19.1 % — SIGNIFICANT CHANGE UP (ref 13–44)
MAGNESIUM SERPL-MCNC: 2 MG/DL — SIGNIFICANT CHANGE UP (ref 1.6–2.6)
MCHC RBC-ENTMCNC: 31.3 PG — SIGNIFICANT CHANGE UP (ref 27–34)
MCHC RBC-ENTMCNC: 35 G/DL — SIGNIFICANT CHANGE UP (ref 32–36)
MCV RBC AUTO: 89.6 FL — SIGNIFICANT CHANGE UP (ref 80–100)
MONOCYTES # BLD AUTO: 0.36 K/UL — SIGNIFICANT CHANGE UP (ref 0–0.9)
MONOCYTES NFR BLD AUTO: 19.7 % — HIGH (ref 2–14)
NEUTROPHILS # BLD AUTO: 1.09 K/UL — LOW (ref 1.8–7.4)
NEUTROPHILS NFR BLD AUTO: 59.7 % — SIGNIFICANT CHANGE UP (ref 43–77)
NRBC # BLD AUTO: 0 K/UL — SIGNIFICANT CHANGE UP (ref 0–0)
NRBC # FLD: 0 K/UL — SIGNIFICANT CHANGE UP (ref 0–0)
NRBC BLD AUTO-RTO: 0 /100 WBCS — SIGNIFICANT CHANGE UP (ref 0–0)
PHOSPHATE SERPL-MCNC: 3.3 MG/DL — SIGNIFICANT CHANGE UP (ref 2.5–4.5)
PLATELET # BLD AUTO: 146 K/UL — LOW (ref 150–400)
POTASSIUM SERPL-MCNC: 4.1 MMOL/L — SIGNIFICANT CHANGE UP (ref 3.5–5.3)
POTASSIUM SERPL-SCNC: 4.1 MMOL/L — SIGNIFICANT CHANGE UP (ref 3.5–5.3)
PROT SERPL-MCNC: 6.5 G/DL — SIGNIFICANT CHANGE UP (ref 6–8.3)
RBC # BLD: 2.49 M/UL — LOW (ref 4.2–5.8)
RBC # FLD: 16.5 % — HIGH (ref 10.3–14.5)
SODIUM SERPL-SCNC: 141 MMOL/L — SIGNIFICANT CHANGE UP (ref 135–145)
WBC # BLD: 1.83 K/UL — LOW (ref 3.8–10.5)
WBC # FLD AUTO: 1.83 K/UL — LOW (ref 3.8–10.5)

## 2025-03-15 PROCEDURE — 99233 SBSQ HOSP IP/OBS HIGH 50: CPT

## 2025-03-15 PROCEDURE — 99222 1ST HOSP IP/OBS MODERATE 55: CPT | Mod: GC

## 2025-03-15 RX ADMIN — Medication 1 APPLICATION(S): at 06:09

## 2025-03-15 RX ADMIN — Medication 100 MILLIGRAM(S): at 12:02

## 2025-03-15 RX ADMIN — Medication 50 MILLIGRAM(S): at 12:46

## 2025-03-15 RX ADMIN — ENOXAPARIN SODIUM 40 MILLIGRAM(S): 100 INJECTION SUBCUTANEOUS at 17:37

## 2025-03-15 RX ADMIN — MUPIROCIN CALCIUM 1 APPLICATION(S): 20 CREAM TOPICAL at 06:07

## 2025-03-15 RX ADMIN — MUPIROCIN CALCIUM 1 APPLICATION(S): 20 CREAM TOPICAL at 17:38

## 2025-03-15 NOTE — PROGRESS NOTE ADULT - ASSESSMENT
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     Active problems  tonsillar ca  vascular dementia   odynophagia/lack of appetite   FTT  Anemia in neoplastic disease  hyponatremia  Vascular dementia  hypercoagulable state    tonsillar ca  odynophagia/lack of appetite   Hyponatremia  FTT  Pt reporting odynophagia and dysphagia w/ regurgitation of food. Dysphagia to solids and liquids.   (s/p C6 of 7 of cisplatin and on RT -should've been completed on 3/7)  Reviewed CT neck, mucosal thickening and enhancement in the larynx and oropharynx may be due to infectious laryngopharyngitis or sequela of previous radiation performed.  Palliative care consulted  Patient does not like viscous lidocaine or liquid gabapentin - will d/c   Pain control with morphine 30 minutes prior to meals   Cineesophagogram completed - rec puree and thin liquid   Empiric treatment of thrush/esophagitis with fluconazole IV  As per daughter - pt with minimal PO intake. Discussed idea/option of PEG tube for nutrition. Given 5 siblings - will need to discuss and all agree. Ongoing discussions.   - Discussed plan of care with Dr. Gibson. Outpatient rad onc team also aware of patient's hospital course.   -Palliative following       Anemia in neoplastic disease  Hb 9.6, otherwise stable, continue to monitor    Vascular dementia  - Pt agitated 3/10 overnight requiring ativan. Mental status improved 3/13. Family reporting continues to have hallucinations but this has been ongoing since diagnosis of dementia.   - Psych consulted. Haldol PRN agitation.   - CTH with chronic microvascular changes.   - Recommend outpatient follow up with neurology.     Starvation Ketosis - Resolved  - AG 17 - UA with ketones - in the setting of poor PO intake. Improved with administration of D5+NS.     Hypercoagulable state 2/2 malignancy  On lovenox 40mg daily

## 2025-03-15 NOTE — CONSULT NOTE ADULT - ASSESSMENT
Patient is a 74 yo M w/ PMHx of tonsilar SCC (T2 N2, p16 negative, concurrent chemo/RT 1/21/25, started first weekly cisplatin 1/24/25, last Tx on 2/28, was to complete 7cycles of Cisplatin on 3/7; completed 30/35 RT sessions), vascular dementia, and ventral hernia repair (50 years ago) presenting w/ odynophagia and FTT. GI consulted for PEG considerations.     #Odynophagia iso tonsilar SCC s/p chemoRT  #PEG evaluation  Patient unable to tolerate PO due to odynophagia likely 2/2 side effects of RT. Patient on empiric tx for candida esophagitis given odynophagia. The patient/family were informed about the risks associated with PEG tube placement, including infection, bleeding, peritonitis, buried bumper syndrome, and potential death. It was clarified that PEG placement does not increase the likelihood of aspiration events or impact overall mortality. Patient and family would like to further discuss amongst themselves at this time.     Recommendations:  - family and patient to discuss PEG; awaiting decision from family and patient  - c/w GOC discussion with family  - please obtain RD consult to optimize PO nutrition - may benefit from Ensure or other supplements to maximize calorie intake  - pain regimen per primary team    All recs are preliminary pending attending attestation.    Brian Beverly, PGY-4  Gastroenterology & Hepatology Fellow  Available on TEAMS  Long range pager #: [phone]  Short range pager#: [phone]    For non-urgent consults, please send email to [email or web address] (for [facility]) or [email or web address] (for [facility])   Patient is a 76 yo M w/ PMHx of tonsilar SCC (T2 N2, p16 negative, concurrent chemo/RT 1/21/25, started first weekly cisplatin 1/24/25, last Tx on 2/28, was to complete 7cycles of Cisplatin on 3/7; completed 30/35 RT sessions), vascular dementia, and ventral hernia repair (50 years ago) presenting w/ odynophagia and FTT. GI consulted for PEG considerations.     #Odynophagia iso tonsilar SCC s/p chemoRT  #PEG evaluation  Patient unable to tolerate PO due to odynophagia likely 2/2 side effects of RT. Patient on empiric tx for candida esophagitis given odynophagia. Given patient with active tonsilar cancer, contraindicated for pull PEG given risk of seeding. Will need IR eval for G-tube placement if patient amenable to feeding tube.      Recommendations:  - consult IR for G-tube placement if within wishes of patient and family  - c/w GOC discussion with family  - please obtain RD consult to optimize PO nutrition - may benefit from Ensure or other supplements to maximize calorie intake  - pain regimen per primary team  - GI will sign off at this time, however please call back with questions or should any worsening/persistent issues arise.      All recs are preliminary pending attending attestation.    Brian Beverly, PGY-4  Gastroenterology & Hepatology Fellow  Available on TEAMS  Long range pager #: [phone]  Short range pager#: [phone]    For non-urgent consults, please send email to [email or web address] (for NS) or [email or web address] (for [facility])   Patient is a 74 yo M w/ PMHx of tonsilar SCC (T2 N2, p16 negative, concurrent chemo/RT 1/21/25, started first weekly cisplatin 1/24/25, last Tx on 2/28, was to complete 7cycles of Cisplatin on 3/7; completed 30/35 RT sessions), vascular dementia, and ventral hernia repair (50 years ago) presenting w/ odynophagia and FTT. GI consulted for PEG considerations.     #Odynophagia iso tonsilar SCC s/p chemoRT  #PEG evaluation  Patient unable to tolerate PO due to odynophagia likely 2/2 side effects of RT. Patient on empiric tx for candida esophagitis given odynophagia. Given patient with active tonsilar cancer, contraindicated for pull PEG given risk of seeding. Will need IR eval for G-tube placement if patient amenable to feeding tube.      Recommendations:  - consult IR for G-tube placement if within wishes of patient and family to avoid seeding of the tract  - c/w GOC discussion with family  - please obtain RD consult to optimize PO nutrition - may benefit from Ensure or other supplements to maximize calorie intake  - pain regimen per primary team  - GI will sign off at this time, however please call back with questions or should any worsening/persistent issues arise.      All recs are preliminary pending attending attestation.    Brian Beverly, PGY-4  Gastroenterology & Hepatology Fellow  Available on TEAMS  Long range pager #: [phone]  Short range pager#: [phone]    For non-urgent consults, please send email to [email or web address] (for [facility]) or [email or web address] (for [facility])

## 2025-03-15 NOTE — PROGRESS NOTE ADULT - TIME BILLING
Review of laboratory data, radiology results, consultants' recommendations, documentation in [place], discussion with patient/advanced care providers and interdisciplinary staff (such as , social workers, etc). Interventions were performed as documented above.

## 2025-03-15 NOTE — PROGRESS NOTE ADULT - SUBJECTIVE AND OBJECTIVE BOX
Hospitalist Progress Note  Laurel Payan MD Pager # [phone]    OVERNIGHT EVENTS: CHELE    SUBJECTIVE / INTERVAL HPI: Patient seen and examined at bedside. Patient reports no oral pain today but he says his brain is causing him to have difficulty with eating. Son reports he has not eaten yet today but he just woke up. No other complaints. He slept well through the night.     VITAL SIGNS:  Vital Signs Last 24 Hrs  T(C): 36.9 (15 Mar 2025 12:42), Max: 36.9 (15 Mar 2025 12:42)  T(F): 98.4 (15 Mar 2025 12:42), Max: 98.4 (15 Mar 2025 12:42)  HR: 70 (15 Mar 2025 12:42) (62 - 82)  BP: 138/76 (15 Mar 2025 12:42) (126/60 - 146/70)  BP(mean): --  RR: 18 (15 Mar 2025 12:42) (16 - 18)  SpO2: 100% (15 Mar 2025 12:42) (98% - 100%)    Parameters below as of 15 Mar 2025 12:42  Patient On (Oxygen Delivery Method): room air        PHYSICAL EXAM:    General: Comfortable  HEENT: NC/AT; PERRL, anicteric sclera; MMM  Neck: supple  Cardiovascular: +S1/S2; RRR  Respiratory: CTA B/L; no W/R/R  Gastrointestinal: soft, NT/ND; +BSx4  Extremities: WWP; no edema, clubbing or cyanosis  Vascular: 2+ radial, DP/PT pulses B/L  Neurological: AAOx2-3; no focal deficits    MEDICATIONS:  MEDICATIONS  (STANDING):  chlorhexidine 2% Cloths 1 Application(s) Topical <User Schedule>  enoxaparin Injectable 40 milliGRAM(s) SubCutaneous every 24 hours  fluconAZOLE IVPB 100 milliGRAM(s) IV Intermittent every 24 hours  fluconAZOLE IVPB      mupirocin 2% Ointment 1 Application(s) Both Nostrils two times a day    MEDICATIONS  (PRN):  aluminum hydroxide/magnesium hydroxide/simethicone Suspension 30 milliLiter(s) Oral every 4 hours PRN Dyspepsia  haloperidol    Injectable 0.5 milliGRAM(s) IV Push every 6 hours PRN Combative/agitation  morphine  - Injectable 2 milliGRAM(s) IV Push every 4 hours PRN Severe Pain (7 - 10)  ondansetron Injectable 4 milliGRAM(s) IV Push every 8 hours PRN Nausea and/or Vomiting      ALLERGIES:  Allergies    No Known Allergies    Intolerances        LABS:                        7.8    1.83  )-----------( 146      ( 15 Mar 2025 06:04 )             22.3     03-15    141  |  104  |  38[H]  ----------------------------<  80  4.1   |  24  |  1.15    Ca    9.0      15 Mar 2025 06:04  Phos  3.3     03-15  Mg     2.00     03-15    TPro  6.5  /  Alb  3.7  /  TBili  0.9  /  DBili  x   /  AST  12  /  ALT  9   /  AlkPhos  66  03-15      Urinalysis Basic - ( 15 Mar 2025 06:04 )    Color: x / Appearance: x / SG: x / pH: x  Gluc: 80 mg/dL / Ketone: x  / Bili: x / Urobili: x   Blood: x / Protein: x / Nitrite: x   Leuk Esterase: x / RBC: x / WBC x   Sq Epi: x / Non Sq Epi: x / Bacteria: x      CAPILLARY BLOOD GLUCOSE          RADIOLOGY & ADDITIONAL TESTS: Reviewed.    ASSESSMENT:    PLAN:

## 2025-03-15 NOTE — CONSULT NOTE ADULT - SUBJECTIVE AND OBJECTIVE BOX
Initial GI Consult    Patient is a 75y old male who presents with a chief complaint of odynophagia with decreased PO intake.     HPI:    Patient is a 76 yo M w/ PMHx of tonsilar SCC (T2 N2, p16 negative, concurrent chemo/RT 1/21/25, started first weekly cisplatin 1/24/25, last Tx on 2/28, was to complete 7cycles of Cisplatin on 3/7; completed 30/35 RT sessions), vascular dementia, and ventral hernia repair (50 years ago) presenting w/ odynophagia and FTT. GI consulted for PEG considerations.     Patient reports that he has not been able to eat properly for the past several weeks due to odynophagia that was worsened by RT. He reports that ice water is okay but everything else hurts. During this hospital course, he was trialed on topical lidocaine, magic mouthwash, and started on fluconazole (starting 3/12) for empiric tx of candida esophagitis. He is also on IV morphine. Patient still not able to tolerate PO due to the pain. Family and patient initially against NGT and/or PEG but now family is open to it but patient still feels hesitant. Seen by S&S on 3/14 for MBS and noted to have trace laryngeal penetration and recommend puree and thin liquids.     PAST MEDICAL & SURGICAL HISTORY:  Ventral hernia      Acid reflux      Osteoarthritis of right hip      Tonsil cancer      History of bilateral inguinal hernia repair  1974      S/P carpal tunnel release  right ; 1993        FAMILY HISTORY:  No pertinent family history in first degree relatives        MEDS:  MEDICATIONS  (STANDING):  chlorhexidine 2% Cloths 1 Application(s) Topical <User Schedule>  enoxaparin Injectable 40 milliGRAM(s) SubCutaneous every 24 hours  fluconAZOLE IVPB 100 milliGRAM(s) IV Intermittent every 24 hours  fluconAZOLE IVPB      mupirocin 2% Ointment 1 Application(s) Both Nostrils two times a day    MEDICATIONS  (PRN):  aluminum hydroxide/magnesium hydroxide/simethicone Suspension 30 milliLiter(s) Oral every 4 hours PRN Dyspepsia  haloperidol    Injectable 0.5 milliGRAM(s) IV Push every 6 hours PRN Combative/agitation  morphine  - Injectable 2 milliGRAM(s) IV Push every 4 hours PRN Severe Pain (7 - 10)  ondansetron Injectable 4 milliGRAM(s) IV Push every 8 hours PRN Nausea and/or Vomiting    Allergies    No Known Allergies    Intolerances      ______________________________________________________________________  PHYSICAL EXAM:  T(C): 36.8 (03-15-25 @ 20:23), Max: 36.9 (03-15-25 @ 12:42)  HR: 86 (03-15-25 @ 20:23)  BP: 123/80 (03-15-25 @ 20:23)  RR: 18 (03-15-25 @ 20:23)  SpO2: 100% (03-15-25 @ 20:23)  Wt(kg): --      GEN: NAD, normocephalic  CVS: S1S2+  CHEST: clear to auscultation  ABD: soft , nontender, nondistended, bowel sounds present  EXTR: no cyanosis, no clubbing, no edema  NEURO: A&OX3  SKIN:  warm;  non icteric    ______________________________________________________________________  LABS:                        7.8    1.83  )-----------( 146      ( 15 Mar 2025 06:04 )             22.3     03-15    141  |  104  |  38[H]  ----------------------------<  80  4.1   |  24  |  1.15    Ca    9.0      15 Mar 2025 06:04  Phos  3.3     03-15  Mg     2.00     03-15    TPro  6.5  /  Alb  3.7  /  TBili  0.9  /  DBili  x   /  AST  12  /  ALT  9   /  AlkPhos  66  03-15    LIVER FUNCTIONS - ( 15 Mar 2025 06:04 )  Alb: 3.7 g/dL / Pro: 6.5 g/dL / ALK PHOS: 66 U/L / ALT: 9 U/L / AST: 12 U/L / GGT: x             ____________________________________________         Initial GI Consult    Patient is a 75y old male who presents with a chief complaint of odynophagia with decreased PO intake.     HPI:    Patient is a 76 yo M w/ PMHx of tonsilar SCC (T2 N2, p16 negative, concurrent chemo/RT 1/21/25, started first weekly cisplatin 1/24/25, last Tx on 2/28, was to complete 7cycles of Cisplatin on 3/7; completed 30/35 RT sessions), vascular dementia, and ventral hernia repair (50 years ago) presenting w/ odynophagia and FTT. GI consulted for PEG considerations.     Patient reports that he has not been able to eat properly for the past several weeks due to odynophagia that was worsened by RT. He reports that ice water is okay but everything else hurts. During this hospital course, he was trialed on topical lidocaine, magic mouthwash, and started on fluconazole (starting 3/12) for empiric tx of candida esophagitis. He is also on IV morphine. Patient still not able to tolerate PO due to the pain. Family and patient initially against NGT and/or PEG but now family is open to it but patient still feels hesitant. Seen by S&S on 3/14 for MBS and noted to have trace laryngeal penetration and recommend puree and thin liquids.     PAST MEDICAL & SURGICAL HISTORY:  Ventral hernia      Acid reflux      Osteoarthritis of right hip      Tonsil cancer      History of bilateral inguinal hernia repair  1974      S/P carpal tunnel release  right ; 1993        FAMILY HISTORY:  No pertinent family history in first degree relatives        MEDS:  MEDICATIONS  (STANDING):  chlorhexidine 2% Cloths 1 Application(s) Topical <User Schedule>  enoxaparin Injectable 40 milliGRAM(s) SubCutaneous every 24 hours  fluconAZOLE IVPB 100 milliGRAM(s) IV Intermittent every 24 hours  fluconAZOLE IVPB      mupirocin 2% Ointment 1 Application(s) Both Nostrils two times a day    MEDICATIONS  (PRN):  aluminum hydroxide/magnesium hydroxide/simethicone Suspension 30 milliLiter(s) Oral every 4 hours PRN Dyspepsia  haloperidol    Injectable 0.5 milliGRAM(s) IV Push every 6 hours PRN Combative/agitation  morphine  - Injectable 2 milliGRAM(s) IV Push every 4 hours PRN Severe Pain (7 - 10)  ondansetron Injectable 4 milliGRAM(s) IV Push every 8 hours PRN Nausea and/or Vomiting    Allergies    No Known Allergies    Intolerances      ______________________________________________________________________  PHYSICAL EXAM:  T(C): 36.8 (03-15-25 @ 20:23), Max: 36.9 (03-15-25 @ 12:42)  HR: 86 (03-15-25 @ 20:23)  BP: 123/80 (03-15-25 @ 20:23)  RR: 18 (03-15-25 @ 20:23)  SpO2: 100% (03-15-25 @ 20:23)  Wt(kg): --      GEN: NAD, normocephalic  CVS: S1S2+  CHEST: clear to auscultation  ABD: soft , nontender, nondistended, bowel sounds present  EXTR: no cyanosis, no clubbing, no edema  NEURO: A&OX3  SKIN:  warm;  non icteric    ______________________________________________________________________  LABS:                        7.8    1.83  )-----------( 146      ( 15 Mar 2025 06:04 )             22.3     03-15    141  |  104  |  38[H]  ----------------------------<  80  4.1   |  24  |  1.15    Ca    9.0      15 Mar 2025 06:04  Phos  3.3     03-15  Mg     2.00     03-15    TPro  6.5  /  Alb  3.7  /  TBili  0.9  /  DBili  x   /  AST  12  /  ALT  9   /  AlkPhos  66  03-15    LIVER FUNCTIONS - ( 15 Mar 2025 06:04 )  Alb: 3.7 g/dL / Pro: 6.5 g/dL / ALK PHOS: 66 U/L / ALT: 9 U/L / AST: 12 U/L / GGT: x             ____________________________________________      Imaging:  < from: CT Neck Soft Tissue w/ IV Cont (03.05.25 @ 23:08) >  FINDINGS:  Salivary glands: Normal. Glands are unremarkable.  Pharynx: Mild mucosal thickening and enhancement in the oropharynx. The   tonsils  are not enlarged. No peritonsillar abscess. Lingual tonsils are mildly  enlarged. No residual or recurrent left-sided palatine tonsillar mass.  Larynx: Mucosal thickening and enhancement in the larynx involving the   vocal  cords epiglottis, and aryepiglottic folds. No masses.  Thyroid: Normal. No nodules.  Trachea: Visualized trachea is unremarkable.  Lungs: Mild bronchial wall thickening and endobronchial fluid in the left   lung.  Lymph nodes: No new or enlarging cervical lymph nodes in comparison to  the prior study. Marked interval decrease in size of bulky left-sided   level II lymphadenopathy with a residual conglomeration of lymph nodes   measuring 1.8 x 1.3 cm (series 302, image 72), compatible with a positive   response to treatment  Bones/joints: Advanced degenerative spondylosis causing multilevel   cervical  spinal canal stenosis.  Soft tissues: Unremarkable in the periphery.    IMPRESSION:    1.   Mucosal thickening and enhancement in the larynx and oropharynx may   be due  to infectious laryngopharyngitis or sequela of previous radiation   performed.  2.  No evidence of residual or recurrent tumor within the left tonsillar   location.  3. No new or enlarging cervical lymph nodes.  4. Small fluid aspiration in the left lung.  5.  Advanced degenerative spondylosis causing multilevel cervical spinal   canal  stenosis.      AGREE WITH THE ABOVE FINDINGS AND REPORT. PLEASE NOTE THAT THIS NOW   REPRESENTS THE FINALIZED REPORT.    < end of copied text >

## 2025-03-15 NOTE — CONSULT NOTE ADULT - ATTENDING COMMENTS
Agree with above. Patient with SCC of the oropharynx, endoscopic pull/push PEG placement would potentially result in seeding of the tract. Would recommend IR PEG placement instead, as T fastener/dilation PEG not available at [facility]. Agree with above. Patient with SCC of the oropharynx, endoscopic pull/push PEG placement would potentially result in seeding of the tract. Would recommend IR PEG placement instead, as T fastener/dilation PEG not available at [facility] for endoscopic placement.

## 2025-03-16 PROCEDURE — 74176 CT ABD & PELVIS W/O CONTRAST: CPT | Mod: 26

## 2025-03-16 PROCEDURE — 99233 SBSQ HOSP IP/OBS HIGH 50: CPT

## 2025-03-16 RX ORDER — LACTULOSE 10 G/15ML
20 SOLUTION ORAL ONCE
Refills: 0 | Status: COMPLETED | OUTPATIENT
Start: 2025-03-16 | End: 2025-03-16

## 2025-03-16 RX ADMIN — MUPIROCIN CALCIUM 1 APPLICATION(S): 20 CREAM TOPICAL at 21:58

## 2025-03-16 RX ADMIN — Medication 1 APPLICATION(S): at 08:05

## 2025-03-16 RX ADMIN — ENOXAPARIN SODIUM 40 MILLIGRAM(S): 100 INJECTION SUBCUTANEOUS at 18:08

## 2025-03-16 RX ADMIN — Medication 50 MILLIGRAM(S): at 12:09

## 2025-03-16 NOTE — CHART NOTE - NSCHARTNOTEFT_GEN_A_CORE
PRE-INTERVENTIONAL RADIOLOGY PROCEDURE NOTE    Patient Age: 75 years old  Patient Gender: Male     Procedure (including site / side if known): Gastrostomy tube placement     Diagnosis / Indication: Odynophagia     Interventional Radiology Attending Physician: Dr. Zhang     Ordering Attending Physician: Dr. Payan     Pertinent medical history: Stage 4 tonsil CA, vascular dementia, ventral hernia, right hip OA     Pertinent labs:                       7.8    1.83  )-----------( 146      ( 15 Mar 2025 06:04 )             22.3   03-15    141  |  104  |  38[H]  ----------------------------<  80  4.1   |  24  |  1.15    Ca    9.0      15 Mar 2025 06:04  Phos  3.3     03-15  Mg     2.00     03-15    TPro  6.5  /  Alb  3.7  /  TBili  0.9  /  DBili  x   /  AST  12  /  ALT  9   /  AlkPhos  66  03-15      Patient and Family aware? Yes   Daughter Ellen Sanchez ([facility]) phone number [phone]        Contact #: ___________[phone]__________

## 2025-03-16 NOTE — PROGRESS NOTE ADULT - SUBJECTIVE AND OBJECTIVE BOX
Hospitalist Progress Note  Laurel Payan MD Pager # [phone]    OVERNIGHT EVENTS: CHELE    SUBJECTIVE / INTERVAL HPI: Patient seen and examined at bedside. Patient reports that he has not been able to eat. Son at bedside reporting that he has not eaten anything. Pt. does not take Ensure (which is at bedside).     VITAL SIGNS:  Vital Signs Last 24 Hrs  T(C): 36.9 (16 Mar 2025 12:32), Max: 37.1 (16 Mar 2025 05:41)  T(F): 98.5 (16 Mar 2025 12:32), Max: 98.7 (16 Mar 2025 05:41)  HR: 78 (16 Mar 2025 12:32) (78 - 86)  BP: 115/71 (16 Mar 2025 12:32) (115/71 - 130/60)  BP(mean): --  RR: 18 (16 Mar 2025 12:32) (18 - 18)  SpO2: 98% (16 Mar 2025 12:32) (98% - 100%)    Parameters below as of 16 Mar 2025 12:32  Patient On (Oxygen Delivery Method): room air        PHYSICAL EXAM:    General: Comfortable  HEENT: NC/AT; PERRL, anicteric sclera; MMM  Neck: supple  Cardiovascular: +S1/S2; RRR  Respiratory: CTA B/L; no W/R/R  Gastrointestinal: soft, NT/ND; +BSx4  Extremities: WWP; no edema, clubbing or cyanosis  Vascular: 2+ radial, DP/PT pulses B/L  Neurological: AAOx2-3; no focal deficits    MEDICATIONS:  MEDICATIONS  (STANDING):  chlorhexidine 2% Cloths 1 Application(s) Topical <User Schedule>  enoxaparin Injectable 40 milliGRAM(s) SubCutaneous every 24 hours  fluconAZOLE IVPB 100 milliGRAM(s) IV Intermittent every 24 hours  fluconAZOLE IVPB      mupirocin 2% Ointment 1 Application(s) Both Nostrils two times a day    MEDICATIONS  (PRN):  aluminum hydroxide/magnesium hydroxide/simethicone Suspension 30 milliLiter(s) Oral every 4 hours PRN Dyspepsia  haloperidol    Injectable 0.5 milliGRAM(s) IV Push every 6 hours PRN Combative/agitation  morphine  - Injectable 2 milliGRAM(s) IV Push every 4 hours PRN Severe Pain (7 - 10)  ondansetron Injectable 4 milliGRAM(s) IV Push every 8 hours PRN Nausea and/or Vomiting      ALLERGIES:  Allergies    No Known Allergies    Intolerances        LABS:                        7.8    1.83  )-----------( 146      ( 15 Mar 2025 06:04 )             22.3     03-15    141  |  104  |  38[H]  ----------------------------<  80  4.1   |  24  |  1.15    Ca    9.0      15 Mar 2025 06:04  Phos  3.3     03-15  Mg     2.00     03-15    TPro  6.5  /  Alb  3.7  /  TBili  0.9  /  DBili  x   /  AST  12  /  ALT  9   /  AlkPhos  66  03-15      Urinalysis Basic - ( 15 Mar 2025 06:04 )    Color: x / Appearance: x / SG: x / pH: x  Gluc: 80 mg/dL / Ketone: x  / Bili: x / Urobili: x   Blood: x / Protein: x / Nitrite: x   Leuk Esterase: x / RBC: x / WBC x   Sq Epi: x / Non Sq Epi: x / Bacteria: x      CAPILLARY BLOOD GLUCOSE          RADIOLOGY & ADDITIONAL TESTS: Reviewed.    ASSESSMENT:    PLAN:

## 2025-03-16 NOTE — CONSULT NOTE ADULT - ASSESSMENT
Interventional Radiology    Evaluate for Procedure: Gastrostomy tube placement    HPI: 74M vascular dementia, tonsillar Ca on chemo/radiation p/w odynophagia and FTT. IR requested for gastrostomy tube.     Allergies: No Known Allergies    Medications (Abx/Cardiac/Anticoagulation/Blood Products)  enoxaparin Injectable: 40 milliGRAM(s) SubCutaneous (03-15 @ 17:37)  fluconAZOLE IVPB: 50 mL/Hr IV Intermittent (03-16 @ 12:09)    Data:    T(C): 36.9  HR: 78  BP: 115/71  RR: 18  SpO2: 98%    -WBC 1.83 / HgB 7.8 / Hct 22.3 / Plt 146  -Na 141 / Cl 104 / BUN 38 / Glucose 80  -K 4.1 / CO2 24 / Cr 1.15  -ALT 9 / Alk Phos 66 / T.Bili 0.9      Radiology: Reviewed    Assessment/Plan:   74M vascular dementia, tonsillar Ca on chemo/radiation p/w odynophagia and FTT. IR requested for gastrostomy tube.     -- Please obtain CT abd/pelvis Monday 3/17. Please obtain with oral contrast if patient able to tolerate.  -- IR will plan to perform gastrostomy tube placement Tuesday 3/18.   -- NPO at midnight on 3/17.  -- hold a.m. anticoagulation and obtain AM labs on 3/18.  -- please complete IR pre-procedure note.  -- please place IR procedure request order under Dr. Zhang.    --  Mert Irwin, PGY-5  Vascular and Interventional Radiology   Available on [employer] Teams    - Non-emergent consults: Place IR consult order in [place]  - Emergent issues (pager): [facility] [phone]; [facility] [phone]; [phone]  - Scheduling questions: [facility] [phone]; [facility] [phone]  - Clinic/outpatient booking: [facility] [phone]; [facility] [phone] Interventional Radiology    Evaluate for Procedure: Gastrostomy tube placement    HPI: 74M vascular dementia, tonsillar Ca on chemo/radiation p/w odynophagia and FTT. IR requested for gastrostomy tube.     Allergies: No Known Allergies    Medications (Abx/Cardiac/Anticoagulation/Blood Products)  enoxaparin Injectable: 40 milliGRAM(s) SubCutaneous (03-15 @ 17:37)  fluconAZOLE IVPB: 50 mL/Hr IV Intermittent (03-16 @ 12:09)    Data:    T(C): 36.9  HR: 78  BP: 115/71  RR: 18  SpO2: 98%    -WBC 1.83 / HgB 7.8 / Hct 22.3 / Plt 146  -Na 141 / Cl 104 / BUN 38 / Glucose 80  -K 4.1 / CO2 24 / Cr 1.15  -ALT 9 / Alk Phos 66 / T.Bili 0.9      Radiology: Reviewed    Assessment/Plan:   74M vascular dementia, tonsillar Ca on chemo/radiation p/w odynophagia and FTT. IR requested for gastrostomy tube.     -- Please obtain CT abd/pelvis Monday 3/17. Please obtain with oral contrast if patient able to tolerate.  -- Please document final [facility] discussion that patient would like to pursue gastrostomy tube.   -- IR will plan to perform gastrostomy tube placement Tuesday 3/18.   -- NPO at midnight on 3/17.  -- hold a.m. anticoagulation and obtain AM labs on 3/18.  -- please complete IR pre-procedure note.  -- please place IR procedure request order under Dr. Zhang.    --  Mert Irwin, PGY-5  Vascular and Interventional Radiology   Available on [employer] Teams    - Non-emergent consults: Place IR consult order in [place]  - Emergent issues (pager): [facility] [phone]; [facility] [phone]; [phone]  - Scheduling questions: [facility] [phone]; [facility] [phone]  - Clinic/outpatient booking: [facility] [phone]; [facility] [phone] Interventional Radiology    Evaluate for Procedure: Gastrostomy tube placement    HPI: 74M vascular dementia, tonsillar Ca on chemo/radiation p/w odynophagia and FTT. IR requested for gastrostomy tube.     Allergies: No Known Allergies    Medications (Abx/Cardiac/Anticoagulation/Blood Products)  enoxaparin Injectable: 40 milliGRAM(s) SubCutaneous (03-15 @ 17:37)  fluconAZOLE IVPB: 50 mL/Hr IV Intermittent (03-16 @ 12:09)    Data:    T(C): 36.9  HR: 78  BP: 115/71  RR: 18  SpO2: 98%    -WBC 1.83 / HgB 7.8 / Hct 22.3 / Plt 146  -Na 141 / Cl 104 / BUN 38 / Glucose 80  -K 4.1 / CO2 24 / Cr 1.15  -ALT 9 / Alk Phos 66 / T.Bili 0.9    Radiology: Reviewed    Assessment/Plan:   74M vascular dementia, tonsillar Ca on chemo/radiation p/w odynophagia and FTT. IR requested for gastrostomy tube.     -- Please obtain CT abd/pelvis Monday 3/17. Please obtain with oral contrast if patient able to tolerate.  -- Please document final [facility] discussion that patient would like to pursue gastrostomy tube.   -- IR will plan to perform gastrostomy tube placement Tuesday 3/18.   -- NPO at midnight on 3/17.   -- hold a.m. anticoagulation and obtain AM labs on 3/18.  -- please complete IR pre-procedure note.  -- please place IR procedure request order under Dr. Zhang.    --  Mert Irwin, PGY-5  Vascular and Interventional Radiology   Available on [employer] Teams    - Non-emergent consults: Place IR consult order in [place]  - Emergent issues (pager): [facility] [phone]; [facility] [phone]; [phone]  - Scheduling questions: [facility] [phone]; [facility] [phone]  - Clinic/outpatient booking: [facility] [phone]; [facility] [phone]

## 2025-03-16 NOTE — PROGRESS NOTE ADULT - ASSESSMENT
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     Active problems  tonsillar ca  vascular dementia   odynophagia/lack of appetite   FTT  Anemia in neoplastic disease  hyponatremia  Vascular dementia  hypercoagulable state    tonsillar ca  odynophagia/lack of appetite   Hyponatremia  FTT  Pt reporting odynophagia and dysphagia w/ regurgitation of food. Dysphagia to solids and liquids. Seems he holds the food and does not swallow due to fear of pain. Pt reporting dysgeusia as well.   (s/p C6 of 7 of cisplatin and on RT -should've been completed on 3/7)  Reviewed CT neck, mucosal thickening and enhancement in the larynx and oropharynx may be due to infectious laryngopharyngitis or sequela of previous radiation performed.  Palliative care consulted  Patient does not like viscous lidocaine or liquid gabapentin - will d/c   Pain control with morphine 30 minutes prior to meals   Cineesophagogram completed - rec puree and thin liquid   Empiric treatment of thrush/esophagitis with fluconazole IV  As per daughter - pt with minimal PO intake. Discussed idea/option of PEG tube for nutrition. Given 5 siblings - will need to discuss and all agree. GI recommending IR eval for PEG - pending.   - Discussed plan of care with Dr. Gibson. Outpatient rad onc team also aware of patient's hospital course.   -Palliative following       Anemia in neoplastic disease  Hb 9.6, otherwise stable, continue to monitor    Vascular dementia  - Pt agitated 3/10 overnight requiring ativan. Mental status improved 3/13. Family reporting continues to have hallucinations but this has been ongoing since diagnosis of dementia.   - Psych consulted. Haldol PRN agitation.   - CTH with chronic microvascular changes.   - Recommend outpatient follow up with neurology.     Starvation Ketosis - Resolved  - AG 17 - UA with ketones - in the setting of poor PO intake. Improved with administration of D5+NS.     Hypercoagulable state 2/2 malignancy  On lovenox 40mg daily

## 2025-03-16 NOTE — CONSULT NOTE ADULT - REASON FOR ADMISSION
odynophagia with decreased PO intake

## 2025-03-17 LAB
ALBUMIN SERPL ELPH-MCNC: 3.7 G/DL — SIGNIFICANT CHANGE UP (ref 3.3–5)
ALP SERPL-CCNC: 60 U/L — SIGNIFICANT CHANGE UP (ref 40–120)
ALT FLD-CCNC: 11 U/L — SIGNIFICANT CHANGE UP (ref 4–41)
ANION GAP SERPL CALC-SCNC: 15 MMOL/L — HIGH (ref 7–14)
AST SERPL-CCNC: 30 U/L — SIGNIFICANT CHANGE UP (ref 4–40)
BASOPHILS # BLD AUTO: 0.02 K/UL — SIGNIFICANT CHANGE UP (ref 0–0.2)
BASOPHILS NFR BLD AUTO: 1 % — SIGNIFICANT CHANGE UP (ref 0–2)
BILIRUB SERPL-MCNC: 1.2 MG/DL — SIGNIFICANT CHANGE UP (ref 0.2–1.2)
BUN SERPL-MCNC: 32 MG/DL — HIGH (ref 7–23)
CALCIUM SERPL-MCNC: 9 MG/DL — SIGNIFICANT CHANGE UP (ref 8.4–10.5)
CHLORIDE SERPL-SCNC: 101 MMOL/L — SIGNIFICANT CHANGE UP (ref 98–107)
CO2 SERPL-SCNC: 21 MMOL/L — LOW (ref 22–31)
CREAT SERPL-MCNC: 0.92 MG/DL — SIGNIFICANT CHANGE UP (ref 0.5–1.3)
EGFR: 86 ML/MIN/1.73M2 — SIGNIFICANT CHANGE UP
EGFR: 86 ML/MIN/1.73M2 — SIGNIFICANT CHANGE UP
EOSINOPHIL # BLD AUTO: 0.01 K/UL — SIGNIFICANT CHANGE UP (ref 0–0.5)
EOSINOPHIL NFR BLD AUTO: 0.5 % — SIGNIFICANT CHANGE UP (ref 0–6)
GLUCOSE SERPL-MCNC: 78 MG/DL — SIGNIFICANT CHANGE UP (ref 70–99)
HCT VFR BLD CALC: 25.2 % — LOW (ref 39–50)
HGB BLD-MCNC: 8.9 G/DL — LOW (ref 13–17)
IANC: 1.1 K/UL — LOW (ref 1.8–7.4)
IMM GRANULOCYTES NFR BLD AUTO: 1 % — HIGH (ref 0–0.9)
LYMPHOCYTES # BLD AUTO: 0.56 K/UL — LOW (ref 1–3.3)
LYMPHOCYTES # BLD AUTO: 27.3 % — SIGNIFICANT CHANGE UP (ref 13–44)
MAGNESIUM SERPL-MCNC: 2.1 MG/DL — SIGNIFICANT CHANGE UP (ref 1.6–2.6)
MCHC RBC-ENTMCNC: 31.2 PG — SIGNIFICANT CHANGE UP (ref 27–34)
MCHC RBC-ENTMCNC: 35.3 G/DL — SIGNIFICANT CHANGE UP (ref 32–36)
MCV RBC AUTO: 88.4 FL — SIGNIFICANT CHANGE UP (ref 80–100)
MONOCYTES # BLD AUTO: 0.34 K/UL — SIGNIFICANT CHANGE UP (ref 0–0.9)
MONOCYTES NFR BLD AUTO: 16.6 % — HIGH (ref 2–14)
NEUTROPHILS # BLD AUTO: 1.1 K/UL — LOW (ref 1.8–7.4)
NEUTROPHILS NFR BLD AUTO: 53.6 % — SIGNIFICANT CHANGE UP (ref 43–77)
NRBC # BLD AUTO: 0 K/UL — SIGNIFICANT CHANGE UP (ref 0–0)
NRBC # FLD: 0 K/UL — SIGNIFICANT CHANGE UP (ref 0–0)
NRBC BLD AUTO-RTO: 0 /100 WBCS — SIGNIFICANT CHANGE UP (ref 0–0)
PHOSPHATE SERPL-MCNC: 2.9 MG/DL — SIGNIFICANT CHANGE UP (ref 2.5–4.5)
PLATELET # BLD AUTO: 155 K/UL — SIGNIFICANT CHANGE UP (ref 150–400)
POTASSIUM SERPL-MCNC: 5.3 MMOL/L — SIGNIFICANT CHANGE UP (ref 3.5–5.3)
POTASSIUM SERPL-SCNC: 5.3 MMOL/L — SIGNIFICANT CHANGE UP (ref 3.5–5.3)
PROT SERPL-MCNC: 6.9 G/DL — SIGNIFICANT CHANGE UP (ref 6–8.3)
RBC # BLD: 2.85 M/UL — LOW (ref 4.2–5.8)
RBC # FLD: 17.1 % — HIGH (ref 10.3–14.5)
SODIUM SERPL-SCNC: 137 MMOL/L — SIGNIFICANT CHANGE UP (ref 135–145)
WBC # BLD: 2.05 K/UL — LOW (ref 3.8–10.5)
WBC # FLD AUTO: 2.05 K/UL — LOW (ref 3.8–10.5)

## 2025-03-17 PROCEDURE — 99232 SBSQ HOSP IP/OBS MODERATE 35: CPT

## 2025-03-17 PROCEDURE — 99233 SBSQ HOSP IP/OBS HIGH 50: CPT

## 2025-03-17 RX ADMIN — MUPIROCIN CALCIUM 1 APPLICATION(S): 20 CREAM TOPICAL at 06:42

## 2025-03-17 RX ADMIN — MUPIROCIN CALCIUM 1 APPLICATION(S): 20 CREAM TOPICAL at 19:38

## 2025-03-17 RX ADMIN — Medication 1 APPLICATION(S): at 06:45

## 2025-03-17 RX ADMIN — Medication 50 MILLIGRAM(S): at 12:38

## 2025-03-17 NOTE — CHART NOTE - NSCHARTNOTEFT_GEN_A_CORE
NUTRITION FOLLOW UP NOTE    Patient is seen for a follow-up for nutrition consult for assessment and education.    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 for failure to thrive, dehydration, and pain management. Course complicated by combative behavior/dementia and starvation ketosis.    Diet Order: NPO after Midnight:   NPO Start Date: 17-Mar-2025 / NPO Start Time: 23:59  Except Medications (03-17-25 @ 07:26)  Pureed:   Ensure Plus High Protein 3x daily  Magic Cup 2x daily      Nutrition Course:  - Patient noted to be documented as disoriented and confused per RN flowsheet. PMH vascular dementia. Patient unable to meaningfully participate in assessment.   - Noted SLP evaluation (3/14) with recommendations for pureed textures and thin liquids. Patient's son and daughter present at bedside. Reports patient continues with a very poor appetite, minimal PO intake. Does not like the oral nutrition supplements (Magic Cup, Ensure).  - Patient pending PEG placement by IR, plan to provide enteral nutrition via PEG-tube. Please see below for recommendations.    PO Intake per RN flowsheet: 0-25%  Food Preferences: None reported  GI Distress: No report of nausea/vomiting/diarrhea/constipation.   Bowel Movement: 3/11/25 per RN flowsheet. Not noted to be on a bowel regimen.   Chewing / Swallowing Difficulty: None reported      Pertinent Medications: MEDICATIONS  (STANDING):  chlorhexidine 2% Cloths 1 Application(s) Topical <User Schedule>  enoxaparin Injectable 40 milliGRAM(s) SubCutaneous every 24 hours  fluconAZOLE IVPB 100 milliGRAM(s) IV Intermittent every 24 hours  fluconAZOLE IVPB      mupirocin 2% Ointment 1 Application(s) Both Nostrils two times a day    MEDICATIONS  (PRN):  aluminum hydroxide/magnesium hydroxide/simethicone Suspension 30 milliLiter(s) Oral every 4 hours PRN Dyspepsia  haloperidol    Injectable 0.5 milliGRAM(s) IV Push every 6 hours PRN Combative/agitation  morphine  - Injectable 2 milliGRAM(s) IV Push every 4 hours PRN Severe Pain (7 - 10)  ondansetron Injectable 4 milliGRAM(s) IV Push every 8 hours PRN Nausea and/or Vomiting      Pertinent Labs: 03-17 Na137 mmol/L Glu 78 mg/dL K+ 5.3 mmol/L Cr  0.92 mg/dL BUN 32 mg/dL[H] 03-17 Phos 2.9 mg/dL 03-17 Alb 3.7 g/dL      Anthropometrics  Weights per RN flowsheet: 81.3kg (3/7)  Weight Assessment: No weight changes since previous assessment    Physical Assessment, per flowsheets:  Edema: None  Pressure Injury: None    Estimated Needs:   [X] No change since previous assessment  Weight Used: Ideal Body Weight 166lb / 75.2kg  Energy Needs: 2105-2406kcal (based on 28-32kcal/kg)  Protein Needs: 90..28gms (based on 1.2-1.4gms/kg)    Previous Nutrition Diagnosis: [X] Severe Malnutrition    Nutrition Diagnosis is [X] ongoing    New Nutrition Diagnosis: [X] not applicable     Education: [X] Writer provided verbal education regarding nutrition plan of care for enteral nutrition via PEG. Patient's daughter and son verbalized understanding to the discussion.     Interventions:   - Continue current diet order, as tolerated, at medical team's discretion  - Discontinue Ensure Plus High Protein (provides 350kcal, 20gms protein per serving) TID provision s/p PEG placement  - Discontinue Magic Cup (provides 290kcal, 9gms protein per serving) provision as per patient preference  - As medically feasible s/p PEG placement, would recommend initiate Jevity 1.5 Moises at 20mL/hr, gradually advance by 10mL q6-8hrs to goal rate @ 60mL/hr x24 hours (provides 1400mL volume formula, 2160kcal, 92gms protein, 1094mL free water)   --> Once tolerating continuous enteral nutrition, may consider changing to bolus feeds for discharge home: Jevity 1.5 Moises, bolus 6 cartons (237mL each) daily to provide a total of 1422mL volume formula, 2130kcal, 90.6g protein, 1080mL free water  --> Defer free fluid management as per medical team  - Please document % PO intake on flowsheets  - Nutrition care to be aligned with patient/family goals of care    Monitor & Evaluate: Nutrition related lab values, weight trends, BMs/GI distress, hydration status, skin integrity.    RD remains available, please consult as needed.    Marce Michel MS RDN CDN  Available on [employer] Teams (Preferred) or Pager #[phone]

## 2025-03-17 NOTE — PROGRESS NOTE ADULT - PROBLEM SELECTOR PLAN 3
- CT neck - Mucosal thickening and enhancement in the larynx and oropharynx may be due to infectious laryngopharyngitis or sequela of previous radiation performed.  - c/w morphine 2mg IV q4hr prn for odynophagia 30 min prior to meals ( PRN use in past 24 hours from 8 AM to 8 AM: none)   - c/w IV fluconazole

## 2025-03-17 NOTE — PROGRESS NOTE ADULT - ASSESSMENT
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     Active problems  tonsillar ca  vascular dementia   odynophagia/lack of appetite   FTT  Anemia in neoplastic disease  hyponatremia  Vascular dementia  hypercoagulable state    tonsillar ca  odynophagia/lack of appetite   Hyponatremia  FTT  severe protein calorie malnutrition  Pt reporting odynophagia and dysphagia w/ regurgitation of food. Dysphagia to solids and liquids. Seems he holds the food and does not swallow due to fear of pain. Pt reporting dysgeusia as well.   (s/p C6 of 7 of cisplatin and on RT -should've been completed on 3/7)  Reviewed CT neck, mucosal thickening and enhancement in the larynx and oropharynx may be due to infectious laryngopharyngitis or sequela of previous radiation performed.  Palliative care consulted  Patient does not like viscous lidocaine or liquid gabapentin - will d/c   Pain control with morphine 30 minutes prior to meals   Cineesophagogram completed - rec puree and thin liquid   Empiric treatment of thrush/esophagitis with fluconazole IV  As per daughter - pt with minimal PO intake. Discussed idea/option of PEG tube for nutrition. Given 5 siblings - will need to discuss and all agree. GI recommending IR trudi for PEG - pending.   - Discussed plan of care with Dr. Gibson. Outpatient rad onc team also aware of patient's hospital course.   -Palliative following       Anemia in neoplastic disease  Hb 9.6, otherwise stable, continue to monitor    Vascular dementia  - Pt agitated 3/10 overnight requiring ativan. Mental status improved 3/13. Family reporting continues to have hallucinations but this has been ongoing since diagnosis of dementia.   - Psych consulted. Haldol PRN agitation.   - CTH with chronic microvascular changes.   - Recommend outpatient follow up with neurology.     Starvation Ketosis - Resolved  - AG 17 - UA with ketones - in the setting of poor PO intake. Improved with administration of D5+NS.     Hypercoagulable state 2/2 malignancy  On lovenox 40mg daily    NPO after MN.  Hold Lovenox.  Ir to do PEG 3/18

## 2025-03-17 NOTE — PROGRESS NOTE ADULT - PROBLEM SELECTOR PLAN 5
- HCP form signed and placed in chart, Ellen (Daughter) is his HCP  - Attempted to bring up MOLST form and Code status- family not amenable to discussing at this time,, no conversations/decisions on code status to date, patient remains full code at this time  - Family discussing LT feeding tube, see GoC note from 3/14

## 2025-03-17 NOTE — PROGRESS NOTE ADULT - SUBJECTIVE AND OBJECTIVE BOX
Onc Hosp Solid Tumor Accept Note    Patient is a 75y old  Male who presents with a chief complaint of odynophagia with decreased PO intake (17 Mar 2025 10:43)      SUBJECTIVE / OVERNIGHT EVENTS:  Patient is resting in bed and seen with YONIS Aguirre at bedside and Both children at bedside , son Julian and daughter Ellen.  Patient is a vwry pleasant man and notes his throat pain is controlled.  Family updated that IR will place PEG 3/18/25 most likely in the morning.  Patient is a [place]    MEDICATIONS  (STANDING):  chlorhexidine 2% Cloths 1 Application(s) Topical <User Schedule>  enoxaparin Injectable 40 milliGRAM(s) SubCutaneous every 24 hours  fluconAZOLE IVPB 100 milliGRAM(s) IV Intermittent every 24 hours  fluconAZOLE IVPB      mupirocin 2% Ointment 1 Application(s) Both Nostrils two times a day    MEDICATIONS  (PRN):  aluminum hydroxide/magnesium hydroxide/simethicone Suspension 30 milliLiter(s) Oral every 4 hours PRN Dyspepsia  haloperidol    Injectable 0.5 milliGRAM(s) IV Push every 6 hours PRN Combative/agitation  morphine  - Injectable 2 milliGRAM(s) IV Push every 4 hours PRN Severe Pain (7 - 10)  ondansetron Injectable 4 milliGRAM(s) IV Push every 8 hours PRN Nausea and/or Vomiting        PHYSICAL EXAM:  GENERAL: NAD,   HEAD:  Atraumatic, Normocephalic  EYES: EOMI, PERRLA, conjunctiva and sclera clear  NECK: Supple, No JVD  CHEST/LUNG: Clear to auscultation bilaterally; No wheeze  HEART: Regular rate and rhythm; No murmurs, rubs, or gallops  ABDOMEN: Soft, Nontender, Nondistended; Bowel sounds present  EXTREMITIES:  2+ Peripheral Pulses, No clubbing, cyanosis, or edema  PSYCH: Alert  NEUROLOGY: non-focal  SKIN: multiple tattoos    LABS:                        8.9    2.05  )-----------( 155      ( 17 Mar 2025 06:45 )             25.2     03-17    137  |  101  |  32[H]  ----------------------------<  78  5.3   |  21[L]  |  0.92    Ca    9.0      17 Mar 2025 06:45  Phos  2.9     03-17  Mg     2.10     03-17    TPro  6.9  /  Alb  3.7  /  TBili  1.2  /  DBili  x   /  AST  30  /  ALT  11  /  AlkPhos  60  03-17      RADIOLOGY & ADDITIONAL TESTS:  < from: CT Abdomen and Pelvis No Cont (03.16.25 @ 18:22) >  IMPRESSION:  No evidence of acute abnormality in the abdomen and pelvis.      < from: Xray Cinesophagram Swallow Function w/ Contrast (03.14.25 @ 08:35) >  IMPRESSION:  There is trace laryngeal penetration.    Care Discussed with Consultants/Other Providers:

## 2025-03-17 NOTE — PROGRESS NOTE ADULT - ASSESSMENT
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      Cheiloplasty (Complex) Text: A decision was made to reconstruct the defect with a  cheiloplasty.  The defect was undermined extensively.  Additional obicularis oris muscle was excised with a 15 blade scalpel.  The defect was converted into a full thickness wedge to facilite a better cosmetic result.  Small vessels were then tied off with 5-0 monocyrl. The obicularis oris, superficial fascia, adipose and dermis were then reapproximated.  After the deeper layers were approximated the epidermis was reapproximated with particular care given to realign the vermilion border.

## 2025-03-17 NOTE — PROGRESS NOTE ADULT - PROBLEM SELECTOR PLAN 1
Follows with Dr. Gibson and Dr. Mitchell at [facility] for onc/rad onc  - ultrasound of his neck in mid October 2024 revealing a left cervical 2.4 cm nodule suspicious   - He started concurrent chemo/RT 1/21/25 with weekly cisplatin started 1/24/25  - He has completed 30/35 RT sessions. According to family recent visits with oncologist have been optimistic in regards to his treatment response. 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 He was scheduled to complete his last chemo session

## 2025-03-17 NOTE — PROGRESS NOTE ADULT - SUBJECTIVE AND OBJECTIVE BOX
Date of Service    Indication for Geriatrics and Palliative Care Services: Symptom mgmt, [facility]    SUBJECTIVE AND OBJECTIVE:    INTERVAL HPI/OVERNIGHT EVENTS:  Patient seen this AM with daughter Ellen and son at bedside. Patient awake and alert, comfortable in no distress. Patient says it's hard to get good sleep in the hospital. Patient not using IV morphine, has not really been eating.   Over weekend family decided to pursue PEG, awaiting plans for PEG placement.       Allergies    No Known Allergies    Intolerances    MEDICATIONS  (STANDING):  chlorhexidine 2% Cloths 1 Application(s) Topical <User Schedule>  enoxaparin Injectable 40 milliGRAM(s) SubCutaneous every 24 hours  fluconAZOLE IVPB 100 milliGRAM(s) IV Intermittent every 24 hours  fluconAZOLE IVPB      melatonin 3 milliGRAM(s) Oral at bedtime  mupirocin 2% Ointment 1 Application(s) Both Nostrils two times a day  thiamine Injectable 100 milliGRAM(s) IV Push daily    MEDICATIONS  (PRN):  aluminum hydroxide/magnesium hydroxide/simethicone Suspension 30 milliLiter(s) Oral every 4 hours PRN Dyspepsia  haloperidol    Injectable 0.5 milliGRAM(s) IV Push every 6 hours PRN Combative/agitation  morphine  - Injectable 1 milliGRAM(s) IV Push every 4 hours PRN Severe Pain (7 - 10)  ondansetron Injectable 4 milliGRAM(s) IV Push every 8 hours PRN Nausea and/or Vomiting      ITEMS UNCHECKED ARE NOT PRESENT    PRESENT SYMPTOMS: [ ]Unable to self-report due to altered mental status- see [ ] CPOT [ ] PAINADS [ ] RDOS  Source if other than patient:  [ ]Family   [ ]Team     Pain:  [x ]yes [ ]no  QOL impact - great  Location -  throat       Aggravating factors - eating/drinking   Quality - burning  Radiation - down esophagus  Timing- a/w meals   Severity (0-10 scale): 2  Minimal acceptable level / Pain Goal (0-10 scale):       Dyspnea:                           [ ]Mild [ ]Moderate [ ]Severe    Anxiety:                             [ ]Mild [ ]Moderate [ ]Severe  Agitation:                          [ ]Mild [ ]Moderate [ ]Severe  Fatigue:                             [ ]Mild [ ]Moderate [ ]Severe  Nausea:                             [ ]Mild [ ]Moderate [ ]Severe  Loss of appetite:              [ ]Mild [ ]Moderate [ ]Severe  Constipation:                   [ ]Mild [ ]Moderate [ ]Severe  Diarrhea:                          [ ]Mild [ ]Moderate [ ]Severe  Pruritus:                            [ ]Mild [ ]Moderate [ ]Severe  Depression:                      [ ]Mild [ ]Moderate [ ]Severe    CPOT:    https://www.[email or web address]/getattachment/[email or web address]/Critical-Care-Pain-Observation-Tool-(CPOT)    PCSSQ[Palliative Care Spiritual Screening Question]   Severity (0-10):  Score of 4 or > indicate consideration of Chaplaincy referral.  Chaplaincy Referral: [ ] yes [ ] refused [ ] following [x ] deferred    Caregiver [place]? : [ ] yes [ ] no [ ] Declined [x ] Deferred              Social work referral [ ] Patient & Family Centered Care Referral [ ]     Anticipatory Grief present?:  [ ] yes [ ] no  [ x] Deferred                  Social work referral [ ] Chaplaincy Referral[ ] Caregiver Support Referral [ ]    Other Symptoms:  [ x]All other review of systems negative   [ ] Unable to obtain due to poor mentation     PHYSICAL EXAM:  Vital Signs Last 24 Hrs  T(C): 36.9 (17 Mar 2025 07:36), Max: 36.9 (16 Mar 2025 12:32)  T(F): 98.4 (17 Mar 2025 07:36), Max: 98.5 (16 Mar 2025 12:32)  HR: 73 (17 Mar 2025 07:36) (68 - 78)  BP: 131/70 (17 Mar 2025 07:36) (115/71 - 132/76)  BP(mean): --  RR: 18 (17 Mar 2025 07:36) (18 - 18)  SpO2: 100% (17 Mar 2025 07:36) (98% - 100%)    Parameters below as of 17 Mar 2025 07:36  Patient On (Oxygen Delivery Method): room air    GENERAL:  [x ]Alert  [x ]Oriented x2   [ ]Lethargic  [ ]Cachexia  [ ]Unarousable  [x ]Verbal  [ ]Non-Verbal  [x ] No Distress  Behavioral:   [ ] Anxiety  [ ] Delirium [ ] Agitation [x ] Calm  [ ] Other  HEENT:  [ ]Normal  [ ] Temporal Wasting  [ x]Dry mouth   [ ]ET Tube/Trach  [x ]Oral lesions  [ x] Mucositis  PULMONARY: Breathing comfortable , no accessory muscle use   [ ]Clear [ ]Tachypnea  [ ]Audible excessive secretions   [ ]Rhonchi        [ ]Right [ ]Left [ ]Bilateral  [ ]Crackles        [ ]Right [ ]Left [ ]Bilateral  [ ]Wheezing     [ ]Right [ ]Left [ ]Bilateral  [ ]Diminished breath sounds [ ]right [ ]left [ ]bilateral  CARDIOVASCULAR:    [x ]Regular [ ]Irregular [ ]Tachy  [ ]Hua [ ]Murmur [ ]Other  GASTROINTESTINAL: Non-distended   [ ]Soft  [ ]Distended   [ ]+BS  [ ]Non tender [ ]Tender  [ ]PEG [ ]OGT/ NGT  Last BM: 3/8  GENITOURINARY:  [x ]Normal [ ] Incontinent   [ ]Oliguria/Anuria   [ ]Alan  MUSCULOSKELETAL:   [ x]Normal   [ ]Weakness  [ ]Bed/Wheelchair bound [ ]Edema  [  ] amputation  [  ] contraction  NEUROLOGIC:   [x ]No focal deficits  [ x]Cognitive impairment  [ ]Dysphagia [ ]Dysarthria [ ]Paresis [ ]Other   SKIN: See Nursing Skin Assessment for further details  [x ]Normal    [ ]Rash  [ ]Pressure ulcer(s)       Present on admission [ ]y [ ]n   [  ]  Wound    [  ] hyperpigmentation    CRITICAL CARE:  [ ]Shock Present  [ ]Septic [ ]Cardiogenic [ ]Neurologic [ ]Hypovolemic  [ ]Vasopressors [ ]Inotropes  [ ]Respiratory failure present [ ]Mechanical Ventilation [ ]Non-invasive ventilatory support [ ]High-Flow   [ ]Acute  [ ]Chronic [ ]Hypoxic  [ ]Hypercarbic [ ]Other  [ ]Other organ failure     LABS:                            8.9    2.05  )-----------( 155      ( 17 Mar 2025 06:45 )             25.2     03-17    137  |  101  |  32[H]  ----------------------------<  78  5.3   |  21[L]  |  0.92    Ca    9.0      17 Mar 2025 06:45  Phos  2.9     03-17  Mg     2.10     03-17    TPro  6.9  /  Alb  3.7  /  TBili  1.2  /  DBili  x   /  AST  30  /  ALT  11  /  AlkPhos  60  03-17      RADIOLOGY & ADDITIONAL STUDIES:     < from: CT Abdomen and Pelvis No Cont (03.16.25 @ 18:22) >  IMPRESSION:  No evidence of acute abnormality in the abdomen and pelvis.    < end of copied text >      Protein Calorie Malnutrition Present: [ ]mild [ ]moderate [ ]severe [ ]underweight [ ]morbid obesity  https://www.andeal.org/vault/8780/web/files/ONC/Table_Clinical%20Characteristics%20to%20Document%20Malnutrition-White%20JV%20et%20al%202012.pdf    Height (cm): 177.8 (03-06-25 @ 15:47), 177.8 (03-05-25 @ 18:20), 175 (02-28-25 @ 15:55)  Weight (kg): 81.3 (03-07-25 @ 05:32), 80.7 (03-05-25 @ 18:20), 80.8 (03-04-25 @ 16:37)  BMI (kg/m2): 25.7 (03-07-25 @ 05:32), 25.5 (03-06-25 @ 15:47), 25.5 (03-05-25 @ 18:20)    [ ]PPSV2 < or = 30%  [x ]significant weight loss [x ]poor nutritional intake [ ]anasarca   [ ]Artificial Nutrition    REFERRALS:   [ ]Chaplaincy  [ ]Hospice  [ ]Child Life  [ x]Social Work  [ x]Case management [ ]Holistic Therapy     Goals of Care Document:

## 2025-03-17 NOTE — PROGRESS NOTE ADULT - PROBLEM SELECTOR PLAN 6
For pain management   Pending PEG placement by IR     In the event of worsening symptoms, please contact the Palliative Medicine team via pager (if the patient is at [facility] #[phone] or if the patient is at [facility] #[phone]) The Geriatric and Palliative Medicine service has coverage 24 hours a day/ 7 days a week to provide medical recommendations regarding symptom management needs via telephone.

## 2025-03-17 NOTE — PROGRESS NOTE ADULT - PROBLEM SELECTOR PLAN 2
Mild approaching moderate stage dementia, declining ADLs at baseline accelerated by cancer treatment. Has been experiencing sundowning and behavioral sx likely from the dementia   - MIKE 3/10  - f/u psych, haldol prn   - Encouraged family at bedside overnight for reorientation and for them to encourage proper sleep wake cycle  - Family not amenable to SSRIs or atypical anti depressants  - CTH Parenchymal volume loss and chronic microvessel ischemic changes are identified

## 2025-03-18 ENCOUNTER — APPOINTMENT (OUTPATIENT)
Dept: INFUSION THERAPY | Facility: HOSPITAL | Age: 75
End: 2025-03-18

## 2025-03-18 ENCOUNTER — APPOINTMENT (OUTPATIENT)
Dept: OTOLARYNGOLOGY | Facility: CLINIC | Age: 75
End: 2025-03-18

## 2025-03-18 ENCOUNTER — APPOINTMENT (OUTPATIENT)
Dept: HEMATOLOGY ONCOLOGY | Facility: CLINIC | Age: 75
End: 2025-03-18

## 2025-03-18 LAB
ALBUMIN SERPL ELPH-MCNC: 3.7 G/DL — SIGNIFICANT CHANGE UP (ref 3.3–5)
ALP SERPL-CCNC: 72 U/L — SIGNIFICANT CHANGE UP (ref 40–120)
ALT FLD-CCNC: 19 U/L — SIGNIFICANT CHANGE UP (ref 4–41)
ANION GAP SERPL CALC-SCNC: 17 MMOL/L — HIGH (ref 7–14)
ANISOCYTOSIS BLD QL: SLIGHT — SIGNIFICANT CHANGE UP
APTT BLD: 30.8 SEC — SIGNIFICANT CHANGE UP (ref 24.5–35.6)
AST SERPL-CCNC: 25 U/L — SIGNIFICANT CHANGE UP (ref 4–40)
BASOPHILS # BLD AUTO: 0.02 K/UL — SIGNIFICANT CHANGE UP (ref 0–0.2)
BASOPHILS NFR BLD AUTO: 0.9 % — SIGNIFICANT CHANGE UP (ref 0–2)
BILIRUB SERPL-MCNC: 1.3 MG/DL — HIGH (ref 0.2–1.2)
BLD GP AB SCN SERPL QL: NEGATIVE — SIGNIFICANT CHANGE UP
BUN SERPL-MCNC: 35 MG/DL — HIGH (ref 7–23)
CALCIUM SERPL-MCNC: 9.2 MG/DL — SIGNIFICANT CHANGE UP (ref 8.4–10.5)
CHLORIDE SERPL-SCNC: 101 MMOL/L — SIGNIFICANT CHANGE UP (ref 98–107)
CO2 SERPL-SCNC: 22 MMOL/L — SIGNIFICANT CHANGE UP (ref 22–31)
CREAT SERPL-MCNC: 1 MG/DL — SIGNIFICANT CHANGE UP (ref 0.5–1.3)
DACRYOCYTES BLD QL SMEAR: SLIGHT — SIGNIFICANT CHANGE UP
EGFR: 78 ML/MIN/1.73M2 — SIGNIFICANT CHANGE UP
EGFR: 78 ML/MIN/1.73M2 — SIGNIFICANT CHANGE UP
EOSINOPHIL # BLD AUTO: 0 K/UL — SIGNIFICANT CHANGE UP (ref 0–0.5)
EOSINOPHIL NFR BLD AUTO: 0 % — SIGNIFICANT CHANGE UP (ref 0–6)
GIANT PLATELETS BLD QL SMEAR: PRESENT — SIGNIFICANT CHANGE UP
GLUCOSE SERPL-MCNC: 84 MG/DL — SIGNIFICANT CHANGE UP (ref 70–99)
HCT VFR BLD CALC: 24.1 % — LOW (ref 39–50)
HGB BLD-MCNC: 8.6 G/DL — LOW (ref 13–17)
IANC: 1.58 K/UL — LOW (ref 1.8–7.4)
INR BLD: 1.31 RATIO — HIGH (ref 0.85–1.16)
LYMPHOCYTES # BLD AUTO: 0.19 K/UL — LOW (ref 1–3.3)
LYMPHOCYTES # BLD AUTO: 7.9 % — LOW (ref 13–44)
MAGNESIUM SERPL-MCNC: 1.9 MG/DL — SIGNIFICANT CHANGE UP (ref 1.6–2.6)
MANUAL SMEAR VERIFICATION: SIGNIFICANT CHANGE UP
MCHC RBC-ENTMCNC: 31.5 PG — SIGNIFICANT CHANGE UP (ref 27–34)
MCHC RBC-ENTMCNC: 35.7 G/DL — SIGNIFICANT CHANGE UP (ref 32–36)
MCV RBC AUTO: 88.3 FL — SIGNIFICANT CHANGE UP (ref 80–100)
MONOCYTES # BLD AUTO: 0.25 K/UL — SIGNIFICANT CHANGE UP (ref 0–0.9)
MONOCYTES NFR BLD AUTO: 10.5 % — SIGNIFICANT CHANGE UP (ref 2–14)
NEUTROPHILS # BLD AUTO: 1.79 K/UL — LOW (ref 1.8–7.4)
NEUTROPHILS NFR BLD AUTO: 76.3 % — SIGNIFICANT CHANGE UP (ref 43–77)
OVALOCYTES BLD QL SMEAR: SLIGHT — SIGNIFICANT CHANGE UP
PHOSPHATE SERPL-MCNC: 2.9 MG/DL — SIGNIFICANT CHANGE UP (ref 2.5–4.5)
PLAT MORPH BLD: NORMAL — SIGNIFICANT CHANGE UP
PLATELET # BLD AUTO: 143 K/UL — LOW (ref 150–400)
PLATELET COUNT - ESTIMATE: NORMAL — SIGNIFICANT CHANGE UP
POIKILOCYTOSIS BLD QL AUTO: SLIGHT — SIGNIFICANT CHANGE UP
POLYCHROMASIA BLD QL SMEAR: SLIGHT — SIGNIFICANT CHANGE UP
POTASSIUM SERPL-MCNC: 4.2 MMOL/L — SIGNIFICANT CHANGE UP (ref 3.5–5.3)
POTASSIUM SERPL-SCNC: 4.2 MMOL/L — SIGNIFICANT CHANGE UP (ref 3.5–5.3)
PROT SERPL-MCNC: 6.5 G/DL — SIGNIFICANT CHANGE UP (ref 6–8.3)
PROTHROM AB SERPL-ACNC: 15.2 SEC — HIGH (ref 9.9–13.4)
RBC # BLD: 2.73 M/UL — LOW (ref 4.2–5.8)
RBC # FLD: 17.2 % — HIGH (ref 10.3–14.5)
RBC BLD AUTO: ABNORMAL
RH IG SCN BLD-IMP: POSITIVE — SIGNIFICANT CHANGE UP
SODIUM SERPL-SCNC: 140 MMOL/L — SIGNIFICANT CHANGE UP (ref 135–145)
VARIANT LYMPHS # BLD: 4.4 % — SIGNIFICANT CHANGE UP (ref 0–6)
VARIANT LYMPHS NFR BLD MANUAL: 4.4 % — SIGNIFICANT CHANGE UP (ref 0–6)
WBC # BLD: 2.35 K/UL — LOW (ref 3.8–10.5)
WBC # FLD AUTO: 2.35 K/UL — LOW (ref 3.8–10.5)

## 2025-03-18 PROCEDURE — 99233 SBSQ HOSP IP/OBS HIGH 50: CPT

## 2025-03-18 PROCEDURE — 99232 SBSQ HOSP IP/OBS MODERATE 35: CPT

## 2025-03-18 PROCEDURE — 49440 PLACE GASTROSTOMY TUBE PERC: CPT

## 2025-03-18 RX ORDER — DIPHENHYDRAMINE HCL 12.5MG/5ML
12.5 ELIXIR ORAL ONCE
Refills: 0 | Status: COMPLETED | OUTPATIENT
Start: 2025-03-18 | End: 2025-03-18

## 2025-03-18 RX ORDER — MAGNESIUM SULFATE 500 MG/ML
1 SYRINGE (ML) INJECTION ONCE
Refills: 0 | Status: COMPLETED | OUTPATIENT
Start: 2025-03-18 | End: 2025-03-18

## 2025-03-18 RX ORDER — POTASSIUM PHOSPHATE, MONOBASIC POTASSIUM PHOSPHATE, DIBASIC INJECTION, 236; 224 MG/ML; MG/ML
15 SOLUTION, CONCENTRATE INTRAVENOUS ONCE
Refills: 0 | Status: COMPLETED | OUTPATIENT
Start: 2025-03-18 | End: 2025-03-18

## 2025-03-18 RX ORDER — ENOXAPARIN SODIUM 100 MG/ML
40 INJECTION SUBCUTANEOUS EVERY 24 HOURS
Refills: 0 | Status: DISCONTINUED | OUTPATIENT
Start: 2025-03-19 | End: 2025-04-07

## 2025-03-18 RX ADMIN — Medication 1 APPLICATION(S): at 06:26

## 2025-03-18 RX ADMIN — MUPIROCIN CALCIUM 1 APPLICATION(S): 20 CREAM TOPICAL at 06:25

## 2025-03-18 RX ADMIN — POTASSIUM PHOSPHATE, MONOBASIC POTASSIUM PHOSPHATE, DIBASIC INJECTION, 62.5 MILLIMOLE(S): 236; 224 SOLUTION, CONCENTRATE INTRAVENOUS at 17:39

## 2025-03-18 RX ADMIN — Medication 50 MILLIGRAM(S): at 14:02

## 2025-03-18 RX ADMIN — Medication 100 GRAM(S): at 16:33

## 2025-03-18 NOTE — PROCEDURE NOTE - PLAN
- vent gastrostomy tube x24 hours   - tomorrow AM can start saline through gastrostomy tube at goal tube feed rate  - if tolerates, can transition to tube feeds after 2 hours

## 2025-03-18 NOTE — PROGRESS NOTE ADULT - ASSESSMENT
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     Active problems  tonsillar ca  vascular dementia   odynophagia/lack of appetite   FTT  Anemia in neoplastic disease  hyponatremia  Vascular dementia  hypercoagulable state    tonsillar ca  odynophagia/lack of appetite   Hyponatremia  FTT  severe protein calorie malnutrition  Pt reporting odynophagia and dysphagia w/ regurgitation of food. Dysphagia to solids and liquids. Seems he holds the food and does not swallow due to fear of pain. Pt reporting dysgeusia as well.   (s/p C6 of 7 of cisplatin and on RT -should've been completed on 3/7)  Reviewed CT neck, mucosal thickening and enhancement in the larynx and oropharynx may be due to infectious laryngopharyngitis or sequela of previous radiation performed.  Palliative care consulted  Patient does not like viscous lidocaine or liquid gabapentin - will d/c   Pain control with morphine 30 minutes prior to meals   Cineesophagogram completed - rec puree and thin liquid   Empiric treatment of thrush/esophagitis with fluconazole IV  As per daughter - pt with minimal PO intake. Discussed idea/option of PEG tube for nutrition. Given 5 siblings - will need to discuss and all agree. GI recommending IR eval for PEG - pending.   - Discussed plan of care with Dr. Gibson. Outpatient rad onc team also aware of patient's hospital course.   -Palliative following       Anemia in neoplastic disease  Hb 9.6, otherwise stable, continue to monitor    Vascular dementia  - Pt agitated 3/10 overnight requiring ativan. Mental status improved 3/13. Family reporting continues to have hallucinations but this has been ongoing since diagnosis of dementia.   - Psych consulted. Haldol PRN agitation.   - CTH with chronic microvascular changes.   - Recommend outpatient follow up with neurology.     Starvation Ketosis - Resolved  - AG 17 - UA with ketones - in the setting of poor PO intake. Improved with administration of D5+NS.     Hypercoagulable state 2/2 malignancy  On lovenox 40mg daily    NPO after MN.--> peg today 3/18 with IR  Hold Lovenox.  f/u nutrition ree tube feeds

## 2025-03-18 NOTE — PROGRESS NOTE ADULT - SUBJECTIVE AND OBJECTIVE BOX
[facility] H[facility] solid tumor note    Peg today with IR [facility] solid tumor note    Patient is a 75y old  Male who presents with a chief complaint of odynophagia with decreased PO intake (18 Mar 2025 12:24)      SUBJECTIVE / OVERNIGHT EVENTS:  peg today with IR    MEDICATIONS  (STANDING):  chlorhexidine 2% Cloths 1 Application(s) Topical <User Schedule>  fluconAZOLE IVPB 100 milliGRAM(s) IV Intermittent every 24 hours  fluconAZOLE IVPB      magnesium sulfate  IVPB 1 Gram(s) IV Intermittent once  potassium phosphate IVPB 15 milliMole(s) IV Intermittent once    MEDICATIONS  (PRN):  aluminum hydroxide/magnesium hydroxide/simethicone Suspension 30 milliLiter(s) Oral every 4 hours PRN Dyspepsia  haloperidol    Injectable 0.5 milliGRAM(s) IV Push every 6 hours PRN Combative/agitation  morphine  - Injectable 2 milliGRAM(s) IV Push every 4 hours PRN Severe Pain (7 - 10)  ondansetron Injectable 4 milliGRAM(s) IV Push every 8 hours PRN Nausea and/or Vomiting        CAPILLARY BLOOD GLUCOSE      Vital Signs Last 24 Hrs  T(C): 36.6 (18 Mar 2025 10:20), Max: 36.9 (18 Mar 2025 05:54)  T(F): 97.8 (18 Mar 2025 10:20), Max: 98.4 (18 Mar 2025 05:54)  HR: 70 (18 Mar 2025 11:30) (62 - 78)  BP: 134/78 (18 Mar 2025 11:30) (115/75 - 158/71)  RR: 18 (18 Mar 2025 11:30) (12 - 19)  SpO2: 99% room air      PHYSICAL EXAM:  GENERAL: NAD,   HEAD:  Atraumatic, Normocephalic  EYES: EOMI, PERRLA, conjunctiva and sclera clear  NECK: Supple, No JVD  CHEST/LUNG: Clear to auscultation bilaterally; No wheeze  HEART: Regular rate and rhythm; No murmurs, rubs, or gallops  ABDOMEN: Soft, Nontender, Nondistended; Bowel sounds present  EXTREMITIES:  2+ Peripheral Pulses, No clubbing, cyanosis, or edema  PSYCH: Alert    LABS:                        8.6    2.35  )-----------( 143      ( 18 Mar 2025 04:37 )             24.1     03-18    140  |  101  |  35[H]  ----------------------------<  84  4.2   |  22  |  1.00    Ca    9.2      18 Mar 2025 04:37  Phos  2.9     03-18  Mg     1.90     03-18    TPro  6.5  /  Alb  3.7  /  TBili  1.3[H]  /  DBili  x   /  AST  25  /  ALT  19  /  AlkPhos  72  03-18    PT/INR - ( 18 Mar 2025 04:37 )   PT: 15.2 sec;   INR: 1.31 ratio         PTT - ( 18 Mar 2025 04:37 )  PTT:30.8 sec        Care Discussed with Consultants/Other Providers:    Peg today with IR  f/u nutrition ree tube feeds

## 2025-03-18 NOTE — PROGRESS NOTE ADULT - SUBJECTIVE AND OBJECTIVE BOX
Date of Service    Indication for Geriatrics and Palliative Care Services: Symptom mgmt, GoC    SUBJECTIVE AND OBJECTIVE:    INTERVAL HPI/OVERNIGHT EVENTS:  Patient s/p PEG this AM. Patient seen this AM with son at bedside. Patient glad he will finally be able to have real food, jokes that all he has been eating are air pockets.       Allergies    No Known Allergies    Intolerances    MEDICATIONS  (STANDING):  chlorhexidine 2% Cloths 1 Application(s) Topical <User Schedule>  enoxaparin Injectable 40 milliGRAM(s) SubCutaneous every 24 hours  fluconAZOLE IVPB 100 milliGRAM(s) IV Intermittent every 24 hours  fluconAZOLE IVPB      melatonin 3 milliGRAM(s) Oral at bedtime  mupirocin 2% Ointment 1 Application(s) Both Nostrils two times a day  thiamine Injectable 100 milliGRAM(s) IV Push daily    MEDICATIONS  (PRN):  aluminum hydroxide/magnesium hydroxide/simethicone Suspension 30 milliLiter(s) Oral every 4 hours PRN Dyspepsia  haloperidol    Injectable 0.5 milliGRAM(s) IV Push every 6 hours PRN Combative/agitation  morphine  - Injectable 1 milliGRAM(s) IV Push every 4 hours PRN Severe Pain (7 - 10)  ondansetron Injectable 4 milliGRAM(s) IV Push every 8 hours PRN Nausea and/or Vomiting      ITEMS UNCHECKED ARE NOT PRESENT    PRESENT SYMPTOMS: [ ]Unable to self-report due to altered mental status- see [ ] CPOT [ ] PAINADS [ ] RDOS  Source if other than patient:  [ ]Family   [ ]Team     Pain:  [x ]yes [ ]no  QOL impact - great  Location -  throat       Aggravating factors - eating/drinking   Quality - burning  Radiation - down esophagus  Timing- a/w meals   Severity (0-10 scale): 2  Minimal acceptable level / Pain Goal (0-10 scale):       Dyspnea:                           [ ]Mild [ ]Moderate [ ]Severe    Anxiety:                             [ ]Mild [ ]Moderate [ ]Severe  Agitation:                          [ ]Mild [ ]Moderate [ ]Severe  Fatigue:                             [ ]Mild [ ]Moderate [ ]Severe  Nausea:                             [ ]Mild [ ]Moderate [ ]Severe  Loss of appetite:              [ ]Mild [ ]Moderate [ ]Severe  Constipation:                   [ ]Mild [ ]Moderate [ ]Severe  Diarrhea:                          [ ]Mild [ ]Moderate [ ]Severe  Pruritus:                            [ ]Mild [ ]Moderate [ ]Severe  Depression:                      [ ]Mild [ ]Moderate [ ]Severe    CPOT:    https://www.sccm.org/getattachment/[email or web address]/Critical-Care-Pain-Observation-Tool-(CPOT)    PCSSQ[Palliative Care Spiritual Screening Question]   Severity (0-10):  Score of 4 or > indicate consideration of Chaplaincy referral.  Chaplaincy Referral: [ ] yes [ ] refused [ ] following [x ] deferred    Caregiver [place]? : [ ] yes [ ] no [ ] Declined [x ] Deferred              Social work referral [ ] Patient & Family Centered Care Referral [ ]     Anticipatory Grief present?:  [ ] yes [ ] no  [ x] Deferred                  Social work referral [ ] Chaplaincy Referral[ ] Caregiver Support Referral [ ]    Other Symptoms:  [ x]All other review of systems negative   [ ] Unable to obtain due to poor mentation     PHYSICAL EXAM:  Vital Signs Last 24 Hrs  T(C): 36.6 (18 Mar 2025 10:20), Max: 36.9 (18 Mar 2025 05:54)  T(F): 97.8 (18 Mar 2025 10:20), Max: 98.4 (18 Mar 2025 05:54)  HR: 70 (18 Mar 2025 11:30) (62 - 78)  BP: 134/78 (18 Mar 2025 11:30) (115/75 - 158/71)  BP(mean): --  RR: 18 (18 Mar 2025 11:30) (12 - 19)  SpO2: 99% (18 Mar 2025 11:30) (97% - 100%)    Parameters below as of 18 Mar 2025 10:50  Patient On (Oxygen Delivery Method): room air      GENERAL:  [x ]Alert  [x ]Oriented x2   [ ]Lethargic  [ ]Cachexia  [ ]Unarousable  [x ]Verbal  [ ]Non-Verbal  [x ] No Distress  Behavioral:   [ ] Anxiety  [ ] Delirium [ ] Agitation [x ] Calm  [ ] Other  HEENT:  [ ]Normal  [ ] Temporal Wasting  [ x]Dry mouth   [ ]ET Tube/Trach  [x ]Oral lesions  [ x] Mucositis  PULMONARY: Breathing comfortable , no accessory muscle use   [ ]Clear [ ]Tachypnea  [ ]Audible excessive secretions   [ ]Rhonchi        [ ]Right [ ]Left [ ]Bilateral  [ ]Crackles        [ ]Right [ ]Left [ ]Bilateral  [ ]Wheezing     [ ]Right [ ]Left [ ]Bilateral  [ ]Diminished breath sounds [ ]right [ ]left [ ]bilateral  CARDIOVASCULAR:    [x ]Regular [ ]Irregular [ ]Tachy  [ ]Hua [ ]Murmur [ ]Other  GASTROINTESTINAL: Non-distended   [ ]Soft  [ ]Distended   [ ]+BS  [ ]Non tender [ ]Tender  [x ]PEG [ ]OGT/ NGT  Last BM: 3/8  GENITOURINARY:  [x ]Normal [ ] Incontinent   [ ]Oliguria/Anuria   [ ]Alan  MUSCULOSKELETAL:   [ x]Normal   [ ]Weakness  [ ]Bed/Wheelchair bound [ ]Edema  [  ] amputation  [  ] contraction  NEUROLOGIC:   [x ]No focal deficits  [ x]Cognitive impairment  [ ]Dysphagia [ ]Dysarthria [ ]Paresis [ ]Other   SKIN: See Nursing Skin Assessment for further details  [x ]Normal    [ ]Rash  [ ]Pressure ulcer(s)       Present on admission [ ]y [ ]n   [  ]  Wound    [  ] hyperpigmentation    CRITICAL CARE:  [ ]Shock Present  [ ]Septic [ ]Cardiogenic [ ]Neurologic [ ]Hypovolemic  [ ]Vasopressors [ ]Inotropes  [ ]Respiratory failure present [ ]Mechanical Ventilation [ ]Non-invasive ventilatory support [ ]High-Flow   [ ]Acute  [ ]Chronic [ ]Hypoxic  [ ]Hypercarbic [ ]Other  [ ]Other organ failure     LABS:                            8.6    2.35  )-----------( 143      ( 18 Mar 2025 04:37 )             24.1     03-18    140  |  101  |  35[H]  ----------------------------<  84  4.2   |  22  |  1.00    Ca    9.2      18 Mar 2025 04:37  Phos  2.9     03-18  Mg     1.90     03-18    TPro  6.5  /  Alb  3.7  /  TBili  1.3[H]  /  DBili  x   /  AST  25  /  ALT  19  /  AlkPhos  72  03-18      RADIOLOGY & ADDITIONAL STUDIES:     < from: CT Abdomen and Pelvis No Cont (03.16.25 @ 18:22) >  IMPRESSION:  No evidence of acute abnormality in the abdomen and pelvis.    < end of copied text >      Protein Calorie Malnutrition Present: [ ]mild [ ]moderate [ ]severe [ ]underweight [ ]morbid obesity  https://www.andeal.org/vault/2440/web/files/ONC/Table_Clinical%20Characteristics%20to%20Document%20Malnutrition-White%20JV%20et%20al%202012.pdf    Height (cm): 177.8 (03-06-25 @ 15:47), 177.8 (03-05-25 @ 18:20), 175 (02-28-25 @ 15:55)  Weight (kg): 81.3 (03-07-25 @ 05:32), 80.7 (03-05-25 @ 18:20), 80.8 (03-04-25 @ 16:37)  BMI (kg/m2): 25.7 (03-07-25 @ 05:32), 25.5 (03-06-25 @ 15:47), 25.5 (03-05-25 @ 18:20)    [ ]PPSV2 < or = 30%  [x ]significant weight loss [x ]poor nutritional intake [ ]anasarca   [ ]Artificial Nutrition    REFERRALS:   [ ]Chaplaincy  [ ]Hospice  [ ]Child Life  [ x]Social Work  [ x]Case management [ ]Holistic Therapy     Goals of Care Document:     Date of Service    Indication for Geriatrics and Palliative Care Services: Symptom mgmt, GoC    SUBJECTIVE AND OBJECTIVE:    INTERVAL HPI/OVERNIGHT EVENTS:  Patient s/p PEG this AM. Patient seen this AM with son at bedside. Patient glad he will finally be able to have real food, jokes that all he has been eating are air pockets.       Allergies    No Known Allergies    Intolerances    MEDICATIONS  (STANDING):  chlorhexidine 2% Cloths 1 Application(s) Topical <User Schedule>  enoxaparin Injectable 40 milliGRAM(s) SubCutaneous every 24 hours  fluconAZOLE IVPB 100 milliGRAM(s) IV Intermittent every 24 hours  fluconAZOLE IVPB      melatonin 3 milliGRAM(s) Oral at bedtime  mupirocin 2% Ointment 1 Application(s) Both Nostrils two times a day  thiamine Injectable 100 milliGRAM(s) IV Push daily    MEDICATIONS  (PRN):  aluminum hydroxide/magnesium hydroxide/simethicone Suspension 30 milliLiter(s) Oral every 4 hours PRN Dyspepsia  haloperidol    Injectable 0.5 milliGRAM(s) IV Push every 6 hours PRN Combative/agitation  morphine  - Injectable 1 milliGRAM(s) IV Push every 4 hours PRN Severe Pain (7 - 10)  ondansetron Injectable 4 milliGRAM(s) IV Push every 8 hours PRN Nausea and/or Vomiting      ITEMS UNCHECKED ARE NOT PRESENT    PRESENT SYMPTOMS: [ ]Unable to self-report due to altered mental status- see [ ] CPOT [ ] PAINADS [ ] RDOS  Source if other than patient:  [ ]Family   [ ]Team     Pain:  [x ]yes [ ]no  QOL impact - great  Location -  throat       Aggravating factors - eating/drinking   Quality - burning  Radiation - down esophagus  Timing- a/w meals   Severity (0-10 scale): 2  Minimal acceptable level / Pain Goal (0-10 scale):       Dyspnea:                           [ ]Mild [ ]Moderate [ ]Severe    Anxiety:                             [ ]Mild [ ]Moderate [ ]Severe  Agitation:                          [ ]Mild [ ]Moderate [ ]Severe  Fatigue:                             [ ]Mild [ ]Moderate [ ]Severe  Nausea:                             [ ]Mild [ ]Moderate [ ]Severe  Loss of appetite:              [ ]Mild [ ]Moderate [ ]Severe  Constipation:                   [ ]Mild [ ]Moderate [ ]Severe  Diarrhea:                          [ ]Mild [ ]Moderate [ ]Severe  Pruritus:                            [ ]Mild [ ]Moderate [ ]Severe  Depression:                      [ ]Mild [ ]Moderate [ ]Severe    CPOT:    https://www.sccm.org/getattachment/[email or web address]/Critical-Care-Pain-Observation-Tool-(CPOT)    PCSSQ[Palliative Care Spiritual Screening Question]   Severity (0-10):  Score of 4 or > indicate consideration of Chaplaincy referral.  Chaplaincy Referral: [ ] yes [ ] refused [ ] following [x ] deferred    Caregiver [place]? : [x ] yes [ ] no [ ] Declined [ ] Deferred              Social work referral [ ] Patient & Family Centered Care Referral [ ]     Anticipatory Grief present?:  [ ] yes [ ] no  [ x] Deferred                  Social work referral [ ] Chaplaincy Referral[ ] Caregiver Support Referral [x ]    Other Symptoms:  [ x]All other review of systems negative   [ ] Unable to obtain due to poor mentation     PHYSICAL EXAM:  Vital Signs Last 24 Hrs  T(C): 36.6 (18 Mar 2025 10:20), Max: 36.9 (18 Mar 2025 05:54)  T(F): 97.8 (18 Mar 2025 10:20), Max: 98.4 (18 Mar 2025 05:54)  HR: 70 (18 Mar 2025 11:30) (62 - 78)  BP: 134/78 (18 Mar 2025 11:30) (115/75 - 158/71)  BP(mean): --  RR: 18 (18 Mar 2025 11:30) (12 - 19)  SpO2: 99% (18 Mar 2025 11:30) (97% - 100%)    Parameters below as of 18 Mar 2025 10:50  Patient On (Oxygen Delivery Method): room air      GENERAL:  [x ]Alert  [x ]Oriented x2   [ ]Lethargic  [ ]Cachexia  [ ]Unarousable  [x ]Verbal  [ ]Non-Verbal  [x ] No Distress  Behavioral:   [ ] Anxiety  [ ] Delirium [ ] Agitation [x ] Calm  [ ] Other  HEENT:  [ ]Normal  [ ] Temporal Wasting  [ x]Dry mouth   [ ]ET Tube/Trach  [x ]Oral lesions  [ x] Mucositis  PULMONARY: Breathing comfortable , no accessory muscle use   [ ]Clear [ ]Tachypnea  [ ]Audible excessive secretions   [ ]Rhonchi        [ ]Right [ ]Left [ ]Bilateral  [ ]Crackles        [ ]Right [ ]Left [ ]Bilateral  [ ]Wheezing     [ ]Right [ ]Left [ ]Bilateral  [ ]Diminished breath sounds [ ]right [ ]left [ ]bilateral  CARDIOVASCULAR:    [x ]Regular [ ]Irregular [ ]Tachy  [ ]Hua [ ]Murmur [ ]Other  GASTROINTESTINAL: Non-distended   [ ]Soft  [ ]Distended   [ ]+BS  [ ]Non tender [ ]Tender  [x ]PEG [ ]OGT/ NGT  Last BM: 3/8  GENITOURINARY:  [x ]Normal [ ] Incontinent   [ ]Oliguria/Anuria   [ ]Alan  MUSCULOSKELETAL:   [ x]Normal   [ ]Weakness  [ ]Bed/Wheelchair bound [ ]Edema  [  ] amputation  [  ] contraction  NEUROLOGIC:   [x ]No focal deficits  [ x]Cognitive impairment  [ ]Dysphagia [ ]Dysarthria [ ]Paresis [ ]Other   SKIN: See Nursing Skin Assessment for further details  [x ]Normal    [ ]Rash  [ ]Pressure ulcer(s)       Present on admission [ ]y [ ]n   [  ]  Wound    [  ] hyperpigmentation    CRITICAL CARE:  [ ]Shock Present  [ ]Septic [ ]Cardiogenic [ ]Neurologic [ ]Hypovolemic  [ ]Vasopressors [ ]Inotropes  [ ]Respiratory failure present [ ]Mechanical Ventilation [ ]Non-invasive ventilatory support [ ]High-Flow   [ ]Acute  [ ]Chronic [ ]Hypoxic  [ ]Hypercarbic [ ]Other  [ ]Other organ failure     LABS:                            8.6    2.35  )-----------( 143      ( 18 Mar 2025 04:37 )             24.1     03-18    140  |  101  |  35[H]  ----------------------------<  84  4.2   |  22  |  1.00    Ca    9.2      18 Mar 2025 04:37  Phos  2.9     03-18  Mg     1.90     03-18    TPro  6.5  /  Alb  3.7  /  TBili  1.3[H]  /  DBili  x   /  AST  25  /  ALT  19  /  AlkPhos  72  03-18      RADIOLOGY & ADDITIONAL STUDIES:     < from: CT Abdomen and Pelvis No Cont (03.16.25 @ 18:22) >  IMPRESSION:  No evidence of acute abnormality in the abdomen and pelvis.    < end of copied text >      Protein Calorie Malnutrition Present: [ ]mild [ ]moderate [ ]severe [ ]underweight [ ]morbid obesity  https://www.andeal.org/vault/2440/web/files/ONC/Table_Clinical%20Characteristics%20to%20Document%20Malnutrition-White%20JV%20et%20al%202012.pdf    Height (cm): 177.8 (03-06-25 @ 15:47), 177.8 (03-05-25 @ 18:20), 175 (02-28-25 @ 15:55)  Weight (kg): 81.3 (03-07-25 @ 05:32), 80.7 (03-05-25 @ 18:20), 80.8 (03-04-25 @ 16:37)  BMI (kg/m2): 25.7 (03-07-25 @ 05:32), 25.5 (03-06-25 @ 15:47), 25.5 (03-05-25 @ 18:20)    [ ]PPSV2 < or = 30%  [x ]significant weight loss [x ]poor nutritional intake [ ]anasarca   [ ]Artificial Nutrition    REFERRALS:   [ ]Chaplaincy  [ ]Hospice  [ ]Child Life  [ x]Social Work  [ x]Case management [ ]Holistic Therapy     Goals of Care Document:

## 2025-03-18 NOTE — PRE PROCEDURE NOTE - PRE PROCEDURE EVALUATION
Interventional Radiology Pre-Procedure Note    Diagnosis/Indication: Patient is a 75y old male who presents with a chief complaint of odynophagia with decreased PO intake. The stomach appears accessible on most recent CT abd/pelvis however prior CT chest shows colon interposed between wall and stomach. Will plan to insufflate stomach to determine if there is still a window for access.       PAST MEDICAL & SURGICAL HISTORY:  Ventral hernia      Acid reflux      Osteoarthritis of right hip      Tonsil cancer      History of bilateral inguinal hernia repair  1974      S/P carpal tunnel release  right ; 1993           Allergies: No Known Allergies      LABS:                        8.6    2.35  )-----------( 143      ( 18 Mar 2025 04:37 )             24.1     03-18    140  |  101  |  35[H]  ----------------------------<  84  4.2   |  22  |  1.00    Ca    9.2      18 Mar 2025 04:37  Phos  2.9     03-18  Mg     1.90     03-18    TPro  6.5  /  Alb  3.7  /  TBili  1.3[H]  /  DBili  x   /  AST  25  /  ALT  19  /  AlkPhos  72  03-18    PT/INR - ( 18 Mar 2025 04:37 )   PT: 15.2 sec;   INR: 1.31 ratio         PTT - ( 18 Mar 2025 04:37 )  PTT:30.8 sec    Procedure/ risks/ benefits were explained, informed consent obtained from patient, verbalizes understanding.

## 2025-03-18 NOTE — PROGRESS NOTE ADULT - PROBLEM SELECTOR PLAN 1
Follows with Dr. Gibson and Dr. Mitchell at [facility] for onc/rad onc  - He started concurrent chemo/RT 1/21/25 with weekly cisplatin started 1/24/25  - He has completed 30/35 RT sessions. According to family recent visits with oncologist have been optimistic in regards to his treatment response. 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 He was scheduled to complete his last chemo session  - CT neck - 1.   Mucosal thickening and enhancement in the larynx and oropharynx may be due to infectious laryngopharyngitis or sequela of previous radiation performed. 2.  No evidence of residual or recurrent tumor within the left tonsillar location. 3. No new or enlarging cervical lymph nodes.

## 2025-03-18 NOTE — PROGRESS NOTE ADULT - PROBLEM SELECTOR PLAN 6
For pain management   s/p PEG     In the event of worsening symptoms, please contact the Palliative Medicine team via pager (if the patient is at [facility] #[phone] or if the patient is at [facility] #[phone]) The Geriatric and Palliative Medicine service has coverage 24 hours a day/ 7 days a week to provide medical recommendations regarding symptom management needs via telephone.

## 2025-03-19 LAB
ANION GAP SERPL CALC-SCNC: 13 MMOL/L — SIGNIFICANT CHANGE UP (ref 7–14)
BASOPHILS # BLD AUTO: 0.02 K/UL — SIGNIFICANT CHANGE UP (ref 0–0.2)
BASOPHILS NFR BLD AUTO: 0.6 % — SIGNIFICANT CHANGE UP (ref 0–2)
BUN SERPL-MCNC: 36 MG/DL — HIGH (ref 7–23)
CALCIUM SERPL-MCNC: 9.1 MG/DL — SIGNIFICANT CHANGE UP (ref 8.4–10.5)
CHLORIDE SERPL-SCNC: 102 MMOL/L — SIGNIFICANT CHANGE UP (ref 98–107)
CO2 SERPL-SCNC: 25 MMOL/L — SIGNIFICANT CHANGE UP (ref 22–31)
CREAT SERPL-MCNC: 0.94 MG/DL — SIGNIFICANT CHANGE UP (ref 0.5–1.3)
EGFR: 84 ML/MIN/1.73M2 — SIGNIFICANT CHANGE UP
EGFR: 84 ML/MIN/1.73M2 — SIGNIFICANT CHANGE UP
EOSINOPHIL # BLD AUTO: 0 K/UL — SIGNIFICANT CHANGE UP (ref 0–0.5)
EOSINOPHIL NFR BLD AUTO: 0 % — SIGNIFICANT CHANGE UP (ref 0–6)
GLUCOSE SERPL-MCNC: 103 MG/DL — HIGH (ref 70–99)
HCT VFR BLD CALC: 26 % — LOW (ref 39–50)
HGB BLD-MCNC: 9.3 G/DL — LOW (ref 13–17)
IANC: 2.59 K/UL — SIGNIFICANT CHANGE UP (ref 1.8–7.4)
IMM GRANULOCYTES NFR BLD AUTO: 0.3 % — SIGNIFICANT CHANGE UP (ref 0–0.9)
LYMPHOCYTES # BLD AUTO: 0.54 K/UL — LOW (ref 1–3.3)
LYMPHOCYTES # BLD AUTO: 14.9 % — SIGNIFICANT CHANGE UP (ref 13–44)
MAGNESIUM SERPL-MCNC: 2 MG/DL — SIGNIFICANT CHANGE UP (ref 1.6–2.6)
MCHC RBC-ENTMCNC: 31.3 PG — SIGNIFICANT CHANGE UP (ref 27–34)
MCHC RBC-ENTMCNC: 35.8 G/DL — SIGNIFICANT CHANGE UP (ref 32–36)
MCV RBC AUTO: 87.5 FL — SIGNIFICANT CHANGE UP (ref 80–100)
MONOCYTES # BLD AUTO: 0.46 K/UL — SIGNIFICANT CHANGE UP (ref 0–0.9)
MONOCYTES NFR BLD AUTO: 12.7 % — SIGNIFICANT CHANGE UP (ref 2–14)
NEUTROPHILS # BLD AUTO: 2.59 K/UL — SIGNIFICANT CHANGE UP (ref 1.8–7.4)
NEUTROPHILS NFR BLD AUTO: 71.5 % — SIGNIFICANT CHANGE UP (ref 43–77)
NRBC # BLD AUTO: 0 K/UL — SIGNIFICANT CHANGE UP (ref 0–0)
NRBC # FLD: 0 K/UL — SIGNIFICANT CHANGE UP (ref 0–0)
NRBC BLD AUTO-RTO: 0 /100 WBCS — SIGNIFICANT CHANGE UP (ref 0–0)
PHOSPHATE SERPL-MCNC: 2.4 MG/DL — LOW (ref 2.5–4.5)
PLATELET # BLD AUTO: 188 K/UL — SIGNIFICANT CHANGE UP (ref 150–400)
POTASSIUM SERPL-MCNC: 3.5 MMOL/L — SIGNIFICANT CHANGE UP (ref 3.5–5.3)
POTASSIUM SERPL-SCNC: 3.5 MMOL/L — SIGNIFICANT CHANGE UP (ref 3.5–5.3)
RBC # BLD: 2.97 M/UL — LOW (ref 4.2–5.8)
RBC # FLD: 18.5 % — HIGH (ref 10.3–14.5)
SODIUM SERPL-SCNC: 140 MMOL/L — SIGNIFICANT CHANGE UP (ref 135–145)
WBC # BLD: 3.62 K/UL — LOW (ref 3.8–10.5)
WBC # FLD AUTO: 3.62 K/UL — LOW (ref 3.8–10.5)

## 2025-03-19 PROCEDURE — 99233 SBSQ HOSP IP/OBS HIGH 50: CPT

## 2025-03-19 RX ORDER — MELATONIN 5 MG
3 TABLET ORAL AT BEDTIME
Refills: 0 | Status: DISCONTINUED | OUTPATIENT
Start: 2025-03-19 | End: 2025-03-20

## 2025-03-19 RX ADMIN — ENOXAPARIN SODIUM 40 MILLIGRAM(S): 100 INJECTION SUBCUTANEOUS at 13:19

## 2025-03-19 RX ADMIN — Medication 50 MILLIGRAM(S): at 13:15

## 2025-03-19 RX ADMIN — Medication 1 APPLICATION(S): at 05:14

## 2025-03-19 RX ADMIN — Medication 3 MILLIGRAM(S): at 21:42

## 2025-03-19 RX ADMIN — HALOPERIDOL 0.5 MILLIGRAM(S): 10 TABLET ORAL at 04:46

## 2025-03-19 NOTE — DISCHARGE NOTE NURSING/CASE MANAGEMENT/SOCIAL WORK - NSDPFAC_GEN_ALL_CORE
[facility] [address] [place] [id number] Phone: [phone]

## 2025-03-19 NOTE — DISCHARGE NOTE NURSING/CASE MANAGEMENT/SOCIAL WORK - NSDCPEFALRISK_GEN_ALL_CORE
For information on Fall & Injury Prevention, visit: [email or web address] OR  [email or web address] OR  https://www.cdc.gov/steadi/patient.html

## 2025-03-19 NOTE — PROGRESS NOTE ADULT - ASSESSMENT
75y old male who presents with a chief complaint of odynophagia with decreased PO intake. The stomach appears accessible on most recent CT abd/pelvis however prior CT chest shows colon interposed between wall and stomach. IR consulted for G tube placement. Patient is now s/p Gastrostomy tube placement on 3/18/25 in Interventional Radiology.     Plan:  - Infuse NS at 60cc/hr x 6 hours  - If patient tolerates NS infusion, can use gastrostomy tube for feeding as per primary team.  - Routine exchange in 3 months. Can be scheduled as outpatient. Outpatient IR office number [phone] or email [email or web address]    [email or web address]

## 2025-03-19 NOTE — PROGRESS NOTE ADULT - ASSESSMENT
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     Active problems  tonsillar ca  vascular dementia   odynophagia/lack of appetite   FTT  Anemia in neoplastic disease  hyponatremia  Vascular dementia  hypercoagulable state    tonsillar ca  odynophagia/lack of appetite   Hyponatremia  FTT  severe protein calorie malnutrition  Pt reporting odynophagia and dysphagia w/ regurgitation of food. Dysphagia to solids and liquids. Seems he holds the food and does not swallow due to fear of pain. Pt reporting dysgeusia as well.   (s/p C6 of 7 of cisplatin and on RT -should've been completed on 3/7)  Reviewed CT neck, mucosal thickening and enhancement in the larynx and oropharynx may be due to infectious laryngopharyngitis or sequela of previous radiation performed.  Palliative care consulted  Patient does not like viscous lidocaine or liquid gabapentin - will d/c   Pain control with morphine 30 minutes prior to meals   Cineesophagogram completed - rec puree and thin liquid   Empiric treatment of thrush/esophagitis with fluconazole IV  As per daughter - pt with minimal PO intake. Discussed idea/option of PEG tube for nutrition. Given 5 siblings - will need to discuss and all agree. GI recommending IR eval for PEG - pending.   - Discussed plan of care with Dr. Gibson. Outpatient rad onc team also aware of patient's hospital course.   -Palliative following       Anemia in neoplastic disease  Hb 9.6, otherwise stable, continue to monitor    Vascular dementia  - Pt agitated 3/10 overnight requiring ativan. Mental status improved 3/13. Family reporting continues to have hallucinations but this has been ongoing since diagnosis of dementia.   - Psych consulted. Haldol PRN agitation.   - CTH with chronic microvascular changes.   - Recommend outpatient follow up with neurology.     Starvation Ketosis - Resolved  - AG 17 - UA with ketones - in the setting of poor PO intake. Improved with administration of D5+NS.     Hypercoagulable state 2/2 malignancy  On lovenox 40mg daily    NPO after MN.--> peg today 3/18 with IR  Hold Lovenox.  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     Active problems  tonsillar ca  vascular dementia   odynophagia/lack of appetite   FTT  Anemia in neoplastic disease  hyponatremia  Vascular dementia  hypercoagulable state  Severe Protein calorie Malnutrition    tonsillar ca  odynophagia/lack of appetite   Hyponatremia  FTT  severe protein calorie malnutrition  Pt reporting odynophagia and dysphagia w/ regurgitation of food. Dysphagia to solids and liquids. Seems he holds the food and does not swallow due to fear of pain. Pt reporting dysgeusia as well.   (s/p C6 of 7 of cisplatin and on RT -should've been completed on 3/7)  Reviewed CT neck, mucosal thickening and enhancement in the larynx and oropharynx may be due to infectious laryngopharyngitis or sequela of previous radiation performed.  Palliative care consulted  Patient does not like viscous lidocaine or liquid gabapentin - will d/c   Pain control with morphine 30 minutes prior to meals   Cineesophagogram completed - rec puree and thin liquid   Empiric treatment of thrush/esophagitis with fluconazole IV  As per daughter - pt with minimal PO intake. Discussed idea/option of PEG tube for nutrition. Given 5 siblings - will need to discuss and all agree. GI recommending IR eval for PEG - pending.   - Discussed plan of care with Dr. Gibson. Outpatient rad onc team also aware of patient's hospital course.   -Palliative following       Anemia in neoplastic disease  Hb 9.6, otherwise stable, continue to monitor    Vascular dementia  - Pt agitated 3/10 overnight requiring ativan. Mental status improved 3/13. Family reporting continues to have hallucinations but this has been ongoing since diagnosis of dementia.   - Psych consulted. Haldol PRN agitation.   - CTH with chronic microvascular changes.   - Recommend outpatient follow up with neurology.     Starvation Ketosis - Resolved  - AG 17 - UA with ketones - in the setting of poor PO intake. Improved with administration of D5+NS.     severe protein Calorie Malnutrition  peg 3/18, Tube feeds to start,    Hypercoagulable state 2/2 malignancy  On lovenox 40mg daily     peg today 3/18 with IR--> Peg placed 3/18, NS x 6 hrs, then start feeds at 10 cc/hr with goal to 60 cc/hr and then transition to Bolus for Home. Will monitor PHos and electrolytes for any re-feed syndrome   Abd binder to prevent patient from removing Peg tube  Restart Lovenox.  Once at bolus feeds, d/c planning ?? to home ?  Out patient f/u with Dr Gibson

## 2025-03-19 NOTE — DISCHARGE NOTE NURSING/CASE MANAGEMENT/SOCIAL WORK - PATIENT PORTAL LINK FT
You can access the FollowMyHealth Patient Portal offered by [facility] by registering at the following website: [email or web address]. By joining [employer]’s FollowMyHealth portal, you will also be able to view your health information using other applications (apps) compatible with our system.

## 2025-03-19 NOTE — DISCHARGE NOTE NURSING/CASE MANAGEMENT/SOCIAL WORK - FINANCIAL ASSISTANCE
[facility] provides services at a reduced cost to those who are determined to be eligible through [facility]’s financial assistance program. Information regarding [facility]’s financial assistance program can be found by going to [email or web address] or by calling [phone].

## 2025-03-19 NOTE — PROGRESS NOTE ADULT - SUBJECTIVE AND OBJECTIVE BOX
75y Male s/p Gastrostomy tube placement on 3/18/25 in Interventional Radiology.     Patient seen and examined bedside resting comfortably. Currently drowsy due to receiving Haldol. H/H and vital signs stable.     T(F): 97.7 (03-19-25 @ 07:34), Max: 98.2 (03-18-25 @ 21:16)  HR: 65 (03-19-25 @ 07:34) (64 - 84)  BP: 108/62 (03-19-25 @ 07:34) (108/62 - 138/78)  RR: 18 (03-19-25 @ 07:34) (14 - 18)  SpO2: 98% (03-19-25 @ 07:34) (98% - 100%)  Wt(kg): --    LABS:                        8.6    2.35  )-----------( 143      ( 18 Mar 2025 04:37 )             24.1     03-18    140  |  101  |  35[H]  ----------------------------<  84  4.2   |  22  |  1.00    Ca    9.2      18 Mar 2025 04:37  Phos  2.9     03-18  Mg     1.90     03-18    TPro  6.5  /  Alb  3.7  /  TBili  1.3[H]  /  DBili  x   /  AST  25  /  ALT  19  /  AlkPhos  72  03-18    PT/INR - ( 18 Mar 2025 04:37 )   PT: 15.2 sec;   INR: 1.31 ratio         PTT - ( 18 Mar 2025 04:37 )  PTT:30.8 sec  I&O's Detail        PHYSICAL EXAM:  General: Nontoxic, in NAD  Gastrostomy Tube: dressing c/d/i, normal saline currently infusing

## 2025-03-19 NOTE — PROVIDER CONTACT NOTE (MEDICATION) - ASSESSMENT
Pt agitated, getting up, arguing with son and RN and touching PEG tube insertion site. RN and Pt son attempted to verbally redirect patient. Verbal redirection unsuccessful, prn haldol given.

## 2025-03-19 NOTE — PROVIDER CONTACT NOTE (MEDICATION) - BACKGROUND
Pt has hx vascular dementia and oropharyngeal ca. Pt admit w FTT, s/p PEG tube placement 3/18. Pt previously had MIKE called this admission for agitation/combativeness, haldol prn ordered.

## 2025-03-19 NOTE — PROGRESS NOTE ADULT - SUBJECTIVE AND OBJECTIVE BOX
Onc Hosp Solid Tumor Note Onc Hosp Solid Tumor Note    saline trial via peg, then tube feeds Onc Hosp Solid Tumor Note    Patient is a 75y old  Male who presents with a chief complaint of odynophagia with decreased PO intake (19 Mar 2025 10:34)      SUBJECTIVE / OVERNIGHT EVENTS:  Patient seen with Carlos Wilkerson and son at bedside.  Son wanted to know when feeds will start.  Peg placed 3/18, NS x 6 hrs, then start feeds at 10 cc/hr with goal to 60 cc/hr and then transition to Bolus for Home.  Abd binder to prevent patient from removing Peg tube.      MEDICATIONS  (STANDING):  chlorhexidine 2% Cloths 1 Application(s) Topical <User Schedule>  enoxaparin Injectable 40 milliGRAM(s) SubCutaneous every 24 hours  fluconAZOLE IVPB 100 milliGRAM(s) IV Intermittent every 24 hours  fluconAZOLE IVPB        MEDICATIONS  (PRN):  aluminum hydroxide/magnesium hydroxide/simethicone Suspension 30 milliLiter(s) Oral every 4 hours PRN Dyspepsia  haloperidol    Injectable 0.5 milliGRAM(s) IV Push every 6 hours PRN Combative/agitation  morphine  - Injectable 2 milliGRAM(s) IV Push every 4 hours PRN Severe Pain (7 - 10)  ondansetron Injectable 4 milliGRAM(s) IV Push every 8 hours PRN Nausea and/or Vomiting        CAPILLARY BLOOD GLUCOSE  Vital Signs Last 24 Hrs    T(F): 97.7 (19 Mar 2025 07:34), Max: 98.2 (18 Mar 2025 21:16)  HR: 65 (19 Mar 2025 07:34) (65 - 84)  BP: 108/62 (19 Mar 2025 07:34) (108/62 - 133/74)  RR: 18 (19 Mar 2025 07:34) (16 - 18)  SpO2: 98% room air        PHYSICAL EXAM:  GENERAL: NAD,   HEAD:  Atraumatic, Normocephalic  EYES: EOMI, PERRLA, conjunctiva and sclera clear  NECK: Supple, No JVD  CHEST/LUNG: Clear to auscultation bilaterally; No wheeze  HEART: Regular rate and rhythm; No murmurs, rubs, or gallops  ABDOMEN: Soft, Nontender, Nondistended; Bowel sounds present  Oeg in place  EXTREMITIES:  2+ Peripheral Pulses, No clubbing, cyanosis, or edema  PSYCH: Alert   NEUROLOGY: non-focal  SKIN: tattoes    LABS:                        8.6    2.35  )-----------( 143      ( 18 Mar 2025 04:37 )             24.1     03-18    140  |  101  |  35[H]  ----------------------------<  84  4.2   |  22  |  1.00    Ca    9.2      18 Mar 2025 04:37  Phos  2.9     03-18  Mg     1.90     03-18    TPro  6.5  /  Alb  3.7  /  TBili  1.3[H]  /  DBili  x   /  AST  25  /  ALT  19  /  AlkPhos  72  03-18    PT/INR - ( 18 Mar 2025 04:37 )   PT: 15.2 sec;   INR: 1.31 ratio         PTT - ( 18 Mar 2025 04:37 )  PTT:30.8 sec          Care Discussed with Consultants/Other Providers:  Plan d/w patient/ son at bedside/ Pa/ CM--> planning for Bolus tube feeds for Home ??? Friday ??  saline trial via peg, then tube feeds

## 2025-03-20 ENCOUNTER — APPOINTMENT (OUTPATIENT)
Dept: INTERNAL MEDICINE | Facility: CLINIC | Age: 75
End: 2025-03-20

## 2025-03-20 LAB
ALBUMIN SERPL ELPH-MCNC: 3.5 G/DL — SIGNIFICANT CHANGE UP (ref 3.3–5)
ALP SERPL-CCNC: 116 U/L — SIGNIFICANT CHANGE UP (ref 40–120)
ALT FLD-CCNC: 106 U/L — HIGH (ref 4–41)
ANION GAP SERPL CALC-SCNC: 9 MMOL/L — SIGNIFICANT CHANGE UP (ref 7–14)
AST SERPL-CCNC: 188 U/L — HIGH (ref 4–40)
BASOPHILS # BLD AUTO: 0.01 K/UL — SIGNIFICANT CHANGE UP (ref 0–0.2)
BASOPHILS NFR BLD AUTO: 0.4 % — SIGNIFICANT CHANGE UP (ref 0–2)
BILIRUB SERPL-MCNC: 1.1 MG/DL — SIGNIFICANT CHANGE UP (ref 0.2–1.2)
BUN SERPL-MCNC: 31 MG/DL — HIGH (ref 7–23)
CALCIUM SERPL-MCNC: 9 MG/DL — SIGNIFICANT CHANGE UP (ref 8.4–10.5)
CHLORIDE SERPL-SCNC: 102 MMOL/L — SIGNIFICANT CHANGE UP (ref 98–107)
CO2 SERPL-SCNC: 28 MMOL/L — SIGNIFICANT CHANGE UP (ref 22–31)
CREAT SERPL-MCNC: 0.94 MG/DL — SIGNIFICANT CHANGE UP (ref 0.5–1.3)
EGFR: 84 ML/MIN/1.73M2 — SIGNIFICANT CHANGE UP
EGFR: 84 ML/MIN/1.73M2 — SIGNIFICANT CHANGE UP
EOSINOPHIL # BLD AUTO: 0 K/UL — SIGNIFICANT CHANGE UP (ref 0–0.5)
EOSINOPHIL NFR BLD AUTO: 0 % — SIGNIFICANT CHANGE UP (ref 0–6)
GLUCOSE SERPL-MCNC: 132 MG/DL — HIGH (ref 70–99)
HCT VFR BLD CALC: 23.4 % — LOW (ref 39–50)
HGB BLD-MCNC: 8.4 G/DL — LOW (ref 13–17)
IANC: 1.71 K/UL — LOW (ref 1.8–7.4)
IMM GRANULOCYTES NFR BLD AUTO: 0.4 % — SIGNIFICANT CHANGE UP (ref 0–0.9)
LYMPHOCYTES # BLD AUTO: 0.33 K/UL — LOW (ref 1–3.3)
LYMPHOCYTES # BLD AUTO: 14.5 % — SIGNIFICANT CHANGE UP (ref 13–44)
MAGNESIUM SERPL-MCNC: 2.2 MG/DL — SIGNIFICANT CHANGE UP (ref 1.6–2.6)
MCHC RBC-ENTMCNC: 31.6 PG — SIGNIFICANT CHANGE UP (ref 27–34)
MCHC RBC-ENTMCNC: 35.9 G/DL — SIGNIFICANT CHANGE UP (ref 32–36)
MCV RBC AUTO: 88 FL — SIGNIFICANT CHANGE UP (ref 80–100)
MONOCYTES # BLD AUTO: 0.21 K/UL — SIGNIFICANT CHANGE UP (ref 0–0.9)
MONOCYTES NFR BLD AUTO: 9.3 % — SIGNIFICANT CHANGE UP (ref 2–14)
NEUTROPHILS # BLD AUTO: 1.71 K/UL — LOW (ref 1.8–7.4)
NEUTROPHILS NFR BLD AUTO: 75.4 % — SIGNIFICANT CHANGE UP (ref 43–77)
NRBC # BLD AUTO: 0 K/UL — SIGNIFICANT CHANGE UP (ref 0–0)
NRBC # FLD: 0 K/UL — SIGNIFICANT CHANGE UP (ref 0–0)
NRBC BLD AUTO-RTO: 0 /100 WBCS — SIGNIFICANT CHANGE UP (ref 0–0)
PHOSPHATE SERPL-MCNC: 2.6 MG/DL — SIGNIFICANT CHANGE UP (ref 2.5–4.5)
PLATELET # BLD AUTO: 159 K/UL — SIGNIFICANT CHANGE UP (ref 150–400)
POTASSIUM SERPL-MCNC: 3.4 MMOL/L — LOW (ref 3.5–5.3)
POTASSIUM SERPL-SCNC: 3.4 MMOL/L — LOW (ref 3.5–5.3)
PROT SERPL-MCNC: 6 G/DL — SIGNIFICANT CHANGE UP (ref 6–8.3)
RBC # BLD: 2.66 M/UL — LOW (ref 4.2–5.8)
RBC # FLD: 18.3 % — HIGH (ref 10.3–14.5)
SODIUM SERPL-SCNC: 139 MMOL/L — SIGNIFICANT CHANGE UP (ref 135–145)
WBC # BLD: 2.27 K/UL — LOW (ref 3.8–10.5)
WBC # FLD AUTO: 2.27 K/UL — LOW (ref 3.8–10.5)

## 2025-03-20 PROCEDURE — 99232 SBSQ HOSP IP/OBS MODERATE 35: CPT

## 2025-03-20 RX ORDER — LORAZEPAM 4 MG/ML
2 VIAL (ML) INJECTION ONCE
Refills: 0 | Status: DISCONTINUED | OUTPATIENT
Start: 2025-03-20 | End: 2025-03-20

## 2025-03-20 RX ORDER — MAGNESIUM, ALUMINUM HYDROXIDE 200-200 MG
30 TABLET,CHEWABLE ORAL EVERY 4 HOURS
Refills: 0 | Status: DISCONTINUED | OUTPATIENT
Start: 2025-03-20 | End: 2025-04-07

## 2025-03-20 RX ORDER — MELATONIN 5 MG
3 TABLET ORAL AT BEDTIME
Refills: 0 | Status: DISCONTINUED | OUTPATIENT
Start: 2025-03-20 | End: 2025-03-29

## 2025-03-20 RX ADMIN — Medication 40 MILLIGRAM(S): at 12:52

## 2025-03-20 RX ADMIN — ENOXAPARIN SODIUM 40 MILLIGRAM(S): 100 INJECTION SUBCUTANEOUS at 12:53

## 2025-03-20 RX ADMIN — Medication 500000 UNIT(S): at 18:02

## 2025-03-20 RX ADMIN — Medication 2 MILLIGRAM(S): at 23:55

## 2025-03-20 RX ADMIN — Medication 1 APPLICATION(S): at 06:04

## 2025-03-20 RX ADMIN — Medication 500000 UNIT(S): at 12:54

## 2025-03-20 RX ADMIN — Medication 3 MILLIGRAM(S): at 22:28

## 2025-03-20 RX ADMIN — HALOPERIDOL 0.5 MILLIGRAM(S): 10 TABLET ORAL at 09:37

## 2025-03-20 NOTE — PROGRESS NOTE ADULT - ASSESSMENT
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     Active problems  tonsillar ca  vascular dementia   FTT  Anemia in neoplastic disease  hyponatremia  Vascular dementia  hypercoagulable state  Severe Protein calorie Malnutrition  odynophagia/lack of appetite --> 3/18 Peg placed by IR  FTT  severe protein calorie malnutrition  Pt reporting odynophagia and dysphagia w/ regurgitation of food. Dysphagia to solids and liquids. Seems he holds the food and does not swallow due to fear of pain. Pt reporting dysgeusia as well.   (s/p C6 of 7 of cisplatin and on RT -should've been completed on 3/7)  Agitation 3/20--> Psych called  Reviewed CT neck, mucosal thickening and enhancement in the larynx and oropharynx may be due to infectious laryngopharyngitis or sequela of previous radiation performed.  Palliative care consulted  Patient does not like viscous lidocaine or liquid gabapentin - will d/c   Pain control with morphine 30 minutes prior to meals   Cineesophagogram completed - rec puree and thin liquid   Empiric treatment of thrush/esophagitis with fluconazole IV  As per daughter - pt with minimal PO intake. Discussed idea/option of PEG tube for nutrition. Given 5 siblings - will need to discuss and all agree. GI recommending IR eval for PEG - pending.   - Discussed plan of care with Dr. Gibson. Outpatient rad onc team also aware of patient's hospital course.   -Palliative following       Anemia in neoplastic disease  Hb 9.6, otherwise stable, continue to monitor    Vascular dementia  - Pt agitated 3/10 overnight requiring ativan. Mental status improved 3/13. Family reporting continues to have hallucinations but this has been ongoing since diagnosis of dementia.   - Psych consulted. Haldol PRN agitation.   - CTH with chronic microvascular changes.   - Recommend outpatient follow up with neurology.     Starvation Ketosis - Resolved  - AG 17 - UA with ketones - in the setting of poor PO intake. Improved with administration of D5+NS.     severe protein Calorie Malnutrition  peg 3/18, Tube feeds to start,    Hypercoagulable state 2/2 malignancy  On lovenox 40mg daily     peg today 3/18 with IR--> Peg placed 3/18, NS x 6 hrs, then start feeds at 10 cc/hr with goal to 60 cc/hr and then transition to Bolus for REHAB  Abd binder to prevent patient from removing Peg tube  c/w Lovenox.  Once at bolus feeds, d/c planning to REHAB  3/20 overnight agitated--> got haldol. Start Haldol prn--> f/u Psych consult.  Out patient f/u with Dr Gibson

## 2025-03-20 NOTE — CHART NOTE - NSCHARTNOTEFT_GEN_A_CORE
Time of evaluation: 23:20    Code MIKE called for acute agitation with combativeness. Patient swung at PCA and scratched a RN twice causing bleeding. Upon entering room, patient standing and not amendable to re-direction. Patient confused, oriented only to self. Patient identifies hospital security as the police he called to take care of a disturbance.         Other interventions attempted: de-escalation, orientation check, environment modification, medication assessment    REVIEW OF SYSTEMS:  CONSTITUTIONAL: [  ] fever   [  ] fatigue  EYES: [  ] visual disturbances  ENMT:  [  ]difficulty hearing  [  ] throat pain  RESPIRATORY:  [  ]cough  [   ] wheezing  [   ] hemoptysis [  ] shortness of breath  CARDIOVASCULAR: [  ]chest pain  [  ] palpitations   GASTROINTESTINAL: [  ]  abdominal pain [  ] nausea [  ] vomiting  [  ] diarrhea  [  ] constipation  GENITOURINARY: [  ] dysuria [  ] frequency  [  ] hematuria [  ] incontinence  NEUROLOGICAL: [  ] headaches [  ] new numbness  SKIN: [  ]itching [  ] new rash   MUSCULOSKELETAL: [  ] back pain  [  ] extremity pain  PSYCHIATRIC: [  ] depression  [  ] anxiety  [  ] mood swings [  ] difficulty sleeping  ALLERGY AND IMMUNOLOGIC: [  ] hives    [  ]Unable to perfrom ROS due to:  [  ] ROS reviewed and all negative    PAST MEDICAL & SURGICAL HISTORY:  Ventral hernia      Acid reflux      Osteoarthritis of right hip      Tonsil cancer      History of bilateral inguinal hernia repair  1974      S/P carpal tunnel release  right ; 1993        MEDICATIONS  (STANDING):  chlorhexidine 2% Cloths 1 Application(s) Topical <User Schedule>  enoxaparin Injectable 40 milliGRAM(s) SubCutaneous every 24 hours  famotidine Injectable 40 milliGRAM(s) IV Push daily  melatonin 3 milliGRAM(s) Oral at bedtime  nystatin    Suspension 478428 Unit(s) Oral every 6 hours  potassium phosphate IVPB 30 milliMole(s) IV Intermittent once    MEDICATIONS  (PRN):  aluminum hydroxide/magnesium hydroxide/simethicone Suspension 30 milliLiter(s) Enteral Tube every 4 hours PRN Dyspepsia  haloperidol    Injectable 0.5 milliGRAM(s) IV Push every 6 hours PRN Combative/agitation  ondansetron Injectable 4 milliGRAM(s) IV Push every 8 hours PRN Nausea and/or Vomiting                          8.4    2.27  )-----------( 159      ( 20 Mar 2025 07:00 )             23.4     03-20    139  |  100  |  33[H]  ----------------------------<  137[H]  3.8   |  27  |  1.16    Ca    9.2      20 Mar 2025 23:54  Phos  1.9     03-20  Mg     2.00     03-20    TPro  6.0  /  Alb  3.5  /  TBili  1.1  /  DBili  x   /  AST  188[H]  /  ALT  106[H]  /  AlkPhos  116  03-20      Vital Signs Last 24 Hrs  T(C): 37.1 (20 Mar 2025 20:56), Max: 37.1 (20 Mar 2025 20:56)  T(F): 98.8 (20 Mar 2025 20:56), Max: 98.8 (20 Mar 2025 20:56)  HR: 95 (20 Mar 2025 20:56) (60 - 95)  BP: 112/61 (20 Mar 2025 20:56) (112/61 - 124/74)  BP(mean): --  RR: 18 (20 Mar 2025 20:56) (17 - 18)  SpO2: 97% (20 Mar 2025 20:56) (95% - 98%)    Parameters below as of 20 Mar 2025 20:56  Patient On (Oxygen Delivery Method): room air        Physical Examination:  General: No acute distress.    HEENT: Pupils equal, round, reactive to light. Symmetric.  PULM: Clear to auscultation bilaterally, no significant sputum production  CVS: Regular rate and rhythm, no murmurs, rubs, or gallops  ABD: Soft, nondistended, nontender, normoactive bowel sounds, no masses  EXT: No edema, nontender  SKIN: Warm and well perfused.      [  ] Unable to perform physical exam due to    [X ] I have evaluated this patient and have determined that restraints are warranted to optimize medical care. Patient was assessed for current physical and psychological risk factors as well as special needs. There are no medical conditions or limitations that would place this patient at risk while in restraints.    Type of restraint: Bilateral soft wrist restraints    Behavioral criteria for discontinuation of restraint: [ X ] See order    Attending MD Aware Time of evaluation: 23:20    Code MIKE called for acute agitation with combativeness. Patient swung at PCA and scratched a RN twice causing RN to bleed. Upon entering room, patient standing and not amendable to re-direction. Patient confused, oriented only to self. Patient identifies hospital security as the police he called to take care of a disturbance.     Other interventions attempted: de-escalation > patient not amenable to re-direction, orientation check - pt oriented only to self, was re-oriented to place and time    REVIEW OF SYSTEMS:  CONSTITUTIONAL: [  ] fever   [  ] fatigue  EYES: [  ] visual disturbances  ENMT:  [  ]difficulty hearing  [  ] throat pain  RESPIRATORY:  [  ]cough  [   ] wheezing  [   ] hemoptysis [  ] shortness of breath  CARDIOVASCULAR: [  ]chest pain  [  ] palpitations   GASTROINTESTINAL: [  ]  abdominal pain [  ] nausea [  ] vomiting  [  ] diarrhea  [  ] constipation  GENITOURINARY: [  ] dysuria [  ] frequency  [  ] hematuria [  ] incontinence  NEUROLOGICAL: [  ] headaches [  ] new numbness  SKIN: [  ]itching [  ] new rash   MUSCULOSKELETAL: [  ] back pain  [  ] extremity pain  PSYCHIATRIC: [  ] depression  [  ] anxiety  [  ] mood swings [  ] difficulty sleeping  ALLERGY AND IMMUNOLOGIC: [  ] hives    [X]Unable to perfrom ROS due to: patient combative and acutely agitated  [  ] ROS reviewed and all negative    PAST MEDICAL & SURGICAL HISTORY:  Ventral hernia  Acid reflux  Osteoarthritis of right hip  Tonsil cancer  History of bilateral inguinal hernia repair  1974  S/P carpal tunnel release  right ; 1993    MEDICATIONS  (STANDING):  chlorhexidine 2% Cloths 1 Application(s) Topical <User Schedule>  enoxaparin Injectable 40 milliGRAM(s) SubCutaneous every 24 hours  famotidine Injectable 40 milliGRAM(s) IV Push daily  melatonin 3 milliGRAM(s) Oral at bedtime  nystatin    Suspension [id number] Unit(s) Oral every 6 hours  potassium phosphate IVPB 30 milliMole(s) IV Intermittent once    MEDICATIONS  (PRN):  aluminum hydroxide/magnesium hydroxide/simethicone Suspension 30 milliLiter(s) Enteral Tube every 4 hours PRN Dyspepsia  haloperidol    Injectable 0.5 milliGRAM(s) IV Push every 6 hours PRN Combative/agitation  ondansetron Injectable 4 milliGRAM(s) IV Push every 8 hours PRN Nausea and/or Vomiting                          8.4    2.27  )-----------( 159      ( 20 Mar 2025 07:00 )             23.4     03-20    139  |  100  |  33[H]  ----------------------------<  137[H]  3.8   |  27  |  1.16    Ca    9.2      20 Mar 2025 23:54  Phos  1.9     03-20  Mg     2.00     03-20    TPro  6.0  /  Alb  3.5  /  TBili  1.1  /  DBili  x   /  AST  188[H]  /  ALT  106[H]  /  AlkPhos  116  03-20      Vital Signs Last 24 Hrs  T(C): 37.1 (20 Mar 2025 20:56), Max: 37.1 (20 Mar 2025 20:56)  T(F): 98.8 (20 Mar 2025 20:56), Max: 98.8 (20 Mar 2025 20:56)  HR: 95 (20 Mar 2025 20:56) (60 - 95)  BP: 112/61 (20 Mar 2025 20:56) (112/61 - 124/74)  RR: 18 (20 Mar 2025 20:56) (17 - 18)  SpO2: 97% on RA (20 Mar 2025 20:56) (95% - 98%)      Physical Examination:  [X] Unable to perform physical exam due to acute agitation and physical aggression    [X] I have evaluated this patient and have determined that restraints are warranted to optimize medical care. Patient was assessed for current physical and psychological risk factors as well as special needs. There are no medical conditions or limitations that would place this patient at risk while in restraints.    Type of restraint: 4 point restraints    Behavioral criteria for discontinuation of restraint: [ X ] See order    Ativan 2mg IVP x 1 given

## 2025-03-20 NOTE — PROGRESS NOTE ADULT - SUBJECTIVE AND OBJECTIVE BOX
Tube feeds  [facility] H[facility] Solid Tumor Note  Patient is a 75y old  Male who presents with a chief complaint of odynophagia with decreased PO intake (20 Mar 2025 09:21)      SUBJECTIVE / OVERNIGHT EVENTS:  Patient seen with Carlos Yun ovrnight noted he had haldol---. Start Haldol prn and Psych consult  Getting Peg feeds, will be at 60 cc goal about midnight tonight then plan for bolus feeds in AM  Patient is resting now, sleeping      MEDICATIONS  (STANDING):  chlorhexidine 2% Cloths 1 Application(s) Topical <User Schedule>  enoxaparin Injectable 40 milliGRAM(s) SubCutaneous every 24 hours  famotidine Injectable 40 milliGRAM(s) IV Push daily  melatonin 3 milliGRAM(s) Oral at bedtime  nystatin    Suspension [id number] Unit(s) Oral every 6 hours    MEDICATIONS  (PRN):  aluminum hydroxide/magnesium hydroxide/simethicone Suspension 30 milliLiter(s) Oral every 4 hours PRN Dyspepsia  haloperidol    Injectable 0.5 milliGRAM(s) IV Push every 6 hours PRN Combative/agitation  ondansetron Injectable 4 milliGRAM(s) IV Push every 8 hours PRN Nausea and/or Vomiting        I&O's Summary    19 Mar 2025 07:01  -  20 Mar 2025 07:00  --------------------------------------------------------  IN: 320 mL / OUT: 0 mL / NET: 320 mL      Vital Signs Last 24 Hrs    T(F): 98.3 (20 Mar 2025 12:34), Max: 98.3 (20 Mar 2025 12:34)  HR: 66 (20 Mar 2025 12:34) (60 - 79)  BP: 124/74 (20 Mar 2025 12:34) (116/74 - 149/71)  RR: 17 (20 Mar 2025 12:34) (17 - 18)  SpO2: 95% room air      PHYSICAL EXAM:  GENERAL: NAD, sleeping  HEAD:  Atraumatic, Normocephalic  EYES: EOMI, PERRLA, conjunctiva and sclera clear  NECK: Supple, No JVD  CHEST/LUNG: Clear to auscultation bilaterally; No wheeze  HEART: Regular rate and rhythm; No murmurs, rubs, or gallops  ABDOMEN: Soft, Nontender, Nondistended; Bowel sounds present  Peg with abd bindser over  EXTREMITIES:  2+ Peripheral Pulses, No clubbing, cyanosis, or edema  PSYCH:  sleeping      LABS:                        8.4    2.27  )-----------( 159      ( 20 Mar 2025 07:00 )             23.4     03-20    139  |  102  |  31[H]  ----------------------------<  132[H]  3.4[L]   |  28  |  0.94    Ca    9.0      20 Mar 2025 07:00  Phos  2.6     03-20  Mg     2.20     03-20    TPro  6.0  /  Alb  3.5  /  TBili  1.1  /  DBili  x   /  AST  188[H]  /  ALT  106[H]  /  AlkPhos  116  03-20          RADIOLOGY & ADDITIONAL TESTS:      Care Discussed with Consultants/Other Providers:    Tube feeds  [facility]

## 2025-03-21 ENCOUNTER — APPOINTMENT (OUTPATIENT)
Dept: INFUSION THERAPY | Facility: HOSPITAL | Age: 75
End: 2025-03-21

## 2025-03-21 LAB
ALBUMIN SERPL ELPH-MCNC: 3.3 G/DL — SIGNIFICANT CHANGE UP (ref 3.3–5)
ALP SERPL-CCNC: 231 U/L — HIGH (ref 40–120)
ALT FLD-CCNC: 454 U/L — HIGH (ref 4–41)
ANION GAP SERPL CALC-SCNC: 10 MMOL/L — SIGNIFICANT CHANGE UP (ref 7–14)
ANION GAP SERPL CALC-SCNC: 12 MMOL/L — SIGNIFICANT CHANGE UP (ref 7–14)
ANISOCYTOSIS BLD QL: SLIGHT — SIGNIFICANT CHANGE UP
AST SERPL-CCNC: 483 U/L — HIGH (ref 4–40)
BASOPHILS # BLD AUTO: 0.04 K/UL — SIGNIFICANT CHANGE UP (ref 0–0.2)
BASOPHILS NFR BLD AUTO: 2.4 % — HIGH (ref 0–2)
BILIRUB SERPL-MCNC: 1.5 MG/DL — HIGH (ref 0.2–1.2)
BUN SERPL-MCNC: 29 MG/DL — HIGH (ref 7–23)
BUN SERPL-MCNC: 33 MG/DL — HIGH (ref 7–23)
CALCIUM SERPL-MCNC: 9 MG/DL — SIGNIFICANT CHANGE UP (ref 8.4–10.5)
CALCIUM SERPL-MCNC: 9.2 MG/DL — SIGNIFICANT CHANGE UP (ref 8.4–10.5)
CHLORIDE SERPL-SCNC: 100 MMOL/L — SIGNIFICANT CHANGE UP (ref 98–107)
CHLORIDE SERPL-SCNC: 103 MMOL/L — SIGNIFICANT CHANGE UP (ref 98–107)
CO2 SERPL-SCNC: 27 MMOL/L — SIGNIFICANT CHANGE UP (ref 22–31)
CO2 SERPL-SCNC: 28 MMOL/L — SIGNIFICANT CHANGE UP (ref 22–31)
CREAT SERPL-MCNC: 1.16 MG/DL — SIGNIFICANT CHANGE UP (ref 0.5–1.3)
CREAT SERPL-MCNC: 1.21 MG/DL — SIGNIFICANT CHANGE UP (ref 0.5–1.3)
EGFR: 62 ML/MIN/1.73M2 — SIGNIFICANT CHANGE UP
EGFR: 62 ML/MIN/1.73M2 — SIGNIFICANT CHANGE UP
EGFR: 65 ML/MIN/1.73M2 — SIGNIFICANT CHANGE UP
EGFR: 65 ML/MIN/1.73M2 — SIGNIFICANT CHANGE UP
EOSINOPHIL # BLD AUTO: 0.01 K/UL — SIGNIFICANT CHANGE UP (ref 0–0.5)
EOSINOPHIL NFR BLD AUTO: 0.8 % — SIGNIFICANT CHANGE UP (ref 0–6)
GLUCOSE SERPL-MCNC: 100 MG/DL — HIGH (ref 70–99)
GLUCOSE SERPL-MCNC: 137 MG/DL — HIGH (ref 70–99)
HCT VFR BLD CALC: 23.5 % — LOW (ref 39–50)
HGB BLD-MCNC: 8.5 G/DL — LOW (ref 13–17)
IANC: 1.01 K/UL — LOW (ref 1.8–7.4)
LYMPHOCYTES # BLD AUTO: 0.23 K/UL — LOW (ref 1–3.3)
LYMPHOCYTES # BLD AUTO: 14.2 % — SIGNIFICANT CHANGE UP (ref 13–44)
MACROCYTES BLD QL: SLIGHT — SIGNIFICANT CHANGE UP
MAGNESIUM SERPL-MCNC: 2 MG/DL — SIGNIFICANT CHANGE UP (ref 1.6–2.6)
MCHC RBC-ENTMCNC: 32.1 PG — SIGNIFICANT CHANGE UP (ref 27–34)
MCHC RBC-ENTMCNC: 36.2 G/DL — HIGH (ref 32–36)
MCV RBC AUTO: 88.7 FL — SIGNIFICANT CHANGE UP (ref 80–100)
MONOCYTES # BLD AUTO: 0.15 K/UL — SIGNIFICANT CHANGE UP (ref 0–0.9)
MONOCYTES NFR BLD AUTO: 9.4 % — SIGNIFICANT CHANGE UP (ref 2–14)
NEUTROPHILS # BLD AUTO: 1.06 K/UL — LOW (ref 1.8–7.4)
NEUTROPHILS NFR BLD AUTO: 65.3 % — SIGNIFICANT CHANGE UP (ref 43–77)
PHOSPHATE SERPL-MCNC: 1.9 MG/DL — LOW (ref 2.5–4.5)
PLAT MORPH BLD: NORMAL — SIGNIFICANT CHANGE UP
PLATELET # BLD AUTO: 152 K/UL — SIGNIFICANT CHANGE UP (ref 150–400)
PLATELET COUNT - ESTIMATE: NORMAL — SIGNIFICANT CHANGE UP
POLYCHROMASIA BLD QL SMEAR: SLIGHT — SIGNIFICANT CHANGE UP
POTASSIUM SERPL-MCNC: 3.6 MMOL/L — SIGNIFICANT CHANGE UP (ref 3.5–5.3)
POTASSIUM SERPL-MCNC: 3.8 MMOL/L — SIGNIFICANT CHANGE UP (ref 3.5–5.3)
POTASSIUM SERPL-SCNC: 3.6 MMOL/L — SIGNIFICANT CHANGE UP (ref 3.5–5.3)
POTASSIUM SERPL-SCNC: 3.8 MMOL/L — SIGNIFICANT CHANGE UP (ref 3.5–5.3)
PROT SERPL-MCNC: 6.1 G/DL — SIGNIFICANT CHANGE UP (ref 6–8.3)
RBC # BLD: 2.65 M/UL — LOW (ref 4.2–5.8)
RBC # FLD: 18.7 % — HIGH (ref 10.3–14.5)
RBC BLD AUTO: ABNORMAL
SODIUM SERPL-SCNC: 139 MMOL/L — SIGNIFICANT CHANGE UP (ref 135–145)
SODIUM SERPL-SCNC: 141 MMOL/L — SIGNIFICANT CHANGE UP (ref 135–145)
VARIANT LYMPHS # BLD: 7.9 % — HIGH (ref 0–6)
VARIANT LYMPHS NFR BLD MANUAL: 7.9 % — HIGH (ref 0–6)
WBC # BLD: 1.62 K/UL — LOW (ref 3.8–10.5)
WBC # FLD AUTO: 1.62 K/UL — LOW (ref 3.8–10.5)

## 2025-03-21 PROCEDURE — 99233 SBSQ HOSP IP/OBS HIGH 50: CPT

## 2025-03-21 PROCEDURE — G0316 PROLONG INPT EVAL ADD15 M: CPT

## 2025-03-21 PROCEDURE — 99232 SBSQ HOSP IP/OBS MODERATE 35: CPT

## 2025-03-21 RX ORDER — POTASSIUM PHOSPHATE, MONOBASIC POTASSIUM PHOSPHATE, DIBASIC INJECTION, 236; 224 MG/ML; MG/ML
30 SOLUTION, CONCENTRATE INTRAVENOUS ONCE
Refills: 0 | Status: DISCONTINUED | OUTPATIENT
Start: 2025-03-21 | End: 2025-03-21

## 2025-03-21 RX ORDER — RISPERIDONE 4 MG
0.25 TABLET ORAL
Refills: 0 | Status: DISCONTINUED | OUTPATIENT
Start: 2025-03-21 | End: 2025-03-23

## 2025-03-21 RX ADMIN — Medication 3 MILLIGRAM(S): at 22:42

## 2025-03-21 RX ADMIN — Medication 1 APPLICATION(S): at 08:02

## 2025-03-21 RX ADMIN — Medication 0.25 MILLIGRAM(S): at 17:48

## 2025-03-21 RX ADMIN — Medication 40 MILLIGRAM(S): at 12:07

## 2025-03-21 RX ADMIN — ENOXAPARIN SODIUM 40 MILLIGRAM(S): 100 INJECTION SUBCUTANEOUS at 13:23

## 2025-03-21 RX ADMIN — Medication 500000 UNIT(S): at 22:41

## 2025-03-21 NOTE — CHART NOTE - NSCHARTNOTEFT_GEN_A_CORE
Family meeting with daughter Ellen Sanchez, eFng, brother and Eyad nephew. , with PA and Palliative Attending Dr George  Family wish to know if patient is Hospice appropriate.  Explained that Dr Gibson notes his Cancer is controlled and would not cause him to be an hospice candidate at this time.  His Dementia was colleen up , but Dr George from palliative notes he would need to be not walking/ bedbound/ not eating to qualify from hospice.  All family is concerned because patient has been living alone.  Psych called to see patient again for help with mood stabalization.  With current need for prn medication , family ponts out that it would be hard for hime to be home or at facility. Family agrees he will need medication BUT NOT ZOLOFT. he had bad s/e with zoloft.  Will use Haldol prn  and f/u psych.    F/u Psych  Plan d/w Daughter Ellen/ Eyad/ Feng/ Pa/ Ralph PALACIOS.    Internal Medicine  Stephanie Cisneros .

## 2025-03-21 NOTE — BH CONSULTATION LIAISON PROGRESS NOTE - NSBHASSESSMENTFT_PSY_ALL_CORE
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 Psychiatry consulted for aggression. Improved behaviour, has not received any PRNs since yesterday. Still has some hallucinations at night, but amenable to reorientation.     Patient carries a diagnosis of dementia for the last 4-5 years, declining cognitively over time, with worsening symptoms along with paranoia, distress, poor sleep since the start of RT. Pain/throat discomfort, poor PO intake has been ongoing.     3/13--- has been doing better, no agitation or prn needs  3/21: reconsulted for agitation s/p code christian, family requesting standing medication for mood, agitation, amenable to Risperidone, bbb d/w family, verbalized understanding, denies si, denies ah    Recommendations    - Deferring observation status to primary team  - Frequent orientation  - Monitor EKG qtc interval  - AVOID bzd, anticholinergics, antihistamines  - Continue Melatonin 3mg at 8PM PO  - Start Risperidone 0.25mg @ 7am and 5pm via peg  - ONLY FOR COMBATIVE BEHAVIORS--------Haldol 0.5mg q 6hrs prn IM/IV/PO  - No psychiatric contraindications to discharge

## 2025-03-21 NOTE — PROGRESS NOTE ADULT - PROBLEM SELECTOR PLAN 5
- HCP form signed and placed in chart, Ellen (Daughter) is his HCP  - Attempted to bring up MOLST form and Code status- family not amenable to discussing at this time,, no conversations/decisions on code status to date, patient remains full code at this time  - Family discussing LT feeding tube, see GOC note from 3/14  - 3/21- See GOC. - HCP form signed and placed in chart, Ellen (Daughter) is his HCP  - Attempted to bring up MOLST form and Code status- family not amenable to discussing at this time, no conversations/decisions on code status to date, patient remains full code at this time  - Family discussing LT feeding tube, see GOC note from 3/14  - 3/21- See GOC. Discussed that patient at this moment is not a candidate for hospice. Plan is to manage behavior so that we can go to a facility

## 2025-03-21 NOTE — PROGRESS NOTE ADULT - CONVERSATION DETAILS
Palliative consulted for complex medical decision making in the setting of serious illness.     Elicited family’s understanding of patient’s illness and prognosis. Discussed that in terms of cancer, there is no obvious evidence of disease with CT neck that was done on admission. Patient's main issue is dementia, as he is having severe behavioral issues at night. Patient is also not at end stage dementia (as he walks and talks) and just received a PEG tube. Given this, patient at this moment is not a candidate for hospice.     Family is unable to take him home due to his needs and behavior. Explained that patient's behavioral issues may also be a barrier to have an accepting facility.  Explained next steps are to have patient be evaluated by psych again to attempt to control the behavior. Family verbalized understanding.

## 2025-03-21 NOTE — PROGRESS NOTE ADULT - SUBJECTIVE AND OBJECTIVE BOX
Date of Service    Indication for Geriatrics and Palliative Care Services: Symptom mgmt, GoC    SUBJECTIVE AND OBJECTIVE:    INTERVAL HPI/OVERNIGHT EVENTS:  Overnight MIKE as patient became physically aggressive with staff, scratched RN causing bleeding. He received IV ativan 2 mg. Patient seen this AM, somnolent. Patient's family at bedside.       Allergies    No Known Allergies    Intolerances    MEDICATIONS  (STANDING):  chlorhexidine 2% Cloths 1 Application(s) Topical <User Schedule>  enoxaparin Injectable 40 milliGRAM(s) SubCutaneous every 24 hours  famotidine Injectable 40 milliGRAM(s) IV Push daily  melatonin 3 milliGRAM(s) Oral at bedtime  nystatin    Suspension 306598 Unit(s) Oral every 6 hours  potassium phosphate IVPB 30 milliMole(s) IV Intermittent once  risperiDONE   Tablet 0.25 milliGRAM(s) Oral <User Schedule>    MEDICATIONS  (PRN):  aluminum hydroxide/magnesium hydroxide/simethicone Suspension 30 milliLiter(s) Enteral Tube every 4 hours PRN Dyspepsia  haloperidol    Injectable 0.5 milliGRAM(s) IV Push every 6 hours PRN Combative/agitation  ondansetron Injectable 4 milliGRAM(s) IV Push every 8 hours PRN Nausea and/or Vomiting        ITEMS UNCHECKED ARE NOT PRESENT    PRESENT SYMPTOMS: [ ]Unable to self-report due to altered mental status- see [ ] CPOT [ ] PAINADS [ ] RDOS  Source if other than patient:  [ ]Family   [ ]Team     Pain:  [x ]yes [ ]no  QOL impact - great  Location -  throat       Aggravating factors - eating/drinking   Quality - burning  Radiation - down esophagus  Timing- a/w meals   Severity (0-10 scale): 2  Minimal acceptable level / Pain Goal (0-10 scale):       Dyspnea:                           [ ]Mild [ ]Moderate [ ]Severe    Anxiety:                             [ ]Mild [ ]Moderate [ ]Severe  Agitation:                          [ ]Mild [ ]Moderate [ ]Severe  Fatigue:                             [ ]Mild [ ]Moderate [ ]Severe  Nausea:                             [ ]Mild [ ]Moderate [ ]Severe  Loss of appetite:              [ ]Mild [ ]Moderate [ ]Severe  Constipation:                   [ ]Mild [ ]Moderate [ ]Severe  Diarrhea:                          [ ]Mild [ ]Moderate [ ]Severe  Pruritus:                            [ ]Mild [ ]Moderate [ ]Severe  Depression:                      [ ]Mild [ ]Moderate [ ]Severe    CPOT:    https://www.[facility].org/getattachment/[email or web address]/Critical-Care-Pain-Observation-Tool-(CPOT)    PCSSQ[Palliative Care Spiritual Screening Question]   Severity (0-10):  Score of 4 or > indicate consideration of Chaplaincy referral.  Chaplaincy Referral: [ ] yes [ ] refused [ ] following [x ] deferred    Caregiver [place]? : [x ] yes [ ] no [ ] Declined [ ] Deferred              Social work referral [ ] Patient & Family Centered Care Referral [ ]     Anticipatory Grief present?:  [ ] yes [ ] no  [ x] Deferred                  Social work referral [ ] Chaplaincy Referral[ ] Caregiver Support Referral [x ]    Other Symptoms:  [ ]All other review of systems negative   [X ] Unable to obtain due to poor mentation     PHYSICAL EXAM:  Vital Signs Last 24 Hrs  T(C): 36.6 (18 Mar 2025 10:20), Max: 36.9 (18 Mar 2025 05:54)  T(F): 97.8 (18 Mar 2025 10:20), Max: 98.4 (18 Mar 2025 05:54)  HR: 70 (18 Mar 2025 11:30) (62 - 78)  BP: 134/78 (18 Mar 2025 11:30) (115/75 - 158/71)  BP(mean): --  RR: 18 (18 Mar 2025 11:30) (12 - 19)  SpO2: 99% (18 Mar 2025 11:30) (97% - 100%)    Parameters below as of 18 Mar 2025 10:50  Patient On (Oxygen Delivery Method): room air      GENERAL:  [ ]Alert  [ ]Oriented x2   [x ]Lethargic  [ ]Cachexia  [ ]Unarousable  [x ]Verbal  [ ]Non-Verbal  [x ] No Distress  Behavioral:   [ ] Anxiety  [ ] Delirium [ ] Agitation [x ] Calm  [ ] Other  HEENT:  [ ]Normal  [ ] Temporal Wasting  [ x]Dry mouth   [ ]ET Tube/Trach  [x ]Oral lesions  [ x] Mucositis  PULMONARY: Breathing comfortable , no accessory muscle use   [ ]Clear [ ]Tachypnea  [ ]Audible excessive secretions   [ ]Rhonchi        [ ]Right [ ]Left [ ]Bilateral  [ ]Crackles        [ ]Right [ ]Left [ ]Bilateral  [ ]Wheezing     [ ]Right [ ]Left [ ]Bilateral  [ ]Diminished breath sounds [ ]right [ ]left [ ]bilateral  CARDIOVASCULAR:    [x ]Regular [ ]Irregular [ ]Tachy  [ ]Hua [ ]Murmur [ ]Other  GASTROINTESTINAL: Non-distended   [ ]Soft  [ ]Distended   [ ]+BS  [ ]Non tender [ ]Tender  [x ]PEG [ ]OGT/ NGT  Last BM: 3/8  GENITOURINARY:  [x ]Normal [ ] Incontinent   [ ]Oliguria/Anuria   [ ]Alan  MUSCULOSKELETAL:   [ x]Normal   [ ]Weakness  [ ]Bed/Wheelchair bound [ ]Edema  [  ] amputation  [  ] contraction  NEUROLOGIC:   [x ]No focal deficits  [ x]Cognitive impairment  [ ]Dysphagia [ ]Dysarthria [ ]Paresis [ ]Other   SKIN: See Nursing Skin Assessment for further details  [x ]Normal    [ ]Rash  [ ]Pressure ulcer(s)       Present on admission [ ]y [ ]n   [  ]  Wound    [  ] hyperpigmentation    CRITICAL CARE:  [ ]Shock Present  [ ]Septic [ ]Cardiogenic [ ]Neurologic [ ]Hypovolemic  [ ]Vasopressors [ ]Inotropes  [ ]Respiratory failure present [ ]Mechanical Ventilation [ ]Non-invasive ventilatory support [ ]High-Flow   [ ]Acute  [ ]Chronic [ ]Hypoxic  [ ]Hypercarbic [ ]Other  [ ]Other organ failure     LABS:                            8.5    1.62  )-----------( 152      ( 21 Mar 2025 06:46 )             23.5     03-21    141  |  103  |  29[H]  ----------------------------<  100[H]  3.6   |  28  |  1.21    Ca    9.0      21 Mar 2025 06:46  Phos  1.9     03-20  Mg     2.00     03-20    TPro  6.1  /  Alb  3.3  /  TBili  1.5[H]  /  DBili  x   /  AST  483[H]  /  ALT  454[H]  /  AlkPhos  231[H]  03-21        RADIOLOGY & ADDITIONAL STUDIES:     < from: CT Abdomen and Pelvis No Cont (03.16.25 @ 18:22) >  IMPRESSION:  No evidence of acute abnormality in the abdomen and pelvis.    < end of copied text >      Protein Calorie Malnutrition Present: [ ]mild [ ]moderate [ ]severe [ ]underweight [ ]morbid obesity  https://www.andeal.org/vault/2440/web/files/ONC/Table_Clinical%20Characteristics%20to%20Document%20Malnutrition-White%20JV%20et%20al%202012.pdf    Height (cm): 177.8 (03-06-25 @ 15:47), 177.8 (03-05-25 @ 18:20), 175 (02-28-25 @ 15:55)  Weight (kg): 81.3 (03-07-25 @ 05:32), 80.7 (03-05-25 @ 18:20), 80.8 (03-04-25 @ 16:37)  BMI (kg/m2): 25.7 (03-07-25 @ 05:32), 25.5 (03-06-25 @ 15:47), 25.5 (03-05-25 @ 18:20)    [ ]PPSV2 < or = 30%  [x ]significant weight loss [x ]poor nutritional intake [ ]anasarca   [ ]Artificial Nutrition    REFERRALS:   [ ]Chaplaincy  [ ]Hospice  [ ]Child Life  [ x]Social Work  [ x]Case management [ ]Holistic Therapy     Goals of Care Document:

## 2025-03-21 NOTE — BH CONSULTATION LIAISON PROGRESS NOTE - NSBHFUPINTERVALCCFT_PSY_A_CORE
CL psychiatry reconsulted for standing medication s/p Code Casey, family agreeable per primary team

## 2025-03-21 NOTE — PROGRESS NOTE ADULT - NS ATTEST RISK PROBLEM GEN_ALL_CORE FT
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

## 2025-03-21 NOTE — BH CONSULTATION LIAISON PROGRESS NOTE - NSBHFUPINTERVALHXFT_PSY_A_CORE
Chart reviewed, seen at bedside, alert to self only, confused to situation, month and year, notably hand shaking (family report chronic), limited interview, denies suicidal ideation, denies auditory hallucinations.    Discussed with daughters, Joselin and Ellen, starting Risperidone 0.25mg bid, daughters in agreement, discussed with daughters black box warning (2-4% of patient's with dementia can experience a cardiovascular event), both verbalized understanding.

## 2025-03-21 NOTE — CHART NOTE - NSCHARTNOTEFT_GEN_A_CORE
NUTRITION FOLLOW UP NOTE    Patient is seen for a follow-up nutrition assessment for severe malnutrition.    SOURCE: [X] Family/Caregiver  [X] Other (Chart Review)    Medical Course: Per chart review, patient is a 75y Male with PMH stage 4 tonsil cancer and vascular dementia who presented to [facility] for failure to thrive, dehydration, and pain management. Course complicated by combative behavior/dementia and starvation ketosis.    Diet Order: Pureed: Tube Feeding Modality: Gastrostomy  Jevity 1.5 Moises (JEVITY1.5RTH)  Total Volume for 24 Hours (mL): 2880  Bolus, Total Volume of Bolus (mL):  360  Total # of Feeds: 4  Tube Feed Frequency: Every 3 hours   Tube Feed Start Time: 07:00  Bolus Feed Rate (mL per Hour): 360   Bolus Feed Duration (in Hours): 1  Bolus Feed Instructions:  Administer at 7AM, 11AM, 2PM, and 5PM (03-21-25 @ 02:54)      Nutrition Course:  - Patient s/p PEG placement (3/18) with initiation of continuous enteral nutrition 3/19. Patient tolerated, now transitioned to bolus feeds in anticipation for discharge to [facility]. Writer received message from PA yesterday (3/20) via [employer] Teams with request to reduce frequency of bolus feeds, please see below for recommendations. Patient additionally provided with a pureed PO diet as per previous SLP recommendations.  - Patient s/p code MIKE (3/20), now on 1:1 with PCA. Patient unable to meaningfully participate in assessment secondary to cognitive status. Patient's daughter present at bedside, reports patient tolerated first bolus feeding this morning. No questions or concerns vocalized at this time.    GI Distress: No report of nausea/vomiting/diarrhea/constipation.   Bowel Movement: 3/20/25 per RN flowsheet. Not noted to be on a bowel regimen.   Chewing / Swallowing Difficulty: Pureed diet as per SLP recs      Pertinent Medications: MEDICATIONS  (STANDING):  chlorhexidine 2% Cloths 1 Application(s) Topical <User Schedule>  enoxaparin Injectable 40 milliGRAM(s) SubCutaneous every 24 hours  famotidine Injectable 40 milliGRAM(s) IV Push daily  melatonin 3 milliGRAM(s) Oral at bedtime  nystatin    Suspension 580351 Unit(s) Oral every 6 hours  potassium phosphate IVPB 30 milliMole(s) IV Intermittent once    MEDICATIONS  (PRN):  aluminum hydroxide/magnesium hydroxide/simethicone Suspension 30 milliLiter(s) Enteral Tube every 4 hours PRN Dyspepsia  haloperidol    Injectable 0.5 milliGRAM(s) IV Push every 6 hours PRN Combative/agitation  ondansetron Injectable 4 milliGRAM(s) IV Push every 8 hours PRN Nausea and/or Vomiting      Pertinent Labs: 03-21 Na141 mmol/L Glu 100 mg/dL[H] K+ 3.6 mmol/L Cr  1.21 mg/dL BUN 29 mg/dL[H] 03-20 Phos 1.9 mg/dL[L] 03-21 Alb 3.3 g/dL      Anthropometrics  Weights per RN flowsheet: Weight (kg): 81.3 (03-18 @ 08:10)  Weight Assessment: No weight changes since previous assessment    Physical Assessment, per flowsheets:  Edema: None  Pressure Injury: None    Estimated Needs:   [X] No change since previous assessment  Weight Used: Ideal Body Weight 166lb / 75.2kg  Energy Needs: 2105-2406kcal (based on 28-32kcal/kg)  Protein Needs: 90..28gms (based on 1.2-1.4gms/kg)    Previous Nutrition Diagnosis: [X] Severe Malnutrition    Nutrition Diagnosis is [X] ongoing    New Nutrition Diagnosis: [X] not applicable     Education: [X] Provided on previous assessment by RD    Interventions:   - Continue PO diet as per SLP, at medical team's discretion  - Continue current enteral nutrition regimen: Jevity 1.5 Moises via PEG, bolus 360mL 4x daily (7am, 11am, 2pm, 5pm), to provide a total of 1440mL volume formula, 2160kcal, 92.2g protein, 1094mL free water)  --> Defer free fluid management as per medical team  - Please document % PO intake on flowsheets  - Nutrition care to be aligned with patient/family goals of care    Monitor & Evaluate:  PO intake, tolerance to diet/supplement, EN tolerance, nutrition related lab values, weight trends, BMs/GI distress, hydration status, skin integrity.    RD remains available, please consult as needed.    Marce Michel MS RDN CDN  Available on [employer] Teams (Preferred) or Pager #[phone]

## 2025-03-21 NOTE — PROGRESS NOTE ADULT - SUBJECTIVE AND OBJECTIVE BOX
Onc Hosp Solid Tumor note  Peg feeds---> bolus today Onc Hosp Solid Tumor note    Patient is a 75y old  Male who presents with a chief complaint of odynophagia with decreased PO intake (21 Mar 2025 09:16)      SUBJECTIVE / OVERNIGHT EVENTS:  Patient seen with Pa and daughter Ellen Sanchez.  Daughter notes he had an active night--> Hallucinating with snake in the bed/ raccoon on the floor - delusional and hallucinating.  She was concerned that he got ativan.  She notes Zoloft did not help in past and agree to have Psych start medication for mood stabalization.  Daughter notes his behaviour had more to do with his dementia.  She also wants to talk to pallitive and mesicine.  She notes she spoke with an Elder care  and is asking about HOSPICE.   She notes patient would not be able to go home.       MEDICATIONS  (STANDING):  chlorhexidine 2% Cloths 1 Application(s) Topical <User Schedule>  enoxaparin Injectable 40 milliGRAM(s) SubCutaneous every 24 hours  famotidine Injectable 40 milliGRAM(s) IV Push daily  melatonin 3 milliGRAM(s) Oral at bedtime  nystatin    Suspension 900954 Unit(s) Oral every 6 hours  potassium phosphate IVPB 30 milliMole(s) IV Intermittent once    MEDICATIONS  (PRN):  aluminum hydroxide/magnesium hydroxide/simethicone Suspension 30 milliLiter(s) Enteral Tube every 4 hours PRN Dyspepsia  haloperidol    Injectable 0.5 milliGRAM(s) IV Push every 6 hours PRN Combative/agitation  ondansetron Injectable 4 milliGRAM(s) IV Push every 8 hours PRN Nausea and/or Vomiting    I&O's Summary    20 Mar 2025 07:01  -  21 Mar 2025 07:00  --------------------------------------------------------  IN: 360 mL / OUT: 0 mL / NET: 360 mL      Vital Signs Last 24 Hrs  T(C): 36.3 (21 Mar 2025 07:50), Max: 37.1 (20 Mar 2025 20:56)  T(F): 97.4 (21 Mar 2025 07:50), Max: 98.8 (20 Mar 2025 20:56)  HR: 98 (21 Mar 2025 07:50) (66 - 98)  BP: 126/58 (21 Mar 2025 07:50) (112/61 - 126/58)  RR: 18 (21 Mar 2025 07:50) (17 - 18)  SpO2: 98% (21 Mar 2025 07:50) (95% - 98%)    Parameters below as of 21 Mar 2025 07:50  Patient On (Oxygen Delivery Method): room air      PHYSICAL EXAM:  GENERAL: NAD, sleeping but moves  with exam  HEAD:  Atraumatic, Normocephalic  EYES: EOMI, PERRLA, conjunctiva and sclera clear  NECK: Supple, No JVD  CHEST/LUNG: Clear to auscultation bilaterally; No wheeze  HEART: Regular rate and rhythm; No murmurs, rubs, or gallops  ABDOMEN: Soft, Nontender, Nondistended; Bowel sounds present  Abd binder  EXTREMITIES:  2+ Peripheral Pulses, No clubbing, cyanosis, or edema  PSYCH: Alert    LABS:                        8.5    1.62  )-----------( 152      ( 21 Mar 2025 06:46 )             23.5     03-21    141  |  103  |  29[H]  ----------------------------<  100[H]  3.6   |  28  |  1.21    Ca    9.0      21 Mar 2025 06:46  Phos  1.9     03-20  Mg     2.00     03-20    TPro  6.1  /  Alb  3.3  /  TBili  1.5[H]  /  DBili  x   /  AST  483[H]  /  ALT  454[H]  /  AlkPhos  231[H]  03-21        Care Discussed with Consultants/Other Providers:    Peg feeds---> bolus today

## 2025-03-21 NOTE — PROGRESS NOTE ADULT - ASSESSMENT
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     Active problems  tonsillar ca  vascular dementia   FTT  Anemia in neoplastic disease  hyponatremia  Vascular dementia  hypercoagulable state  Severe Protein calorie Malnutrition  odynophagia/lack of appetite --> 3/18 Peg placed by IR  FTT  severe protein calorie malnutrition  Pt reporting odynophagia and dysphagia w/ regurgitation of food. Dysphagia to solids and liquids. Seems he holds the food and does not swallow due to fear of pain. Pt reporting dysgeusia as well.   (s/p C6 of 7 of cisplatin and on RT -should've been completed on 3/7)  Agitation 3/20--> Psych called--> 3/21 Psych called again.  Plan is for Rehab  Reviewed CT neck, mucosal thickening and enhancement in the larynx and oropharynx may be due to infectious laryngopharyngitis or sequela of previous radiation performed.  Palliative care consulted  Patient does not like viscous lidocaine or liquid gabapentin - will d/c   Pain control with morphine 30 minutes prior to meals   Cineesophagogram completed - rec puree and thin liquid   Empiric treatment of thrush/esophagitis with fluconazole IV  As per daughter - pt with minimal PO intake. Discussed idea/option of PEG tube for nutrition. Given 5 siblings - will need to discuss and all agree. GI recommending IR eval for PEG - pending.   - Discussed plan of care with Dr. Gibson. Outpatient rad onc team also aware of patient's hospital course.   -Palliative following       Anemia in neoplastic disease  Hb 9.6, otherwise stable, continue to monitor    Vascular dementia  - Pt agitated 3/10 overnight requiring ativan. Mental status improved 3/13. Family reporting continues to have hallucinations but this has been ongoing since diagnosis of dementia.   - Psych consulted. Haldol PRN agitation.   - CTH with chronic microvascular changes.   - Recommend outpatient follow up with neurology.     Starvation Ketosis - Resolved  - AG 17 - UA with ketones - in the setting of poor PO intake. Improved with administration of D5+NS.     severe protein Calorie Malnutrition  peg 3/18, Tube feeds to start,    Hypercoagulable state 2/2 malignancy  On lovenox 40mg daily     peg today 3/18 with IR--> Peg placed 3/18, NS x 6 hrs, then start feeds at 10 cc/hr with goal to 60 cc/hr and then transition to Bolus for REHAB  Abd binder to prevent patient from removing Peg tube  c/w Lovenox.  Once at bolus feeds, d/c planning to REHAB  3/20 overnight agitated--> got haldol. Start Haldol prn--> f/u Psych consult.  Out patient f/u with Dr Gibson

## 2025-03-21 NOTE — PROGRESS NOTE ADULT - PROBLEM SELECTOR PROBLEM 2
Vascular dementia
FTT (failure to thrive) in adult
Vascular dementia

## 2025-03-21 NOTE — PROGRESS NOTE ADULT - PROBLEM SELECTOR PLAN 2
Mild approaching moderate stage dementia, declining ADLs at baseline accelerated by cancer treatment. Has been experiencing sundowning and behavioral sx likely from the dementia   - MIKE 3/10  - f/u psych, haldol prn   - Encouraged family at bedside overnight for reorientation and for them to encourage proper sleep wake cycle  - Family not amenable to SSRIs or atypical anti depressants  - CTH Parenchymal volume loss and chronic microvessel ischemic changes are identified - Mild approaching moderate stage dementia, declining ADLs at baseline accelerated by cancer treatment. Has been experiencing sundowning and behavioral sx likely from the dementia   - CTH Parenchymal volume loss and chronic microvessel ischemic changes are identified  - Persistent behavioral issues, BERTs  - f/u psych

## 2025-03-21 NOTE — PROGRESS NOTE ADULT - PROBLEM SELECTOR PLAN 6
For pain management   s/p PEG     In the event of worsening symptoms, please contact the Palliative Medicine team via pager (if the patient is at [facility] #[phone] or if the patient is at [facility] #[phone]) The Geriatric and Palliative Medicine service has coverage 24 hours a day/ 7 days a week to provide medical recommendations regarding symptom management needs via telephone. For GOC

## 2025-03-21 NOTE — PROGRESS NOTE ADULT - PROBLEM SELECTOR PROBLEM 6
Encounter for palliative care
DEREK

## 2025-03-21 NOTE — BH CONSULTATION LIAISON PROGRESS NOTE - OTHER
denied ah, would not answer to ?vh quiet recent Code Casey impaired d/t dementia bilateral hand shaking, slight cogwheeling left upper arm only required frequent repeating of questions

## 2025-03-21 NOTE — PROGRESS NOTE ADULT - PROBLEM SELECTOR PLAN 1
Follows with Dr. Gibson and Dr. Mitchell at [facility] for onc/rad onc  - He started concurrent chemo/RT 1/21/25 with weekly cisplatin started 1/24/25  - He has completed 30/35 RT sessions. According to family recent visits with oncologist have been optimistic in regards to his treatment response. 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 He was scheduled to complete his last chemo session  - CT neck - 1.   Mucosal thickening and enhancement in the larynx and oropharynx may be due to infectious laryngopharyngitis or sequela of previous radiation performed. 2.  No evidence of residual or recurrent tumor within the left tonsillar location. 3. No new or enlarging cervical lymph nodes. - Follows with Dr. Gibson and Dr. Mitchell at [facility] for onc/rad onc  - He started concurrent chemo/RT 1/21/25 with weekly cisplatin started 1/24/25  - He has completed 30/35 RT sessions. According to family recent visits with oncologist have been optimistic in regards to his treatment response. 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 He was scheduled to complete his last chemo session  - CT neck - 1.   Mucosal thickening and enhancement in the larynx and oropharynx may be due to infectious laryngopharyngitis or sequela of previous radiation performed. 2.  No evidence of residual or recurrent tumor within the left tonsillar location. 3. No new or enlarging cervical lymph nodes.  - No obvious disease at this moment, but recurrence in future possible

## 2025-03-21 NOTE — PROGRESS NOTE ADULT - PROBLEM SELECTOR PLAN 3
- CT neck - Mucosal thickening and enhancement in the larynx and oropharynx may be due to infectious laryngopharyngitis or sequela of previous radiation performed.  - c/w morphine 2mg IV q4hr prn for odynophagia 30 min prior to meals ( PRN use in past 24 hours from 8 AM to 8 AM: none)   - c/w IV fluconazole - CT neck - Mucosal thickening and enhancement in the larynx and oropharynx may be due to infectious laryngopharyngitis or sequela of previous radiation performed.  - Discontinued IV morphine 2 mg   - c/w IV fluconazole

## 2025-03-22 LAB
ALBUMIN SERPL ELPH-MCNC: 3.6 G/DL — SIGNIFICANT CHANGE UP (ref 3.3–5)
ALP SERPL-CCNC: 196 U/L — HIGH (ref 40–120)
ALT FLD-CCNC: 307 U/L — HIGH (ref 4–41)
ANION GAP SERPL CALC-SCNC: 14 MMOL/L — SIGNIFICANT CHANGE UP (ref 7–14)
AST SERPL-CCNC: 149 U/L — HIGH (ref 4–40)
BASOPHILS # BLD AUTO: 0.01 K/UL — SIGNIFICANT CHANGE UP (ref 0–0.2)
BASOPHILS NFR BLD AUTO: 0.4 % — SIGNIFICANT CHANGE UP (ref 0–2)
BILIRUB SERPL-MCNC: 0.9 MG/DL — SIGNIFICANT CHANGE UP (ref 0.2–1.2)
BUN SERPL-MCNC: 34 MG/DL — HIGH (ref 7–23)
CALCIUM SERPL-MCNC: 9 MG/DL — SIGNIFICANT CHANGE UP (ref 8.4–10.5)
CHLORIDE SERPL-SCNC: 100 MMOL/L — SIGNIFICANT CHANGE UP (ref 98–107)
CO2 SERPL-SCNC: 26 MMOL/L — SIGNIFICANT CHANGE UP (ref 22–31)
CREAT SERPL-MCNC: 1.08 MG/DL — SIGNIFICANT CHANGE UP (ref 0.5–1.3)
EGFR: 71 ML/MIN/1.73M2 — SIGNIFICANT CHANGE UP
EGFR: 71 ML/MIN/1.73M2 — SIGNIFICANT CHANGE UP
EOSINOPHIL # BLD AUTO: 0.01 K/UL — SIGNIFICANT CHANGE UP (ref 0–0.5)
EOSINOPHIL NFR BLD AUTO: 0.4 % — SIGNIFICANT CHANGE UP (ref 0–6)
GLUCOSE SERPL-MCNC: 115 MG/DL — HIGH (ref 70–99)
HCT VFR BLD CALC: 25.2 % — LOW (ref 39–50)
HGB BLD-MCNC: 8.7 G/DL — LOW (ref 13–17)
IANC: 1.62 K/UL — LOW (ref 1.8–7.4)
IMM GRANULOCYTES NFR BLD AUTO: 1.3 % — HIGH (ref 0–0.9)
LYMPHOCYTES # BLD AUTO: 0.4 K/UL — LOW (ref 1–3.3)
LYMPHOCYTES # BLD AUTO: 17.4 % — SIGNIFICANT CHANGE UP (ref 13–44)
MAGNESIUM SERPL-MCNC: 2 MG/DL — SIGNIFICANT CHANGE UP (ref 1.6–2.6)
MCHC RBC-ENTMCNC: 31.5 PG — SIGNIFICANT CHANGE UP (ref 27–34)
MCHC RBC-ENTMCNC: 34.5 G/DL — SIGNIFICANT CHANGE UP (ref 32–36)
MCV RBC AUTO: 91.3 FL — SIGNIFICANT CHANGE UP (ref 80–100)
MONOCYTES # BLD AUTO: 0.23 K/UL — SIGNIFICANT CHANGE UP (ref 0–0.9)
MONOCYTES NFR BLD AUTO: 10 % — SIGNIFICANT CHANGE UP (ref 2–14)
NEUTROPHILS # BLD AUTO: 1.62 K/UL — LOW (ref 1.8–7.4)
NEUTROPHILS NFR BLD AUTO: 70.5 % — SIGNIFICANT CHANGE UP (ref 43–77)
NRBC # BLD AUTO: 0 K/UL — SIGNIFICANT CHANGE UP (ref 0–0)
NRBC # FLD: 0 K/UL — SIGNIFICANT CHANGE UP (ref 0–0)
NRBC BLD AUTO-RTO: 0 /100 WBCS — SIGNIFICANT CHANGE UP (ref 0–0)
PHOSPHATE SERPL-MCNC: 2.9 MG/DL — SIGNIFICANT CHANGE UP (ref 2.5–4.5)
PLATELET # BLD AUTO: 167 K/UL — SIGNIFICANT CHANGE UP (ref 150–400)
POTASSIUM SERPL-MCNC: 3.7 MMOL/L — SIGNIFICANT CHANGE UP (ref 3.5–5.3)
POTASSIUM SERPL-SCNC: 3.7 MMOL/L — SIGNIFICANT CHANGE UP (ref 3.5–5.3)
PROT SERPL-MCNC: 6.2 G/DL — SIGNIFICANT CHANGE UP (ref 6–8.3)
RBC # BLD: 2.76 M/UL — LOW (ref 4.2–5.8)
RBC # FLD: 19.4 % — HIGH (ref 10.3–14.5)
SODIUM SERPL-SCNC: 140 MMOL/L — SIGNIFICANT CHANGE UP (ref 135–145)
WBC # BLD: 2.3 K/UL — LOW (ref 3.8–10.5)
WBC # FLD AUTO: 2.3 K/UL — LOW (ref 3.8–10.5)

## 2025-03-22 PROCEDURE — 99232 SBSQ HOSP IP/OBS MODERATE 35: CPT

## 2025-03-22 RX ORDER — SOD PHOS DI, MONO/K PHOS MONO 250 MG
1 TABLET ORAL
Refills: 0 | Status: COMPLETED | OUTPATIENT
Start: 2025-03-22 | End: 2025-03-22

## 2025-03-22 RX ORDER — HALOPERIDOL 10 MG/1
2 TABLET ORAL ONCE
Refills: 0 | Status: COMPLETED | OUTPATIENT
Start: 2025-03-22 | End: 2025-03-22

## 2025-03-22 RX ORDER — HALOPERIDOL 10 MG/1
1 TABLET ORAL ONCE
Refills: 0 | Status: COMPLETED | OUTPATIENT
Start: 2025-03-22 | End: 2025-03-22

## 2025-03-22 RX ORDER — MELATONIN 5 MG
6 TABLET ORAL AT BEDTIME
Refills: 0 | Status: COMPLETED | OUTPATIENT
Start: 2025-03-22 | End: 2025-03-22

## 2025-03-22 RX ORDER — HALOPERIDOL 10 MG/1
0.5 TABLET ORAL ONCE
Refills: 0 | Status: COMPLETED | OUTPATIENT
Start: 2025-03-22 | End: 2025-03-22

## 2025-03-22 RX ADMIN — HALOPERIDOL 0.5 MILLIGRAM(S): 10 TABLET ORAL at 21:20

## 2025-03-22 RX ADMIN — Medication 1 PACKET(S): at 09:00

## 2025-03-22 RX ADMIN — HALOPERIDOL 0.5 MILLIGRAM(S): 10 TABLET ORAL at 15:29

## 2025-03-22 RX ADMIN — Medication 6 MILLIGRAM(S): at 20:51

## 2025-03-22 RX ADMIN — ENOXAPARIN SODIUM 40 MILLIGRAM(S): 100 INJECTION SUBCUTANEOUS at 14:20

## 2025-03-22 RX ADMIN — Medication 1 APPLICATION(S): at 09:07

## 2025-03-22 RX ADMIN — Medication 500000 UNIT(S): at 14:21

## 2025-03-22 RX ADMIN — HALOPERIDOL 2 MILLIGRAM(S): 10 TABLET ORAL at 03:08

## 2025-03-22 RX ADMIN — Medication 0.25 MILLIGRAM(S): at 09:00

## 2025-03-22 RX ADMIN — Medication 40 MILLIGRAM(S): at 11:24

## 2025-03-22 RX ADMIN — Medication 0.25 MILLIGRAM(S): at 18:26

## 2025-03-22 RX ADMIN — HALOPERIDOL 1 MILLIGRAM(S): 10 TABLET ORAL at 18:58

## 2025-03-22 RX ADMIN — Medication 1 PACKET(S): at 14:20

## 2025-03-22 RX ADMIN — HALOPERIDOL 0.5 MILLIGRAM(S): 10 TABLET ORAL at 02:54

## 2025-03-22 RX ADMIN — HALOPERIDOL 0.5 MILLIGRAM(S): 10 TABLET ORAL at 16:49

## 2025-03-22 RX ADMIN — Medication 500000 UNIT(S): at 09:00

## 2025-03-22 RX ADMIN — Medication 1 PACKET(S): at 16:49

## 2025-03-22 NOTE — CHART NOTE - NSCHARTNOTEFT_GEN_A_CORE
CODE MIKE called overhead.   Per staff, pt became acutely agitated, wandering into other patient's rooms, not re-directable, and yelling at staff.   Pt seen in hallway with nursing staff and security present.   Pt eventually re-directed back to room while maintaining patient safety. PRN haldol given. CODE MIKE called overhead.   Per staff, pt became acutely agitated, wandering into other patient's rooms, not re-directable, and yelling at staff.   Pt seen in hallway with nursing staff and security present.   Pt eventually re-directed back to room while maintaining patient safety. PRN haldol given. Will c/w constant observation.

## 2025-03-22 NOTE — CHART NOTE - NSCHARTNOTEFT_GEN_A_CORE
Time of evaluation: 3:00 AM    74 y/o male with a hx of vascular dementia, stage 4 tonsil cancer a/w FTT and pain management s/p PEG placement. CODE MIKE called for acute agitation and physical aggression towards RN. As per RN, pt got up to use the bathroom. As per 1:1 at bedside, pt rapidly and repeatedly flush the toilet. As RN and 1:1 went to check on patient in bathroom, patient became agitated. Patient came out of the bathroom and grabbed the nurses shirt. RN was able to loose patient's  and patient began to yell at the other patient in the room. Patient not amendable to re-direction. Patient received prn haldol 0.5mg IVP prior to my arrival. Upon my arrival patient naked and lying in bed, resting comfortably. Shortly after patient began to curse at 1:1 and attempted to get out of bed. Patient instructed to lay down and refused. Security on hand and patient placed on 4pt restraints and given haldol 2mg IVP STAT.    Other interventions attempted: de-escalation - pt not amendable, medication assessment - required higher dose of haldol for acute agitation and physical aggression    REVIEW OF SYSTEMS:  CONSTITUTIONAL: [  ] fever   [  ] fatigue  EYES: [  ] visual disturbances  ENMT:  [  ]difficulty hearing  [  ] throat pain  RESPIRATORY:  [  ]cough  [   ] wheezing  [   ] hemoptysis [  ] shortness of breath  CARDIOVASCULAR: [  ]chest pain  [  ] palpitations   GASTROINTESTINAL: [  ]  abdominal pain [  ] nausea [  ] vomiting  [  ] diarrhea  [  ] constipation  GENITOURINARY: [  ] dysuria [  ] frequency  [  ] hematuria [  ] incontinence  NEUROLOGICAL: [  ] headaches [  ] new numbness  SKIN: [  ]itching [  ] new rash   MUSCULOSKELETAL: [  ] back pain  [  ] extremity pain  PSYCHIATRIC: [  ] depression  [  ] anxiety  [  ] mood swings [  ] difficulty sleeping  ALLERGY AND IMMUNOLOGIC: [  ] hives    [X]Unable to perform ROS due to: acute agitation and physical aggression  [  ] ROS reviewed and all negative    PAST MEDICAL & SURGICAL HISTORY:  Ventral hernia  Acid reflux  Osteoarthritis of right hip  Tonsil cancer  History of bilateral inguinal hernia repair 1974  S/P carpal tunnel release right ; 1993      MEDICATIONS  (STANDING):  chlorhexidine 2% Cloths 1 Application(s) Topical <User Schedule>  enoxaparin Injectable 40 milliGRAM(s) SubCutaneous every 24 hours  famotidine Injectable 40 milliGRAM(s) IV Push daily  melatonin 3 milliGRAM(s) Oral at bedtime  nystatin    Suspension [id number] Unit(s) Oral every 6 hours  potassium phosphate / sodium phosphate Powder (PHOS-NaK) 1 Packet(s) Oral three times a day before meals  risperiDONE   Tablet 0.25 milliGRAM(s) Oral <User Schedule>    MEDICATIONS  (PRN):  aluminum hydroxide/magnesium hydroxide/simethicone Suspension 30 milliLiter(s) Enteral Tube every 4 hours PRN Dyspepsia  haloperidol    Injectable 0.5 milliGRAM(s) IV Push every 6 hours PRN Combative/agitation  ondansetron Injectable 4 milliGRAM(s) IV Push every 8 hours PRN Nausea and/or Vomiting                          8.7    2.30  )-----------( 167      ( 22 Mar 2025 05:25 )             25.2     03-22    140  |  100  |  34[H]  ----------------------------<  115[H]  3.7   |  26  |  1.08    Ca    9.0      22 Mar 2025 05:25  Phos  2.9     03-22  Mg     2.00     03-22    TPro  6.2  /  Alb  3.6  /  TBili  0.9  /  DBili  x   /  AST  149[H]  /  ALT  307[H]  /  AlkPhos  196[H]  03-22      [X] Unable to perform physical exam & vital signs due to acute physical aggression    [X ] I have evaluated this patient and have determined that restraints are warranted to optimize medical care. Patient was assessed for current physical and psychological risk factors as well as special needs. There are no medical conditions or limitations that would place this patient at risk while in restraints.    Type of restraint: 4 point restraints    Behavioral criteria for discontinuation of restraint: [ X ] See order

## 2025-03-22 NOTE — CHART NOTE - NSCHARTNOTEFT_GEN_A_CORE
CODE MIKE called overhead. Patient intermittently became slightly agitated but was able to be redirected. Noted patient had a BM and was being cleaned up. Patient now more cooperative. Will continue continuous constant observation for safety.

## 2025-03-22 NOTE — PROGRESS NOTE ADULT - SUBJECTIVE AND OBJECTIVE BOX
Onc Hosp Solid Tumor note    Bolus feeds  F/u Psych Onc Hosp Solid Tumor note  Patient is a 75y old  Male who presents with a chief complaint of odynophagia with decreased PO intake (22 Mar 2025 08:55)      SUBJECTIVE / OVERNIGHT EVENTS:  resting in bed, had agitation overnight    MEDICATIONS  (STANDING):  chlorhexidine 2% Cloths 1 Application(s) Topical <User Schedule>  enoxaparin Injectable 40 milliGRAM(s) SubCutaneous every 24 hours  famotidine Injectable 40 milliGRAM(s) IV Push daily  melatonin 3 milliGRAM(s) Oral at bedtime  nystatin    Suspension [id number] Unit(s) Oral every 6 hours  potassium phosphate / sodium phosphate Powder (PHOS-NaK) 1 Packet(s) Oral three times a day before meals  risperiDONE   Tablet 0.25 milliGRAM(s) Oral <User Schedule>    MEDICATIONS  (PRN):  aluminum hydroxide/magnesium hydroxide/simethicone Suspension 30 milliLiter(s) Enteral Tube every 4 hours PRN Dyspepsia  haloperidol    Injectable 0.5 milliGRAM(s) IV Push every 6 hours PRN Combative/agitation  ondansetron Injectable 4 milliGRAM(s) IV Push every 8 hours PRN Nausea and/or Vomiting        I&O's Summary    21 Mar 2025 07:01  -  22 Mar 2025 07:00  --------------------------------------------------------  IN: 1080 mL / OUT: 0 mL / NET: 1080 mL    22 Mar 2025 07:01  -  22 Mar 2025 12:51  --------------------------------------------------------  IN: 360 mL / OUT: 0 mL / NET: 360 mL      Vital Signs Last 24 Hrs  T(C): 36.8 (22 Mar 2025 12:25), Max: 36.8 (22 Mar 2025 04:47)  T(F): 98.3 (22 Mar 2025 12:25), Max: 98.3 (22 Mar 2025 12:25)  HR: 79 (22 Mar 2025 12:25) (63 - 79)  BP: 122/71 (22 Mar 2025 12:25) (118/73 - 137/77)  BP(mean): 101 (22 Mar 2025 04:47) (101 - 101)  RR: 18 (22 Mar 2025 12:25) (18 - 18)  SpO2: 100% (22 Mar 2025 12:25) (97% - 100%)    Parameters below as of 22 Mar 2025 12:25  Patient On (Oxygen Delivery Method): room air      PHYSICAL EXAM:  GENERAL: Sleeping, was agitated overnight  HEAD:  Atraumatic, Normocephalic  EYES: EOMI, PERRLA, conjunctiva and sclera clear  NECK: Supple, No JVD  CHEST/LUNG: Clear to auscultation bilaterally; No wheeze  HEART: Regular rate and rhythm; No murmurs, rubs, or gallops  ABDOMEN: Soft, Nontender, Nondistended; Bowel sounds present  EXTREMITIES:  2+ Peripheral Pulses, No clubbing, cyanosis, or edema  PSYCH: sleeping but moves when examined      LABS:                        8.7    2.30  )-----------( 167      ( 22 Mar 2025 05:25 )             25.2     03-22    140  |  100  |  34[H]  ----------------------------<  115[H]  3.7   |  26  |  1.08    Ca    9.0      22 Mar 2025 05:25  Phos  2.9     03-22  Mg     2.00     03-22    TPro  6.2  /  Alb  3.6  /  TBili  0.9  /  DBili  x   /  AST  149[H]  /  ALT  307[H]  /  AlkPhos  196[H]  03-22       Care Discussed with Consultants/Other Providers:  c/w   Bolus feeds  F/u Psych

## 2025-03-22 NOTE — PROGRESS NOTE ADULT - ASSESSMENT
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     Active problems  tonsillar ca  vascular dementia   FTT  Anemia in neoplastic disease  hyponatremia  Vascular dementia  hypercoagulable state  Severe Protein calorie Malnutrition  odynophagia/lack of appetite --> 3/18 Peg placed by IR  FTT  severe protein calorie malnutrition  Pt reporting odynophagia and dysphagia w/ regurgitation of food. Dysphagia to solids and liquids. Seems he holds the food and does not swallow due to fear of pain. Pt reporting dysgeusia as well.   (s/p C6 of 7 of cisplatin and on RT -should've been completed on 3/7)  Agitation 3/20--> Psych called  Reviewed CT neck, mucosal thickening and enhancement in the larynx and oropharynx may be due to infectious laryngopharyngitis or sequela of previous radiation performed.  Palliative care consulted  Patient does not like viscous lidocaine or liquid gabapentin - will d/c   Pain control with morphine 30 minutes prior to meals   Cineesophagogram completed - rec puree and thin liquid   Empiric treatment of thrush/esophagitis with fluconazole IV  As per daughter - pt with minimal PO intake. Discussed idea/option of PEG tube for nutrition. Given 5 siblings - will need to discuss and all agree. GI recommending IR eval for PEG - pending.   - Discussed plan of care with Dr. Gibson. Outpatient rad onc team also aware of patient's hospital course.   -Palliative following       Anemia in neoplastic disease  Hb 9.6, otherwise stable, continue to monitor    Vascular dementia  - Pt agitated 3/10 overnight requiring ativan. Mental status improved 3/13. Family reporting continues to have hallucinations but this has been ongoing since diagnosis of dementia.   - Psych consulted. Haldol PRN agitation.   - CTH with chronic microvascular changes.   - Recommend outpatient follow up with neurology.     Starvation Ketosis - Resolved  - AG 17 - UA with ketones - in the setting of poor PO intake. Improved with administration of D5+NS.     severe protein Calorie Malnutrition  peg 3/18, Tube feeds to start,    Hypercoagulable state 2/2 malignancy  On lovenox 40mg daily     peg today 3/18 with IR--> Peg placed 3/18, NS x 6 hrs, then start feeds at 10 cc/hr with goal to 60 cc/hr and then transition to Bolus for REHAB  Abd binder to prevent patient from removing Peg tube  c/w Lovenox.  Once at bolus feeds, d/c planning to REHAB  3/20 overnight agitated--> got haldol. Start Haldol prn--> f/u Psych consult.  Out patient f/u with Dr Gibson 75 yo gentlemen with tonsillar ca (s/p C6 of 7 of cisplatin and on RT -should've been completed on 3/7), vascular dementia p/w odynophagia/lack of appetite and overall FTT.     Active problems  tonsillar ca  vascular dementia   FTT  Anemia in neoplastic disease  hyponatremia  Vascular dementia  hypercoagulable state  Severe Protein calorie Malnutrition  odynophagia/lack of appetite --> 3/18 Peg placed by IR  FTT  severe protein calorie malnutrition  Pt reporting odynophagia and dysphagia w/ regurgitation of food. Dysphagia to solids and liquids. Seems he holds the food and does not swallow due to fear of pain. Pt reporting dysgeusia as well.   (s/p C6 of 7 of cisplatin and on RT -should've been completed on 3/7)  Agitation 3/20--> Psych called  Reviewed CT neck, mucosal thickening and enhancement in the larynx and oropharynx may be due to infectious laryngopharyngitis or sequela of previous radiation performed.  Palliative care consulted  Patient does not like viscous lidocaine or liquid gabapentin - will d/c   Pain control with morphine 30 minutes prior to meals   Cineesophagogram completed - rec puree and thin liquid   Empiric treatment of thrush/esophagitis with fluconazole IV  As per daughter - pt with minimal PO intake. Discussed idea/option of PEG tube for nutrition. Given 5 siblings - will need to discuss and all agree.   - Discussed plan of care with Dr. Gibson. Outpatient rad onc team also aware of patient's hospital course.   -Palliative following   Peg placed by IR--> now on Bolus feeds      Anemia in neoplastic disease  Hb 9.6, otherwise stable, continue to monitor    Vascular dementia  - Pt agitated 3/10 overnight requiring ativan. Mental status improved 3/13. Family reporting continues to have hallucinations but this has been ongoing since diagnosis of dementia.   - Psych consulted. Haldol PRN agitation.   - CTH with chronic microvascular changes.   - Recommend outpatient follow up with neurology.   3/20 overnight agitated--> got haldol. Start Haldol prn--> f/u Psych consult.--> 3/21 started on Risperidone by psych--> f/u pysch    Starvation Ketosis - Resolved  - AG 17 - UA with ketones - in the setting of poor PO intake. Improved with administration of D5+NS.   RESOLVED--> now on Bolus peg feeds    severe protein Calorie Malnutrition  peg 3/18, Tube feeds to start,    Hypercoagulable state 2/2 malignancy  On lovenox 40mg daily       peg today 3/18 with IR--> Peg placed 3/18, NS x 6 hrs, then start feeds at 10 cc/hr with goal to 60 cc/hr and then transition to Bolus for REHAB  Abd binder to prevent patient from removing Peg tube  c/w Lovenox.  Once at bolus feeds, d/c planning to REHAB  3/20 overnight agitated--> got haldol. Start Haldol prn--> f/u Psych consult.--> 3/21 started on Risperidone by psych--> f/u pysch   Out patient f/u with Onc/ ENT / Psych Dr Gibson/ Dr Macdonald/ psych

## 2025-03-23 LAB
ALBUMIN SERPL ELPH-MCNC: 3.3 G/DL — SIGNIFICANT CHANGE UP (ref 3.3–5)
ALP SERPL-CCNC: 156 U/L — HIGH (ref 40–120)
ALT FLD-CCNC: 187 U/L — HIGH (ref 4–41)
ANION GAP SERPL CALC-SCNC: 11 MMOL/L — SIGNIFICANT CHANGE UP (ref 7–14)
AST SERPL-CCNC: 57 U/L — HIGH (ref 4–40)
BASOPHILS # BLD AUTO: 0.02 K/UL — SIGNIFICANT CHANGE UP (ref 0–0.2)
BASOPHILS NFR BLD AUTO: 0.7 % — SIGNIFICANT CHANGE UP (ref 0–2)
BILIRUB SERPL-MCNC: 0.6 MG/DL — SIGNIFICANT CHANGE UP (ref 0.2–1.2)
BUN SERPL-MCNC: 32 MG/DL — HIGH (ref 7–23)
CALCIUM SERPL-MCNC: 8.8 MG/DL — SIGNIFICANT CHANGE UP (ref 8.4–10.5)
CHLORIDE SERPL-SCNC: 104 MMOL/L — SIGNIFICANT CHANGE UP (ref 98–107)
CO2 SERPL-SCNC: 27 MMOL/L — SIGNIFICANT CHANGE UP (ref 22–31)
CREAT SERPL-MCNC: 0.9 MG/DL — SIGNIFICANT CHANGE UP (ref 0.5–1.3)
EGFR: 89 ML/MIN/1.73M2 — SIGNIFICANT CHANGE UP
EGFR: 89 ML/MIN/1.73M2 — SIGNIFICANT CHANGE UP
EOSINOPHIL # BLD AUTO: 0.02 K/UL — SIGNIFICANT CHANGE UP (ref 0–0.5)
EOSINOPHIL NFR BLD AUTO: 0.7 % — SIGNIFICANT CHANGE UP (ref 0–6)
GLUCOSE SERPL-MCNC: 92 MG/DL — SIGNIFICANT CHANGE UP (ref 70–99)
HCT VFR BLD CALC: 24.7 % — LOW (ref 39–50)
HGB BLD-MCNC: 8.5 G/DL — LOW (ref 13–17)
IANC: 1.79 K/UL — LOW (ref 1.8–7.4)
IMM GRANULOCYTES NFR BLD AUTO: 0.7 % — SIGNIFICANT CHANGE UP (ref 0–0.9)
LYMPHOCYTES # BLD AUTO: 0.57 K/UL — LOW (ref 1–3.3)
LYMPHOCYTES # BLD AUTO: 20.3 % — SIGNIFICANT CHANGE UP (ref 13–44)
MAGNESIUM SERPL-MCNC: 2.2 MG/DL — SIGNIFICANT CHANGE UP (ref 1.6–2.6)
MCHC RBC-ENTMCNC: 31.5 PG — SIGNIFICANT CHANGE UP (ref 27–34)
MCHC RBC-ENTMCNC: 34.4 G/DL — SIGNIFICANT CHANGE UP (ref 32–36)
MCV RBC AUTO: 91.5 FL — SIGNIFICANT CHANGE UP (ref 80–100)
MONOCYTES # BLD AUTO: 0.39 K/UL — SIGNIFICANT CHANGE UP (ref 0–0.9)
MONOCYTES NFR BLD AUTO: 13.9 % — SIGNIFICANT CHANGE UP (ref 2–14)
NEUTROPHILS # BLD AUTO: 1.79 K/UL — LOW (ref 1.8–7.4)
NEUTROPHILS NFR BLD AUTO: 63.7 % — SIGNIFICANT CHANGE UP (ref 43–77)
NRBC # BLD AUTO: 0 K/UL — SIGNIFICANT CHANGE UP (ref 0–0)
NRBC # FLD: 0 K/UL — SIGNIFICANT CHANGE UP (ref 0–0)
NRBC BLD AUTO-RTO: 0 /100 WBCS — SIGNIFICANT CHANGE UP (ref 0–0)
PHOSPHATE SERPL-MCNC: 3.7 MG/DL — SIGNIFICANT CHANGE UP (ref 2.5–4.5)
PLATELET # BLD AUTO: 150 K/UL — SIGNIFICANT CHANGE UP (ref 150–400)
POTASSIUM SERPL-MCNC: 3.7 MMOL/L — SIGNIFICANT CHANGE UP (ref 3.5–5.3)
POTASSIUM SERPL-SCNC: 3.7 MMOL/L — SIGNIFICANT CHANGE UP (ref 3.5–5.3)
PROT SERPL-MCNC: 5.9 G/DL — LOW (ref 6–8.3)
RBC # BLD: 2.7 M/UL — LOW (ref 4.2–5.8)
RBC # FLD: 19.9 % — HIGH (ref 10.3–14.5)
SODIUM SERPL-SCNC: 142 MMOL/L — SIGNIFICANT CHANGE UP (ref 135–145)
WBC # BLD: 2.81 K/UL — LOW (ref 3.8–10.5)
WBC # FLD AUTO: 2.81 K/UL — LOW (ref 3.8–10.5)

## 2025-03-23 PROCEDURE — 99232 SBSQ HOSP IP/OBS MODERATE 35: CPT

## 2025-03-23 RX ORDER — OLANZAPINE 10 MG/1
2.5 TABLET ORAL EVERY 6 HOURS
Refills: 0 | Status: DISCONTINUED | OUTPATIENT
Start: 2025-03-23 | End: 2025-03-31

## 2025-03-23 RX ORDER — HALOPERIDOL 10 MG/1
1 TABLET ORAL ONCE
Refills: 0 | Status: COMPLETED | OUTPATIENT
Start: 2025-03-23 | End: 2025-03-23

## 2025-03-23 RX ORDER — RISPERIDONE 4 MG
0.5 TABLET ORAL AT BEDTIME
Refills: 0 | Status: DISCONTINUED | OUTPATIENT
Start: 2025-03-23 | End: 2025-03-23

## 2025-03-23 RX ORDER — RISPERIDONE 4 MG
0.5 TABLET ORAL
Refills: 0 | Status: DISCONTINUED | OUTPATIENT
Start: 2025-03-23 | End: 2025-03-31

## 2025-03-23 RX ORDER — RISPERIDONE 4 MG
0.25 TABLET ORAL
Refills: 0 | Status: DISCONTINUED | OUTPATIENT
Start: 2025-03-24 | End: 2025-03-24

## 2025-03-23 RX ADMIN — Medication 500000 UNIT(S): at 09:48

## 2025-03-23 RX ADMIN — Medication 0.25 MILLIGRAM(S): at 06:46

## 2025-03-23 RX ADMIN — Medication 1 APPLICATION(S): at 06:46

## 2025-03-23 RX ADMIN — Medication 40 MILLIGRAM(S): at 11:01

## 2025-03-23 RX ADMIN — Medication 3 MILLIGRAM(S): at 22:08

## 2025-03-23 RX ADMIN — Medication 0.5 MILLIGRAM(S): at 18:05

## 2025-03-23 RX ADMIN — Medication 500000 UNIT(S): at 14:28

## 2025-03-23 RX ADMIN — ENOXAPARIN SODIUM 40 MILLIGRAM(S): 100 INJECTION SUBCUTANEOUS at 14:28

## 2025-03-23 RX ADMIN — HALOPERIDOL 0.5 MILLIGRAM(S): 10 TABLET ORAL at 11:00

## 2025-03-23 RX ADMIN — HALOPERIDOL 1 MILLIGRAM(S): 10 TABLET ORAL at 12:08

## 2025-03-23 NOTE — BH CONSULTATION LIAISON PROGRESS NOTE - NSBHFUPINTERVALHXFT_PSY_A_CORE
Chart reviewed, seen at bedside. Pt sitting on side of bed with 1:1 CO present. Had multiple CODE BERTs yesterday for agitation. Refuses to answer most questions. When asked what is bothering him, states "that psycho over there! (pointing to the pt sharing the room) and they wont let me leave!" Does not respond when asked about orientation/mood/EPS, refuses to comply with EPS exam. During conversation, pt states "If you don't want to bother me, then leave, get out of here."    Discussed with daughters, Joselin and Ellen, starting Risperidone 0.25mg bid, daughters in agreement, discussed with daughters black box warning (2-4% of patient's with dementia can experience a cardiovascular event), both verbalized understanding.

## 2025-03-23 NOTE — PROGRESS NOTE ADULT - ASSESSMENT
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     Active problems  tonsillar ca  vascular dementia   FTT  Anemia in neoplastic disease  hyponatremia  Vascular dementia  hypercoagulable state  Severe Protein calorie Malnutrition  odynophagia/lack of appetite --> 3/18 Peg placed by IR  FTT  severe protein calorie malnutrition  Pt reporting odynophagia and dysphagia w/ regurgitation of food. Dysphagia to solids and liquids. Seems he holds the food and does not swallow due to fear of pain. Pt reporting dysgeusia as well.   (s/p C6 of 7 of cisplatin and on RT -should've been completed on 3/7)  Agitation 3/20--> Psych called  Reviewed CT neck, mucosal thickening and enhancement in the larynx and oropharynx may be due to infectious laryngopharyngitis or sequela of previous radiation performed.  Palliative care consulted  Patient does not like viscous lidocaine or liquid gabapentin - will d/c   Pain control with morphine 30 minutes prior to meals   Cineesophagogram completed - rec puree and thin liquid   Empiric treatment of thrush/esophagitis with fluconazole IV  As per daughter - pt with minimal PO intake. Discussed idea/option of PEG tube for nutrition. Given 5 siblings - will need to discuss and all agree.   - Discussed plan of care with Dr. Gibson. Outpatient rad onc team also aware of patient's hospital course.   -Palliative following   Peg placed by IR--> now on Bolus feeds      Anemia in neoplastic disease  Hb 9.6, otherwise stable, continue to monitor    Vascular dementia  - Pt agitated 3/10 overnight requiring ativan. Mental status improved 3/13. Family reporting continues to have hallucinations but this has been ongoing since diagnosis of dementia.   - Psych consulted. Haldol PRN agitation.   - CTH with chronic microvascular changes.   - Recommend outpatient follow up with neurology.   3/20 overnight agitated--> got haldol. Start Haldol prn--> f/u Psych consult.--> 3/21 started on Risperidone by psych--> f/u pysch    Starvation Ketosis - Resolved  - AG 17 - UA with ketones - in the setting of poor PO intake. Improved with administration of D5+NS.   RESOLVED--> now on Bolus peg feeds    severe protein Calorie Malnutrition  peg 3/18, Tube feeds to start,    Hypercoagulable state 2/2 malignancy  On lovenox 40mg daily       peg today 3/18 with IR--> Peg placed 3/18, NS x 6 hrs, then start feeds at 10 cc/hr with goal to 60 cc/hr and then transition to Bolus for REHAB  Abd binder to prevent patient from removing Peg tube  c/w Lovenox.  Once at bolus feeds, d/c planning to REHAB  3/20 overnight agitated--> got haldol. Start Haldol prn--> f/u Psych consult.--> 3/21 started on Risperidone by psych--> f/u pysch   Out patient f/u with Onc/ ENT / Psych Dr Gibson/ Dr Macdonald/ psych 73 yo gentlemen with tonsillar ca (s/p C6 of 7 of cisplatin and on RT -should've been completed on 3/7), vascular dementia p/w odynophagia/lack of appetite and overall FTT.     Active problems  tonsillar ca  vascular dementia   FTT  Anemia in neoplastic disease  hyponatremia  Vascular dementia  hypercoagulable state  Severe Protein calorie Malnutrition  odynophagia/lack of appetite --> 3/18 Peg placed by IR    Vascular dementia  - Pt agitated 3/10 overnight requiring ativan. Mental status improved 3/13. Family reporting continues to have hallucinations but this has been ongoing since diagnosis of dementia.   - Psych consulted. Haldol PRN agitation.   - CTH with chronic microvascular changes.   - Recommend outpatient follow up with neurology.   3/20 overnight agitated--> got haldol. Start Haldol prn--> f/u Psych consult.--> 3/21 started on Risperidone by psych--> 3/22 Psych started RISPERDAL l bid.  3/23--> f/u Psych--> Increase Risperdal to 0.25 mg in AM, 0.5 mg QH        FTT/ severe protein calorie malnutrition  Pt reporting odynophagia and dysphagia w/ regurgitation of food. Dysphagia to solids and liquids. Seems he holds the food and does not swallow due to fear of pain. Pt reporting dysgeusia as well.   (s/p C6 of 7 of cisplatin and on RT -should've been completed on 3/7)  Agitation 3/20--> Psych called  Reviewed CT neck, mucosal thickening and enhancement in the larynx and oropharynx may be due to infectious laryngopharyngitis or sequela of previous radiation performed.  Palliative care consulted  Patient does not like viscous lidocaine or liquid gabapentin - will d/c   Pain control with morphine 30 minutes prior to meals   Cineesophagogram completed - rec puree and thin liquid   Empiric treatment of thrush/esophagitis with fluconazole IV  As per daughter - pt with minimal PO intake. Discussed idea/option of PEG tube for nutrition. Given 5 siblings - will need to discuss and all agree.   - Discussed plan of care with Dr. Gibson. Outpatient rad onc team also aware of patient's hospital course.   -Palliative following   Peg placed by IR--> now on Bolus feeds      Anemia in neoplastic disease  Hb 9.6, otherwise stable, continue to monitor      Starvation Ketosis - Resolved  - AG 17 - UA with ketones - in the setting of poor PO intake. Improved with administration of D5+NS.   RESOLVED--> now on Bolus peg feeds    severe protein Calorie Malnutrition  peg 3/18, Tube feeds to start,    Hypercoagulable state 2/2 malignancy  On lovenox 40mg daily       peg today 3/18 with IR--> Peg placed 3/18, NS x 6 hrs, then start feeds at 10 cc/hr with goal to 60 cc/hr and then transition to Bolus for REHAB  Abd binder to prevent patient from removing Peg tube  c/w Lovenox.  Once at bolus feeds, d/c planning to REHAB  3/20 overnight agitated--> got haldol. Start Haldol prn--> f/u Psych consult.--> 3/21 started on Risperidone by psych-->Vascular dementia  - Pt agitated 3/10 overnight requiring ativan. Mental status improved 3/13. Family reporting continues to have hallucinations but this has been ongoing since diagnosis of dementia.   - Psych consulted. Haldol PRN agitation.   - CTH with chronic microvascular changes.   - Recommend outpatient follow up with neurology.   3/20 overnight agitated--> got haldol. Start Haldol prn--> f/u Psych consult.--> 3/21 started on Risperidone by psych--> 3/22 Psych started RISPERDAL l bid.  3/23--> f/u Psych--> Increase Risperdal to 0.25 mg in AM, 0.5 mg QH   f/u pysch   Out patient f/u with Onc/ ENT / Psych Dr Gibson/ Dr Macdonald/ psych 75 yo gentlemen with tonsillar ca (s/p C6 of 7 of cisplatin and on RT -should've been completed on 3/7), vascular dementia p/w odynophagia/lack of appetite and overall FTT.     Active problems  tonsillar ca  vascular dementia   FTT  Anemia in neoplastic disease  hyponatremia  Vascular dementia  hypercoagulable state  Severe Protein calorie Malnutrition  odynophagia/lack of appetite --> 3/18 Peg placed by IR    Vascular dementia  - Pt agitated 3/10 overnight requiring ativan. Mental status improved 3/13. Family reporting continues to have hallucinations but this has been ongoing since diagnosis of dementia.   - Psych consulted. Haldol PRN agitation.   - CTH with chronic microvascular changes.   - Recommend outpatient follow up with neurology.   3/20 overnight agitated--> got haldol. Start Haldol prn--> f/u Psych consult.--> 3/21 started on Risperidone by psych--> 3/22 Psych started RISPERDAL l bid.  3/23--> f/u Psych--> Increase Risperdal to 0.25 mg in AM, 0.5 mg QH        FTT/ severe protein calorie malnutrition  Pt reporting odynophagia and dysphagia w/ regurgitation of food. Dysphagia to solids and liquids. Seems he holds the food and does not swallow due to fear of pain. Pt reporting dysgeusia as well.   (s/p C6 of 7 of cisplatin and on RT -should've been completed on 3/7)  Agitation 3/20--> Psych called  Reviewed CT neck, mucosal thickening and enhancement in the larynx and oropharynx may be due to infectious laryngopharyngitis or sequela of previous radiation performed.  Palliative care consulted  Patient does not like viscous lidocaine or liquid gabapentin - will d/c   Pain control with morphine 30 minutes prior to meals   Cineesophagogram completed - rec puree and thin liquid   Empiric treatment of thrush/esophagitis with fluconazole IV  As per daughter - pt with minimal PO intake. Discussed idea/option of PEG tube for nutrition. Given 5 siblings - will need to discuss and all agree.   - Discussed plan of care with Dr. Gibson. Outpatient rad onc team also aware of patient's hospital course.   -Palliative following   Peg placed by IR--> now on Bolus feeds      Anemia in neoplastic disease  Hb 9.6, otherwise stable, continue to monitor      Starvation Ketosis - Resolved  - AG 17 - UA with ketones - in the setting of poor PO intake. Improved with administration of D5+NS.   RESOLVED--> now on Bolus peg feeds    severe protein Calorie Malnutrition  peg 3/18, Tube feeds to start,    Hypercoagulable state 2/2 malignancy  On lovenox 40mg daily       peg today 3/18 with IR--> Peg placed 3/18, NS x 6 hrs, then start feeds at 10 cc/hr with goal to 60 cc/hr and then transition to Bolus for REHAB  Abd binder to prevent patient from removing Peg tube  c/w Lovenox.  Once at bolus feeds, d/c planning to REHAB  3/20 overnight agitated--> got haldol. Start Haldol prn--> f/u Psych consult.--> 3/21 started on Risperidone by psych-->Vascular dementia  - Pt agitated 3/10 overnight requiring ativan. Mental status improved 3/13. Family reporting continues to have hallucinations but this has been ongoing since diagnosis of dementia.   - Psych consulted. Haldol PRN agitation.   - CTH with chronic microvascular changes.   - Recommend outpatient follow up with neurology.   3/20 overnight agitated--> got haldol. Start Haldol prn--> f/u Psych consult.--> 3/21 started on Risperidone by psych--> 3/22 Psych started RISPERDAL l bid.  3/23--> f/u Psych--> Increase Risperdal to 0.25 mg in AM, 0.5 mg QH, prn treatment changed to ZYPREXA not haldol   f/u pysch   Out patient f/u with Onc/ ENT / Psych Dr Gibson/ Dr Macdonald/ psych

## 2025-03-23 NOTE — BH CONSULTATION LIAISON PROGRESS NOTE - NSBHASSESSMENTFT_PSY_ALL_CORE
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 Psychiatry consulted for aggression. Improved behaviour, has not received any PRNs since yesterday. Still has some hallucinations at night, but amenable to reorientation.     Patient carries a diagnosis of dementia for the last 4-5 years, declining cognitively over time, with worsening symptoms along with paranoia, distress, poor sleep since the start of RT. Pain/throat discomfort, poor PO intake has been ongoing.     3/13--- has been doing better, no agitation or prn needs  3/21: reconsulted for agitation s/p code christian, family requesting standing medication for mood, agitation, amenable to Risperidone, bbb d/w family, verbalized understanding, denies si, denies ah  3/23 - multiple code BERTs in last 24 hours, receiving PRN Haldol frequently, does not engage in exam or comply with EPS exam, does not appear grossly rigid, appears paranoid about roommate, will increase risperidone to 0.25 mg AM/0.5 mg PM, will change PRN to Zyprexa 2.5 mg    Recommendations    - Deferring observation status to primary team  - Frequent orientation  - Monitor EKG qtc interval  - AVOID bzd, anticholinergics, antihistamines  - Continue Melatonin 3mg at 8PM PO  - INCREASE Risperidone 0.25mg @ 7am and 0.5 mg 5pm via peg  - ONLY FOR COMBATIVE BEHAVIORS--------Zyprex 2.5mg q 6hrs prn IM/IV/PO  - No psychiatric contraindications to discharge   The patient is a 75 yr old M with a PMHx of Vascular dementia and 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 Psychiatry consulted for aggression. Improved behaviour, has not received any PRNs since yesterday. Still has some hallucinations at night, but amenable to reorientation.     Patient carries a diagnosis of dementia for the last 4-5 years, declining cognitively over time, with worsening symptoms along with paranoia, distress, poor sleep since the start of RT. Pain/throat discomfort, poor PO intake has been ongoing.     3/13--- has been doing better, no agitation or prn needs  3/21: reconsulted for agitation s/p code christian, family requesting standing medication for mood, agitation, amenable to Risperidone, bbb d/w family, verbalized understanding, denies si, denies ah  3/23 - multiple code BERTs in last 24 hours, receiving PRN Haldol frequently, does not engage in exam or comply with EPS exam, does not appear grossly rigid, appears paranoid about roommate, will increase risperidone to 0.25 mg AM/0.5 mg PM, will change PRN to Zyprexa 2.5 mg    Recommendations  - Recommend transfer to [facility]  - Deferring observation status to primary team  - Frequent orientation  - Monitor EKG qtc interval  - AVOID bzd, anticholinergics, antihistamines  - Continue Melatonin 3mg at 8PM PO  - INCREASE Risperidone 0.25mg @ 7am and 0.5 mg 5pm via peg  - ONLY FOR COMBATIVE BEHAVIORS--------Zyprex 2.5mg q 6hrs prn IM/IV/PO  - No psychiatric contraindications to discharge

## 2025-03-23 NOTE — BH CONSULTATION LIAISON PROGRESS NOTE - ATTENDING COMMENTS
Discussed case with fellow md, impression and plan discussed and agreed upon
Discussed with resident, prior MD, agree with plan as above, needs further assessment cannot leave AMA, patient very agitated med changes as above

## 2025-03-23 NOTE — CHART NOTE - NSCHARTNOTEFT_GEN_A_CORE
Code MIKE called overhead. Per RN, patient was wandering in the hallway and patient was yelling at staff. Per RN, patient was yelling at staff. Patient was brought back to his room in his bed. When provider arrived, patient was already laying comfortably calmly in bed. Maintained patient safety. Family at bedside. Since patient is calm, will hold off on PRN Zyprexa. Family updated at bedside.

## 2025-03-23 NOTE — BH CONSULTATION LIAISON PROGRESS NOTE - OTHER
impaired d/t dementia quiet bilateral hand shaking, slight cogwheeling left upper arm only recent Code Beatris

## 2025-03-23 NOTE — PROGRESS NOTE ADULT - SUBJECTIVE AND OBJECTIVE BOX
Solid Tumor Onc note    f/u Psych Solid Tumor Onc note  Patient is a 75y old  Male who presents with a chief complaint of odynophagia with decreased PO intake (23 Mar 2025 09:16)      SUBJECTIVE / OVERNIGHT EVENTS:  Patient seen with pa,  still noted to have agitation overnight--> f/u psych     MEDICATIONS  (STANDING):  chlorhexidine 2% Cloths 1 Application(s) Topical <User Schedule>  enoxaparin Injectable 40 milliGRAM(s) SubCutaneous every 24 hours  famotidine Injectable 40 milliGRAM(s) IV Push daily  melatonin 3 milliGRAM(s) Oral at bedtime  nystatin    Suspension 594733 Unit(s) Oral every 6 hours  risperiDONE   Tablet 0.5 milliGRAM(s) Oral <User Schedule>    MEDICATIONS  (PRN):  aluminum hydroxide/magnesium hydroxide/simethicone Suspension 30 milliLiter(s) Enteral Tube every 4 hours PRN Dyspepsia  haloperidol    Injectable 0.5 milliGRAM(s) IV Push every 6 hours PRN Combative/agitation  ondansetron Injectable 4 milliGRAM(s) IV Push every 8 hours PRN Nausea and/or Vomiting        CAPILLARY BLOOD GLUCOSE        I&O's Summary    22 Mar 2025 07:01  -  23 Mar 2025 07:00  --------------------------------------------------------  IN: 1490 mL / OUT: 0 mL / NET: 1490 mL      Vital Signs Last 24 Hrs  T(C): 36.6 (23 Mar 2025 11:28), Max: 36.6 (22 Mar 2025 22:15)  T(F): 97.9 (23 Mar 2025 11:28), Max: 97.9 (22 Mar 2025 22:15)  HR: 98 (23 Mar 2025 11:28) (72 - 98)  BP: 103/64 (23 Mar 2025 11:28) (103/64 - 114/65)  BP(mean): --  RR: 18 (23 Mar 2025 11:28) (18 - 18)  SpO2: 98% (23 Mar 2025 11:28) (97% - 99%)    Parameters below as of 23 Mar 2025 11:28  Patient On (Oxygen Delivery Method): room air      PHYSICAL EXAM:  GENERAL: NAD, Sleeping.  HEAD:  Atraumatic, Normocephalic  EYES: EOMI, PERRLA, conjunctiva and sclera clear  NECK: Supple, No JVD  CHEST/LUNG: Clear to auscultation bilaterally; No wheeze  HEART: Regular rate and rhythm; No murmurs, rubs, or gallops  ABDOMEN: Soft, Nontender, Nondistended; Bowel sounds present  Abd binder with pEG tube under.  EXTREMITIES:  2+ Peripheral Pulses, No clubbing, cyanosis, or edema  PSYCH: sleeping    LABS:                        8.5    2.81  )-----------( 150      ( 23 Mar 2025 06:37 )             24.7     03-23    142  |  104  |  32[H]  ----------------------------<  92  3.7   |  27  |  0.90    Ca    8.8      23 Mar 2025 06:37  Phos  3.7     03-23  Mg     2.20     03-23    TPro  5.9[L]  /  Alb  3.3  /  TBili  0.6  /  DBili  x   /  AST  57[H]  /  ALT  187[H]  /  AlkPhos  156[H]  03-23      Care Discussed with Consultants/Other Providers:  d/w Pa , f/u Psych  f/u Psych--> Increase Risperdal to 0.25 mg in AM, 0.5 mg QHS

## 2025-03-24 LAB
ALBUMIN SERPL ELPH-MCNC: 3.6 G/DL — SIGNIFICANT CHANGE UP (ref 3.3–5)
ALP SERPL-CCNC: 152 U/L — HIGH (ref 40–120)
ALT FLD-CCNC: 157 U/L — HIGH (ref 4–41)
ANION GAP SERPL CALC-SCNC: 13 MMOL/L — SIGNIFICANT CHANGE UP (ref 7–14)
AST SERPL-CCNC: 43 U/L — HIGH (ref 4–40)
BASOPHILS # BLD AUTO: 0.02 K/UL — SIGNIFICANT CHANGE UP (ref 0–0.2)
BASOPHILS NFR BLD AUTO: 0.4 % — SIGNIFICANT CHANGE UP (ref 0–2)
BILIRUB SERPL-MCNC: 0.8 MG/DL — SIGNIFICANT CHANGE UP (ref 0.2–1.2)
BUN SERPL-MCNC: 28 MG/DL — HIGH (ref 7–23)
CALCIUM SERPL-MCNC: 9.5 MG/DL — SIGNIFICANT CHANGE UP (ref 8.4–10.5)
CHLORIDE SERPL-SCNC: 103 MMOL/L — SIGNIFICANT CHANGE UP (ref 98–107)
CO2 SERPL-SCNC: 23 MMOL/L — SIGNIFICANT CHANGE UP (ref 22–31)
CREAT SERPL-MCNC: 0.9 MG/DL — SIGNIFICANT CHANGE UP (ref 0.5–1.3)
EGFR: 89 ML/MIN/1.73M2 — SIGNIFICANT CHANGE UP
EGFR: 89 ML/MIN/1.73M2 — SIGNIFICANT CHANGE UP
EOSINOPHIL # BLD AUTO: 0.03 K/UL — SIGNIFICANT CHANGE UP (ref 0–0.5)
EOSINOPHIL NFR BLD AUTO: 0.6 % — SIGNIFICANT CHANGE UP (ref 0–6)
GLUCOSE SERPL-MCNC: 104 MG/DL — HIGH (ref 70–99)
HCT VFR BLD CALC: 28.6 % — LOW (ref 39–50)
HGB BLD-MCNC: 9.6 G/DL — LOW (ref 13–17)
IANC: 3.46 K/UL — SIGNIFICANT CHANGE UP (ref 1.8–7.4)
IMM GRANULOCYTES NFR BLD AUTO: 1 % — HIGH (ref 0–0.9)
LYMPHOCYTES # BLD AUTO: 0.96 K/UL — LOW (ref 1–3.3)
LYMPHOCYTES # BLD AUTO: 19 % — SIGNIFICANT CHANGE UP (ref 13–44)
MAGNESIUM SERPL-MCNC: 2.1 MG/DL — SIGNIFICANT CHANGE UP (ref 1.6–2.6)
MCHC RBC-ENTMCNC: 31.2 PG — SIGNIFICANT CHANGE UP (ref 27–34)
MCHC RBC-ENTMCNC: 33.6 G/DL — SIGNIFICANT CHANGE UP (ref 32–36)
MCV RBC AUTO: 92.9 FL — SIGNIFICANT CHANGE UP (ref 80–100)
MONOCYTES # BLD AUTO: 0.54 K/UL — SIGNIFICANT CHANGE UP (ref 0–0.9)
MONOCYTES NFR BLD AUTO: 10.7 % — SIGNIFICANT CHANGE UP (ref 2–14)
NEUTROPHILS # BLD AUTO: 3.46 K/UL — SIGNIFICANT CHANGE UP (ref 1.8–7.4)
NEUTROPHILS NFR BLD AUTO: 68.3 % — SIGNIFICANT CHANGE UP (ref 43–77)
NRBC # BLD AUTO: 0 K/UL — SIGNIFICANT CHANGE UP (ref 0–0)
NRBC # FLD: 0 K/UL — SIGNIFICANT CHANGE UP (ref 0–0)
NRBC BLD AUTO-RTO: 0 /100 WBCS — SIGNIFICANT CHANGE UP (ref 0–0)
PHOSPHATE SERPL-MCNC: 3 MG/DL — SIGNIFICANT CHANGE UP (ref 2.5–4.5)
PLATELET # BLD AUTO: 155 K/UL — SIGNIFICANT CHANGE UP (ref 150–400)
POTASSIUM SERPL-MCNC: 4.6 MMOL/L — SIGNIFICANT CHANGE UP (ref 3.5–5.3)
POTASSIUM SERPL-SCNC: 4.6 MMOL/L — SIGNIFICANT CHANGE UP (ref 3.5–5.3)
PROT SERPL-MCNC: 6.6 G/DL — SIGNIFICANT CHANGE UP (ref 6–8.3)
RBC # BLD: 3.08 M/UL — LOW (ref 4.2–5.8)
RBC # FLD: 20 % — HIGH (ref 10.3–14.5)
SODIUM SERPL-SCNC: 139 MMOL/L — SIGNIFICANT CHANGE UP (ref 135–145)
WBC # BLD: 5.06 K/UL — SIGNIFICANT CHANGE UP (ref 3.8–10.5)
WBC # FLD AUTO: 5.06 K/UL — SIGNIFICANT CHANGE UP (ref 3.8–10.5)

## 2025-03-24 PROCEDURE — 99232 SBSQ HOSP IP/OBS MODERATE 35: CPT

## 2025-03-24 RX ORDER — OLANZAPINE 10 MG/1
2.5 TABLET ORAL ONCE
Refills: 0 | Status: COMPLETED | OUTPATIENT
Start: 2025-03-24 | End: 2025-03-24

## 2025-03-24 RX ORDER — TRAZODONE HCL 100 MG
25 TABLET ORAL
Refills: 0 | Status: DISCONTINUED | OUTPATIENT
Start: 2025-03-24 | End: 2025-03-29

## 2025-03-24 RX ORDER — RISPERIDONE 4 MG
0.5 TABLET ORAL
Refills: 0 | Status: DISCONTINUED | OUTPATIENT
Start: 2025-03-25 | End: 2025-04-07

## 2025-03-24 RX ORDER — RISPERIDONE 4 MG
0.25 TABLET ORAL ONCE
Refills: 0 | Status: COMPLETED | OUTPATIENT
Start: 2025-03-24 | End: 2025-03-24

## 2025-03-24 RX ADMIN — Medication 0.5 MILLIGRAM(S): at 18:59

## 2025-03-24 RX ADMIN — Medication 3 MILLIGRAM(S): at 21:29

## 2025-03-24 RX ADMIN — OLANZAPINE 2.5 MILLIGRAM(S): 10 TABLET ORAL at 22:17

## 2025-03-24 RX ADMIN — Medication 0.25 MILLIGRAM(S): at 06:50

## 2025-03-24 RX ADMIN — Medication 40 MILLIGRAM(S): at 13:21

## 2025-03-24 RX ADMIN — Medication 0.25 MILLIGRAM(S): at 15:07

## 2025-03-24 RX ADMIN — Medication 25 MILLIGRAM(S): at 21:29

## 2025-03-24 RX ADMIN — Medication 1 APPLICATION(S): at 07:03

## 2025-03-24 RX ADMIN — OLANZAPINE 2.5 MILLIGRAM(S): 10 TABLET ORAL at 22:57

## 2025-03-24 RX ADMIN — Medication 500000 UNIT(S): at 21:29

## 2025-03-24 RX ADMIN — ENOXAPARIN SODIUM 40 MILLIGRAM(S): 100 INJECTION SUBCUTANEOUS at 13:21

## 2025-03-24 NOTE — BH CONSULTATION LIAISON PROGRESS NOTE - NSBHFUPINTERVALHXFT_PSY_A_CORE
Chart reviewed, received Haldol 1mg yesterday, seen today for follow up, received sitting quietly in a chair on opposite side of room, daughter at bedside, patient ambulated with assist to bed, alert and oriented to self, calm, when asked to sit in bed patient concerned he was going to sit on a "dog" he thought was in his bed, patient endorsed to writer he felt "stressed," however, further stated he did not want to talk about it, patient denies suicidal ideation, denies auditory/visual hallucinations.     Daughter Joselin at bedside endorsed concern patient has become more agitated, dtr states this morning patient threatened to hit son, dtr also endorsed patient had not slept. Discussed with dtr changes to medication regiment including increasing Risperidone and adding Trazodone for sleep, dtr amenable.

## 2025-03-24 NOTE — PROGRESS NOTE ADULT - TIME BILLING
Reviewing laboratory data, consultants' recommendations, documentation in [place], performing medically appropriate examinations/evaluations, discussion with patient/family/RN/ACP/Residents and interdisciplinary staff (such as , social workers, etc), counseling patient/family/care giver, ordering medically appropriate medication, tests, or procedures

## 2025-03-24 NOTE — PROGRESS NOTE ADULT - SUBJECTIVE AND OBJECTIVE BOX
Patient is a 75y old  Male who presents with a chief complaint of odynophagia with decreased PO intake (23 Mar 2025 09:16)      SUBJECTIVE / OVERNIGHT EVENTS: No acute events overnight    ADDITIONAL REVIEW OF SYSTEMS:    MEDICATIONS  (STANDING):  chlorhexidine 2% Cloths 1 Application(s) Topical <User Schedule>  enoxaparin Injectable 40 milliGRAM(s) SubCutaneous every 24 hours  famotidine Injectable 40 milliGRAM(s) IV Push daily  melatonin 3 milliGRAM(s) Oral at bedtime  nystatin    Suspension 731797 Unit(s) Oral every 6 hours  risperiDONE   Tablet 0.5 milliGRAM(s) Oral <User Schedule>  traZODone 25 milliGRAM(s) Oral <User Schedule>    MEDICATIONS  (PRN):  aluminum hydroxide/magnesium hydroxide/simethicone Suspension 30 milliLiter(s) Enteral Tube every 4 hours PRN Dyspepsia  OLANZapine Injectable 2.5 milliGRAM(s) IntraMuscular every 6 hours PRN agitation/combative  ondansetron Injectable 4 milliGRAM(s) IV Push every 8 hours PRN Nausea and/or Vomiting      CAPILLARY BLOOD GLUCOSE        I&O's Summary    23 Mar 2025 07:01  -  24 Mar 2025 07:00  --------------------------------------------------------  IN: 1440 mL / OUT: 0 mL / NET: 1440 mL        PHYSICAL EXAM:  Vital Signs Last 24 Hrs  T(C): 36.6 (24 Mar 2025 16:00), Max: 36.7 (24 Mar 2025 05:08)  T(F): 97.9 (24 Mar 2025 16:00), Max: 98.1 (24 Mar 2025 05:08)  HR: 92 (24 Mar 2025 16:00) (75 - 92)  BP: 140/80 (24 Mar 2025 16:00) (140/80 - 146/75)  BP(mean): --  RR: 18 (24 Mar 2025 16:00) (18 - 18)  SpO2: 100% (24 Mar 2025 16:00) (99% - 100%)    Parameters below as of 24 Mar 2025 16:00  Patient On (Oxygen Delivery Method): room air      GENERAL: NAD, Sitting on side of his bed  HEAD:  Atraumatic, Normocephalic  EYES: EOMI, PERRLA, conjunctiva and sclera clear  NECK: Supple, No JVD  CHEST/LUNG: Clear to auscultation bilaterally; No wheeze  HEART: Regular rate and rhythm; No murmurs, rubs, or gallops  ABDOMEN: Soft, Nontender, Nondistended; Bowel sounds present  Abd binder with PEG tube under.  EXTREMITIES:  2+ Peripheral Pulses, No clubbing, cyanosis, or edema  PSYCH: Appears calm but minimally verbal       LABS:                        9.6    5.06  )-----------( 155      ( 24 Mar 2025 05:25 )             28.6     03-24    139  |  103  |  28[H]  ----------------------------<  104[H]  4.6   |  23  |  0.90    Ca    9.5      24 Mar 2025 05:25  Phos  3.0     03-24  Mg     2.10     03-24    TPro  6.6  /  Alb  3.6  /  TBili  0.8  /  DBili  x   /  AST  43[H]  /  ALT  157[H]  /  AlkPhos  152[H]  03-24          Urinalysis Basic - ( 24 Mar 2025 05:25 )    Color: x / Appearance: x / SG: x / pH: x  Gluc: 104 mg/dL / Ketone: x  / Bili: x / Urobili: x   Blood: x / Protein: x / Nitrite: x   Leuk Esterase: x / RBC: x / WBC x   Sq Epi: x / Non Sq Epi: x / Bacteria: x          RADIOLOGY & ADDITIONAL TESTS:  Results Reviewed:   Imaging Personally Reviewed:  Electrocardiogram Personally Reviewed:    COORDINATION OF CARE:  Care Discussed with Consultants/Other Providers [Y/N]:  Prior or Outpatient Records Reviewed [Y/N]:

## 2025-03-24 NOTE — BH CONSULTATION LIAISON PROGRESS NOTE - NSBHASSESSMENTFT_PSY_ALL_CORE
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 Psychiatry consulted for aggression. Improved behaviour, has not received any PRNs since yesterday. Still has some hallucinations at night, but amenable to reorientation.     Patient carries a diagnosis of dementia for the last 4-5 years, declining cognitively over time, with worsening symptoms along with paranoia, distress, poor sleep since the start of RT. Pain/throat discomfort, poor PO intake has been ongoing.     3/13--- has been doing better, no agitation or prn needs  3/21: reconsulted for agitation s/p code christian, family requesting standing medication for mood, agitation, amenable to Risperidone, bbb d/w family, verbalized understanding, denies si, denies ah  3/23 - multiple code BERTs in last 24 hours, receiving PRN Haldol frequently, does not engage in exam or comply with EPS exam, does not appear grossly rigid, appears paranoid about roommate, will increase risperidone to 0.25 mg AM/0.5 mg PM, will change PRN to Zyprexa 2.5 mg  3/24 - a&o, calm, +delusions, dtr endorses threatening behavior towards son this morning, d/w dtr changes to med regiment-in agreement    Recommendations  - Recommend transfer to 7S  - Deferring observation status to primary team  - Frequent orientation  - Monitor EKG qtc interval  - AVOID bzd, anticholinergics, antihistamines  - Continue Melatonin 3mg at 8PM PO  - INCREASE Risperidone 0.5mg @ 7am and 0.5 mg 5pm via peg  - GIVE STAT Risperidone 0.25mg now  - START Trazodone 25mg 8pm   - ONLY FOR COMBATIVE BEHAVIORS--------Zyprex 2.5mg q 6hrs prn IM/IV/PO  - No psychiatric contraindications to discharge   The patient is a 75 yr old M with a PMHx of Vascular dementia and 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 Psychiatry consulted for aggression. Improved behaviour, has not received any PRNs since yesterday. Still has some hallucinations at night, but amenable to reorientation.     Patient carries a diagnosis of dementia for the last 4-5 years, declining cognitively over time, with worsening symptoms along with paranoia, distress, poor sleep since the start of RT. Pain/throat discomfort, poor PO intake has been ongoing.     3/13--- has been doing better, no agitation or prn needs  3/21: reconsulted for agitation s/p code christian, family requesting standing medication for mood, agitation, amenable to Risperidone, bbb d/w family, verbalized understanding, denies si, denies ah  3/23 - multiple code BERTs in last 24 hours, receiving PRN Haldol frequently, does not engage in exam or comply with EPS exam, does not appear grossly rigid, appears paranoid about roommate, will increase risperidone to 0.25 mg AM/0.5 mg PM, will change PRN to Zyprexa 2.5 mg  3/24 - a&o, calm, +delusions, dtr endorses threatening behavior towards son this morning, d/w dtr changes to med regiment-in agreement    Recommendations  - Recommend transfer to 7S  - Deferring observation status to primary team  - Frequent orientation  - Monitor EKG qtc interval  - AVOID bzd, anticholinergics, antihistamines  - Continue Melatonin 3mg at 8PM PO  - INCREASE Risperidone 0.5mg @ 7am and 0.5 mg 5pm via peg  - GIVE STAT Risperidone 0.25mg X1 only now  - START Trazodone 25mg 8pm tonight  - ONLY FOR COMBATIVE BEHAVIORS--------Zyprex 2.5mg q 6hrs prn IM/IV/PO  - No psychiatric contraindications to discharge   The patient is a 75 yr old M with a PMHx of Vascular dementia and 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 Psychiatry consulted for aggression. Improved behaviour, has not received any PRNs since yesterday. Still has some hallucinations at night, but amenable to reorientation.     Patient carries a diagnosis of dementia for the last 4-5 years, declining cognitively over time, with worsening symptoms along with paranoia, distress, poor sleep since the start of RT. Pain/throat discomfort, poor PO intake has been ongoing.     3/13--- has been doing better, no agitation or prn needs  3/21: reconsulted for agitation s/p code christian, family requesting standing medication for mood, agitation, amenable to Risperidone, bbb d/w family, verbalized understanding, denies si, denies ah  3/23 - multiple code BERTs in last 24 hours, receiving PRN Haldol frequently, does not engage in exam or comply with EPS exam, does not appear grossly rigid, appears paranoid about roommate, will increase risperidone to 0.25 mg AM/0.5 mg PM, will change PRN to Zyprexa 2.5 mg  3/24 - a&o, calm, +delusions, dtr endorses threatening behavior towards son this morning, d/w dtr changes to med regiment-in agreement, d/w primary team given changes in behavior consider further medical workup    Recommendations  - Recommend transfer to 7S  - Deferring observation status to primary team  - Frequent orientation   -consider u/a given changes in behavior or another organic cause-staff report decreased urine output  - Monitor EKG qtc interval  - AVOID bzd, anticholinergics, antihistamines  - Continue Melatonin 3mg at 8PM PO  - INCREASE Risperidone 0.5mg @ 7am and 0.5 mg 5pm via peg  - GIVE STAT Risperidone 0.25mg X1 only now  - START Trazodone 25mg 8pm tonight  - ONLY FOR COMBATIVE BEHAVIORS--------Zyprex 2.5mg q 6hrs prn IM/IV/PO  - No psychiatric contraindications to discharge

## 2025-03-24 NOTE — PROGRESS NOTE ADULT - ASSESSMENT
73 yo M with tonsillar ca (s/p C6 of 7 of cisplatin and on RT -should've been completed on 3/7), vascular dementia p/w odynophagia/lack of appetite and overall FTT.     Active problems  tonsillar CA  vascular dementia  w/ agitation   FTT requiring PEG tube   Anemia in neoplastic disease  hyponatremia  hypercoagulable state secondary to neoplasm   Severe Protein calorie Malnutrition    #Vascular dementia  - Pt frequently agitated. Family reporting continues to have hallucinations but this has been ongoing since diagnosis of dementia.   - CTH with chronic microvascular changes.   - Psych consulted- rec transfer to 7S. rec increase Risperidone to 0.5mg at 7am and 0.5 mg at 5pm via peg. Trazodone 25mg at 8pm  - For combative behavior - -Zyprex 2.5mg q 6hrs prn IM/IV/PO      #FTT/ severe protein calorie malnutrition  Pt reporting odynophagia and dysphagia w/ regurgitation of food. Dysphagia to solids and liquids. Seems he holds the food and does not swallow due to fear of pain.   Reviewed CT neck, mucosal thickening and enhancement in the larynx and oropharynx may be due to infectious laryngopharyngitis or sequela of previous radiation performed.  Patient does not like viscous lidocaine or liquid gabapentin - will d/c   Pain control with morphine 30 minutes prior to meals   Cineesophagogram completed - rec puree and thin liquid   Pt still with minimal PO intake -> PEG placed and pt now on bolus tube feeds   s/p empiric treatment of thrush/esophagitis with IV fluconazole- changed to Nystatin via PEG       #Anemia in neoplastic disease  Hb 9.6, otherwise stable, continue to monitor      #Severe Protein calorie Malnutrition  - Starvation ketosis resolved.   - Continue tube feeds       #Hypercoagulable state 2/2 malignancy  On lovenox 40mg daily

## 2025-03-24 NOTE — BH CONSULTATION LIAISON PROGRESS NOTE - OTHER
quiet required redirection at times bilateral hand shaking, slight cogwheeling left upper arm only required assist from chair to bed by PCA impaired d/t dementia

## 2025-03-25 ENCOUNTER — APPOINTMENT (OUTPATIENT)
Dept: HEMATOLOGY ONCOLOGY | Facility: CLINIC | Age: 75
End: 2025-03-25

## 2025-03-25 ENCOUNTER — APPOINTMENT (OUTPATIENT)
Dept: INFUSION THERAPY | Facility: HOSPITAL | Age: 75
End: 2025-03-25

## 2025-03-25 LAB
ALBUMIN SERPL ELPH-MCNC: 3.6 G/DL — SIGNIFICANT CHANGE UP (ref 3.3–5)
ALP SERPL-CCNC: 235 U/L — HIGH (ref 40–120)
ALT FLD-CCNC: 134 U/L — HIGH (ref 4–41)
ANION GAP SERPL CALC-SCNC: 12 MMOL/L — SIGNIFICANT CHANGE UP (ref 7–14)
AST SERPL-CCNC: 45 U/L — HIGH (ref 4–40)
BASOPHILS # BLD AUTO: 0.02 K/UL — SIGNIFICANT CHANGE UP (ref 0–0.2)
BASOPHILS NFR BLD AUTO: 0.3 % — SIGNIFICANT CHANGE UP (ref 0–2)
BILIRUB SERPL-MCNC: 0.7 MG/DL — SIGNIFICANT CHANGE UP (ref 0.2–1.2)
BUN SERPL-MCNC: 30 MG/DL — HIGH (ref 7–23)
CALCIUM SERPL-MCNC: 9.1 MG/DL — SIGNIFICANT CHANGE UP (ref 8.4–10.5)
CHLORIDE SERPL-SCNC: 101 MMOL/L — SIGNIFICANT CHANGE UP (ref 98–107)
CO2 SERPL-SCNC: 24 MMOL/L — SIGNIFICANT CHANGE UP (ref 22–31)
CREAT SERPL-MCNC: 0.97 MG/DL — SIGNIFICANT CHANGE UP (ref 0.5–1.3)
EGFR: 81 ML/MIN/1.73M2 — SIGNIFICANT CHANGE UP
EGFR: 81 ML/MIN/1.73M2 — SIGNIFICANT CHANGE UP
EOSINOPHIL # BLD AUTO: 0.04 K/UL — SIGNIFICANT CHANGE UP (ref 0–0.5)
EOSINOPHIL NFR BLD AUTO: 0.7 % — SIGNIFICANT CHANGE UP (ref 0–6)
GLUCOSE SERPL-MCNC: 100 MG/DL — HIGH (ref 70–99)
HCT VFR BLD CALC: 25.8 % — LOW (ref 39–50)
HGB BLD-MCNC: 8.8 G/DL — LOW (ref 13–17)
IANC: 4.29 K/UL — SIGNIFICANT CHANGE UP (ref 1.8–7.4)
IMM GRANULOCYTES NFR BLD AUTO: 0.5 % — SIGNIFICANT CHANGE UP (ref 0–0.9)
LYMPHOCYTES # BLD AUTO: 0.76 K/UL — LOW (ref 1–3.3)
LYMPHOCYTES # BLD AUTO: 13 % — SIGNIFICANT CHANGE UP (ref 13–44)
MAGNESIUM SERPL-MCNC: 1.9 MG/DL — SIGNIFICANT CHANGE UP (ref 1.6–2.6)
MCHC RBC-ENTMCNC: 31.3 PG — SIGNIFICANT CHANGE UP (ref 27–34)
MCHC RBC-ENTMCNC: 34.1 G/DL — SIGNIFICANT CHANGE UP (ref 32–36)
MCV RBC AUTO: 91.8 FL — SIGNIFICANT CHANGE UP (ref 80–100)
MONOCYTES # BLD AUTO: 0.69 K/UL — SIGNIFICANT CHANGE UP (ref 0–0.9)
MONOCYTES NFR BLD AUTO: 11.8 % — SIGNIFICANT CHANGE UP (ref 2–14)
NEUTROPHILS # BLD AUTO: 4.29 K/UL — SIGNIFICANT CHANGE UP (ref 1.8–7.4)
NEUTROPHILS NFR BLD AUTO: 73.7 % — SIGNIFICANT CHANGE UP (ref 43–77)
NRBC # BLD AUTO: 0 K/UL — SIGNIFICANT CHANGE UP (ref 0–0)
NRBC # FLD: 0 K/UL — SIGNIFICANT CHANGE UP (ref 0–0)
NRBC BLD AUTO-RTO: 0 /100 WBCS — SIGNIFICANT CHANGE UP (ref 0–0)
PHOSPHATE SERPL-MCNC: 3.5 MG/DL — SIGNIFICANT CHANGE UP (ref 2.5–4.5)
PLATELET # BLD AUTO: 164 K/UL — SIGNIFICANT CHANGE UP (ref 150–400)
POTASSIUM SERPL-MCNC: 4.2 MMOL/L — SIGNIFICANT CHANGE UP (ref 3.5–5.3)
POTASSIUM SERPL-SCNC: 4.2 MMOL/L — SIGNIFICANT CHANGE UP (ref 3.5–5.3)
PROT SERPL-MCNC: 6.4 G/DL — SIGNIFICANT CHANGE UP (ref 6–8.3)
RBC # BLD: 2.81 M/UL — LOW (ref 4.2–5.8)
RBC # FLD: 19.8 % — HIGH (ref 10.3–14.5)
SODIUM SERPL-SCNC: 137 MMOL/L — SIGNIFICANT CHANGE UP (ref 135–145)
WBC # BLD: 5.83 K/UL — SIGNIFICANT CHANGE UP (ref 3.8–10.5)
WBC # FLD AUTO: 5.83 K/UL — SIGNIFICANT CHANGE UP (ref 3.8–10.5)

## 2025-03-25 PROCEDURE — 99232 SBSQ HOSP IP/OBS MODERATE 35: CPT

## 2025-03-25 PROCEDURE — 99233 SBSQ HOSP IP/OBS HIGH 50: CPT

## 2025-03-25 RX ADMIN — Medication 1 APPLICATION(S): at 05:28

## 2025-03-25 RX ADMIN — Medication 40 MILLIGRAM(S): at 11:50

## 2025-03-25 RX ADMIN — OLANZAPINE 2.5 MILLIGRAM(S): 10 TABLET ORAL at 06:06

## 2025-03-25 RX ADMIN — Medication 0.5 MILLIGRAM(S): at 05:28

## 2025-03-25 RX ADMIN — Medication 500000 UNIT(S): at 05:28

## 2025-03-25 RX ADMIN — Medication 0.5 MILLIGRAM(S): at 17:08

## 2025-03-25 RX ADMIN — Medication 3 MILLIGRAM(S): at 21:42

## 2025-03-25 RX ADMIN — Medication 500000 UNIT(S): at 21:42

## 2025-03-25 RX ADMIN — Medication 25 MILLIGRAM(S): at 21:42

## 2025-03-25 RX ADMIN — Medication 500000 UNIT(S): at 17:08

## 2025-03-25 RX ADMIN — Medication 500000 UNIT(S): at 13:21

## 2025-03-25 RX ADMIN — ENOXAPARIN SODIUM 40 MILLIGRAM(S): 100 INJECTION SUBCUTANEOUS at 12:00

## 2025-03-25 NOTE — PROGRESS NOTE ADULT - SUBJECTIVE AND OBJECTIVE BOX
PROGRESS NOTE:   Authored by Jojo Hanna Ma, MD  Available on MS Teams; Pager [phone]    Patient is a 75y old  Male who presents with a chief complaint of odynophagia with decreased PO intake (25 Mar 2025 09:16)    SUBJECTIVE / OVERNIGHT EVENTS: Transferred to 7S overnight. ROS limited this AM- pt non-participatory.    MEDICATIONS  (STANDING):  chlorhexidine 2% Cloths 1 Application(s) Topical <User Schedule>  enoxaparin Injectable 40 milliGRAM(s) SubCutaneous every 24 hours  famotidine Injectable 40 milliGRAM(s) IV Push daily  melatonin 3 milliGRAM(s) Oral at bedtime  nystatin    Suspension 839463 Unit(s) Oral every 6 hours  risperiDONE   Tablet 0.5 milliGRAM(s) Oral <User Schedule>  risperiDONE   Tablet 0.5 milliGRAM(s) Oral <User Schedule>  traZODone 25 milliGRAM(s) Oral <User Schedule>    MEDICATIONS  (PRN):  aluminum hydroxide/magnesium hydroxide/simethicone Suspension 30 milliLiter(s) Enteral Tube every 4 hours PRN Dyspepsia  OLANZapine Injectable 2.5 milliGRAM(s) IntraMuscular every 6 hours PRN agitation/combative  ondansetron Injectable 4 milliGRAM(s) IV Push every 8 hours PRN Nausea and/or Vomiting    CAPILLARY BLOOD GLUCOSE    I&O's Summary    24 Mar 2025 07:01  -  25 Mar 2025 07:00  --------------------------------------------------------  IN: 360 mL / OUT: 0 mL / NET: 360 mL    25 Mar 2025 07:01  -  25 Mar 2025 15:23  --------------------------------------------------------  IN: 1330 mL / OUT: 0 mL / NET: 1330 mL    PHYSICAL EXAM:  Vital Signs Last 24 Hrs  T(C): 37.1 (25 Mar 2025 14:00), Max: 37.5 (24 Mar 2025 19:10)  T(F): 98.8 (25 Mar 2025 14:00), Max: 99.5 (24 Mar 2025 19:10)  HR: 69 (25 Mar 2025 14:00) (69 - 92)  BP: 104/65 (25 Mar 2025 14:00) (104/65 - 140/80)  BP(mean): --  RR: 16 (25 Mar 2025 14:00) (16 - 18)  SpO2: 100% (25 Mar 2025 14:00) (100% - 100%)    Parameters below as of 25 Mar 2025 14:00  Patient On (Oxygen Delivery Method): room air    GENERAL: No acute distress  HEAD:  Normocephalic  NECK: Supple  CHEST/LUNG: CTAB  HEART: Regular rate and rhythm  ABDOMEN: Soft, non-tender, non-distended  EXTREMITIES: No clubbing, cyanosis, or edema  SKIN: No rashes or lesions to exposed skin    LABS:                        8.8    5.83  )-----------( 164      ( 25 Mar 2025 05:50 )             25.8     03-25    137  |  101  |  30[H]  ----------------------------<  100[H]  4.2   |  24  |  0.97    Ca    9.1      25 Mar 2025 05:50  Phos  3.5     03-25  Mg     1.90     03-25    TPro  6.4  /  Alb  3.6  /  TBili  0.7  /  DBili  x   /  AST  45[H]  /  ALT  134[H]  /  AlkPhos  235[H]  03-25    Urinalysis Basic - ( 25 Mar 2025 05:50 )    Color: x / Appearance: x / SG: x / pH: x  Gluc: 100 mg/dL / Ketone: x  / Bili: x / Urobili: x   Blood: x / Protein: x / Nitrite: x   Leuk Esterase: x / RBC: x / WBC x   Sq Epi: x / Non Sq Epi: x / Bacteria: x    RADIOLOGY & ADDITIONAL TESTS: Reviewed

## 2025-03-25 NOTE — PROGRESS NOTE ADULT - TIME BILLING
Patient encounter, including chart review, medication review, patient interview, ordering labs and medications, interpreting labs and imaging results, coordination of care with consultants, and discussing plan with healthcare team.

## 2025-03-25 NOTE — BH CONSULTATION LIAISON PROGRESS NOTE - NSBHFUPINTERVALCCFT_PSY_A_CORE
Plastic Surgeon Procedure Text (A): After obtaining clear surgical margins the patient was sent to plastics for surgical repair.  The patient understands they will receive post-surgical care and follow-up from the referring physician's office. I want out!"

## 2025-03-25 NOTE — BH CONSULTATION LIAISON PROGRESS NOTE - NSBHASSESSMENTFT_PSY_ALL_CORE
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 Psychiatry consulted for aggression. Improved behaviour, has not received any PRNs since yesterday. Still has some hallucinations at night, but amenable to reorientation.     Patient carries a diagnosis of dementia for the last 4-5 years, declining cognitively over time, with worsening symptoms along with paranoia, distress, poor sleep since the start of RT. Pain/throat discomfort, poor PO intake has been ongoing.     3/13--- has been doing better, no agitation or prn needs  3/21: reconsulted for agitation s/p code christian, family requesting standing medication for mood, agitation, amenable to Risperidone, bbb d/w family, verbalized understanding, denies si, denies ah  3/23 - multiple code BERTs in last 24 hours, receiving PRN Haldol frequently, does not engage in exam or comply with EPS exam, does not appear grossly rigid, appears paranoid about roommate, will increase risperidone to 0.25 mg AM/0.5 mg PM, will change PRN to Zyprexa 2.5 mg  3/24 - a&o, calm, +delusions, dtr endorses threatening behavior towards son this morning, d/w dtr changes to med regiment-in agreement, d/w primary team given changes in behavior consider further medical workup  3/25 - restless and confused, delusional, no EPS tolerating risperdal, will monitor for a day in new 7S setting     Recommendations  - Recommend transfer to 7S  - Deferring observation status to primary team  - Frequent orientation   -consider u/a given changes in behavior or another organic cause-staff report decreased urine output  - Monitor EKG qtc interval  - AVOID bzd, anticholinergics, antihistamines  - Continue Melatonin 3mg at 8PM PO  - Continue Risperidone 0.5mg @ 7am and 0.5 mg 5pm via peg  - Continue Trazodone 25mg 8pm tonight  - ONLY FOR COMBATIVE BEHAVIORS--------Zyprex 2.5mg q 6hrs prn IM/IV/PO  - No psychiatric contraindications to discharge

## 2025-03-25 NOTE — BH CONSULTATION LIAISON PROGRESS NOTE - OTHER
required redirection at times impaired d/t dementia bilateral hand shaking, slight cogwheeling left upper arm only required assist from chair to bed by PCA quiet

## 2025-03-25 NOTE — BH CONSULTATION LIAISON PROGRESS NOTE - NSBHFUPINTERVALHXFT_PSY_A_CORE
patient sleepy, when awoken very confused and restless, AOx0-1 at most, eyes closed most of evaluation

## 2025-03-25 NOTE — PROGRESS NOTE ADULT - ASSESSMENT
73 yo M with tonsillar ca (s/p C6 of 7 of cisplatin and on RT -should've been completed on 3/7), vascular dementia p/w odynophagia/lack of appetite and overall FTT.     Active problems  tonsillar CA  vascular dementia  w/ agitation   FTT requiring PEG tube   Anemia in neoplastic disease  hyponatremia  hypercoagulable state secondary to neoplasm   Severe Protein calorie Malnutrition    #Vascular dementia  - Pt frequently agitated. Family reporting continues to have hallucinations but this has been ongoing since diagnosis of dementia.   - CTH with chronic microvascular changes.   - Psych consulted- rec transfer to 7S. rec increase Risperidone to 0.5mg at 7am and 0.5 mg at 5pm via peg. Trazodone 25mg at 8pm  - For combative behavior - -Zyprex 2.5mg q 6hrs prn IM/IV/PO      #FTT/ severe protein calorie malnutrition  Pt reporting odynophagia and dysphagia w/ regurgitation of food. Dysphagia to solids and liquids. Seems he holds the food and does not swallow due to fear of pain.   Reviewed CT neck, mucosal thickening and enhancement in the larynx and oropharynx may be due to infectious laryngopharyngitis or sequela of previous radiation performed.  Patient does not like viscous lidocaine or liquid gabapentin - will d/c   Pain control with morphine 30 minutes prior to meals   Cineesophagogram completed - rec puree and thin liquid   Pt still with minimal PO intake -> PEG placed and pt now on bolus tube feeds   s/p empiric treatment of thrush/esophagitis with IV fluconazole- changed to Nystatin via PEG       #Anemia in neoplastic disease  Hb 9.6, otherwise stable, continue to monitor      #Severe Protein calorie Malnutrition  - Starvation ketosis resolved.   - Continue tube feeds       #Hypercoagulable state 2/2 malignancy  On lovenox 40mg daily       73 yo M with tonsillar ca (s/p C6 of 7 of cisplatin and on RT -should've been completed on 3/7), vascular dementia p/w odynophagia/lack of appetite and overall FTT.     Active problems  Tonsillar CA  Vascular dementia w/ agitation   FTT requiring PEG tube   Anemia in neoplastic disease  Hyponatremia  Hypercoagulable state secondary to neoplasm   Severe Protein-Calorie Malnutrition    #Vascular dementia  - Pt frequently agitated. Family reporting continues to have hallucinations but this has been ongoing since diagnosis of dementia.   - CTH with chronic microvascular changes.   - Psych consulted, now transferred to . Increased Risperidone to 0.5mg at 7am and 0.5 mg at 5pm via PEG. C/w Trazodone 25mg at 8pm  - For combative behavior - Zyprexa 2.5mg IM q6h PRN    #FTT/severe protein calorie malnutrition  -Pt reported odynophagia and dysphagia w/ regurgitation of food. Dysphagia to solids and liquids. Seems he holds the food and does not swallow due to fear of pain.   -Reviewed CT neck, mucosal thickening and enhancement in the larynx and oropharynx may be due to infectious laryngopharyngitis or sequela of previous radiation performed.  -Patient does not like viscous lidocaine or liquid gabapentin - now discontinued   -Cineesophagogram completed - rec puree and thin liquid   -Pt still with minimal PO intake -> PEG placed and pt now on bolus tube feeds   -s/p empiric treatment of thrush/esophagitis with IV fluconazole- changed to Nystatin    #Anemia in neoplastic disease  -Hb stable, continue to monitor    #Severe Protein calorie Malnutrition  - Starvation ketosis resolved  - Continue tube feeds       #Hypercoagulable state 2/2 malignancy  -Lovenox 40mg daily   73 yo M with tonsillar ca (s/p C6 of 7 of cisplatin and on RT -should've been completed on 3/7), vascular dementia p/w odynophagia/lack of appetite and overall FTT.     Active problems  Tonsillar CA  Vascular dementia w/ agitation   FTT requiring PEG tube   Anemia in neoplastic disease  Hyponatremia  Hypercoagulable state secondary to neoplasm   Severe Protein-Calorie Malnutrition    #Vascular dementia  - Pt frequently agitated. Family reporting continues to have hallucinations but this has been ongoing since diagnosis of dementia.   - CTH with chronic microvascular changes.   - Psych consulted, now transferred to . Increased Risperidone to 0.5mg at 7am and 0.5 mg at 5pm via PEG. C/w Trazodone 25mg at 8pm  - For combative behavior - Zyprexa 2.5mg IM q6h PRN    #FTT/severe protein calorie malnutrition  -Pt reported odynophagia and dysphagia w/ regurgitation of food. Dysphagia to solids and liquids. Seems he holds the food and does not swallow due to fear of pain.   -Reviewed CT neck, mucosal thickening and enhancement in the larynx and oropharynx may be due to infectious laryngopharyngitis or sequela of previous radiation performed.  -Patient does not like viscous lidocaine or liquid gabapentin - now discontinued   -Cineesophagogram completed - rec puree and thin liquid   -Pt still with minimal PO intake -> PEG placed and pt now on bolus tube feeds   -s/p empiric treatment of thrush/esophagitis with IV fluconazole- changed to Nystatin    #Anemia in neoplastic disease  -Hb stable, continue to monitor    #Severe Protein calorie Malnutrition  - Starvation ketosis resolved  - Continue tube feeds     #Hypercoagulable state 2/2 malignancy  -Lovenox 40mg daily    #Dispo  -DAX pending stable behaviors, tolerating TF

## 2025-03-26 LAB
ALBUMIN SERPL ELPH-MCNC: 3.4 G/DL — SIGNIFICANT CHANGE UP (ref 3.3–5)
ALP SERPL-CCNC: 196 U/L — HIGH (ref 40–120)
ALT FLD-CCNC: 95 U/L — HIGH (ref 4–41)
ANION GAP SERPL CALC-SCNC: 10 MMOL/L — SIGNIFICANT CHANGE UP (ref 7–14)
ANION GAP SERPL CALC-SCNC: 12 MMOL/L — SIGNIFICANT CHANGE UP (ref 7–14)
AST SERPL-CCNC: 27 U/L — SIGNIFICANT CHANGE UP (ref 4–40)
BILIRUB SERPL-MCNC: 0.6 MG/DL — SIGNIFICANT CHANGE UP (ref 0.2–1.2)
BLOOD GAS VENOUS COMPREHENSIVE RESULT: SIGNIFICANT CHANGE UP
BUN SERPL-MCNC: 24 MG/DL — HIGH (ref 7–23)
BUN SERPL-MCNC: 34 MG/DL — HIGH (ref 7–23)
CALCIUM SERPL-MCNC: 9.1 MG/DL — SIGNIFICANT CHANGE UP (ref 8.4–10.5)
CALCIUM SERPL-MCNC: 9.2 MG/DL — SIGNIFICANT CHANGE UP (ref 8.4–10.5)
CHLORIDE SERPL-SCNC: 100 MMOL/L — SIGNIFICANT CHANGE UP (ref 98–107)
CHLORIDE SERPL-SCNC: 102 MMOL/L — SIGNIFICANT CHANGE UP (ref 98–107)
CO2 SERPL-SCNC: 22 MMOL/L — SIGNIFICANT CHANGE UP (ref 22–31)
CO2 SERPL-SCNC: 26 MMOL/L — SIGNIFICANT CHANGE UP (ref 22–31)
CREAT SERPL-MCNC: 0.76 MG/DL — SIGNIFICANT CHANGE UP (ref 0.5–1.3)
CREAT SERPL-MCNC: 0.99 MG/DL — SIGNIFICANT CHANGE UP (ref 0.5–1.3)
EGFR: 79 ML/MIN/1.73M2 — SIGNIFICANT CHANGE UP
EGFR: 79 ML/MIN/1.73M2 — SIGNIFICANT CHANGE UP
EGFR: 93 ML/MIN/1.73M2 — SIGNIFICANT CHANGE UP
EGFR: 93 ML/MIN/1.73M2 — SIGNIFICANT CHANGE UP
FLUAV AG NPH QL: SIGNIFICANT CHANGE UP
FLUBV AG NPH QL: SIGNIFICANT CHANGE UP
GLUCOSE SERPL-MCNC: 111 MG/DL — HIGH (ref 70–99)
GLUCOSE SERPL-MCNC: 163 MG/DL — HIGH (ref 70–99)
HCT VFR BLD CALC: 25.7 % — LOW (ref 39–50)
HCT VFR BLD CALC: 25.8 % — LOW (ref 39–50)
HGB BLD-MCNC: 8.9 G/DL — LOW (ref 13–17)
HGB BLD-MCNC: 9.1 G/DL — LOW (ref 13–17)
MAGNESIUM SERPL-MCNC: 1.9 MG/DL — SIGNIFICANT CHANGE UP (ref 1.6–2.6)
MCHC RBC-ENTMCNC: 31.7 PG — SIGNIFICANT CHANGE UP (ref 27–34)
MCHC RBC-ENTMCNC: 32.4 PG — SIGNIFICANT CHANGE UP (ref 27–34)
MCHC RBC-ENTMCNC: 34.5 G/DL — SIGNIFICANT CHANGE UP (ref 32–36)
MCHC RBC-ENTMCNC: 35.4 G/DL — SIGNIFICANT CHANGE UP (ref 32–36)
MCV RBC AUTO: 91.5 FL — SIGNIFICANT CHANGE UP (ref 80–100)
MCV RBC AUTO: 91.8 FL — SIGNIFICANT CHANGE UP (ref 80–100)
NRBC # BLD AUTO: 0 K/UL — SIGNIFICANT CHANGE UP (ref 0–0)
NRBC # BLD AUTO: 0 K/UL — SIGNIFICANT CHANGE UP (ref 0–0)
NRBC # FLD: 0 K/UL — SIGNIFICANT CHANGE UP (ref 0–0)
NRBC # FLD: 0 K/UL — SIGNIFICANT CHANGE UP (ref 0–0)
NRBC BLD AUTO-RTO: 0 /100 WBCS — SIGNIFICANT CHANGE UP (ref 0–0)
NRBC BLD AUTO-RTO: 0 /100 WBCS — SIGNIFICANT CHANGE UP (ref 0–0)
PHOSPHATE SERPL-MCNC: 1.8 MG/DL — LOW (ref 2.5–4.5)
PLATELET # BLD AUTO: 168 K/UL — SIGNIFICANT CHANGE UP (ref 150–400)
PLATELET # BLD AUTO: 192 K/UL — SIGNIFICANT CHANGE UP (ref 150–400)
POTASSIUM SERPL-MCNC: 4.9 MMOL/L — SIGNIFICANT CHANGE UP (ref 3.5–5.3)
POTASSIUM SERPL-MCNC: 5.3 MMOL/L — SIGNIFICANT CHANGE UP (ref 3.5–5.3)
POTASSIUM SERPL-SCNC: 4.9 MMOL/L — SIGNIFICANT CHANGE UP (ref 3.5–5.3)
POTASSIUM SERPL-SCNC: 5.3 MMOL/L — SIGNIFICANT CHANGE UP (ref 3.5–5.3)
PROT SERPL-MCNC: 6.6 G/DL — SIGNIFICANT CHANGE UP (ref 6–8.3)
RBC # BLD: 2.81 M/UL — LOW (ref 4.2–5.8)
RBC # BLD: 2.81 M/UL — LOW (ref 4.2–5.8)
RBC # FLD: 19.9 % — HIGH (ref 10.3–14.5)
RBC # FLD: 19.9 % — HIGH (ref 10.3–14.5)
RSV RNA NPH QL NAA+NON-PROBE: SIGNIFICANT CHANGE UP
SARS-COV-2 RNA SPEC QL NAA+PROBE: SIGNIFICANT CHANGE UP
SODIUM SERPL-SCNC: 136 MMOL/L — SIGNIFICANT CHANGE UP (ref 135–145)
SODIUM SERPL-SCNC: 136 MMOL/L — SIGNIFICANT CHANGE UP (ref 135–145)
SOURCE RESPIRATORY: SIGNIFICANT CHANGE UP
WBC # BLD: 6.58 K/UL — SIGNIFICANT CHANGE UP (ref 3.8–10.5)
WBC # BLD: 6.66 K/UL — SIGNIFICANT CHANGE UP (ref 3.8–10.5)
WBC # FLD AUTO: 6.58 K/UL — SIGNIFICANT CHANGE UP (ref 3.8–10.5)
WBC # FLD AUTO: 6.66 K/UL — SIGNIFICANT CHANGE UP (ref 3.8–10.5)

## 2025-03-26 PROCEDURE — 99233 SBSQ HOSP IP/OBS HIGH 50: CPT

## 2025-03-26 PROCEDURE — 71045 X-RAY EXAM CHEST 1 VIEW: CPT | Mod: 26

## 2025-03-26 RX ORDER — ACETAMINOPHEN 500 MG/5ML
1000 LIQUID (ML) ORAL ONCE
Refills: 0 | Status: COMPLETED | OUTPATIENT
Start: 2025-03-26 | End: 2025-03-26

## 2025-03-26 RX ADMIN — Medication 40 MILLIGRAM(S): at 12:54

## 2025-03-26 RX ADMIN — Medication 25 MILLIGRAM(S): at 22:43

## 2025-03-26 RX ADMIN — OLANZAPINE 2.5 MILLIGRAM(S): 10 TABLET ORAL at 16:36

## 2025-03-26 RX ADMIN — Medication 0.5 MILLIGRAM(S): at 19:45

## 2025-03-26 RX ADMIN — Medication 500000 UNIT(S): at 12:54

## 2025-03-26 RX ADMIN — Medication 500000 UNIT(S): at 05:13

## 2025-03-26 RX ADMIN — Medication 1000 MILLIGRAM(S): at 22:28

## 2025-03-26 RX ADMIN — Medication 0.5 MILLIGRAM(S): at 05:13

## 2025-03-26 RX ADMIN — ENOXAPARIN SODIUM 40 MILLIGRAM(S): 100 INJECTION SUBCUTANEOUS at 12:55

## 2025-03-26 RX ADMIN — Medication 500000 UNIT(S): at 19:45

## 2025-03-26 RX ADMIN — Medication 1000 MILLILITER(S): at 23:00

## 2025-03-26 RX ADMIN — Medication 400 MILLIGRAM(S): at 21:58

## 2025-03-26 RX ADMIN — Medication 3 MILLIGRAM(S): at 22:43

## 2025-03-26 RX ADMIN — Medication 1 APPLICATION(S): at 05:13

## 2025-03-26 NOTE — PROVIDER CONTACT NOTE (OTHER) - REASON
Patient agitated, making fist and threatening to punch staff
Rhea Juarez
pt combative, pulling out PEG tube

## 2025-03-26 NOTE — CHART NOTE - NSCHARTNOTEFT_GEN_A_CORE
73yo M hx vascular dementia, Stage 4 tonsil ca on Chemo/RT p/w FTT, Odynophagia. Fluc for oral thrush. Per ENT eval patent airways, S/S recc pureed diet. S/p PEG placement 3/18, continuous TF started 3/19, switched to bolus 3/21.Fam opting for DAX on DC. c/c/b multiple MIKE, increased risperidone per BH. Avoid ativan or benzos.    Notified by RN pt w 101.9F temp. Pt is A&Ox0, interview limited by mental status.     Vital Signs Last 24 Hrs  T(C): 38.8 (26 Mar 2025 21:05), Max: 38.8 (26 Mar 2025 21:05)  T(F): 101.9 (26 Mar 2025 21:05), Max: 101.9 (26 Mar 2025 21:05)  HR: 103 (26 Mar 2025 21:05) (80 - 103)  BP: 104/68 (26 Mar 2025 21:05) (104/68 - 140/51)  BP(mean): --  RR: 17 (26 Mar 2025 21:05) (16 - 17)  SpO2: 99% (26 Mar 2025 21:05) (99% - 100%)    PHYSICAL EXAM:  General: Awake and alert.  No acute distress.  Head: Normocephalic, atraumatic.    Neck: Supple.    Heart: RRR.  Normal S1 and S2.  No murmurs, rubs, or gallops.  No LE edema b/l.   Lungs: Nonlabored breathing.  Good inspiratory effort.  CTAB.  No wheezes, crackles, or rhonchi.    Abdomen: soft, NT/ND.    Skin: Warm and dry.  No rashes. No rash/lesions around PEG site.   Neuro: A&Ox0      SIRS  - fever w tachycardia   - s/p ofirmev  - fu CBC, BMP, VBG, BCx x2, UA/UCx, CXR, RVP  - 1L IVF bolus  - monitor off antibiotics for now    Cont to monitor

## 2025-03-26 NOTE — PROGRESS NOTE ADULT - SUBJECTIVE AND OBJECTIVE BOX
PROGRESS NOTE:   Authored by Jojo Hanna Ma, MD  Available on MS Teams; Pager [phone]    Patient is a 75y old  Male who presents with a chief complaint of odynophagia with decreased PO intake (26 Mar 2025 09:29)    SUBJECTIVE / OVERNIGHT EVENTS: 1 zyprexa IM in the last 24h. ROS limited this AM- pt asleep, resting comfortably. Updated son at bedside    MEDICATIONS  (STANDING):  chlorhexidine 2% Cloths 1 Application(s) Topical <User Schedule>  enoxaparin Injectable 40 milliGRAM(s) SubCutaneous every 24 hours  famotidine Injectable 40 milliGRAM(s) IV Push daily  melatonin 3 milliGRAM(s) Oral at bedtime  nystatin    Suspension 361835 Unit(s) Oral every 6 hours  risperiDONE   Tablet 0.5 milliGRAM(s) Oral <User Schedule>  risperiDONE   Tablet 0.5 milliGRAM(s) Oral <User Schedule>  traZODone 25 milliGRAM(s) Oral <User Schedule>    MEDICATIONS  (PRN):  aluminum hydroxide/magnesium hydroxide/simethicone Suspension 30 milliLiter(s) Enteral Tube every 4 hours PRN Dyspepsia  OLANZapine Injectable 2.5 milliGRAM(s) IntraMuscular every 6 hours PRN agitation/combative  ondansetron Injectable 4 milliGRAM(s) IV Push every 8 hours PRN Nausea and/or Vomiting    CAPILLARY BLOOD GLUCOSE    I&O's Summary    25 Mar 2025 07:01  -  26 Mar 2025 07:00  --------------------------------------------------------  IN: 2190 mL / OUT: 0 mL / NET: 2190 mL    26 Mar 2025 07:01  -  26 Mar 2025 16:18  --------------------------------------------------------  IN: 1330 mL / OUT: 0 mL / NET: 1330 mL    PHYSICAL EXAM:  Vital Signs Last 24 Hrs  T(C): 37.1 (26 Mar 2025 12:30), Max: 37.1 (26 Mar 2025 12:30)  T(F): 98.7 (26 Mar 2025 12:30), Max: 98.7 (26 Mar 2025 12:30)  HR: 80 (26 Mar 2025 12:30) (74 - 86)  BP: 110/67 (26 Mar 2025 12:30) (110/67 - 140/51)  BP(mean): --  RR: 16 (26 Mar 2025 12:30) (16 - 18)  SpO2: 100% (26 Mar 2025 12:30) (98% - 100%)    Parameters below as of 26 Mar 2025 12:30  Patient On (Oxygen Delivery Method): room air    GENERAL: No acute distress  HEAD:  Normocephalic  NECK: Supple  CHEST/LUNG: CTAB  HEART: Regular rate and rhythm  ABDOMEN: Soft, non-tender, non-distended  EXTREMITIES: No clubbing, cyanosis, or edema  SKIN: No rashes or lesions to visible skin    LABS:                        8.9    6.58  )-----------( 168      ( 26 Mar 2025 05:50 )             25.8     03-26    136  |  102  |  24[H]  ----------------------------<  111[H]  4.9   |  22  |  0.76    Ca    9.2      26 Mar 2025 05:50  Phos  3.5     03-25  Mg     1.90     03-25    TPro  6.6  /  Alb  3.4  /  TBili  0.6  /  DBili  x   /  AST  27  /  ALT  95[H]  /  AlkPhos  196[H]  03-26    Urinalysis Basic - ( 26 Mar 2025 05:50 )    Color: x / Appearance: x / SG: x / pH: x  Gluc: 111 mg/dL / Ketone: x  / Bili: x / Urobili: x   Blood: x / Protein: x / Nitrite: x   Leuk Esterase: x / RBC: x / WBC x   Sq Epi: x / Non Sq Epi: x / Bacteria: x    RADIOLOGY & ADDITIONAL TESTS: Reviewed

## 2025-03-26 NOTE — PROVIDER CONTACT NOTE (OTHER) - SITUATION
pt agitated, combative. hitting, kicking staff members. pulling out PEG tube despite multiple attempts of re-direction
Patient agitated, making fist and threatening to punch staff
patient agitated, trying to hit staff

## 2025-03-26 NOTE — PROVIDER CONTACT NOTE (OTHER) - BACKGROUND
Patient diagnosed with vascular dementia
Patient diagnosed with Vascular Dementia
pt with failure to thrive. agitated and combative

## 2025-03-26 NOTE — PROGRESS NOTE ADULT - TIME BILLING
Patient encounter, including chart review, medication review, patient interview, ordering labs and medications, interpreting labs and imaging results, coordination of care with consultants, and discussing plan with son at bedside and healthcare team.

## 2025-03-26 NOTE — PROVIDER CONTACT NOTE (OTHER) - ASSESSMENT
pt getting out of bed, unsteady gait. not re-directable. PRN for agitation given
Patient confused, stated that he wants to go to school and trying to leave, gets angry and threatens to hit staff when encouraged to stay
Patient alert and oriented x2

## 2025-03-26 NOTE — CHART NOTE - NSCHARTNOTEFT_GEN_A_CORE
Notified by RN that pateint remains agitated, trying to pull Peg despite Zyprexa, will place pt on B/L unsecured Mittens and Monitor.

## 2025-03-26 NOTE — PROGRESS NOTE ADULT - ASSESSMENT
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     Active problems  Tonsillar CA  Vascular dementia w/ agitation   FTT requiring PEG tube   Anemia in neoplastic disease  Hyponatremia  Hypercoagulable state secondary to neoplasm   Severe Protein-Calorie Malnutrition    #Vascular dementia  - Pt frequently agitated. Family reporting continues to have hallucinations but this has been ongoing since diagnosis of dementia.   - CTH with chronic microvascular changes.   - Psych consulted, now transferred to . Increased Risperidone to 0.5mg at 7am and 0.5 mg at 5pm via PEG. C/w Trazodone 25mg at 8pm  - For combative behavior - Zyprexa 2.5mg IM q6h PRN    #FTT/severe protein calorie malnutrition  -Pt reported odynophagia and dysphagia w/ regurgitation of food. Dysphagia to solids and liquids. Seems he holds the food and does not swallow due to fear of pain.   -Reviewed CT neck, mucosal thickening and enhancement in the larynx and oropharynx may be due to infectious laryngopharyngitis or sequela of previous radiation performed.  -Patient does not like viscous lidocaine or liquid gabapentin - now discontinued   -Cineesophagogram completed - rec puree and thin liquid   -Pt still with minimal PO intake -> PEG placed and pt now on bolus tube feeds   -s/p empiric treatment of thrush/esophagitis with IV fluconazole- changed to Nystatin. To complete 7 days on 3/27    #Anemia in neoplastic disease  -Hb stable, continue to monitor    #Severe Protein calorie Malnutrition  - Starvation ketosis resolved  - Continue tube feeds     #Hypercoagulable state 2/2 malignancy  -Lovenox 40mg daily    #Dispo  -DAX pending stable behaviors, tolerating TF 73 yo M with tonsillar ca (s/p C6 of 7 of cisplatin and on RT -should've been completed on 3/7), vascular dementia p/w odynophagia/lack of appetite and overall FTT.     Active problems  Tonsillar CA  Vascular dementia w/ agitation   FTT requiring PEG tube   Anemia in neoplastic disease  Hyponatremia  Hypercoagulable state secondary to neoplasm   Severe Protein-Calorie Malnutrition    #Vascular dementia  - Pt frequently agitated. Family reporting continues to have hallucinations but this has been ongoing since diagnosis of dementia.   - CTH with chronic microvascular changes.   - Psych consulted, now transferred to . Increased Risperidone to 0.5mg at 7am and 0.5 mg at 5pm via PEG. C/w Trazodone 25mg at 8pm  - For combative behavior - Zyprexa 2.5mg IM q6h PRN    #FTT/severe protein calorie malnutrition  -Pt reported odynophagia and dysphagia w/ regurgitation of food. Dysphagia to solids and liquids. Seems he holds the food and does not swallow due to fear of pain.   -Reviewed CT neck, mucosal thickening and enhancement in the larynx and oropharynx may be due to infectious laryngopharyngitis or sequela of previous radiation performed.  -Patient does not like viscous lidocaine or liquid gabapentin - now discontinued   -Cineesophagogram completed - rec puree and thin liquid   -Pt still with minimal PO intake -> PEG placed and pt now on bolus tube feeds   -s/p empiric treatment of thrush/esophagitis with IV fluconazole- changed to Nystatin. To complete 7 days on 3/27    #Anemia in neoplastic disease  -Hb stable, continue to monitor    #Severe Protein calorie Malnutrition  - Starvation ketosis resolved  - Continue tube feeds     #Hypercoagulable state 2/2 malignancy  -Lovenox 40mg daily    #Dispo  -Possible DAX vs LTC pending stable behavior

## 2025-03-26 NOTE — PROVIDER CONTACT NOTE (OTHER) - ACTION/TREATMENT ORDERED:
ACP notified, Haldol 0.5mg IVP ordered
[facility] ACP made aware. bilateral unsecured mittens in place to prevent pulling out PEG tube as per provider
ACP notified, Haldol 1mg IVP ordered

## 2025-03-27 LAB
ADD ON TEST-SPECIMEN IN LAB: SIGNIFICANT CHANGE UP
APPEARANCE UR: CLEAR — SIGNIFICANT CHANGE UP
B PERT DNA SPEC QL NAA+PROBE: SIGNIFICANT CHANGE UP
B PERT+PARAPERT DNA PNL SPEC NAA+PROBE: SIGNIFICANT CHANGE UP
BACTERIA # UR AUTO: NEGATIVE /HPF — SIGNIFICANT CHANGE UP
BILIRUB UR-MCNC: NEGATIVE — SIGNIFICANT CHANGE UP
C PNEUM DNA SPEC QL NAA+PROBE: SIGNIFICANT CHANGE UP
CAST: 0 /LPF — SIGNIFICANT CHANGE UP (ref 0–4)
COLOR SPEC: SIGNIFICANT CHANGE UP
DIFF PNL FLD: NEGATIVE — SIGNIFICANT CHANGE UP
FLUAV SUBTYP SPEC NAA+PROBE: SIGNIFICANT CHANGE UP
FLUBV RNA SPEC QL NAA+PROBE: SIGNIFICANT CHANGE UP
GLUCOSE UR QL: NEGATIVE MG/DL — SIGNIFICANT CHANGE UP
HADV DNA SPEC QL NAA+PROBE: SIGNIFICANT CHANGE UP
HCOV 229E RNA SPEC QL NAA+PROBE: SIGNIFICANT CHANGE UP
HCOV HKU1 RNA SPEC QL NAA+PROBE: SIGNIFICANT CHANGE UP
HCOV NL63 RNA SPEC QL NAA+PROBE: SIGNIFICANT CHANGE UP
HCOV OC43 RNA SPEC QL NAA+PROBE: SIGNIFICANT CHANGE UP
HMPV RNA SPEC QL NAA+PROBE: SIGNIFICANT CHANGE UP
HPIV1 RNA SPEC QL NAA+PROBE: SIGNIFICANT CHANGE UP
HPIV2 RNA SPEC QL NAA+PROBE: SIGNIFICANT CHANGE UP
HPIV3 RNA SPEC QL NAA+PROBE: SIGNIFICANT CHANGE UP
HPIV4 RNA SPEC QL NAA+PROBE: SIGNIFICANT CHANGE UP
KETONES UR-MCNC: NEGATIVE MG/DL — SIGNIFICANT CHANGE UP
LEUKOCYTE ESTERASE UR-ACNC: ABNORMAL
M PNEUMO DNA SPEC QL NAA+PROBE: SIGNIFICANT CHANGE UP
NITRITE UR-MCNC: NEGATIVE — SIGNIFICANT CHANGE UP
PH UR: 8.5 (ref 5–8)
PROT UR-MCNC: 30 MG/DL
RAPID RVP RESULT: SIGNIFICANT CHANGE UP
RBC CASTS # UR COMP ASSIST: 2 /HPF — SIGNIFICANT CHANGE UP (ref 0–4)
RSV RNA SPEC QL NAA+PROBE: SIGNIFICANT CHANGE UP
RV+EV RNA SPEC QL NAA+PROBE: SIGNIFICANT CHANGE UP
SARS-COV-2 RNA SPEC QL NAA+PROBE: SIGNIFICANT CHANGE UP
SP GR SPEC: 1.02 — SIGNIFICANT CHANGE UP (ref 1–1.03)
SQUAMOUS # UR AUTO: 1 /HPF — SIGNIFICANT CHANGE UP (ref 0–5)
UROBILINOGEN FLD QL: 1 MG/DL — SIGNIFICANT CHANGE UP (ref 0.2–1)
WBC UR QL: 2 /HPF — SIGNIFICANT CHANGE UP (ref 0–5)

## 2025-03-27 PROCEDURE — 99233 SBSQ HOSP IP/OBS HIGH 50: CPT

## 2025-03-27 RX ORDER — SOD PHOS DI, MONO/K PHOS MONO 250 MG
1 TABLET ORAL ONCE
Refills: 0 | Status: DISCONTINUED | OUTPATIENT
Start: 2025-03-27 | End: 2025-03-27

## 2025-03-27 RX ORDER — POTASSIUM PHOSPHATE, MONOBASIC POTASSIUM PHOSPHATE, DIBASIC INJECTION, 236; 224 MG/ML; MG/ML
15 SOLUTION, CONCENTRATE INTRAVENOUS ONCE
Refills: 0 | Status: COMPLETED | OUTPATIENT
Start: 2025-03-27 | End: 2025-03-27

## 2025-03-27 RX ORDER — SODIUM ZIRCONIUM CYCLOSILICATE 5 G/5G
10 POWDER, FOR SUSPENSION ORAL ONCE
Refills: 0 | Status: COMPLETED | OUTPATIENT
Start: 2025-03-27 | End: 2025-03-27

## 2025-03-27 RX ORDER — SOD PHOS DI, MONO/K PHOS MONO 250 MG
1 TABLET ORAL ONCE
Refills: 0 | Status: COMPLETED | OUTPATIENT
Start: 2025-03-27 | End: 2025-03-27

## 2025-03-27 RX ADMIN — Medication 25 MILLIGRAM(S): at 20:04

## 2025-03-27 RX ADMIN — SODIUM ZIRCONIUM CYCLOSILICATE 10 GRAM(S): 5 POWDER, FOR SUSPENSION ORAL at 18:23

## 2025-03-27 RX ADMIN — Medication 0.5 MILLIGRAM(S): at 05:46

## 2025-03-27 RX ADMIN — Medication 1 PACKET(S): at 02:19

## 2025-03-27 RX ADMIN — Medication 0.5 MILLIGRAM(S): at 18:20

## 2025-03-27 RX ADMIN — Medication 40 MILLIGRAM(S): at 12:18

## 2025-03-27 RX ADMIN — POTASSIUM PHOSPHATE, MONOBASIC POTASSIUM PHOSPHATE, DIBASIC INJECTION, 62.5 MILLIMOLE(S): 236; 224 SOLUTION, CONCENTRATE INTRAVENOUS at 16:49

## 2025-03-27 RX ADMIN — Medication 3 MILLIGRAM(S): at 20:04

## 2025-03-27 RX ADMIN — Medication 1 APPLICATION(S): at 05:46

## 2025-03-27 RX ADMIN — Medication 500000 UNIT(S): at 05:46

## 2025-03-27 RX ADMIN — ENOXAPARIN SODIUM 40 MILLIGRAM(S): 100 INJECTION SUBCUTANEOUS at 12:18

## 2025-03-27 RX ADMIN — Medication 500000 UNIT(S): at 00:53

## 2025-03-27 NOTE — PROGRESS NOTE ADULT - TIME BILLING
Patient encounter, including chart review, medication review, patient interview, ordering labs and medications, interpreting labs and imaging results, coordination of care with consultants, and discussing plan with daughter on the phone and healthcare team.

## 2025-03-27 NOTE — PROGRESS NOTE ADULT - SUBJECTIVE AND OBJECTIVE BOX
PROGRESS NOTE:   Authored by Jojo Hanna Ma, MD  Available on MS Teams; Pager [phone]    Patient is a 75y old  Male who presents with a chief complaint of odynophagia with decreased PO intake (26 Mar 2025 09:29)    SUBJECTIVE / OVERNIGHT EVENTS: Pt febrile and tachycardic overnight, received IV tylenol and IVF with improvement. This AM, ROS limited due to patient's mental status.     MEDICATIONS  (STANDING):  chlorhexidine 2% Cloths 1 Application(s) Topical <User Schedule>  enoxaparin Injectable 40 milliGRAM(s) SubCutaneous every 24 hours  famotidine Injectable 40 milliGRAM(s) IV Push daily  melatonin 3 milliGRAM(s) Oral at bedtime  potassium phosphate IVPB 15 milliMole(s) IV Intermittent once  risperiDONE   Tablet 0.5 milliGRAM(s) Oral <User Schedule>  risperiDONE   Tablet 0.5 milliGRAM(s) Oral <User Schedule>  sodium zirconium cyclosilicate 10 Gram(s) Oral once  traZODone 25 milliGRAM(s) Oral <User Schedule>    MEDICATIONS  (PRN):  aluminum hydroxide/magnesium hydroxide/simethicone Suspension 30 milliLiter(s) Enteral Tube every 4 hours PRN Dyspepsia  OLANZapine Injectable 2.5 milliGRAM(s) IntraMuscular every 6 hours PRN agitation/combative  ondansetron Injectable 4 milliGRAM(s) IV Push every 8 hours PRN Nausea and/or Vomiting    CAPILLARY BLOOD GLUCOSE    I&O's Summary    26 Mar 2025 07:  -  27 Mar 2025 07:00  --------------------------------------------------------  IN: 2440 mL / OUT: 0 mL / NET: 2440 mL    27 Mar 2025 07:01  -  27 Mar 2025 15:53  --------------------------------------------------------  IN: 360 mL / OUT: 0 mL / NET: 360 mL    PHYSICAL EXAM:  Vital Signs Last 24 Hrs  T(C): 36.9 (27 Mar 2025 11:40), Max: 38.8 (26 Mar 2025 21:05)  T(F): 98.4 (27 Mar 2025 11:40), Max: 101.9 (26 Mar 2025 21:05)  HR: 98 (27 Mar 2025 11:40) (75 - 103)  BP: 108/58 (27 Mar 2025 11:40) (104/68 - 108/58)  BP(mean): --  RR: 18 (27 Mar 2025 11:40) (17 - 18)  SpO2: 100% (27 Mar 2025 11:40) (99% - 100%)    Parameters below as of 27 Mar 2025 11:40  Patient On (Oxygen Delivery Method): room air    GENERAL: No acute distress  HEAD:  Normocephalic  EYES: Conjunctiva and sclera clear  NECK: Supple  CHEST/LUNG: CTAB  HEART: Regular rate and rhythm  ABDOMEN: Soft, non-tender, non-distended. Abdominal binder in place  EXTREMITIES: No clubbing, cyanosis, or edema. b/l hands it soft mitts  NEUROLOGY: Alert, awake, answering questions, mumbling  SKIN: No rashes or lesions to visible skin    LABS:                        9.1    6.66  )-----------( 192      ( 26 Mar 2025 22:05 )             25.7     03-26    136  |  100  |  34[H]  ----------------------------<  163[H]  5.3   |  26  |  0.99    Ca    9.1      26 Mar 2025 22:05  Phos  1.8     -  Mg     1.90     -    TPro  6.6  /  Alb  3.4  /  TBili  0.6  /  DBili  x   /  AST  27  /  ALT  95[H]  /  AlkPhos  196[H]  03-26    Urinalysis Basic - ( 27 Mar 2025 02:39 )    Color: Dark Yellow / Appearance: Clear / S.019 / pH: x  Gluc: x / Ketone: Negative mg/dL  / Bili: Negative / Urobili: 1.0 mg/dL   Blood: x / Protein: 30 mg/dL / Nitrite: Negative   Leuk Esterase: Trace / RBC: 2 /HPF / WBC 2 /HPF   Sq Epi: x / Non Sq Epi: 1 /HPF / Bacteria: Negative /HPF    Urinalysis with Rflx Culture (collected 27 Mar 2025 02:39)    RADIOLOGY & ADDITIONAL TESTS: Reviewed

## 2025-03-27 NOTE — PROGRESS NOTE ADULT - ASSESSMENT
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     Active problems  SIRS  Vascular dementia w/ agitation   FTT requiring PEG tube   Tonsillar CA  Anemia in neoplastic disease  Hyponatremia (resolved)  Hyperkalemia  Hypophosphatemia  Hypercoagulable state secondary to neoplasm   Severe Protein-Calorie Malnutrition    #SIRS  Patient febrile to 101.9 and tachycardic on 3/26, no obvious signs or symptoms of infection  f/u 3/26 BCx  UA neg, full RVP neg, CXR clear  Monitor off abx    #Electrolyte derangements  Lokelma for mild hyperkalemia  Replete phos PRN  Continue to trend  T/c adjusting TF    #Vascular dementia  - Pt frequently agitated. Family reporting continues to have hallucinations but this has been ongoing since diagnosis of dementia.   - CTH with chronic microvascular changes.   - Psych consulted, now transferred to 7S. Increased Risperidone to 0.5mg at 7am and 0.5 mg at 5pm via PEG. C/w Trazodone 25mg at 8pm  - For combative behavior - Zyprexa 2.5mg IM q6h PRN    #FTT/severe protein calorie malnutrition  -Pt reported odynophagia and dysphagia w/ regurgitation of food. Dysphagia to solids and liquids. Seems he holds the food and does not swallow due to fear of pain.   -Reviewed CT neck, mucosal thickening and enhancement in the larynx and oropharynx may be due to infectious laryngopharyngitis or sequela of previous radiation performed.  -Patient does not like viscous lidocaine or liquid gabapentin - now discontinued   -Cineesophagogram completed - rec puree and thin liquid   -Pt still with minimal PO intake -> PEG placed and pt now on bolus tube feeds   -s/p empiric treatment of thrush/esophagitis with IV fluconazole- changed to Nystatin. To complete 7 days on 3/27    #Anemia in neoplastic disease  -Hb stable, continue to monitor    #Severe Protein calorie Malnutrition  - Starvation ketosis resolved  - Continue tube feeds     #Hypercoagulable state 2/2 malignancy  -Lovenox 40mg daily    #Dispo  -Possible DAX pending infectious workup and stable behavior

## 2025-03-28 ENCOUNTER — APPOINTMENT (OUTPATIENT)
Dept: INFUSION THERAPY | Facility: HOSPITAL | Age: 75
End: 2025-03-28

## 2025-03-28 PROCEDURE — 99232 SBSQ HOSP IP/OBS MODERATE 35: CPT

## 2025-03-28 PROCEDURE — 99233 SBSQ HOSP IP/OBS HIGH 50: CPT

## 2025-03-28 RX ADMIN — Medication 0.5 MILLIGRAM(S): at 18:21

## 2025-03-28 RX ADMIN — OLANZAPINE 2.5 MILLIGRAM(S): 10 TABLET ORAL at 22:12

## 2025-03-28 RX ADMIN — Medication 0.5 MILLIGRAM(S): at 05:00

## 2025-03-28 RX ADMIN — Medication 1 APPLICATION(S): at 05:05

## 2025-03-28 RX ADMIN — Medication 3 MILLIGRAM(S): at 21:31

## 2025-03-28 RX ADMIN — Medication 40 MILLIGRAM(S): at 12:09

## 2025-03-28 RX ADMIN — Medication 25 MILLIGRAM(S): at 21:32

## 2025-03-28 RX ADMIN — ENOXAPARIN SODIUM 40 MILLIGRAM(S): 100 INJECTION SUBCUTANEOUS at 12:09

## 2025-03-28 NOTE — BH CONSULTATION LIAISON PROGRESS NOTE - OTHER
impaired d/t dementia required assist from chair to bed by PCA quiet required redirection at times bilateral hand shaking, slight cogwheeling left upper arm only

## 2025-03-28 NOTE — BH CONSULTATION LIAISON PROGRESS NOTE - NSBHFUPINTERVALHXFT_PSY_A_CORE
patient more awake but still confused, AOx1 at most, not particularly agitated and is following commands. Denies mood issues, no AH/VH.

## 2025-03-28 NOTE — PROGRESS NOTE ADULT - TIME BILLING
Patient encounter, including chart review, medication review, patient interview, ordering labs and medications, interpreting labs and imaging results, coordination of care with consultants, and discussing plan with patient and healthcare team.

## 2025-03-28 NOTE — BH CONSULTATION LIAISON PROGRESS NOTE - NSBHMSESPABN_PSY_A_CORE
Soft volume/Decreased productivity/Increased latency
Soft volume/Decreased productivity/Increased latency
Soft volume/Decreased productivity
Soft volume/Decreased productivity
Soft volume/Decreased productivity/Increased latency

## 2025-03-28 NOTE — PROGRESS NOTE ADULT - ASSESSMENT
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     Active problems  SIRS  Vascular dementia w/ agitation   FTT requiring PEG tube   Tonsillar CA  Anemia in neoplastic disease  Hyponatremia (resolved)  Hyperkalemia  Hypophosphatemia  Hypercoagulable state secondary to neoplasm   Severe Protein-Calorie Malnutrition    #SIRS  Patient febrile to 101.9 and tachycardic on 3/26, no obvious signs or symptoms of infection  f/u 3/26 BCx  UA neg, full RVP neg, CXR clear  Monitor off abx    #Electrolyte derangements  Lokelma for mild hyperkalemia  Replete phos PRN  Continue to trend  T/c adjusting TF    #Vascular dementia  - Pt frequently agitated. Family reporting continues to have hallucinations but this has been ongoing since diagnosis of dementia.   - CTH with chronic microvascular changes.   - Psych consulted, now transferred to 7S. Increased Risperidone to 0.5mg at 7am and 0.5 mg at 5pm via PEG. C/w Trazodone 25mg at 8pm  - For combative behavior - Zyprexa 2.5mg IM q6h PRN    #FTT/severe protein calorie malnutrition  -Pt reported odynophagia and dysphagia w/ regurgitation of food. Dysphagia to solids and liquids. Seems he holds the food and does not swallow due to fear of pain.   -Reviewed CT neck, mucosal thickening and enhancement in the larynx and oropharynx may be due to infectious laryngopharyngitis or sequela of previous radiation performed.  -Patient does not like viscous lidocaine or liquid gabapentin - now discontinued   -Cineesophagogram completed - rec puree and thin liquid   -Pt still with minimal PO intake -> PEG placed and pt now on bolus tube feeds   -s/p empiric treatment of thrush/esophagitis with IV fluconazole- changed to Nystatin. 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     Active problems  SIRS  Vascular dementia w/ agitation   FTT requiring PEG tube   Tonsillar CA  Anemia in neoplastic disease  Hyponatremia (resolved)  Hyperkalemia  Hypophosphatemia  Hypercoagulable state secondary to neoplasm   Severe Protein-Calorie Malnutrition    #SIRS  Patient febrile to 101.9 and tachycardic on 3/26, no obvious signs or symptoms of infection  f/u 3/26 BCx- NGTD x 24h  UA neg, full RVP neg, CXR clear  Monitor off abx    #Electrolyte derangements  Lokelma for mild hyperkalemia  Replete phos PRN  Continue to trend    #Vascular dementia  - Pt frequently agitated. Family reporting continues to have hallucinations but this has been ongoing since diagnosis of dementia.   - CTH with chronic microvascular changes.   - Psych consulted, now transferred to 7S. Increased Risperidone to 0.5mg at 7am and 0.5 mg at 5pm via PEG. C/w Trazodone 25mg at 8pm  - For combative behavior - Zyprexa 2.5mg IM q6h PRN    #FTT/severe protein calorie malnutrition  -Pt reported odynophagia and dysphagia w/ regurgitation of food. Dysphagia to solids and liquids. Seems he holds the food and does not swallow due to fear of pain.   -Reviewed CT neck, mucosal thickening and enhancement in the larynx and oropharynx may be due to infectious laryngopharyngitis or sequela of previous radiation performed.  -Patient does not like viscous lidocaine or liquid gabapentin - now discontinued   -Cineesophagogram completed - rec puree and thin liquid   -Pt still with minimal PO intake -> PEG placed and pt now on bolus tube feeds   -s/p empiric treatment of thrush/esophagitis with IV fluconazole- changed to Nystatin. Completed 7 days on 3/27    #Anemia in neoplastic disease  -Hb stable, continue to monitor    #Severe Protein calorie Malnutrition  - Starvation ketosis resolved  - Continue tube feeds     #Hypercoagulable state 2/2 malignancy  -Lovenox 40mg daily    #Dispo  -Possible DAX pending infectious workup and stable behavior

## 2025-03-28 NOTE — PROGRESS NOTE ADULT - SUBJECTIVE AND OBJECTIVE BOX
PROGRESS NOTE:   Authored by Jojo Hanna Ma, MD  Available on MS Teams; Pager [phone]    Patient is a 75y old  Male who presents with a chief complaint of odynophagia with decreased PO intake (28 Mar 2025 09:18)    SUBJECTIVE / OVERNIGHT EVENTS: NAEON. Pt seen in hallway sitting in chair this AM. States he is currently in an apartment living with his wife and his neighbor Lanie is across the building. He does not remember his hospital course or seeing his family recently. He c/o 1 episode of diarrhea today.    All other review of systems is negative unless indicated above.    MEDICATIONS  (STANDING):  chlorhexidine 2% Cloths 1 Application(s) Topical <User Schedule>  enoxaparin Injectable 40 milliGRAM(s) SubCutaneous every 24 hours  famotidine Injectable 40 milliGRAM(s) IV Push daily  melatonin 3 milliGRAM(s) Oral at bedtime  risperiDONE   Tablet 0.5 milliGRAM(s) Oral <User Schedule>  risperiDONE   Tablet 0.5 milliGRAM(s) Oral <User Schedule>  traZODone 25 milliGRAM(s) Oral <User Schedule>    MEDICATIONS  (PRN):  aluminum hydroxide/magnesium hydroxide/simethicone Suspension 30 milliLiter(s) Enteral Tube every 4 hours PRN Dyspepsia  OLANZapine Injectable 2.5 milliGRAM(s) IntraMuscular every 6 hours PRN agitation/combative  ondansetron Injectable 4 milliGRAM(s) IV Push every 8 hours PRN Nausea and/or Vomiting      CAPILLARY BLOOD GLUCOSE        I&O's Summary    27 Mar 2025 07:01  -  28 Mar 2025 07:00  --------------------------------------------------------  IN: 360 mL / OUT: 0 mL / NET: 360 mL        PHYSICAL EXAM:  Vital Signs Last 24 Hrs  T(C): 37.2 (28 Mar 2025 04:35), Max: 37.5 (27 Mar 2025 19:53)  T(F): 98.9 (28 Mar 2025 04:35), Max: 99.5 (27 Mar 2025 19:53)  HR: 83 (28 Mar 2025 04:35) (83 - 98)  BP: 127/62 (28 Mar 2025 04:35) (108/58 - 127/62)  BP(mean): --  RR: 18 (28 Mar 2025 04:35) (18 - 18)  SpO2: 99% (28 Mar 2025 04:35) (96% - 100%)    Parameters below as of 28 Mar 2025 04:35  Patient On (Oxygen Delivery Method): room air        GENERAL: No acute distress  HEAD:  Normocephalic  EYES: Conjunctiva and sclera clear  NECK: Supple  CHEST/LUNG: CTAB  HEART: Regular rate and rhythm  ABDOMEN: Soft, non-tender, non-distended  EXTREMITIES: No clubbing, cyanosis, or edema  NEUROLOGY: A&O x 3  SKIN: No rashes or lesions to visible skin    LABS:                        9.1    6.66  )-----------( 192      ( 26 Mar 2025 22:05 )             25.7     03-26    136  |  100  |  34[H]  ----------------------------<  163[H]  5.3   |  26  |  0.99    Ca    9.1      26 Mar 2025 22:05  Phos  1.8     03-26  Mg     1.90     03-26    Urinalysis Basic - ( 27 Mar 2025 02:39 )    Color: Dark Yellow / Appearance: Clear / S.019 / pH: x  Gluc: x / Ketone: Negative mg/dL  / Bili: Negative / Urobili: 1.0 mg/dL   Blood: x / Protein: 30 mg/dL / Nitrite: Negative   Leuk Esterase: Trace / RBC: 2 /HPF / WBC 2 /HPF   Sq Epi: x / Non Sq Epi: 1 /HPF / Bacteria: Negative /HPF    Urinalysis with Rflx Culture (collected 27 Mar 2025 02:39)    Culture - Blood (collected 26 Mar 2025 22:25)  Source: Blood Blood-Peripheral  Preliminary Report (28 Mar 2025 03:01):    No growth at 24 hours    Culture - Blood (collected 26 Mar 2025 22:25)  Source: Blood Blood  Preliminary Report (28 Mar 2025 03:01):    No growth at 24 hours          RADIOLOGY & ADDITIONAL TESTS: Reviewed   PROGRESS NOTE:   Authored by Jojo Hanna Ma, MD  Available on MS Teams; Pager [phone]    Patient is a 75y old  Male who presents with a chief complaint of odynophagia with decreased PO intake (28 Mar 2025 09:18)    SUBJECTIVE / OVERNIGHT EVENTS: NAEON. Pt seen in hallway sitting in chair this AM. States he is currently in an apartment living with his wife and his neighbor Lanie is across the building. He does not remember his hospital course or seeing his family recently. He c/o 1 episode of diarrhea today. He denies any other new or acute symptoms.    All other review of systems is negative unless indicated above.    MEDICATIONS  (STANDING):  chlorhexidine 2% Cloths 1 Application(s) Topical <User Schedule>  enoxaparin Injectable 40 milliGRAM(s) SubCutaneous every 24 hours  famotidine Injectable 40 milliGRAM(s) IV Push daily  melatonin 3 milliGRAM(s) Oral at bedtime  risperiDONE   Tablet 0.5 milliGRAM(s) Oral <User Schedule>  risperiDONE   Tablet 0.5 milliGRAM(s) Oral <User Schedule>  traZODone 25 milliGRAM(s) Oral <User Schedule>    MEDICATIONS  (PRN):  aluminum hydroxide/magnesium hydroxide/simethicone Suspension 30 milliLiter(s) Enteral Tube every 4 hours PRN Dyspepsia  OLANZapine Injectable 2.5 milliGRAM(s) IntraMuscular every 6 hours PRN agitation/combative  ondansetron Injectable 4 milliGRAM(s) IV Push every 8 hours PRN Nausea and/or Vomiting    CAPILLARY BLOOD GLUCOSE    I&O's Summary    27 Mar 2025 07:01  -  28 Mar 2025 07:00  --------------------------------------------------------  IN: 360 mL / OUT: 0 mL / NET: 360 mL    PHYSICAL EXAM:  Vital Signs Last 24 Hrs  T(C): 37.2 (28 Mar 2025 04:35), Max: 37.5 (27 Mar 2025 19:53)  T(F): 98.9 (28 Mar 2025 04:35), Max: 99.5 (27 Mar 2025 19:53)  HR: 83 (28 Mar 2025 04:35) (83 - 98)  BP: 127/62 (28 Mar 2025 04:35) (108/58 - 127/62)  BP(mean): --  RR: 18 (28 Mar 2025 04:35) (18 - 18)  SpO2: 99% (28 Mar 2025 04:35) (96% - 100%)    Parameters below as of 28 Mar 2025 04:35  Patient On (Oxygen Delivery Method): room air    GENERAL: No acute distress  HEAD:  Normocephalic  EYES: Conjunctiva and sclera clear  NECK: Supple  CHEST/LUNG: CTAB  HEART: Regular rate and rhythm  ABDOMEN: Soft, non-tender, non-distended  EXTREMITIES: No clubbing, cyanosis, or edema  NEUROLOGY: Awake, alert, A&O x self  SKIN: No rashes or lesions to visible skin    LABS:                        9.1    6.66  )-----------( 192      ( 26 Mar 2025 22:05 )             25.7     03-26    136  |  100  |  34[H]  ----------------------------<  163[H]  5.3   |  26  |  0.99    Ca    9.1      26 Mar 2025 22:05  Phos  1.8     03-  Mg     1.90     03-26    Urinalysis Basic - ( 27 Mar 2025 02:39 )    Color: Dark Yellow / Appearance: Clear / S.019 / pH: x  Gluc: x / Ketone: Negative mg/dL  / Bili: Negative / Urobili: 1.0 mg/dL   Blood: x / Protein: 30 mg/dL / Nitrite: Negative   Leuk Esterase: Trace / RBC: 2 /HPF / WBC 2 /HPF   Sq Epi: x / Non Sq Epi: 1 /HPF / Bacteria: Negative /HPF    Urinalysis with Rflx Culture (collected 27 Mar 2025 02:39)    Culture - Blood (collected 26 Mar 2025 22:25)  Source: Blood Blood-Peripheral  Preliminary Report (28 Mar 2025 03:01):    No growth at 24 hours    Culture - Blood (collected 26 Mar 2025 22:25)  Source: Blood Blood  Preliminary Report (28 Mar 2025 03:01):    No growth at 24 hours    RADIOLOGY & ADDITIONAL TESTS: Reviewed

## 2025-03-28 NOTE — BH CONSULTATION LIAISON PROGRESS NOTE - NSBHMSESPEECH_PSY_A_CORE
Normal volume, rate, productivity, spontaneity and articulation
Abnormal as indicated, otherwise normal...

## 2025-03-28 NOTE — BH CONSULTATION LIAISON PROGRESS NOTE - NSBHMSETHTPROC_PSY_A_CORE
Disorganized/Thought blocking
Linear/Normal reasoning
Linear/Thought blocking
Disorganized/Thought blocking

## 2025-03-28 NOTE — BH CONSULTATION LIAISON PROGRESS NOTE - NSBHASSESSMENTFT_PSY_ALL_CORE
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 Psychiatry consulted for aggression. Improved behaviour, has not received any PRNs since yesterday. Still has some hallucinations at night, but amenable to reorientation.     Patient carries a diagnosis of dementia for the last 4-5 years, declining cognitively over time, with worsening symptoms along with paranoia, distress, poor sleep since the start of RT. Pain/throat discomfort, poor PO intake has been ongoing.     3/13--- has been doing better, no agitation or prn needs  3/21: reconsulted for agitation s/p code christian, family requesting standing medication for mood, agitation, amenable to Risperidone, bbb d/w family, verbalized understanding, denies si, denies ah  3/23 - multiple code BERTs in last 24 hours, receiving PRN Haldol frequently, does not engage in exam or comply with EPS exam, does not appear grossly rigid, appears paranoid about roommate, will increase risperidone to 0.25 mg AM/0.5 mg PM, will change PRN to Zyprexa 2.5 mg  3/24 - a&o, calm, +delusions, dtr endorses threatening behavior towards son this morning, d/w dtr changes to med regiment-in agreement, d/w primary team given changes in behavior consider further medical workup  3/25 - restless and confused, delusional, no EPS tolerating risperdal, will monitor for a day in new 7S setting   3/28 - patient betterin 7S setting, more awake less agitated but still confused    Recommendations  - Recommend transfer to 7S  - Deferring observation status to primary team  - Frequent orientation   -consider u/a given changes in behavior or another organic cause-staff report decreased urine output  - Monitor EKG qtc interval  - AVOID bzd, anticholinergics, antihistamines  - Continue Melatonin 3mg at 8PM PO  - Continue Risperidone 0.5mg @ 7am and 0.5 mg 5pm via peg  - Continue Trazodone 25mg 8pm tonight  - ONLY FOR COMBATIVE BEHAVIORS--------Zyprex 2.5mg q 6hrs prn IM/IV/PO  - No psychiatric contraindications to discharge

## 2025-03-29 LAB
ANION GAP SERPL CALC-SCNC: 13 MMOL/L — SIGNIFICANT CHANGE UP (ref 7–14)
BUN SERPL-MCNC: 23 MG/DL — SIGNIFICANT CHANGE UP (ref 7–23)
CALCIUM SERPL-MCNC: 9.3 MG/DL — SIGNIFICANT CHANGE UP (ref 8.4–10.5)
CHLORIDE SERPL-SCNC: 102 MMOL/L — SIGNIFICANT CHANGE UP (ref 98–107)
CO2 SERPL-SCNC: 22 MMOL/L — SIGNIFICANT CHANGE UP (ref 22–31)
CREAT SERPL-MCNC: 1.08 MG/DL — SIGNIFICANT CHANGE UP (ref 0.5–1.3)
EGFR: 71 ML/MIN/1.73M2 — SIGNIFICANT CHANGE UP
EGFR: 71 ML/MIN/1.73M2 — SIGNIFICANT CHANGE UP
GLUCOSE SERPL-MCNC: 78 MG/DL — SIGNIFICANT CHANGE UP (ref 70–99)
HCT VFR BLD CALC: 24.6 % — LOW (ref 39–50)
HGB BLD-MCNC: 8.2 G/DL — LOW (ref 13–17)
MAGNESIUM SERPL-MCNC: 2.3 MG/DL — SIGNIFICANT CHANGE UP (ref 1.6–2.6)
MCHC RBC-ENTMCNC: 32 PG — SIGNIFICANT CHANGE UP (ref 27–34)
MCHC RBC-ENTMCNC: 33.3 G/DL — SIGNIFICANT CHANGE UP (ref 32–36)
MCV RBC AUTO: 96.1 FL — SIGNIFICANT CHANGE UP (ref 80–100)
NRBC # BLD AUTO: 0 K/UL — SIGNIFICANT CHANGE UP (ref 0–0)
NRBC # FLD: 0 K/UL — SIGNIFICANT CHANGE UP (ref 0–0)
NRBC BLD AUTO-RTO: 0 /100 WBCS — SIGNIFICANT CHANGE UP (ref 0–0)
PHOSPHATE SERPL-MCNC: 3.7 MG/DL — SIGNIFICANT CHANGE UP (ref 2.5–4.5)
PLATELET # BLD AUTO: 183 K/UL — SIGNIFICANT CHANGE UP (ref 150–400)
POTASSIUM SERPL-MCNC: 4.7 MMOL/L — SIGNIFICANT CHANGE UP (ref 3.5–5.3)
POTASSIUM SERPL-SCNC: 4.7 MMOL/L — SIGNIFICANT CHANGE UP (ref 3.5–5.3)
RBC # BLD: 2.56 M/UL — LOW (ref 4.2–5.8)
RBC # FLD: 21.1 % — HIGH (ref 10.3–14.5)
SODIUM SERPL-SCNC: 137 MMOL/L — SIGNIFICANT CHANGE UP (ref 135–145)
WBC # BLD: 3.32 K/UL — LOW (ref 3.8–10.5)
WBC # FLD AUTO: 3.32 K/UL — LOW (ref 3.8–10.5)

## 2025-03-29 PROCEDURE — 99233 SBSQ HOSP IP/OBS HIGH 50: CPT

## 2025-03-29 RX ORDER — TRAZODONE HCL 100 MG
25 TABLET ORAL
Refills: 0 | Status: DISCONTINUED | OUTPATIENT
Start: 2025-03-29 | End: 2025-03-31

## 2025-03-29 RX ADMIN — Medication 25 MILLIGRAM(S): at 18:35

## 2025-03-29 RX ADMIN — Medication 0.5 MILLIGRAM(S): at 05:49

## 2025-03-29 RX ADMIN — Medication 40 MILLIGRAM(S): at 18:35

## 2025-03-29 RX ADMIN — Medication 40 MILLIGRAM(S): at 05:49

## 2025-03-29 RX ADMIN — Medication 1 APPLICATION(S): at 05:48

## 2025-03-29 RX ADMIN — OLANZAPINE 2.5 MILLIGRAM(S): 10 TABLET ORAL at 13:57

## 2025-03-29 RX ADMIN — ENOXAPARIN SODIUM 40 MILLIGRAM(S): 100 INJECTION SUBCUTANEOUS at 13:59

## 2025-03-29 RX ADMIN — Medication 0.5 MILLIGRAM(S): at 18:35

## 2025-03-29 NOTE — PROGRESS NOTE ADULT - ASSESSMENT
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     Active problems  SIRS  Vascular dementia w/ agitation   FTT requiring PEG tube   Tonsillar CA  Anemia in neoplastic disease  Hyponatremia (resolved)  Hyperkalemia  Hypophosphatemia  Hypercoagulable state secondary to neoplasm   Severe Protein-Calorie Malnutrition    #SIRS  Patient febrile to 101.9 and tachycardic on 3/26, no obvious signs or symptoms of infection  f/u 3/26 BCx- NGTD x 24h  UA neg, full RVP neg, CXR clear  Monitor off abx    #Electrolyte derangements  Lokelma for mild hyperkalemia  Replete phos PRN  Continue to trend    #Vascular dementia  - Pt frequently agitated. Family reporting continues to have hallucinations but this has been ongoing since diagnosis of dementia.   - CTH with chronic microvascular changes.   - Psych consulted, now transferred to 7S. Increased Risperidone to 0.5mg at 7am and 0.5 mg at 5pm via PEG. C/w Trazodone 25mg at 8pm  - For combative behavior - Zyprexa 2.5mg IM q6h PRN    #FTT/severe protein calorie malnutrition  -Pt reported odynophagia and dysphagia w/ regurgitation of food. Dysphagia to solids and liquids. Seems he holds the food and does not swallow due to fear of pain.   -Reviewed CT neck, mucosal thickening and enhancement in the larynx and oropharynx may be due to infectious laryngopharyngitis or sequela of previous radiation performed.  -Patient does not like viscous lidocaine or liquid gabapentin - now discontinued   -Cineesophagogram completed - rec puree and thin liquid   -Pt still with minimal PO intake -> PEG placed and pt now on bolus tube feeds   -s/p empiric treatment of thrush/esophagitis with IV fluconazole- changed to Nystatin. Completed 7 days on 3/27    #Anemia in neoplastic disease  -Hb stable, continue to monitor    #Severe Protein calorie Malnutrition  - Starvation ketosis resolved  - Continue tube feeds     #Hypercoagulable state 2/2 malignancy  -Lovenox 40mg daily    #Dispo  -Possible DAX pending infectious workup and stable behavior 75 yo M with tonsillar ca (s/p C6 of 7 of cisplatin and on RT -should've been completed on 3/7), vascular dementia p/w odynophagia/lack of appetite and overall FTT.     Active problems  SIRS  Vascular dementia w/ agitation   FTT requiring PEG tube   Tonsillar CA  Anemia in neoplastic disease  Hyponatremia (resolved)  Hyperkalemia  Hypophosphatemia  Hypercoagulable state secondary to neoplasm   Severe Protein-Calorie Malnutrition    #SIRS  Patient febrile to 101.9 and tachycardic on 3/26, no obvious signs or symptoms of infection  f/u 3/26 BCx- NGTD x 48h  UA neg, full RVP neg, CXR clear  Monitor off abx    #Electrolyte derangements  Lokelma for mild hyperkalemia  Replete phos PRN  Continue to trend    #Vascular dementia  - Pt frequently agitated. Family reporting continues to have hallucinations but this has been ongoing since diagnosis of dementia.   - CTH with chronic microvascular changes.   - Psych consulted, now transferred to 7S. Increased Risperidone to 0.5mg at 7am and 0.5 mg at 5pm via PEG. C/w Trazodone 25mg at 8pm  - For combative behavior - Zyprexa 2.5mg IM q6h PRN    #FTT/severe protein calorie malnutrition  -Pt reported odynophagia and dysphagia w/ regurgitation of food. Dysphagia to solids and liquids. Seems he holds the food and does not swallow due to fear of pain.   -Reviewed CT neck, mucosal thickening and enhancement in the larynx and oropharynx may be due to infectious laryngopharyngitis or sequela of previous radiation performed.  -Patient does not like viscous lidocaine or liquid gabapentin - now discontinued   -Cineesophagogram completed - rec puree and thin liquid   -Pt still with minimal PO intake -> PEG placed and pt now on bolus tube feeds   -s/p empiric treatment of thrush/esophagitis with IV fluconazole- changed to Nystatin. Completed 7 days on 3/27    #Anemia in neoplastic disease  -Hb stable, continue to monitor    #Severe Protein calorie Malnutrition  - Starvation ketosis resolved  - Continue tube feeds     #Hypercoagulable state 2/2 malignancy  -Lovenox 40mg daily    #Dispo  -Likely DAX pending stable behavior 73 yo M with tonsillar ca (s/p C6 of 7 of cisplatin and on RT -should've been completed on 3/7), vascular dementia p/w odynophagia/lack of appetite and overall FTT.     Active problems  SIRS  Vascular dementia w/ agitation   FTT requiring PEG tube   Tonsillar CA  Anemia in neoplastic disease  Hyponatremia (resolved)  Hyperkalemia  Hypophosphatemia  Hypercoagulable state secondary to neoplasm   Severe Protein-Calorie Malnutrition    #SIRS  Patient febrile to 101.9 and tachycardic on 3/26, no obvious signs or symptoms of infection  f/u 3/26 BCx- NGTD x 48h  UA neg, full RVP neg, CXR clear  Monitor off abx    #Electrolyte derangements  Lokelma for mild hyperkalemia  Replete phos PRN  Continue to trend    #Vascular dementia  - Pt frequently agitated. Family reporting continues to have hallucinations but this has been ongoing since diagnosis of dementia.   - CTH with chronic microvascular changes.   - Psych consulted, now transferred to 7S. Increased Risperidone to 0.5mg at 7am and 0.5 mg at 5pm via PEG. C/w Trazodone 25mg, retimed to 5pm from 8pm (has been receiving later than 8pm)  - Family requested discontinuing melatonin as this has not been helping pt  - For combative behavior - Zyprexa 2.5mg IM q6h PRN    #FTT/severe protein calorie malnutrition  -Pt reported odynophagia and dysphagia w/ regurgitation of food. Dysphagia to solids and liquids. Seems he holds the food and does not swallow due to fear of pain.   -Reviewed CT neck, mucosal thickening and enhancement in the larynx and oropharynx may be due to infectious laryngopharyngitis or sequela of previous radiation performed.  -Patient does not like viscous lidocaine or liquid gabapentin - now discontinued   -Cineesophagogram completed - rec puree and thin liquid   -Pt still with minimal PO intake -> PEG placed and pt now on bolus tube feeds   -s/p empiric treatment of thrush/esophagitis with IV fluconazole- changed to Nystatin. Completed 7 days on 3/27    #Anemia in neoplastic disease  -Hb stable, continue to monitor    #Severe Protein calorie Malnutrition  - Starvation ketosis resolved  - Continue tube feeds     #Hypercoagulable state 2/2 malignancy  -Lovenox 40mg daily    #Dispo  -Likely DAX pending stable behavior

## 2025-03-29 NOTE — PROGRESS NOTE ADULT - SUBJECTIVE AND OBJECTIVE BOX
PROGRESS NOTE:   Authored by Jojo Hanna Ma, MD  Available on MS Teams; Pager [phone]    Patient is a 75y old  Male who presents with a chief complaint of odynophagia with decreased PO intake (29 Mar 2025 08:53)      SUBJECTIVE / OVERNIGHT EVENTS: Overnight, received IM olanzapine for agitation. This AM,    All other review of systems is negative unless indicated above.    MEDICATIONS  (STANDING):  chlorhexidine 2% Cloths 1 Application(s) Topical <User Schedule>  enoxaparin Injectable 40 milliGRAM(s) SubCutaneous every 24 hours  famotidine   Suspension 40 milliGRAM(s) Oral two times a day  melatonin 3 milliGRAM(s) Oral at bedtime  risperiDONE   Tablet 0.5 milliGRAM(s) Oral <User Schedule>  risperiDONE   Tablet 0.5 milliGRAM(s) Oral <User Schedule>  traZODone 25 milliGRAM(s) Oral <User Schedule>    MEDICATIONS  (PRN):  aluminum hydroxide/magnesium hydroxide/simethicone Suspension 30 milliLiter(s) Enteral Tube every 4 hours PRN Dyspepsia  OLANZapine Injectable 2.5 milliGRAM(s) IntraMuscular every 6 hours PRN agitation/combative  ondansetron Injectable 4 milliGRAM(s) IV Push every 8 hours PRN Nausea and/or Vomiting      CAPILLARY BLOOD GLUCOSE        I&O's Summary    28 Mar 2025 07:01  -  29 Mar 2025 07:00  --------------------------------------------------------  IN: 500 mL / OUT: 0 mL / NET: 500 mL        PHYSICAL EXAM:  Vital Signs Last 24 Hrs  T(C): 36.7 (29 Mar 2025 05:25), Max: 36.8 (28 Mar 2025 21:00)  T(F): 98 (29 Mar 2025 05:25), Max: 98.3 (28 Mar 2025 21:00)  HR: 96 (29 Mar 2025 05:25) (82 - 96)  BP: 128/77 (29 Mar 2025 05:25) (106/68 - 128/77)  BP(mean): --  RR: 18 (29 Mar 2025 05:25) (17 - 18)  SpO2: 91% (29 Mar 2025 05:25) (91% - 99%)    Parameters below as of 29 Mar 2025 05:25  Patient On (Oxygen Delivery Method): room air        GENERAL: No acute distress  HEAD:  Normocephalic  EYES: Conjunctiva and sclera clear  NECK: Supple  CHEST/LUNG: CTAB  HEART: Regular rate and rhythm  ABDOMEN: Soft, non-tender, non-distended  EXTREMITIES: No clubbing, cyanosis, or edema  NEUROLOGY: A&O x 3  SKIN: No rashes or lesions to visible skin    LABS:                        8.2    3.32  )-----------( 183      ( 29 Mar 2025 06:01 )             24.6                     Urinalysis with Rflx Culture (collected 27 Mar 2025 02:39)    Culture - Blood (collected 26 Mar 2025 22:25)  Source: Blood Blood-Peripheral  Preliminary Report (29 Mar 2025 03:01):    No growth at 48 Hours    Culture - Blood (collected 26 Mar 2025 22:25)  Source: Blood Blood  Preliminary Report (29 Mar 2025 03:01):    No growth at 48 Hours          RADIOLOGY & ADDITIONAL TESTS: Reviewed   PROGRESS NOTE:   Authored by Jojo Hanna Ma, MD  Available on MS Teams; Pager [phone]    Patient is a 75y old  Male who presents with a chief complaint of odynophagia with decreased PO intake (29 Mar 2025 08:53)      SUBJECTIVE / OVERNIGHT EVENTS: Overnight, received IM olanzapine for agitation. This AM, pt seen and examined with son, daughter, and friends at bedside. ROS limited, pt asleep    MEDICATIONS  (STANDING):  chlorhexidine 2% Cloths 1 Application(s) Topical <User Schedule>  enoxaparin Injectable 40 milliGRAM(s) SubCutaneous every 24 hours  famotidine   Suspension 40 milliGRAM(s) Oral two times a day  melatonin 3 milliGRAM(s) Oral at bedtime  risperiDONE   Tablet 0.5 milliGRAM(s) Oral <User Schedule>  risperiDONE   Tablet 0.5 milliGRAM(s) Oral <User Schedule>  traZODone 25 milliGRAM(s) Oral <User Schedule>    MEDICATIONS  (PRN):  aluminum hydroxide/magnesium hydroxide/simethicone Suspension 30 milliLiter(s) Enteral Tube every 4 hours PRN Dyspepsia  OLANZapine Injectable 2.5 milliGRAM(s) IntraMuscular every 6 hours PRN agitation/combative  ondansetron Injectable 4 milliGRAM(s) IV Push every 8 hours PRN Nausea and/or Vomiting      CAPILLARY BLOOD GLUCOSE        I&O's Summary    28 Mar 2025 07:01  -  29 Mar 2025 07:00  --------------------------------------------------------  IN: 500 mL / OUT: 0 mL / NET: 500 mL        PHYSICAL EXAM:  Vital Signs Last 24 Hrs  T(C): 36.7 (29 Mar 2025 05:25), Max: 36.8 (28 Mar 2025 21:00)  T(F): 98 (29 Mar 2025 05:25), Max: 98.3 (28 Mar 2025 21:00)  HR: 96 (29 Mar 2025 05:25) (82 - 96)  BP: 128/77 (29 Mar 2025 05:25) (106/68 - 128/77)  BP(mean): --  RR: 18 (29 Mar 2025 05:25) (17 - 18)  SpO2: 91% (29 Mar 2025 05:25) (91% - 99%)    Parameters below as of 29 Mar 2025 05:25  Patient On (Oxygen Delivery Method): room air        GENERAL: No acute distress  HEAD:  Normocephalic  NECK: Supple  CHEST/LUNG: CTAB  HEART: Regular rate and rhythm  ABDOMEN: Soft, non-tender, non-distended  EXTREMITIES: No clubbing, cyanosis, or edema  SKIN: No rashes or lesions to visible skin    LABS:                        8.2    3.32  )-----------( 183      ( 29 Mar 2025 06:01 )             24.6                     Urinalysis with Rflx Culture (collected 27 Mar 2025 02:39)    Culture - Blood (collected 26 Mar 2025 22:25)  Source: Blood Blood-Peripheral  Preliminary Report (29 Mar 2025 03:01):    No growth at 48 Hours    Culture - Blood (collected 26 Mar 2025 22:25)  Source: Blood Blood  Preliminary Report (29 Mar 2025 03:01):    No growth at 48 Hours          RADIOLOGY & ADDITIONAL TESTS: Reviewed

## 2025-03-29 NOTE — PROGRESS NOTE ADULT - TIME BILLING
Patient encounter, including chart review, medication review, patient interview, ordering labs and medications, interpreting labs and imaging results, coordination of care with consultants, and discussing plan with family and healthcare team.

## 2025-03-30 PROCEDURE — 99233 SBSQ HOSP IP/OBS HIGH 50: CPT

## 2025-03-30 RX ADMIN — ENOXAPARIN SODIUM 40 MILLIGRAM(S): 100 INJECTION SUBCUTANEOUS at 12:55

## 2025-03-30 RX ADMIN — Medication 25 MILLIGRAM(S): at 18:54

## 2025-03-30 RX ADMIN — Medication 40 MILLIGRAM(S): at 05:01

## 2025-03-30 RX ADMIN — Medication 1 APPLICATION(S): at 05:01

## 2025-03-30 RX ADMIN — Medication 0.5 MILLIGRAM(S): at 18:54

## 2025-03-30 RX ADMIN — Medication 0.5 MILLIGRAM(S): at 05:01

## 2025-03-30 RX ADMIN — Medication 40 MILLIGRAM(S): at 18:55

## 2025-03-30 NOTE — PROGRESS NOTE ADULT - SUBJECTIVE AND OBJECTIVE BOX
PROGRESS NOTE:   Authored by Jojo Hanna Ma, MD  Available on MS Teams; Pager [phone]    Patient is a 75y old  Male who presents with a chief complaint of odynophagia with decreased PO intake (30 Mar 2025 09:28)      SUBJECTIVE / OVERNIGHT EVENTS: Placed in b/l mittens yesterday afternoon and received additional PRN zyprexa for agitation. NAEON. This AM, remains in b/l mittens. ROS limited d/t patient mental status. Per daughter at bedside, pt had VH and AH this morning (not new, has been ongoing for the past year). Speaking to someone at the foot of the bed and responding. Speaking to daughter in phrases but not making sense. Recognized friend from band at bedside.    All other review of systems is negative unless indicated above.    MEDICATIONS  (STANDING):  chlorhexidine 2% Cloths 1 Application(s) Topical <User Schedule>  enoxaparin Injectable 40 milliGRAM(s) SubCutaneous every 24 hours  famotidine   Suspension 40 milliGRAM(s) Oral two times a day  risperiDONE   Tablet 0.5 milliGRAM(s) Oral <User Schedule>  risperiDONE   Tablet 0.5 milliGRAM(s) Oral <User Schedule>  traZODone 25 milliGRAM(s) Oral <User Schedule>    MEDICATIONS  (PRN):  aluminum hydroxide/magnesium hydroxide/simethicone Suspension 30 milliLiter(s) Enteral Tube every 4 hours PRN Dyspepsia  OLANZapine Injectable 2.5 milliGRAM(s) IntraMuscular every 6 hours PRN agitation/combative  ondansetron Injectable 4 milliGRAM(s) IV Push every 8 hours PRN Nausea and/or Vomiting      CAPILLARY BLOOD GLUCOSE        I&O's Summary    29 Mar 2025 07:01  -  30 Mar 2025 07:00  --------------------------------------------------------  IN: 500 mL / OUT: 0 mL / NET: 500 mL    30 Mar 2025 07:01  -  30 Mar 2025 19:06  --------------------------------------------------------  IN: 970 mL / OUT: 0 mL / NET: 970 mL        PHYSICAL EXAM:  Vital Signs Last 24 Hrs  T(C): 37 (30 Mar 2025 11:00), Max: 37 (30 Mar 2025 11:00)  T(F): 98.6 (30 Mar 2025 11:00), Max: 98.6 (30 Mar 2025 11:00)  HR: 85 (30 Mar 2025 11:00) (83 - 92)  BP: 108/76 (30 Mar 2025 11:00) (108/76 - 134/76)  BP(mean): --  RR: 18 (30 Mar 2025 11:00) (17 - 18)  SpO2: 100% (30 Mar 2025 11:00) (97% - 100%)    Parameters below as of 30 Mar 2025 11:00  Patient On (Oxygen Delivery Method): room air        GENERAL: No acute distress  HEAD:  Normocephalic  EYES: Conjunctiva and sclera clear  NECK: Supple  CHEST/LUNG: CTAB  HEART: Regular rate and rhythm  ABDOMEN: Soft, non-tender, non-distended  EXTREMITIES: No clubbing, cyanosis, or edema  SKIN: No rashes or lesions to visible skin    LABS:                        8.2    3.32  )-----------( 183      ( 29 Mar 2025 06:01 )             24.6     03-29    137  |  102  |  23  ----------------------------<  78  4.7   |  22  |  1.08    Ca    9.3      29 Mar 2025 06:01  Phos  3.7     03-29  Mg     2.30     03-29            Urinalysis Basic - ( 29 Mar 2025 06:01 )    Color: x / Appearance: x / SG: x / pH: x  Gluc: 78 mg/dL / Ketone: x  / Bili: x / Urobili: x   Blood: x / Protein: x / Nitrite: x   Leuk Esterase: x / RBC: x / WBC x   Sq Epi: x / Non Sq Epi: x / Bacteria: x            RADIOLOGY & ADDITIONAL TESTS: Reviewed

## 2025-03-30 NOTE — DISCHARGE NOTE PROVIDER - NSDCFUSCHEDAPPT_GEN_ALL_CORE_FT
Vanessa Mitchell  [facility]  RADMED [address]  Scheduled Appointment: 04/11/2025    Yarelis Hancock  [facility]  INTMED [id number]  Scheduled Appointment: 05/22/2025

## 2025-03-30 NOTE — DISCHARGE NOTE PROVIDER - CARE PROVIDER_API CALL
Dominic Gibson.  Medical Oncology  [address] [place] [id number]  Phone: [phone]  Fax: [phone]  Follow Up Time:

## 2025-03-30 NOTE — DISCHARGE NOTE PROVIDER - INSTRUCTIONS
tube feeds - Jevity 1.5cal 360mL four times per day  free water - 250mL four times per day  AND PO as tolerates - puree with thin liquids  ASPIRATION PRECAUTIONS

## 2025-03-30 NOTE — DISCHARGE NOTE PROVIDER - NSDCMRMEDTOKEN_GEN_ALL_CORE_FT
GABAPENTIN 300 MG/6ML SOLN CUP: TAKE 6 ML 3 TIMES DAILY   aluminum hydroxide-magnesium hydroxide 200 mg-200 mg/5 mL oral suspension: 30 milliliter(s) orally every 4 hours As needed Dyspepsia  famotidine 40 mg/5 mL oral suspension: 5 milliliter(s) orally 2 times a day  polyethylene glycol 3350 oral powder for reconstitution: 17 gram(s) orally every 24 hours  risperiDONE 0.25 mg oral tablet: 3 tab(s) orally once a day 0.75mg daily at 1700  risperiDONE 0.5 mg oral tablet: 1 tab(s) orally once a day 0.5mg daily at 0700

## 2025-03-30 NOTE — DISCHARGE NOTE PROVIDER - DETAILS OF MALNUTRITION DIAGNOSIS/DIAGNOSES
This patient has been assessed with a concern for Malnutrition and was treated during this hospitalization for the following Nutrition diagnosis/diagnoses:     -  03/07/2025: Severe protein-calorie malnutrition

## 2025-03-30 NOTE — PROGRESS NOTE ADULT - ASSESSMENT
73 yo M with tonsillar ca (s/p C6 of 7 of cisplatin and on RT -should've been completed on 3/7), vascular dementia p/w odynophagia/lack of appetite and overall FTT.     Active problems  SIRS  Vascular dementia w/ agitation   FTT requiring PEG tube   Tonsillar CA  Anemia in neoplastic disease  Hyponatremia (resolved)  Hyperkalemia  Hypophosphatemia  Hypercoagulable state secondary to neoplasm   Severe Protein-Calorie Malnutrition    #SIRS  Patient febrile to 101.9 and tachycardic on 3/26, no obvious signs or symptoms of infection  f/u 3/26 BCx- NGTD x 48h  UA neg, full RVP neg, CXR clear  Monitor off abx    #Electrolyte derangements  Lokelma for mild hyperkalemia  Replete phos PRN  Continue to trend    #Vascular dementia  - Pt frequently agitated. Family reporting continues to have hallucinations but this has been ongoing since diagnosis of dementia.   - CTH with chronic microvascular changes.   - Psych consulted, now transferred to 7S. Increased Risperidone to 0.5mg at 7am and 0.5 mg at 5pm via PEG. C/w Trazodone 25mg, retimed to 5pm from 8pm (has been receiving later than 8pm)  - Family requested discontinuing melatonin as this has not been helping pt  - For combative behavior - Zyprexa 2.5mg IM q6h PRN    #FTT/severe protein calorie malnutrition  -Pt reported odynophagia and dysphagia w/ regurgitation of food. Dysphagia to solids and liquids. Seems he holds the food and does not swallow due to fear of pain.   -Reviewed CT neck, mucosal thickening and enhancement in the larynx and oropharynx may be due to infectious laryngopharyngitis or sequela of previous radiation performed.  -Patient does not like viscous lidocaine or liquid gabapentin - now discontinued   -Cineesophagogram completed - rec puree and thin liquid   -Pt still with minimal PO intake -> PEG placed and pt now on bolus tube feeds   -s/p empiric treatment of thrush/esophagitis with IV fluconazole- changed to Nystatin. Completed 7 days on 3/27    #Anemia in neoplastic disease  -Hb stable, continue to monitor    #Severe Protein calorie Malnutrition  - Starvation ketosis resolved  - Continue tube feeds     #Hypercoagulable state 2/2 malignancy  -Lovenox 40mg daily    #Dispo  -Likely DAX pending stable behavior 73 yo M with tonsillar ca (s/p C6 of 7 of cisplatin and on RT -should've been completed on 3/7), vascular dementia p/w odynophagia/lack of appetite and overall FTT.     Active problems  SIRS  Vascular dementia w/ agitation   FTT requiring PEG tube   Tonsillar CA  Anemia in neoplastic disease  Hyponatremia (resolved)  Hyperkalemia  Hypophosphatemia  Hypercoagulable state secondary to neoplasm   Severe Protein-Calorie Malnutrition    #SIRS  Patient febrile to 101.9 and tachycardic on 3/26, no obvious signs or symptoms of infection  f/u 3/26 BCx- NGTD x 72h  UA neg, full RVP neg, CXR clear  Monitor off abx    #Electrolyte derangements  Lokelma for mild hyperkalemia (resolved)  Replete phos PRN (resolved)  Continue to trend    #Vascular dementia  - Pt frequently agitated. Family reporting continues to have hallucinations but this has been ongoing since diagnosis of dementia.   - CTH with chronic microvascular changes.   - Psych consulted, now transferred to 7S. Increased Risperidone to 0.5mg at 7am and 0.5 mg at 5pm via PEG. C/w Trazodone 25mg, retimed to 5pm from 8pm (has been receiving later than 8pm)  - Family requested discontinuing melatonin to minimize polypharmacy as this has not been helping pt  - For combative behavior - Zyprexa 2.5mg IM q6h PRN    #FTT/severe protein calorie malnutrition  -Pt reported odynophagia and dysphagia w/ regurgitation of food. Dysphagia to solids and liquids. Seems he holds the food and does not swallow due to fear of pain.   -Reviewed CT neck, mucosal thickening and enhancement in the larynx and oropharynx may be due to infectious laryngopharyngitis or sequela of previous radiation performed.  -Patient does not like viscous lidocaine or liquid gabapentin - now discontinued   -Cineesophagogram completed - rec puree and thin liquid   -Pt still with minimal PO intake -> PEG placed and pt now on bolus tube feeds   -s/p empiric treatment of thrush/esophagitis with IV fluconazole- changed to Nystatin. Completed 7 days on 3/27    #Anemia in neoplastic disease  -Hb stable, continue to monitor    #Severe Protein calorie Malnutrition  - Starvation ketosis resolved  - Continue tube feeds     #Hypercoagulable state 2/2 malignancy  -Lovenox 40mg daily    #Dispo  -Likely DAX pending stable behavior  -PT re-eval

## 2025-03-30 NOTE — DISCHARGE NOTE PROVIDER - HOSPITAL COURSE
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 He does not like to take magic mouthwash due to the way it makes things taste. Reports his taste buds are already altered from the radiation therapy and the magic mouthwash makes it worse. Does not take his gabapentin as prescribed either due to the odynophagia.   Seen in ED 3/5/25 but opted to not be admitted. Presenting today as no improvement in symptoms.   Denies  headache, dizziness, chest pain, palpitations, SOB, abdominal pain, joint pain, diarrhea urinary symptoms.   Vitals: T99.7, HR 80, /66, RR 18 satting 99% RA (06 Mar 2025 19:39)    Hospital Course:    #SIRS  Patient febrile to 101.9 and tachycardic on 3/26, no obvious signs or symptoms of infection  f/u 3/26 BCx- NGTD x 72h  UA neg, full RVP neg, CXR clear  Monitor off abx    #Electrolyte derangements  Lokelma for mild hyperkalemia (resolved)  Replete phos PRN (resolved)  Continue to trend    #Vascular dementia  - Pt frequently agitated. Family reporting continues to have hallucinations but this has been ongoing since diagnosis of dementia.   - CTH with chronic microvascular changes.   - Psych consulted, now transferred to 7S. Increased Risperidone to 0.5mg at 7am and 0.5 mg at 5pm via PEG. C/w Trazodone 25mg, retimed to 5pm from 8pm (has been receiving later than 8pm)  - Family requested discontinuing melatonin to minimize polypharmacy as this has not been helping pt  - For combative behavior - Zyprexa 2.5mg IM q6h PRN    #FTT/severe protein calorie malnutrition  -Pt reported odynophagia and dysphagia w/ regurgitation of food. Dysphagia to solids and liquids. Seems he holds the food and does not swallow due to fear of pain.   -Reviewed CT neck, mucosal thickening and enhancement in the larynx and oropharynx may be due to infectious laryngopharyngitis or sequela of previous radiation performed.  -Patient does not like viscous lidocaine or liquid gabapentin - now discontinued   -Cineesophagogram completed - rec puree and thin liquid   -Pt still with minimal PO intake -> PEG placed and pt now on bolus tube feeds   -s/p empiric treatment of thrush/esophagitis with IV fluconazole- changed to Nystatin. 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 He does not like to take magic mouthwash due to the way it makes things taste. Reports his taste buds are already altered from the radiation therapy and the magic mouthwash makes it worse. Does not take his gabapentin as prescribed either due to the odynophagia. Seen in ED 3/5/25 but opted to not be admitted. Returned next day as no improvement in symptoms. Vitals: T99.7, HR 80, /66, RR 18 satting 99% RA (06 Mar 2025 19:39) Admitted for FTT, dehydration, pain management.     Patient completed chemo/radiation. Trouble swallowing d/t radiation. S&S 3/14 puree with thin liquids. FTT/severe protein calorie malnutrition - Despite pain control and other interventions (does not like viscous lidocaine or liquid gabapentin; empiric treatment of thrush/esophagitis with IV fluconazole then nystatin, completed 7d) still not eating so PEG placed by IR on 3/19. Continues on tube feeds with free water, with PO as tolerates   The patient has underlying dementia and experienced significant agitation. Psychiatry was consulted and meds were adjusted. His behavior has stabilized on current medication regimen and with behavioral redirection.     Pt had one time temp spike to 101.9 and tachycardic on 3/26, no obvious signs or symptoms of infection BCx/UA neg, full RVP neg, CXR clear    Pt had elevated LFTs, were higher earlier in admission (, , ), were trending down almost to normal, slight bump on 4/7 (, ALT 61, AST 60).   US Gallbladder sludge. No sonographic evidence of acute cholecystitis.  asymptomatic, tolerating tube feeds, could be d/t meds, sludge. Would f/u as outpt.

## 2025-03-30 NOTE — DISCHARGE NOTE PROVIDER - NSDCCPTREATMENT_GEN_ALL_CORE_FT
PRINCIPAL PROCEDURE  Procedure: CT scanning  Findings and Treatment: 1.   Mucosal thickening and enhancement in the larynx and oropharynx may   be due  to infectious laryngopharyngitis or sequela of previous radiation   performed.  2.  No evidence of residual or recurrent tumor within the left tonsillar   location.  3. No new or enlarging cervical lymph nodes.  4. Small fluid aspiration in the left lung.  5.  Advanced degenerative spondylosis causing multilevel cervical spinal   canal< from: CT Neck Soft Tissue w/ IV Cont (03.05.25 @ 23:08) >        SECONDARY PROCEDURE  Procedure: CT scanning  Findings and Treatment: LOWER CHEST: Within normal limits.  LIVER: Within normal limits.  BILE DUCTS: Normal caliber.  GALLBLADDER: Within normal limits.  SPLEEN: Within normal limits.  PANCREAS: Within normal limits.  ADRENALS: Within normal limits.  KIDNEYS/URETERS: Within normal limits.  BLADDER: Within normal limits.  REPRODUCTIVE ORGANS: Enlarged prostate.  BOWEL: No bowel obstruction. Appendix is normal. Diverticulosis, without   evidence of acute diverticulitis. Previously administered oral contrast   in the colon. Nondistended stomach without evidence of bowel interposed   between the stomach wall and abdominal wall.  PERITONEUM/RETROPERITONEUM: Within normal limits.  VESSELS: Atherosclerotic calcifications.  LYMPH NODES: No lymphadenopathy.  ABDOMINAL WALL: Within normal limits.  BONES: Degenerative changes.  IMPRESSION:  No evidence of acute abnormality in the abdomen and pelvis.< from: CT Abdomen and Pelvis No Cont (03.16.25 @ 18:22) >

## 2025-03-30 NOTE — PROGRESS NOTE ADULT - TIME BILLING
Patient encounter, including chart review, medication review, patient interview, ordering labs and medications, interpreting labs and imaging results, coordination of care with consultants, and discussing plan with patient, family at bedside, and healthcare team.

## 2025-03-30 NOTE — DISCHARGE NOTE PROVIDER - NSDCCPCAREPLAN_GEN_ALL_CORE_FT
PRINCIPAL DISCHARGE DIAGNOSIS  Diagnosis: Adult failure to thrive  Assessment and Plan of Treatment: You had decreased intake by mouth leading to protein-calorie malnutrition. Our speech and swallow therapists recommended a puree and thin liquid diet, however you did not eat enough to maintain your nutrition. You had a PEG tube placed for additional support and were started on tube feeds.      SECONDARY DISCHARGE DIAGNOSES  Diagnosis: Odynophagia  Assessment and Plan of Treatment: You had some pain with swallowing which did not significantly improve with viscous lidocaine or liquid gabapentin. You completed a 7 day treatment for oral thrush/esophagitis.    Diagnosis: Dementia with behavioral problem  Assessment and Plan of Treatment: You had some agitation and delirium in the hospital. You were seen and evaluated by our Psychiatrists and started on medications which you should continue taking as prescribed on discharge.     PRINCIPAL DISCHARGE DIAGNOSIS  Diagnosis: Adult failure to thrive  Assessment and Plan of Treatment: The patient had radiation and chemotherapy for tonsillar cancer. He developed trouble swallowing and was not able to maintain adequate oral intake. He was treated empirically for thrush for one week. He was trialed on viscous lidocaine and gabapentin. He still could not swallow properly so a PEG tube was placed by OR on 3/18.  Continue on tube feeds, with PO intake as tolerates.  tube feeds - Jevity 1.5cal 360mL four times per day  free water - 250mL four times per day  AND PO as tolerates - puree with thin liquids  ASPIRATION PRECAUTIONS        SECONDARY DISCHARGE DIAGNOSES  Diagnosis: Dementia with behavioral problem  Assessment and Plan of Treatment: The patient has underlying dementia and experienced significant agitation. Psychiatry was consulted and meds were adjusted. His behavior has stabilized on current medication regimen and with behavioral redirection.    Diagnosis: Elevated LFTs  Assessment and Plan of Treatment: Pt had elevated LFTs, were higher earlier in admission (, , ), were trending down almost to normal, but had slight bump on 4/7 (, ALT 61, AST 60).   US Gallbladder sludge. No sonographic evidence of acute cholecystitis.  asymptomatic, tolerating tube feeds, could be d/t meds, sludge.   Please follow up LFTs at rehab.    Diagnosis: Anemia of chronic disease  Assessment and Plan of Treatment: The patient has anemia of chronic disease. Hb been stable about 9    Diagnosis: Primary tonsillar squamous cell carcinoma  Assessment and Plan of Treatment: Recently found to have stage IV tonsillar cancer. He completed chemo and radiation.   The patient should follow up with his oncologist Dr. Dominic Gibson  He started concurrent chemo/RT 1/21/25 with weekly cisplatin started 1/24/25  - He has completed 30/35 RT sessions. According to family recent visits with oncologist have been optimistic in regards to his treatment response. 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 He was scheduled to complete his last chemo session  - CT neck - 1.   Mucosal thickening and enhancement in the larynx and oropharynx may be due to infectious laryngopharyngitis or sequela of previous radiation performed. 2.  No evidence of residual or recurrent tumor within the left tonsillar location. 3. No new or enlarging cervical lymph nodes.  - No obvious disease at this moment, but recurrence in future possible.

## 2025-03-31 LAB
ALBUMIN SERPL ELPH-MCNC: 3.8 G/DL — SIGNIFICANT CHANGE UP (ref 3.3–5)
ALP SERPL-CCNC: 181 U/L — HIGH (ref 40–120)
ALT FLD-CCNC: 56 U/L — HIGH (ref 4–41)
ANION GAP SERPL CALC-SCNC: 14 MMOL/L — SIGNIFICANT CHANGE UP (ref 7–14)
AST SERPL-CCNC: 37 U/L — SIGNIFICANT CHANGE UP (ref 4–40)
BILIRUB SERPL-MCNC: 0.5 MG/DL — SIGNIFICANT CHANGE UP (ref 0.2–1.2)
BUN SERPL-MCNC: 21 MG/DL — SIGNIFICANT CHANGE UP (ref 7–23)
CALCIUM SERPL-MCNC: 9.3 MG/DL — SIGNIFICANT CHANGE UP (ref 8.4–10.5)
CHLORIDE SERPL-SCNC: 98 MMOL/L — SIGNIFICANT CHANGE UP (ref 98–107)
CO2 SERPL-SCNC: 24 MMOL/L — SIGNIFICANT CHANGE UP (ref 22–31)
CREAT SERPL-MCNC: 0.98 MG/DL — SIGNIFICANT CHANGE UP (ref 0.5–1.3)
EGFR: 80 ML/MIN/1.73M2 — SIGNIFICANT CHANGE UP
EGFR: 80 ML/MIN/1.73M2 — SIGNIFICANT CHANGE UP
GLUCOSE SERPL-MCNC: 171 MG/DL — HIGH (ref 70–99)
HCT VFR BLD CALC: 25.4 % — LOW (ref 39–50)
HGB BLD-MCNC: 8.7 G/DL — LOW (ref 13–17)
LACTATE SERPL-SCNC: 2 MMOL/L — SIGNIFICANT CHANGE UP (ref 0.5–2)
MAGNESIUM SERPL-MCNC: 2.2 MG/DL — SIGNIFICANT CHANGE UP (ref 1.6–2.6)
MCHC RBC-ENTMCNC: 32.1 PG — SIGNIFICANT CHANGE UP (ref 27–34)
MCHC RBC-ENTMCNC: 34.3 G/DL — SIGNIFICANT CHANGE UP (ref 32–36)
MCV RBC AUTO: 93.7 FL — SIGNIFICANT CHANGE UP (ref 80–100)
NRBC # BLD AUTO: 0 K/UL — SIGNIFICANT CHANGE UP (ref 0–0)
NRBC # FLD: 0 K/UL — SIGNIFICANT CHANGE UP (ref 0–0)
NRBC BLD AUTO-RTO: 0 /100 WBCS — SIGNIFICANT CHANGE UP (ref 0–0)
PHOSPHATE SERPL-MCNC: 3.2 MG/DL — SIGNIFICANT CHANGE UP (ref 2.5–4.5)
PLATELET # BLD AUTO: 230 K/UL — SIGNIFICANT CHANGE UP (ref 150–400)
POTASSIUM SERPL-MCNC: 4.8 MMOL/L — SIGNIFICANT CHANGE UP (ref 3.5–5.3)
POTASSIUM SERPL-SCNC: 4.8 MMOL/L — SIGNIFICANT CHANGE UP (ref 3.5–5.3)
PROT SERPL-MCNC: 7.1 G/DL — SIGNIFICANT CHANGE UP (ref 6–8.3)
RBC # BLD: 2.71 M/UL — LOW (ref 4.2–5.8)
RBC # FLD: 20.2 % — HIGH (ref 10.3–14.5)
SODIUM SERPL-SCNC: 136 MMOL/L — SIGNIFICANT CHANGE UP (ref 135–145)
WBC # BLD: 4.27 K/UL — SIGNIFICANT CHANGE UP (ref 3.8–10.5)
WBC # FLD AUTO: 4.27 K/UL — SIGNIFICANT CHANGE UP (ref 3.8–10.5)

## 2025-03-31 PROCEDURE — 99232 SBSQ HOSP IP/OBS MODERATE 35: CPT

## 2025-03-31 PROCEDURE — 99233 SBSQ HOSP IP/OBS HIGH 50: CPT

## 2025-03-31 RX ORDER — OLANZAPINE 10 MG/1
2.5 TABLET ORAL EVERY 6 HOURS
Refills: 0 | Status: DISCONTINUED | OUTPATIENT
Start: 2025-03-31 | End: 2025-04-07

## 2025-03-31 RX ORDER — RISPERIDONE 4 MG
0.75 TABLET ORAL
Refills: 0 | Status: DISCONTINUED | OUTPATIENT
Start: 2025-03-31 | End: 2025-04-07

## 2025-03-31 RX ADMIN — Medication 1 APPLICATION(S): at 05:14

## 2025-03-31 RX ADMIN — ENOXAPARIN SODIUM 40 MILLIGRAM(S): 100 INJECTION SUBCUTANEOUS at 13:32

## 2025-03-31 RX ADMIN — Medication 0.5 MILLIGRAM(S): at 06:25

## 2025-03-31 RX ADMIN — Medication 0.75 MILLIGRAM(S): at 17:03

## 2025-03-31 RX ADMIN — Medication 40 MILLIGRAM(S): at 17:03

## 2025-03-31 RX ADMIN — Medication 40 MILLIGRAM(S): at 05:14

## 2025-03-31 NOTE — PROGRESS NOTE ADULT - ASSESSMENT
73 yo M with tonsillar ca (s/p C6 of 7 of cisplatin and on RT -should've been completed on 3/7), vascular dementia p/w odynophagia/lack of appetite and overall FTT.     Active problems  SIRS  Vascular dementia w/ agitation   FTT requiring PEG tube   Tonsillar CA  Anemia in neoplastic disease  Hyponatremia (resolved)  Hyperkalemia  Hypophosphatemia  Hypercoagulable state secondary to neoplasm   Severe Protein-Calorie Malnutrition    #SIRS  Patient febrile to 101.9 and tachycardic on 3/26, no obvious signs or symptoms of infection  f/u 3/26 BCx- NGTD x 72h  UA neg, full RVP neg, CXR clear  Monitor off abx    #Electrolyte derangements  Lokelma for mild hyperkalemia (resolved)  Replete phos PRN (resolved)  Continue to trend    #Vascular dementia  - Pt frequently agitated. Family reporting continues to have hallucinations but this has been ongoing since diagnosis of dementia.   - CTH with chronic microvascular changes.   - Psych consulted, now transferred to 7S. Increased Risperidone to 0.5mg at 7am and 0.5 mg at 5pm via PEG. C/w Trazodone 25mg, retimed to 5pm from 8pm (has been receiving later than 8pm)  - Family requested discontinuing melatonin to minimize polypharmacy as this has not been helping pt  - For combative behavior - Zyprexa 2.5mg IM q6h PRN    #FTT/severe protein calorie malnutrition  -Pt reported odynophagia and dysphagia w/ regurgitation of food. Dysphagia to solids and liquids. Seems he holds the food and does not swallow due to fear of pain.   -Reviewed CT neck, mucosal thickening and enhancement in the larynx and oropharynx may be due to infectious laryngopharyngitis or sequela of previous radiation performed.  -Patient does not like viscous lidocaine or liquid gabapentin - now discontinued   -Cineesophagogram completed - rec puree and thin liquid   -Pt still with minimal PO intake -> PEG placed and pt now on bolus tube feeds   -s/p empiric treatment of thrush/esophagitis with IV fluconazole- changed to Nystatin. Completed 7 days on 3/27    #Anemia in neoplastic disease  -Hb stable, continue to monitor    #Severe Protein calorie Malnutrition  - Starvation ketosis resolved  - Continue tube feeds     #Hypercoagulable state 2/2 malignancy  -Lovenox 40mg daily    #Dispo  -Likely DAX pending stable behavior  -PT re-eval 74M vascular dementia, tonsillar ca (s/p C6 of 7 of cisplatin and on RT -should've been completed on 3/7), vascular dementia p/w odynophagia/lack of appetite and overall FTT.     Active problems  SIRS  Vascular dementia w/ agitation   FTT requiring PEG tube   Tonsillar CA  Anemia in neoplastic disease  Hyponatremia (resolved)  Hyperkalemia  Hypophosphatemia  Hypercoagulable state secondary to neoplasm   Severe Protein-Calorie Malnutrition    #Vascular dementia with agitation  - Pt frequently agitated. Family reporting continues to have hallucinations but this has been ongoing since diagnosis of dementia.   - CTH with chronic microvascular changes.   - For combative behavior - Zyprexa 2.5mg IM q6h PRN  appreciate psych input - adjust risperidone 0.5 at 0700, 0.75 at 1700; stop trazodone; may retry melatonin in future    #SIRS  Patient febrile to 101.9 and tachycardic on 3/26, no obvious signs or symptoms of infection  3/26 BCx- NGTD   UA neg, full RVP neg, CXR clear  Monitoring off abx.  Thus far was just one time spike...    #Electrolyte derangements  Lokelma for mild hyperkalemia (resolved)  Replete phos PRN (resolved)  Continue to trend    #FTT/severe protein calorie malnutrition  -Pt reported odynophagia and dysphagia w/ regurgitation of food. Dysphagia to solids and liquids. Seems he holds the food and does not swallow due to fear of pain.   -Reviewed CT neck, mucosal thickening and enhancement in the larynx and oropharynx may be due to infectious laryngopharyngitis or sequela of previous radiation performed.  -Patient does not like viscous lidocaine or liquid gabapentin so that was discontinued   -Cineesophagogram completed - rec puree and thin liquid   -Pt still with minimal PO intake -> PEG placed 3/18 by IR, now on bolus tube feeds   -s/p empiric treatment of thrush/esophagitis with IV fluconazole- changed to Nystatin. Completed 7 days on 3/27    #Anemia in neoplastic disease  -Hb stable, continue to monitor    #Severe Protein calorie Malnutrition  - Starvation ketosis resolved  - Continue tube feeds     #Hypercoagulable state 2/2 malignancy  -Lovenox 40mg daily    #Dispo  -Likely DAX pending stable behavior  -PT re-eval 74M vascular dementia, tonsillar ca (s/p C6 of 7 of cisplatin and on RT -should've been completed on 3/7), vascular dementia p/w odynophagia/lack of appetite and overall FTT.     Active problems  SIRS  Vascular dementia w/ agitation   FTT requiring PEG tube   Tonsillar CA  Anemia in neoplastic disease  Hyponatremia (resolved)  Hyperkalemia  Hypophosphatemia  Hypercoagulable state secondary to neoplasm   Severe Protein-Calorie Malnutrition    #Vascular dementia with hallucinations at baseline, with worsening agitation in setting of prolonged hospitalization  - Pt frequently agitated. Family reporting continues to have hallucinations but this has been ongoing since diagnosis of dementia.   - CTH with chronic microvascular changes.   - For combative behavior - Zyprexa 2.5mg IM q6h PRN  appreciate psych input - adjust risperidone 0.5 at 0700, 0.75 at 1700; stop trazodone; may retry melatonin in future    Tonsillar cancer - reportedly stage 4 on dx, completed radiation, trouble with swallowing likely d/t radiation, s/p PEG 3/18  outpt f/u with Dr. Gibson. Seen by PC, full code at this point    #FTT/severe protein calorie malnutrition  -Pt reported odynophagia and dysphagia w/ regurgitation of food. Dysphagia to solids and liquids. Seems he holds the food and does not swallow due to fear of pain.   -Reviewed CT neck, mucosal thickening and enhancement in the larynx and oropharynx may be due to infectious laryngopharyngitis or sequela of previous radiation performed.  -Patient does not like viscous lidocaine or liquid gabapentin so that was discontinued   -Cineesophagogram completed - rec puree and thin liquid   -Pt still with minimal PO intake -> PEG placed 3/18 by IR, now on bolus tube feeds   -s/p empiric treatment of thrush/esophagitis with IV fluconazole- changed to Nystatin. Completed 7 days on 3/27    #SIRS  Patient febrile to 101.9 and tachycardic on 3/26, no obvious signs or symptoms of infection  3/26 BCx- NGTD   UA neg, full RVP neg, CXR clear  Monitoring off abx.  Thus far was just one time spike...    #Electrolyte derangements  Lokelma for mild hyperkalemia (resolved)  Replete phos PRN (resolved)  Continue to trend    #Anemia in neoplastic disease  -Hb stable, continue to monitor    #Severe Protein calorie Malnutrition  - Starvation ketosis resolved  - Continue tube feeds     #Hypercoagulable state 2/2 malignancy  -Lovenox 40mg daily    #Dispo  -Likely DAX pending stable behavior  -PT re-eval

## 2025-03-31 NOTE — PROGRESS NOTE ADULT - TIME BILLING
Patient encounter, including chart review, medication review, patient interview, ordering labs and medications, interpreting labs and imaging results, coordination of care with consultants, and discussing plan with patient, family at bedside, and healthcare team. Preparing to see the patient including review of tests and other providers' notes, confirming history with patient/family member, performing medical examination and evaluation, counseling and educating the patient/family/caregiver, ordering medications, tests and procedures, communicating with other health care professionals, documenting clinical information in the EMR, independently interpreting results and communicating results to the patient/family/caregiver, care coordination

## 2025-03-31 NOTE — BH CONSULTATION LIAISON PROGRESS NOTE - NSBHFUPINTERVALHXFT_PSY_A_CORE
-zyprexa 2.5mg x1 on Fri + Sat  -restraints this AM d/t trying to pull at PEG    Chart reviewed, case discussed in IDRs.  This AM pt sedated and difficult to rouse, MYRNA. -zyprexa 2.5mg x1 on Fri + Sat  -restraints this AM d/t trying to pull at PEG    Chart reviewed, case discussed in IDRs.  This AM pt sedated and difficult to MYRNA [place].    Spoke w/ daughter Joselin Reed via phone ([phone]). Expressed concern that trazodone was not helpful and asked for alternative. Discussed dc'ing trazodone and increasing HS risperidone to 0.75mg. Daughter in agreement.

## 2025-03-31 NOTE — BH CONSULTATION LIAISON PROGRESS NOTE - NSBHASSESSMENTFT_PSY_ALL_CORE
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 Psychiatry consulted for aggression. Improved behaviour, has not received any PRNs since yesterday. Still has some hallucinations at night, but amenable to reorientation.     Patient carries a diagnosis of dementia for the last 4-5 years, declining cognitively over time, with worsening symptoms along with paranoia, distress, poor sleep since the start of RT. Pain/throat discomfort, poor PO intake has been ongoing.     3/13--- has been doing better, no agitation or prn needs  3/21: reconsulted for agitation s/p code christian, family requesting standing medication for mood, agitation, amenable to Risperidone, bbb d/w family, verbalized understanding, denies si, denies ah  3/23 - multiple code BERTs in last 24 hours, receiving PRN Haldol frequently, does not engage in exam or comply with EPS exam, does not appear grossly rigid, appears paranoid about roommate, will increase risperidone to 0.25 mg AM/0.5 mg PM, will change PRN to Zyprexa 2.5 mg  3/24 - a&o, calm, +delusions, dtr endorses threatening behavior towards son this morning, d/w dtr changes to med regiment-in agreement, d/w primary team given changes in behavior consider further medical workup  3/25 - restless and confused, delusional, no EPS tolerating risperdal, will monitor for a day in new 7S setting   3/28 - patient better in 7S setting, more awake less agitated but still confused  3/31 - prns x2 this wknd, per chart pt persistently hallucinating (not new, chronic x past year); restraints d/t trying to pull at PEG; pt sedated and difficult to rouse, MYRNA      Recommendations  - no need for 1:1 on 7s  - Frequent orientation  - repeat UA trace leuks otherwise ok  - Continue Risperidone 0.5mg @ 7am and 0.5 mg @ 5pm via peg  - Continue Trazodone 25mg 8pm  - melatonin dc'd per family request  - ONLY FOR COMBATIVE BEHAVIORS--------Zyprexa 2.5mg q 6hrs prn IM/IV/PO  - Monitor EKG qtc interval  - AVOID bzd, anticholinergics, antihistamines  - No psychiatric contraindications to discharge   The patient is a 75 yr old M with a PMHx of Vascular dementia and 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 Psychiatry consulted for aggression. Improved behaviour, has not received any PRNs since yesterday. Still has some hallucinations at night, but amenable to reorientation.     Patient carries a diagnosis of dementia for the last 4-5 years, declining cognitively over time, with worsening symptoms along with paranoia, distress, poor sleep since the start of RT. Pain/throat discomfort, poor PO intake has been ongoing.     3/13--- has been doing better, no agitation or prn needs  3/21: reconsulted for agitation s/p code christian, family requesting standing medication for mood, agitation, amenable to Risperidone, bbb d/w family, verbalized understanding, denies si, denies ah  3/23 - multiple code BERTs in last 24 hours, receiving PRN Haldol frequently, does not engage in exam or comply with EPS exam, does not appear grossly rigid, appears paranoid about roommate, will increase risperidone to 0.25 mg AM/0.5 mg PM, will change PRN to Zyprexa 2.5 mg  3/24 - a&o, calm, +delusions, dtr endorses threatening behavior towards son this morning, d/w dtr changes to med regiment-in agreement, d/w primary team given changes in behavior consider further medical workup  3/25 - restless and confused, delusional, no EPS tolerating risperdal, will monitor for a day in new 7S setting   3/28 - patient better in 7S setting, more awake less agitated but still confused  3/31 - prns x2 this wknd, per chart pt persistently hallucinating (not new, chronic x past year); restraints d/t trying to pull at PEG; pt sedated and difficult to rouseMYRNA. Spoke w/ daughter Joselin Reed via phone ([phone]). Expressed concern that trazodone was not helpful and asked for alternative. Discussed dc'ing trazodone and increasing HS risperidone to 0.75mg. Daughter in agreement.      Recommendations  - no need for 1:1 on 7s  - Frequent orientation  - repeat UA trace leuks otherwise ok  - c/w Risperidone as follows  	-c/w 0.5mg @ 7am  	[] INCREASE hs dose to 0.75 mg @ 5pm via peg  - D/C Trazodone per family request  - melatonin dc'd; can try again in future if needed, family ok  - ONLY FOR COMBATIVE BEHAVIORS--------Zyprexa 2.5mg q 6hrs prn IM/IV/PO  - Monitor EKG qtc interval  - AVOID bzd, anticholinergics, antihistamines  - No psychiatric contraindications to discharge

## 2025-03-31 NOTE — PROGRESS NOTE ADULT - SUBJECTIVE AND OBJECTIVE BOX
[facility] Division of Hospital Medicine  Jordana Mays MD  Pager (M-F, 8A-5P):  In-house pager [phone]; Long-range pager [phone]  Other Times:  Please page Hospitalist in Charge -  In-house pager [phone]    Patient is a 75y old  Male who presents with a chief complaint of odynophagia with decreased PO intake (30 Mar 2025 21:48)    SUBJECTIVE / OVERNIGHT EVENTS:  On restraints, got PRNs  ADDITIONAL REVIEW OF SYSTEMS:    MEDICATIONS  (STANDING):  chlorhexidine 2% Cloths 1 Application(s) Topical <User Schedule>  enoxaparin Injectable 40 milliGRAM(s) SubCutaneous every 24 hours  famotidine   Suspension 40 milliGRAM(s) Oral two times a day  risperiDONE   Tablet 0.5 milliGRAM(s) Oral <User Schedule>  risperiDONE   Tablet 0.5 milliGRAM(s) Oral <User Schedule>  traZODone 25 milliGRAM(s) Oral <User Schedule>    MEDICATIONS  (PRN):  aluminum hydroxide/magnesium hydroxide/simethicone Suspension 30 milliLiter(s) Enteral Tube every 4 hours PRN Dyspepsia  OLANZapine Injectable 2.5 milliGRAM(s) IntraMuscular every 6 hours PRN agitation/combative  ondansetron Injectable 4 milliGRAM(s) IV Push every 8 hours PRN Nausea and/or Vomiting    I&O's Summary    30 Mar 2025 07:01  -  31 Mar 2025 07:00  --------------------------------------------------------  IN: 2440 mL / OUT: 0 mL / NET: 2440 mL    PHYSICAL EXAM:  Vital Signs Last 24 Hrs  T(C): 36.8 (31 Mar 2025 06:03), Max: 36.8 (31 Mar 2025 06:03)  T(F): 98.2 (31 Mar 2025 06:03), Max: 98.2 (31 Mar 2025 06:03)  HR: 61 (31 Mar 2025 06:03) (61 - 87)  BP: 112/55 (31 Mar 2025 06:03) (108/60 - 124/72)  BP(mean): --  RR: 18 (31 Mar 2025 06:03) (17 - 18)  SpO2: 100% (31 Mar 2025 06:03) (99% - 100%)    Parameters below as of 31 Mar 2025 06:03  Patient On (Oxygen Delivery Method): room air    GENERAL: sedated, snoring, in bed, B wrist restraints  CHEST/LUNG: CTAB  HEART: Regular rate and rhythm  ABDOMEN: Soft, non-tender, non-distended; +PEG under abdominal binder  EXTREMITIES: No clubbing, cyanosis, or edema  SKIN: No rashes or lesions to visible skin    LABS:    RADIOLOGY & ADDITIONAL TESTS:  Results Reviewed:   Imaging Personally Reviewed:  Electrocardiogram Personally Reviewed:    COORDINATION OF CARE:  Care Discussed with Consultants/Other Providers [Y/N]: RN, SW, CM, ACP, Psych re overall care   Prior or Outpatient Records Reviewed [Y/N]:   [facility] Division of Hospital Medicine  Jordana Mays MD  Pager (M-F, 8A-5P):  In-house pager [phone]; Long-range pager [phone]  Other Times:  Please page Hospitalist in Charge -  In-house pager [phone]    Patient is a 75y old  Male who presents with a chief complaint of odynophagia with decreased PO intake (30 Mar 2025 21:48)    SUBJECTIVE / OVERNIGHT EVENTS:  Got PRNs Fri, Sat  On restraints this am as was going for PEG  Sedate, snoring this am, snoring. Seen later with daughter Joselin and friend at bedside. Pt identified his daughter "Joselin Mackenzie" and asked to identify his friend, pt said "give me a list of names and I'll get it." Daughter notes he is urinating frequently. She recounted progression of dementia symptoms past 4 years, although not formally diagnosed until around gi, around time of CA diagnosis. (Of note, pt's brother had and  from tonsillar cancer.) Daughter notes pt more confused since got anesthesia for PEG tube placement. Was walking around on 8N. Since on 7S getting weaker. Day/night cycle off.   ADDITIONAL REVIEW OF SYSTEMS:    MEDICATIONS  (STANDING):  chlorhexidine 2% Cloths 1 Application(s) Topical <User Schedule>  enoxaparin Injectable 40 milliGRAM(s) SubCutaneous every 24 hours  famotidine   Suspension 40 milliGRAM(s) Oral two times a day  risperiDONE   Tablet 0.5 milliGRAM(s) Oral <User Schedule>  risperiDONE   Tablet 0.5 milliGRAM(s) Oral <User Schedule>  traZODone 25 milliGRAM(s) Oral <User Schedule>    MEDICATIONS  (PRN):  aluminum hydroxide/magnesium hydroxide/simethicone Suspension 30 milliLiter(s) Enteral Tube every 4 hours PRN Dyspepsia  OLANZapine Injectable 2.5 milliGRAM(s) IntraMuscular every 6 hours PRN agitation/combative  ondansetron Injectable 4 milliGRAM(s) IV Push every 8 hours PRN Nausea and/or Vomiting    I&O's Summary    30 Mar 2025 07:01  -  31 Mar 2025 07:00  --------------------------------------------------------  IN: 2440 mL / OUT: 0 mL / NET: 2440 mL    PHYSICAL EXAM:  Vital Signs Last 24 Hrs  T(C): 36.8 (31 Mar 2025 06:03), Max: 36.8 (31 Mar 2025 06:03)  T(F): 98.2 (31 Mar 2025 06:03), Max: 98.2 (31 Mar 2025 06:03)  HR: 61 (31 Mar 2025 06:03) (61 - 87)  BP: 112/55 (31 Mar 2025 06:03) (108/60 - 124/72)  BP(mean): --  RR: 18 (31 Mar 2025 06:03) (17 - 18)  SpO2: 100% (31 Mar 2025 06:03) (99% - 100%)    Parameters below as of 31 Mar 2025 06:03  Patient On (Oxygen Delivery Method): room air    GENERAL: sedated, snoring, in bed, B wrist restraints  CHEST/LUNG: CTAB  HEART: Regular rate and rhythm  ABDOMEN: Soft, non-tender, non-distended; +PEG under abdominal binder  EXTREMITIES: No clubbing, cyanosis, or edema  SKIN: No rashes or lesions to visible skin    LABS:    RADIOLOGY & ADDITIONAL TESTS:  Results Reviewed:   Imaging Personally Reviewed:  Electrocardiogram Personally Reviewed:    COORDINATION OF CARE:  Care Discussed with Consultants/Other Providers [Y/N]: RN, SW, CM, ACP, Psych re overall care   Prior or Outpatient Records Reviewed [Y/N]:   [facility] Division of Hospital Medicine  Jordana Mays MD  Pager (M-F, 8A-5P):  In-house pager [phone]; Long-range pager [phone]  Other Times:  Please page Hospitalist in Charge -  In-house pager [phone]    Patient is a 75y old  Male who presents with a chief complaint of odynophagia with decreased PO intake (30 Mar 2025 21:48)    SUBJECTIVE / OVERNIGHT EVENTS:  Got PRNs Fri, Sat  On restraints this am as was going for PEG  Sedate, snoring this am, snoring. Seen later with daughter Joselin and friend at bedside. Pt identified his daughter "Joselin Mackenzie" and asked to identify his friend, pt said "give me a list of names and I'll get it." Daughter notes he is urinating frequently. She recounted progression of dementia symptoms past 4 years, although not formally diagnosed until around gi, around time of CA diagnosis. (Of note, pt's brother had and  from tonsillar cancer.) Daughter notes pt more confused since got anesthesia for PEG tube placement. Was walking around on 8N. Since on 7S getting weaker. Day/night cycle off. Daughter asking about meds for behavior, sleep.  ADDITIONAL REVIEW OF SYSTEMS:    MEDICATIONS  (STANDING):  chlorhexidine 2% Cloths 1 Application(s) Topical <User Schedule>  enoxaparin Injectable 40 milliGRAM(s) SubCutaneous every 24 hours  famotidine   Suspension 40 milliGRAM(s) Oral two times a day  risperiDONE   Tablet 0.5 milliGRAM(s) Oral <User Schedule>  risperiDONE   Tablet 0.5 milliGRAM(s) Oral <User Schedule>  traZODone 25 milliGRAM(s) Oral <User Schedule>    MEDICATIONS  (PRN):  aluminum hydroxide/magnesium hydroxide/simethicone Suspension 30 milliLiter(s) Enteral Tube every 4 hours PRN Dyspepsia  OLANZapine Injectable 2.5 milliGRAM(s) IntraMuscular every 6 hours PRN agitation/combative  ondansetron Injectable 4 milliGRAM(s) IV Push every 8 hours PRN Nausea and/or Vomiting    I&O's Summary    30 Mar 2025 07:01  -  31 Mar 2025 07:00  --------------------------------------------------------  IN: 2440 mL / OUT: 0 mL / NET: 2440 mL    PHYSICAL EXAM:  Vital Signs Last 24 Hrs  T(C): 36.8 (31 Mar 2025 06:03), Max: 36.8 (31 Mar 2025 06:03)  T(F): 98.2 (31 Mar 2025 06:03), Max: 98.2 (31 Mar 2025 06:03)  HR: 61 (31 Mar 2025 06:03) (61 - 87)  BP: 112/55 (31 Mar 2025 06:03) (108/60 - 124/72)  BP(mean): --  RR: 18 (31 Mar 2025 06:03) (17 - 18)  SpO2: 100% (31 Mar 2025 06:03) (99% - 100%)    Parameters below as of 31 Mar 2025 06:03  Patient On (Oxygen Delivery Method): room air    GENERAL: sedated, snoring, in bed, B wrist restraints --> awake, talking to family, pleasantly confused  CHEST/LUNG: CTAB  HEART: Regular rate and rhythm  ABDOMEN: Soft, non-tender, non-distended; +PEG under abdominal binder  EXTREMITIES: No clubbing, cyanosis, or edema  SKIN: No rashes or lesions to visible skin    LABS:    RADIOLOGY & ADDITIONAL TESTS:  Results Reviewed:   Imaging Personally Reviewed:  Electrocardiogram Personally Reviewed:    COORDINATION OF CARE:  Care Discussed with Consultants/Other Providers [Y/N]: RN, SW, CM, ACP, Psych re overall care   Prior or Outpatient Records Reviewed [Y/N]:   [facility] Division of Hospital Medicine  Jordana Mays MD  Pager (M-F, 8A-5P):  In-house pager [phone]; Long-range pager [phone]  Other Times:  Please page Hospitalist in Charge -  In-house pager [phone]    Patient is a 75y old  Male who presents with a chief complaint of odynophagia with decreased PO intake (30 Mar 2025 21:48)    SUBJECTIVE / OVERNIGHT EVENTS:  Got PRNs Fri, Sat  On restraints this am as was going for PEG  RN unable to get blood draw d/t agitation this afternoon.  Sedate, snoring this am, snoring. Seen later with daughter Joselin and friend at bedside. Pt identified his daughter "Joselin Mackenzie" and asked to identify his friend, pt said "give me a list of names and I'll get it." Daughter notes he is urinating frequently. She recounted progression of dementia symptoms past 4 years, although not formally diagnosed until around , around time of CA diagnosis. (Of note, pt's brother had and  from tonsillar cancer.) Daughter notes pt more confused since got anesthesia for PEG tube placement. Was walking around on 8N. Since on 7S getting weaker. Day/night cycle off. Daughter asking about meds for behavior, sleep.  ADDITIONAL REVIEW OF SYSTEMS:    MEDICATIONS  (STANDING):  chlorhexidine 2% Cloths 1 Application(s) Topical <User Schedule>  enoxaparin Injectable 40 milliGRAM(s) SubCutaneous every 24 hours  famotidine   Suspension 40 milliGRAM(s) Oral two times a day  risperiDONE   Tablet 0.5 milliGRAM(s) Oral <User Schedule>  risperiDONE   Tablet 0.5 milliGRAM(s) Oral <User Schedule>  traZODone 25 milliGRAM(s) Oral <User Schedule>    MEDICATIONS  (PRN):  aluminum hydroxide/magnesium hydroxide/simethicone Suspension 30 milliLiter(s) Enteral Tube every 4 hours PRN Dyspepsia  OLANZapine Injectable 2.5 milliGRAM(s) IntraMuscular every 6 hours PRN agitation/combative  ondansetron Injectable 4 milliGRAM(s) IV Push every 8 hours PRN Nausea and/or Vomiting    I&O's Summary    30 Mar 2025 07:01  -  31 Mar 2025 07:00  --------------------------------------------------------  IN: 2440 mL / OUT: 0 mL / NET: 2440 mL    PHYSICAL EXAM:  Vital Signs Last 24 Hrs  T(C): 36.8 (31 Mar 2025 06:03), Max: 36.8 (31 Mar 2025 06:03)  T(F): 98.2 (31 Mar 2025 06:03), Max: 98.2 (31 Mar 2025 06:03)  HR: 61 (31 Mar 2025 06:03) (61 - 87)  BP: 112/55 (31 Mar 2025 06:03) (108/60 - 124/72)  BP(mean): --  RR: 18 (31 Mar 2025 06:03) (17 - 18)  SpO2: 100% (31 Mar 2025 06:03) (99% - 100%)    Parameters below as of 31 Mar 2025 06:03  Patient On (Oxygen Delivery Method): room air    GENERAL: sedated, snoring, in bed, B wrist restraints --> awake, talking to family, pleasantly confused  CHEST/LUNG: CTAB  HEART: Regular rate and rhythm  ABDOMEN: Soft, non-tender, non-distended; +PEG under abdominal binder  EXTREMITIES: No clubbing, cyanosis, or edema  SKIN: No rashes or lesions to visible skin    LABS:    RADIOLOGY & ADDITIONAL TESTS:  Results Reviewed:   Imaging Personally Reviewed:  Electrocardiogram Personally Reviewed:    COORDINATION OF CARE:  Care Discussed with Consultants/Other Providers [Y/N]: RN, SW, CM, ACP, Psych re overall care   Prior or Outpatient Records Reviewed [Y/N]:

## 2025-04-01 LAB
ALBUMIN SERPL ELPH-MCNC: 3.7 G/DL — SIGNIFICANT CHANGE UP (ref 3.3–5)
ALP SERPL-CCNC: 165 U/L — HIGH (ref 40–120)
ALT FLD-CCNC: 48 U/L — HIGH (ref 4–41)
ANION GAP SERPL CALC-SCNC: 13 MMOL/L — SIGNIFICANT CHANGE UP (ref 7–14)
AST SERPL-CCNC: 26 U/L — SIGNIFICANT CHANGE UP (ref 4–40)
BILIRUB SERPL-MCNC: 0.6 MG/DL — SIGNIFICANT CHANGE UP (ref 0.2–1.2)
BUN SERPL-MCNC: 19 MG/DL — SIGNIFICANT CHANGE UP (ref 7–23)
CALCIUM SERPL-MCNC: 9.4 MG/DL — SIGNIFICANT CHANGE UP (ref 8.4–10.5)
CHLORIDE SERPL-SCNC: 100 MMOL/L — SIGNIFICANT CHANGE UP (ref 98–107)
CO2 SERPL-SCNC: 22 MMOL/L — SIGNIFICANT CHANGE UP (ref 22–31)
CREAT SERPL-MCNC: 1.06 MG/DL — SIGNIFICANT CHANGE UP (ref 0.5–1.3)
CULTURE RESULTS: SIGNIFICANT CHANGE UP
CULTURE RESULTS: SIGNIFICANT CHANGE UP
EGFR: 73 ML/MIN/1.73M2 — SIGNIFICANT CHANGE UP
EGFR: 73 ML/MIN/1.73M2 — SIGNIFICANT CHANGE UP
GLUCOSE SERPL-MCNC: 99 MG/DL — SIGNIFICANT CHANGE UP (ref 70–99)
LACTATE SERPL-SCNC: 2 MMOL/L — SIGNIFICANT CHANGE UP (ref 0.5–2)
MAGNESIUM SERPL-MCNC: 2.1 MG/DL — SIGNIFICANT CHANGE UP (ref 1.6–2.6)
PHOSPHATE SERPL-MCNC: 3.6 MG/DL — SIGNIFICANT CHANGE UP (ref 2.5–4.5)
POTASSIUM SERPL-MCNC: 4.6 MMOL/L — SIGNIFICANT CHANGE UP (ref 3.5–5.3)
POTASSIUM SERPL-SCNC: 4.6 MMOL/L — SIGNIFICANT CHANGE UP (ref 3.5–5.3)
PROT SERPL-MCNC: 6.8 G/DL — SIGNIFICANT CHANGE UP (ref 6–8.3)
SODIUM SERPL-SCNC: 135 MMOL/L — SIGNIFICANT CHANGE UP (ref 135–145)
SPECIMEN SOURCE: SIGNIFICANT CHANGE UP
SPECIMEN SOURCE: SIGNIFICANT CHANGE UP

## 2025-04-01 PROCEDURE — 99233 SBSQ HOSP IP/OBS HIGH 50: CPT

## 2025-04-01 PROCEDURE — 99232 SBSQ HOSP IP/OBS MODERATE 35: CPT

## 2025-04-01 RX ADMIN — ENOXAPARIN SODIUM 40 MILLIGRAM(S): 100 INJECTION SUBCUTANEOUS at 15:12

## 2025-04-01 RX ADMIN — Medication 0.75 MILLIGRAM(S): at 17:17

## 2025-04-01 RX ADMIN — Medication 40 MILLIGRAM(S): at 17:17

## 2025-04-01 RX ADMIN — Medication 1 APPLICATION(S): at 07:33

## 2025-04-01 RX ADMIN — Medication 40 MILLIGRAM(S): at 07:33

## 2025-04-01 RX ADMIN — Medication 0.5 MILLIGRAM(S): at 07:01

## 2025-04-01 NOTE — BH CONSULTATION LIAISON PROGRESS NOTE - NSBHFUPINTERVALHXFT_PSY_A_CORE
patient more awake, off of restrains, obsrving to see if patient will pull at peg, patient calm and cooperative, Aox1 but knows year is 2025

## 2025-04-01 NOTE — PROGRESS NOTE ADULT - ASSESSMENT
74M vascular dementia, tonsillar ca (s/p C6 of 7 of cisplatin and on RT -should've been completed on 3/7), vascular dementia p/w odynophagia/lack of appetite and overall FTT.     Active problems  SIRS  Vascular dementia w/ agitation   FTT requiring PEG tube   Tonsillar CA  Anemia in neoplastic disease  Hyponatremia (resolved)  Hyperkalemia  Hypophosphatemia  Hypercoagulable state secondary to neoplasm   Severe Protein-Calorie Malnutrition    #Vascular dementia with hallucinations at baseline, with worsening agitation in setting of prolonged hospitalization  - Pt frequently agitated. Family reporting continues to have hallucinations but this has been ongoing since diagnosis of dementia.   - CTH with chronic microvascular changes.   - For combative behavior - Zyprexa 2.5mg IM q6h PRN  appreciate psych input - adjust risperidone 0.5 at 0700, 0.75 at 1700; stop trazodone; may retry melatonin in future    Tonsillar cancer - reportedly stage 4 on dx, completed radiation, trouble with swallowing likely d/t radiation, s/p PEG 3/18  outpt f/u with Dr. Gibson. Seen by PC, full code at this point    #FTT/severe protein calorie malnutrition  -Pt reported odynophagia and dysphagia w/ regurgitation of food. Dysphagia to solids and liquids. Seems he holds the food and does not swallow due to fear of pain.   -Reviewed CT neck, mucosal thickening and enhancement in the larynx and oropharynx may be due to infectious laryngopharyngitis or sequela of previous radiation performed.  -Patient does not like viscous lidocaine or liquid gabapentin so that was discontinued   -Cineesophagogram completed - rec puree and thin liquid   -Pt still with minimal PO intake -> PEG placed 3/18 by IR, now on bolus tube feeds   -s/p empiric treatment of thrush/esophagitis with IV fluconazole- changed to Nystatin. Completed 7 days on 3/27    #SIRS  Patient febrile to 101.9 and tachycardic on 3/26, no obvious signs or symptoms of infection  3/26 BCx- NGTD   UA neg, full RVP neg, CXR clear  Monitoring off abx.  Thus far was just one time spike...    #Electrolyte derangements  Lokelma for mild hyperkalemia (resolved)  Replete phos PRN (resolved)  Continue to trend    #Anemia in neoplastic disease  -Hb stable, continue to monitor    #Severe Protein calorie Malnutrition  - Starvation ketosis resolved  - Continue tube feeds     #Hypercoagulable state 2/2 malignancy  -Lovenox 40mg daily    #Dispo  -Likely DAX pending stable behavior  -PT re-eval 74M vascular dementia, tonsillar ca (s/p C6 of 7 of cisplatin and on RT -should've been completed on 3/7), vascular dementia p/w odynophagia/lack of appetite and overall FTT.     Active problems  SIRS  Vascular dementia w/ agitation   FTT requiring PEG tube   Tonsillar CA  Anemia in neoplastic disease  Hyponatremia (resolved)  Hyperkalemia  Hypophosphatemia  Hypercoagulable state secondary to neoplasm   Severe Protein-Calorie Malnutrition    #Vascular dementia with hallucinations at baseline, with worsening agitation in setting of prolonged hospitalization  - Pt frequently agitated. Family reporting continues to have hallucinations but this has been ongoing since diagnosis of dementia.   - CTH with chronic microvascular changes.   - For combative behavior - Zyprexa 2.5mg IM q6h PRN  appreciate psych input - adjust risperidone 0.5 at 0700, 0.75 at 1700; stop trazodone; may retry melatonin in future  --> trialing off mittens today....    Tonsillar cancer - reportedly stage 4 on dx, completed radiation, trouble with swallowing likely d/t radiation, s/p PEG 3/18  outpt f/u with Dr. Gibson. Seen by PC, full code at this point    #FTT/severe protein calorie malnutrition  -Pt reported odynophagia and dysphagia w/ regurgitation of food. Dysphagia to solids and liquids. Seems he holds the food and does not swallow due to fear of pain.   -Reviewed CT neck, mucosal thickening and enhancement in the larynx and oropharynx may be due to infectious laryngopharyngitis or sequela of previous radiation performed.  -Patient does not like viscous lidocaine or liquid gabapentin so that was discontinued   -Cineesophagogram completed - rec puree and thin liquid   -s/p empiric treatment of thrush/esophagitis with IV fluconazole- changed to Nystatin. Completed 7 days on 3/27  -Pt still with minimal PO intake -> PEG placed 3/18 by IR, now on bolus tube feeds but can take PO as well  - increased free water 250 q6 --> q4, f/u lytes....    #SIRS  Patient febrile to 101.9 and tachycardic on 3/26, no obvious signs or symptoms of infection  3/26 BCx- NGTD   UA neg, full RVP neg, CXR clear  Monitoring off abx.  Thus far was just one time spike...    #Electrolyte derangements  Lokelma for mild hyperkalemia (resolved)  Replete phos PRN (resolved)  Continue to trend    #Anemia in neoplastic disease  -Hb stable, continue to monitor    #Severe Protein calorie Malnutrition  - Starvation ketosis resolved  - Continue tube feeds     #Hypercoagulable state 2/2 malignancy  -Lovenox 40mg daily    #Dispo  -Likely DAX pending stable behavior  -PT re-eval

## 2025-04-01 NOTE — BH CONSULTATION LIAISON PROGRESS NOTE - NSBHASSESSMENTFT_PSY_ALL_CORE
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 Psychiatry consulted for aggression. Improved behaviour, has not received any PRNs since yesterday. Still has some hallucinations at night, but amenable to reorientation.     Patient carries a diagnosis of dementia for the last 4-5 years, declining cognitively over time, with worsening symptoms along with paranoia, distress, poor sleep since the start of RT. Pain/throat discomfort, poor PO intake has been ongoing.     3/13--- has been doing better, no agitation or prn needs  3/21: reconsulted for agitation s/p code christian, family requesting standing medication for mood, agitation, amenable to Risperidone, bbb d/w family, verbalized understanding, denies si, denies ah  3/23 - multiple code BERTs in last 24 hours, receiving PRN Haldol frequently, does not engage in exam or comply with EPS exam, does not appear grossly rigid, appears paranoid about roommate, will increase risperidone to 0.25 mg AM/0.5 mg PM, will change PRN to Zyprexa 2.5 mg  3/24 - a&o, calm, +delusions, dtr endorses threatening behavior towards son this morning, d/w dtr changes to med regiment-in agreement, d/w primary team given changes in behavior consider further medical workup  3/25 - restless and confused, delusional, no EPS tolerating risperdal, will monitor for a day in new 7S setting   3/28 - patient better in 7S setting, more awake less agitated but still confused  3/31 - prns x2 this wknd, per chart pt persistently hallucinating (not new, chronic x past year); restraints d/t trying to pull at PEG; pt sedated and difficult to rouseMYRNA. Spoke w/ daughter Joselin Reed via phone ([phone]). Expressed concern that trazodone was not helpful and asked for alternative. Discussed dc'ing trazodone and increasing HS risperidone to 0.75mg. Daughter in agreement.        Recommendations  - no need for 1:1 on 7s  - Frequent orientation  - repeat UA trace leuks otherwise ok  - c/w Risperidone as follows  	-c/w 0.5mg @ 7am  	[] INCREASE hs dose to 0.75 mg @ 5pm via peg  - D/C Trazodone per family request  - melatonin dc'd; can try again in future if needed, family ok  - ONLY FOR COMBATIVE BEHAVIORS--------Zyprexa 2.5mg q 6hrs prn IM/IV/PO  - Monitor EKG qtc interval  - AVOID bzd, anticholinergics, antihistamines  - No psychiatric contraindications to discharge

## 2025-04-01 NOTE — PROGRESS NOTE ADULT - SUBJECTIVE AND OBJECTIVE BOX
[facility] Division of Hospital Medicine  Jordana Mays MD  Pager (M-F, 8A-5P):  In-house pager [phone]; Long-range pager [phone]  Other Times:  Please page Hospitalist in Charge -  In-house pager [phone]    Patient is a 75y old  Male who presents with a chief complaint of odynophagia with decreased PO intake (31 Mar 2025 12:24)    SUBJECTIVE / OVERNIGHT EVENTS:  PVR yesterday 14  Told RN yesterday he was too young to be sticking people with needles.  Seen lying flat in bed resting. Asked if he was having an problems, he said "no as long as my heart is still beating."  ADDITIONAL REVIEW OF SYSTEMS:    MEDICATIONS  (STANDING):  chlorhexidine 2% Cloths 1 Application(s) Topical <User Schedule>  enoxaparin Injectable 40 milliGRAM(s) SubCutaneous every 24 hours  famotidine   Suspension 40 milliGRAM(s) Oral two times a day  risperiDONE   Tablet 0.5 milliGRAM(s) Oral <User Schedule>  risperiDONE   Tablet 0.75 milliGRAM(s) Oral <User Schedule>    MEDICATIONS  (PRN):  aluminum hydroxide/magnesium hydroxide/simethicone Suspension 30 milliLiter(s) Enteral Tube every 4 hours PRN Dyspepsia  OLANZapine 2.5 milliGRAM(s) Oral every 6 hours PRN agitation, not redirectable  OLANZapine Injectable 2.5 milliGRAM(s) IntraMuscular every 6 hours PRN agitation, not redirectable  ondansetron Injectable 4 milliGRAM(s) IV Push every 8 hours PRN Nausea and/or Vomiting    I&O's Summary    31 Mar 2025 07:01  -  01 Apr 2025 07:00  --------------------------------------------------------  IN: 2080 mL / OUT: 0 mL / NET: 2080 mL    PHYSICAL EXAM:  Vital Signs Last 24 Hrs  T(C): 36.8 (01 Apr 2025 06:55), Max: 36.8 (31 Mar 2025 13:16)  T(F): 98.3 (01 Apr 2025 06:55), Max: 98.3 (31 Mar 2025 13:16)  HR: 80 (01 Apr 2025 06:55) (72 - 84)  BP: 121/60 (01 Apr 2025 06:55) (121/60 - 124/63)  BP(mean): --  RR: 18 (01 Apr 2025 06:55) (18 - 18)  SpO2: 99% (01 Apr 2025 06:55) (99% - 100%)    Parameters below as of 01 Apr 2025 06:55  Patient On (Oxygen Delivery Method): room air    GENERAL: sedated, snoring, in bed, B wrist restraints --> awake, talking to family, pleasantly confused  CHEST/LUNG: CTAB  HEART: Regular rate and rhythm  ABDOMEN: Soft, non-tender, non-distended; +PEG under abdominal binder  EXTREMITIES: No clubbing, cyanosis, or edema  SKIN: No rashes or lesions to visible skin    LABS:                        8.7    4.27  )-----------( 230      ( 31 Mar 2025 18:24 )             25.4     04-01    135  |  100  |  19  ----------------------------<  99  4.6   |  22  |  1.06    Ca    9.4      01 Apr 2025 07:07  Phos  3.6     04-01  Mg     2.10     04-01    TPro  6.8  /  Alb  3.7  /  TBili  0.6  /  DBili  x   /  AST  26  /  ALT  48[H]  /  AlkPhos  165[H]  04-01    RADIOLOGY & ADDITIONAL TESTS:  Results Reviewed:   Imaging Personally Reviewed:  Electrocardiogram Personally Reviewed:    COORDINATION OF CARE:  Care Discussed with Consultants/Other Providers [Y/N]: RN, SW, CM, ACP, Psych re overall care  Prior or Outpatient Records Reviewed [Y/N]:

## 2025-04-02 LAB
ALBUMIN SERPL ELPH-MCNC: 3.8 G/DL — SIGNIFICANT CHANGE UP (ref 3.3–5)
ALP SERPL-CCNC: 147 U/L — HIGH (ref 40–120)
ALT FLD-CCNC: 43 U/L — HIGH (ref 4–41)
ANION GAP SERPL CALC-SCNC: 13 MMOL/L — SIGNIFICANT CHANGE UP (ref 7–14)
AST SERPL-CCNC: 26 U/L — SIGNIFICANT CHANGE UP (ref 4–40)
BILIRUB SERPL-MCNC: 0.6 MG/DL — SIGNIFICANT CHANGE UP (ref 0.2–1.2)
BUN SERPL-MCNC: 20 MG/DL — SIGNIFICANT CHANGE UP (ref 7–23)
CALCIUM SERPL-MCNC: 9.7 MG/DL — SIGNIFICANT CHANGE UP (ref 8.4–10.5)
CHLORIDE SERPL-SCNC: 101 MMOL/L — SIGNIFICANT CHANGE UP (ref 98–107)
CO2 SERPL-SCNC: 24 MMOL/L — SIGNIFICANT CHANGE UP (ref 22–31)
CREAT SERPL-MCNC: 1.05 MG/DL — SIGNIFICANT CHANGE UP (ref 0.5–1.3)
EGFR: 74 ML/MIN/1.73M2 — SIGNIFICANT CHANGE UP
EGFR: 74 ML/MIN/1.73M2 — SIGNIFICANT CHANGE UP
GLUCOSE SERPL-MCNC: 108 MG/DL — HIGH (ref 70–99)
HCT VFR BLD CALC: 28.3 % — LOW (ref 39–50)
HGB BLD-MCNC: 9.7 G/DL — LOW (ref 13–17)
LACTATE SERPL-SCNC: 1.7 MMOL/L — SIGNIFICANT CHANGE UP (ref 0.5–2)
MAGNESIUM SERPL-MCNC: 2.3 MG/DL — SIGNIFICANT CHANGE UP (ref 1.6–2.6)
MCHC RBC-ENTMCNC: 32.6 PG — SIGNIFICANT CHANGE UP (ref 27–34)
MCHC RBC-ENTMCNC: 34.3 G/DL — SIGNIFICANT CHANGE UP (ref 32–36)
MCV RBC AUTO: 95 FL — SIGNIFICANT CHANGE UP (ref 80–100)
NRBC # BLD AUTO: 0 K/UL — SIGNIFICANT CHANGE UP (ref 0–0)
NRBC # FLD: 0 K/UL — SIGNIFICANT CHANGE UP (ref 0–0)
NRBC BLD AUTO-RTO: 0 /100 WBCS — SIGNIFICANT CHANGE UP (ref 0–0)
PHOSPHATE SERPL-MCNC: 3.4 MG/DL — SIGNIFICANT CHANGE UP (ref 2.5–4.5)
PLATELET # BLD AUTO: 259 K/UL — SIGNIFICANT CHANGE UP (ref 150–400)
POTASSIUM SERPL-MCNC: 4.6 MMOL/L — SIGNIFICANT CHANGE UP (ref 3.5–5.3)
POTASSIUM SERPL-SCNC: 4.6 MMOL/L — SIGNIFICANT CHANGE UP (ref 3.5–5.3)
PROT SERPL-MCNC: 6.8 G/DL — SIGNIFICANT CHANGE UP (ref 6–8.3)
RBC # BLD: 2.98 M/UL — LOW (ref 4.2–5.8)
RBC # FLD: 20.6 % — HIGH (ref 10.3–14.5)
SODIUM SERPL-SCNC: 138 MMOL/L — SIGNIFICANT CHANGE UP (ref 135–145)
WBC # BLD: 4.71 K/UL — SIGNIFICANT CHANGE UP (ref 3.8–10.5)
WBC # FLD AUTO: 4.71 K/UL — SIGNIFICANT CHANGE UP (ref 3.8–10.5)

## 2025-04-02 PROCEDURE — 99233 SBSQ HOSP IP/OBS HIGH 50: CPT

## 2025-04-02 RX ADMIN — ENOXAPARIN SODIUM 40 MILLIGRAM(S): 100 INJECTION SUBCUTANEOUS at 18:00

## 2025-04-02 RX ADMIN — Medication 40 MILLIGRAM(S): at 06:22

## 2025-04-02 RX ADMIN — Medication 0.75 MILLIGRAM(S): at 18:00

## 2025-04-02 RX ADMIN — Medication 0.5 MILLIGRAM(S): at 06:22

## 2025-04-02 RX ADMIN — Medication 40 MILLIGRAM(S): at 18:21

## 2025-04-02 RX ADMIN — Medication 1 APPLICATION(S): at 06:28

## 2025-04-02 NOTE — BH CONSULTATION LIAISON PROGRESS NOTE - MSE OPTIONS
Structured MSE
Unstructured MSE
Structured MSE
Unstructured MSE
Structured MSE
Unstructured MSE
Structured MSE

## 2025-04-02 NOTE — PHYSICAL THERAPY INITIAL EVALUATION ADULT - PERTINENT HX OF CURRENT PROBLEM, REHAB EVAL
Pt. presented with odynophagia/lack of appetite and FTT.
Patient is a 74 year old male, PMH stated below, presents with odynophagia.

## 2025-04-02 NOTE — BH CONSULTATION LIAISON PROGRESS NOTE - NSBHCONSULTFOLLOWAFTERCARE_PSY_A_CORE FT
[facility], [address] [place] [address], [place] [id number], [phone] 
[facility], [address] [place] [address], [place] [id number], [phone] 
[facility], [address] [place] [address], [place] [address]884, [phone] 
Primary team to coordinate a safe d/c plan with supportive family. Will continue to fu
[facility], [address] [place] [address], [place] [id number], [phone] 
[facility], [address] [place] [address], [place] [id number], [phone] 
[facility], [address] [place] [address], [place] [address]274, [phone] 
Primary team to coordinate a safe d/c plan with supportive family. Will continue to fu

## 2025-04-02 NOTE — BH CONSULTATION LIAISON PROGRESS NOTE - NSBHCONSULTMEDOTHER_PSY_A_CORE FT
combative behaviors, see above
combative behaviors
combative behaviors, see above

## 2025-04-02 NOTE — BH CONSULTATION LIAISON PROGRESS NOTE - NSBHASSESSMENTFT_PSY_ALL_CORE
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 Psychiatry consulted for aggression. Improved behaviour, has not received any PRNs since yesterday. Still has some hallucinations at night, but amenable to reorientation.     Patient carries a diagnosis of dementia for the last 4-5 years, declining cognitively over time, with worsening symptoms along with paranoia, distress, poor sleep since the start of RT. Pain/throat discomfort, poor PO intake has been ongoing.     3/13--- has been doing better, no agitation or prn needs  3/21: reconsulted for agitation s/p code christian, family requesting standing medication for mood, agitation, amenable to Risperidone, bbb d/w family, verbalized understanding, denies si, denies ah  3/23 - multiple code BERTs in last 24 hours, receiving PRN Haldol frequently, does not engage in exam or comply with EPS exam, does not appear grossly rigid, appears paranoid about roommate, will increase risperidone to 0.25 mg AM/0.5 mg PM, will change PRN to Zyprexa 2.5 mg  3/24 - a&o, calm, +delusions, dtr endorses threatening behavior towards son this morning, d/w dtr changes to med regiment-in agreement, d/w primary team given changes in behavior consider further medical workup  3/25 - restless and confused, delusional, no EPS tolerating risperdal, will monitor for a day in new 7S setting   3/28 - patient better in 7S setting, more awake less agitated but still confused  3/31 - prns x2 this wknd, per chart pt persistently hallucinating (not new, chronic x past year); restraints d/t trying to pull at PEG; pt sedated and difficult to rouseMYRNA. Spoke w/ daughter Joselin Reed via phone ([phone]). Expressed concern that trazodone was not helpful and asked for alternative. Discussed dc'ing trazodone and increasing HS risperidone to 0.75mg. Daughter in agreement.  4/1 - patient more awake, off of restrains, obsrving to see if patient will pull at peg, patient calm and cooperative, Aox1 but knows year is 2025 4/2 - naeo, no prns, remains off of restraints; this AM sleepy, difficult to rouse, overall calm, no rigidity but minimally cooperative w/ exam      Recommendations  - no need for 1:1 on 7s  - Frequent orientation  - repeat UA trace leuks otherwise ok  - c/w Risperidone as follows  	-c/w 0.5mg @ 7am + 0.75mg @ 5pm via peg  - D/C Trazodone per family request  - melatonin dc'd; can try again in future if needed, family ok  - ONLY FOR COMBATIVE BEHAVIORS--------Zyprexa 2.5mg q 6hrs prn IM/IV/PO  - Monitor EKG qtc interval  - AVOID bzd, anticholinergics, antihistamines  - No psychiatric contraindications to discharge

## 2025-04-02 NOTE — BH CONSULTATION LIAISON PROGRESS NOTE - NSBHFUPINTERVALHXFT_PSY_A_CORE
-naeo, no prns    This AM pt sleepy, difficult to rouse, off of restraints, overall calm, no rigidity but minimally cooperative w/ exam

## 2025-04-02 NOTE — PHYSICAL THERAPY INITIAL EVALUATION ADULT - ACTIVE RANGE OF MOTION EXAMINATION, REHAB EVAL
javier. upper extremity Active ROM was WNL (within normal limits)/bilateral lower extremity Active ROM was WNL (within normal limits)
bilateral upper extremity Active ROM was WFL (within functional limits)/bilateral  lower extremity Active ROM was WFL (within functional limits)

## 2025-04-02 NOTE — PHYSICAL THERAPY INITIAL EVALUATION ADULT - GENERAL OBSERVATIONS, REHAB EVAL
Pt encountered in semi-supine position in NAD, all lines intact, a&ox4, SPO2 100%, and PABLO Tarango aware.
Consult received, chart reviewed. Patient received in bed, no apparent distress. Pt. agreeable to participate in PT.

## 2025-04-02 NOTE — PROGRESS NOTE ADULT - ASSESSMENT
74M vascular dementia, tonsillar ca (s/p C6 of 7 of cisplatin and on RT -should've been completed on 3/7), vascular dementia p/w odynophagia/lack of appetite and overall FTT.     Active problems  SIRS  Vascular dementia w/ agitation   FTT requiring PEG tube   Tonsillar CA  Anemia in neoplastic disease  Hyponatremia (resolved)  Hyperkalemia  Hypophosphatemia  Hypercoagulable state secondary to neoplasm   Severe Protein-Calorie Malnutrition    #Vascular dementia with hallucinations at baseline, with worsening agitation in setting of prolonged hospitalization  - Pt frequently agitated. Family reporting continues to have hallucinations but this has been ongoing since diagnosis of dementia.   - CTH with chronic microvascular changes.   - For combative behavior - Zyprexa 2.5mg IM q6h PRN  appreciate psych input - adjust risperidone 0.5 at 0700, 0.75 at 1700; stop trazodone; may retry melatonin in future  --> trialing off mittens today....    Tonsillar cancer - reportedly stage 4 on dx, completed radiation, trouble with swallowing likely d/t radiation, s/p PEG 3/18  outpt f/u with Dr. Gibson. Seen by PC, full code at this point    #FTT/severe protein calorie malnutrition  -Pt reported odynophagia and dysphagia w/ regurgitation of food. Dysphagia to solids and liquids. Seems he holds the food and does not swallow due to fear of pain.   -Reviewed CT neck, mucosal thickening and enhancement in the larynx and oropharynx may be due to infectious laryngopharyngitis or sequela of previous radiation performed.  -Patient does not like viscous lidocaine or liquid gabapentin so that was discontinued   -Cineesophagogram completed - rec puree and thin liquid   -s/p empiric treatment of thrush/esophagitis with IV fluconazole- changed to Nystatin. Completed 7 days on 3/27  -Pt still with minimal PO intake -> PEG placed 3/18 by IR, now on bolus tube feeds but can take PO as well  - increased free water 250 q6 --> q4, f/u lytes....    #SIRS  Patient febrile to 101.9 and tachycardic on 3/26, no obvious signs or symptoms of infection  3/26 BCx- NGTD   UA neg, full RVP neg, CXR clear  Monitoring off abx.  Thus far was just one time spike...    #Electrolyte derangements  Lokelma for mild hyperkalemia (resolved)  Replete phos PRN (resolved)  Continue to trend    #Anemia in neoplastic disease  -Hb stable, continue to monitor    #Severe Protein calorie Malnutrition  - Starvation ketosis resolved  - Continue tube feeds     #Hypercoagulable state 2/2 malignancy  -Lovenox 40mg daily    #Dispo  -Likely DAX pending stable behavior  -PT re-eval 74M vascular dementia, tonsillar ca (s/p C6 of 7 of cisplatin and on RT -should've been completed on 3/7), vascular dementia p/w odynophagia/lack of appetite and overall FTT.     Active problems  SIRS  Vascular dementia w/ agitation   FTT requiring PEG tube   Tonsillar CA  Anemia in neoplastic disease  Hyponatremia (resolved)  Hyperkalemia  Hypophosphatemia  Hypercoagulable state secondary to neoplasm   Severe Protein-Calorie Malnutrition    #Vascular dementia with hallucinations at baseline, with worsening agitation in setting of prolonged hospitalization  - Pt frequently agitated. Family reporting continues to have hallucinations but this has been ongoing since diagnosis of dementia.   - CTH with chronic microvascular changes.   - For combative behavior - Zyprexa 2.5mg IM q6h PRN  appreciate psych input - adjust risperidone 0.5 at 0700, 0.75 at 1700; stop trazodone; may retry melatonin in future  --> doing well off mittens    Tonsillar cancer - reportedly stage 4 on dx, completed radiation, trouble with swallowing likely d/t radiation, s/p PEG 3/18  outpt f/u with Dr. Gibson. Seen by PC, full code at this point    #FTT/severe protein calorie malnutrition  -Pt reported odynophagia and dysphagia w/ regurgitation of food. Dysphagia to solids and liquids. Seems he holds the food and does not swallow due to fear of pain.   -Reviewed CT neck, mucosal thickening and enhancement in the larynx and oropharynx may be due to infectious laryngopharyngitis or sequela of previous radiation performed.  -Patient does not like viscous lidocaine or liquid gabapentin so that was discontinued   -Cineesophagogram completed - rec puree and thin liquid   -s/p empiric treatment of thrush/esophagitis with IV fluconazole- changed to Nystatin. Completed 7 days on 3/27  -Pt still with minimal PO intake -> PEG placed 3/18 by IR, now on bolus tube feeds but can take PO as well  - increased free water 250 q6 --> q4, f/u lytes....    #SIRS  Patient febrile to 101.9 and tachycardic on 3/26, no obvious signs or symptoms of infection  3/26 BCx- NGTD   UA neg, full RVP neg, CXR clear  Monitoring off abx.  Thus far was just one time spike...    #Electrolyte derangements  Lokelma for mild hyperkalemia (resolved)  Replete phos PRN (resolved)  Continue to trend    #Anemia in neoplastic disease  -Hb stable, continue to monitor    #Severe Protein calorie Malnutrition  - Starvation ketosis resolved  - Continue tube feeds     #Hypercoagulable state 2/2 malignancy  -Lovenox 40mg daily    #Dispo  -Likely DAX pending stable behavior  -PT re-eval

## 2025-04-02 NOTE — BH CONSULTATION LIAISON PROGRESS NOTE - PRN MEDS
Ativan 2mg x1
zyprexa 2.5mg x1 on Fri + Sat
Haldol ranging 1mg x1
Haldol ranging 1mg x1
Multiple doses of Haldol ranging 0.5 mg- 2 mg in the last 24 hours 
zyprexa 2.5mg x1 on Fri + Sat
zyprexa 2.5mg x1 on Fri + Sat
Haldol ranging 1mg x1

## 2025-04-02 NOTE — BH CONSULTATION LIAISON PROGRESS NOTE - NSICDXBHPRIMARYDX_PSY_ALL_CORE
Acute encephalopathy   G93.40  

## 2025-04-02 NOTE — BH CONSULTATION LIAISON PROGRESS NOTE - NSBHTIMEACTIVITIESPERFORMED_PSY_A_CORE
Discussed with patient, team, SW, and chart reviewed. Time exclusive of supervision/education. 

## 2025-04-02 NOTE — BH CONSULTATION LIAISON PROGRESS NOTE - NSBHATTESTTYPEVISIT_PSY_A_CORE
Attending Only
Attending with Resident/Fellow/Student
Attending with Resident/Fellow/Student
MEAGAN without on-site Attending supervision
MEAGAN without on-site Attending supervision
Resident/Fellow with telephonic supervision
Resident/Fellow with telephonic supervision

## 2025-04-02 NOTE — BH CONSULTATION LIAISON PROGRESS NOTE - NSBHPTASSESSDT_PSY_A_CORE
13-Mar-2025 11:25
21-Mar-2025 13:47
02-Apr-2025 14:11
01-Apr-2025 13:37
24-Mar-2025 14:12
31-Mar-2025 12:05
23-Mar-2025 12:41
25-Mar-2025 14:41
28-Mar-2025 17:33

## 2025-04-02 NOTE — BH CONSULTATION LIAISON PROGRESS NOTE - NSBHATTESTBILLING_PSY_A_CORE
74933-Kjisurohpv OBS or IP - moderate complexity OR 35-49 mins
75217-Eekchiknsx OBS or IP - moderate complexity OR 35-49 mins
Billing in another system
99392-Jxqgtwpapj OBS or IP - moderate complexity OR 35-49 mins
96597-Oocjdltzrw OBS or IP - moderate complexity OR 35-49 mins
03841-Crmicftkgz OBS or IP - moderate complexity OR 35-49 mins
Billing in another system
Non-billable
Non-billable

## 2025-04-02 NOTE — BH CONSULTATION LIAISON PROGRESS NOTE - NSBHCHARTREVIEWLAB_PSY_A_CORE FT
CBC Full  -  ( 24 Mar 2025 05:25 )  WBC Count : 5.06 K/uL  RBC Count : 3.08 M/uL  Hemoglobin : 9.6 g/dL  Hematocrit : 28.6 %  Platelet Count - Automated : 155 K/uL  Mean Cell Volume : 92.9 fL  Mean Cell Hemoglobin : 31.2 pg  Mean Cell Hemoglobin Concentration : 33.6 g/dL  Auto Neutrophil # : x  Auto Lymphocyte # : x  Auto Monocyte # : x  Auto Eosinophil # : x  Auto Basophil # : x  Auto Neutrophil % : x  Auto Lymphocyte % : x  Auto Monocyte % : x  Auto Eosinophil % : x  Auto Basophil % : x  03-24    139  |  103  |  28[H]  ----------------------------<  104[H]  4.6   |  23  |  0.90    Ca    9.5      24 Mar 2025 05:25  Phos  3.0     03-24  Mg     2.10     03-24    TPro  6.6  /  Alb  3.6  /  TBili  0.8  /  DBili  x   /  AST  43[H]  /  ALT  157[H]  /  AlkPhos  152[H]  03-24  
CBC Full  -  ( 24 Mar 2025 05:25 )  WBC Count : 5.06 K/uL  RBC Count : 3.08 M/uL  Hemoglobin : 9.6 g/dL  Hematocrit : 28.6 %  Platelet Count - Automated : 155 K/uL  Mean Cell Volume : 92.9 fL  Mean Cell Hemoglobin : 31.2 pg  Mean Cell Hemoglobin Concentration : 33.6 g/dL  Auto Neutrophil # : x  Auto Lymphocyte # : x  Auto Monocyte # : x  Auto Eosinophil # : x  Auto Basophil # : x  Auto Neutrophil % : x  Auto Lymphocyte % : x  Auto Monocyte % : x  Auto Eosinophil % : x  Auto Basophil % : x  03-24    139  |  103  |  28[H]  ----------------------------<  104[H]  4.6   |  23  |  0.90    Ca    9.5      24 Mar 2025 05:25  Phos  3.0     03-24  Mg     2.10     03-24    TPro  6.6  /  Alb  3.6  /  TBili  0.8  /  DBili  x   /  AST  43[H]  /  ALT  157[H]  /  AlkPhos  152[H]  03-24  
                    CAPILLARY BLOOD GLUCOSE              
CBC Full  -  ( 24 Mar 2025 05:25 )  WBC Count : 5.06 K/uL  RBC Count : 3.08 M/uL  Hemoglobin : 9.6 g/dL  Hematocrit : 28.6 %  Platelet Count - Automated : 155 K/uL  Mean Cell Volume : 92.9 fL  Mean Cell Hemoglobin : 31.2 pg  Mean Cell Hemoglobin Concentration : 33.6 g/dL  Auto Neutrophil # : x  Auto Lymphocyte # : x  Auto Monocyte # : x  Auto Eosinophil # : x  Auto Basophil # : x  Auto Neutrophil % : x  Auto Lymphocyte % : x  Auto Monocyte % : x  Auto Eosinophil % : x  Auto Basophil % : x  03-24    139  |  103  |  28[H]  ----------------------------<  104[H]  4.6   |  23  |  0.90    Ca    9.5      24 Mar 2025 05:25  Phos  3.0     03-24  Mg     2.10     03-24    TPro  6.6  /  Alb  3.6  /  TBili  0.8  /  DBili  x   /  AST  43[H]  /  ALT  157[H]  /  AlkPhos  152[H]  03-24  
CBC Full  -  ( 13 Mar 2025 07:29 )  WBC Count : 1.43 K/uL  RBC Count : 2.75 M/uL  Hemoglobin : 8.6 g/dL  Hematocrit : 24.6 %  Platelet Count - Automated : 140 K/uL  Mean Cell Volume : 89.5 fL  Mean Cell Hemoglobin : 31.3 pg  Mean Cell Hemoglobin Concentration : 35.0 g/dL  Auto Neutrophil # : x  Auto Lymphocyte # : x  Auto Monocyte # : x  Auto Eosinophil # : x  Auto Basophil # : x  Auto Neutrophil % : x  Auto Lymphocyte % : x  Auto Monocyte % : x  Auto Eosinophil % : x  Auto Basophil % : x  03-13    141  |  105  |  35[H]  ----------------------------<  115[H]  3.7   |  25  |  0.99    Ca    9.0      13 Mar 2025 07:29  Phos  2.8     03-13  Mg     2.10     03-13    TPro  6.4  /  Alb  3.6  /  TBili  0.7  /  DBili  x   /  AST  12  /  ALT  10  /  AlkPhos  62  03-13  
CBC Full  -  ( 21 Mar 2025 06:46 )  WBC Count : 1.62 K/uL  RBC Count : 2.65 M/uL  Hemoglobin : 8.5 g/dL  Hematocrit : 23.5 %  Platelet Count - Automated : 152 K/uL  Mean Cell Volume : 88.7 fL  Mean Cell Hemoglobin : 32.1 pg  Mean Cell Hemoglobin Concentration : 36.2 g/dL  Auto Neutrophil # : x  Auto Lymphocyte # : x  Auto Monocyte # : x  Auto Eosinophil # : x  Auto Basophil # : x  Auto Neutrophil % : x  Auto Lymphocyte % : x  Auto Monocyte % : x  Auto Eosinophil % : x  Auto Basophil % : x  03-21    141  |  103  |  29[H]  ----------------------------<  100[H]  3.6   |  28  |  1.21    Ca    9.0      21 Mar 2025 06:46  Phos  1.9     03-20  Mg     2.00     03-20    TPro  6.1  /  Alb  3.3  /  TBili  1.5[H]  /  DBili  x   /  AST  483[H]  /  ALT  454[H]  /  AlkPhos  231[H]  03-21  
CBC Full  -  ( 21 Mar 2025 06:46 )  WBC Count : 1.62 K/uL  RBC Count : 2.65 M/uL  Hemoglobin : 8.5 g/dL  Hematocrit : 23.5 %  Platelet Count - Automated : 152 K/uL  Mean Cell Volume : 88.7 fL  Mean Cell Hemoglobin : 32.1 pg  Mean Cell Hemoglobin Concentration : 36.2 g/dL  Auto Neutrophil # : x  Auto Lymphocyte # : x  Auto Monocyte # : x  Auto Eosinophil # : x  Auto Basophil # : x  Auto Neutrophil % : x  Auto Lymphocyte % : x  Auto Monocyte % : x  Auto Eosinophil % : x  Auto Basophil % : x  03-21    141  |  103  |  29[H]  ----------------------------<  100[H]  3.6   |  28  |  1.21    Ca    9.0      21 Mar 2025 06:46  Phos  1.9     03-20  Mg     2.00     03-20    TPro  6.1  /  Alb  3.3  /  TBili  1.5[H]  /  DBili  x   /  AST  483[H]  /  ALT  454[H]  /  AlkPhos  231[H]  03-21

## 2025-04-02 NOTE — BH CONSULTATION LIAISON PROGRESS NOTE - NSBHCHARTREVIEWVS_PSY_A_CORE FT
Vital Signs Last 24 Hrs  T(C): 36.4 (02 Apr 2025 11:36), Max: 36.6 (02 Apr 2025 05:30)  T(F): 97.5 (02 Apr 2025 11:36), Max: 97.8 (02 Apr 2025 05:30)  HR: 85 (02 Apr 2025 11:36) (85 - 89)  BP: 120/67 (02 Apr 2025 11:36) (120/67 - 130/60)  BP(mean): --  RR: 19 (02 Apr 2025 11:36) (18 - 19)  SpO2: 100% (02 Apr 2025 11:36) (95% - 100%)    Parameters below as of 02 Apr 2025 11:36  Patient On (Oxygen Delivery Method): room air    
Vital Signs Last 24 Hrs  T(C): 36.4 (21 Mar 2025 13:10), Max: 37.1 (20 Mar 2025 20:56)  T(F): 97.5 (21 Mar 2025 13:10), Max: 98.8 (20 Mar 2025 20:56)  HR: 76 (21 Mar 2025 13:10) (76 - 98)  BP: 118/73 (21 Mar 2025 13:10) (112/61 - 126/58)  BP(mean): --  RR: 18 (21 Mar 2025 13:10) (18 - 18)  SpO2: 100% (21 Mar 2025 13:10) (97% - 100%)    Parameters below as of 21 Mar 2025 13:10  Patient On (Oxygen Delivery Method): room air    
Vital Signs Last 24 Hrs  T(C): 36.5 (28 Mar 2025 11:51), Max: 37.5 (27 Mar 2025 19:53)  T(F): 97.7 (28 Mar 2025 11:51), Max: 99.5 (27 Mar 2025 19:53)  HR: 93 (28 Mar 2025 11:51) (83 - 93)  BP: 119/55 (28 Mar 2025 11:51) (113/53 - 127/62)  BP(mean): --  RR: 17 (28 Mar 2025 11:51) (17 - 18)  SpO2: 99% (28 Mar 2025 11:51) (96% - 99%)    Parameters below as of 28 Mar 2025 11:51  Patient On (Oxygen Delivery Method): room air    
Vital Signs Last 24 Hrs  T(C): 36.8 (13 Mar 2025 07:43), Max: 37.2 (12 Mar 2025 21:57)  T(F): 98.3 (13 Mar 2025 07:43), Max: 98.9 (12 Mar 2025 21:57)  HR: 102 (13 Mar 2025 07:43) (72 - 102)  BP: 134/75 (13 Mar 2025 07:43) (117/70 - 134/75)  BP(mean): --  RR: 18 (13 Mar 2025 07:43) (18 - 18)  SpO2: 97% (13 Mar 2025 07:43) (97% - 100%)    Parameters below as of 13 Mar 2025 07:43  Patient On (Oxygen Delivery Method): room air    
Vital Signs Last 24 Hrs  T(C): 36.8 (31 Mar 2025 06:03), Max: 36.8 (31 Mar 2025 06:03)  T(F): 98.2 (31 Mar 2025 06:03), Max: 98.2 (31 Mar 2025 06:03)  HR: 61 (31 Mar 2025 06:03) (61 - 87)  BP: 112/55 (31 Mar 2025 06:03) (108/60 - 124/72)  BP(mean): --  RR: 18 (31 Mar 2025 06:03) (17 - 18)  SpO2: 100% (31 Mar 2025 06:03) (99% - 100%)    Parameters below as of 31 Mar 2025 06:03  Patient On (Oxygen Delivery Method): room air    
Vital Signs Last 24 Hrs  T(C): 36.6 (01 Apr 2025 12:02), Max: 36.8 (01 Apr 2025 06:55)  T(F): 97.9 (01 Apr 2025 12:02), Max: 98.3 (01 Apr 2025 06:55)  HR: 72 (01 Apr 2025 12:02) (72 - 84)  BP: 120/65 (01 Apr 2025 12:02) (120/65 - 124/63)  BP(mean): --  RR: 18 (01 Apr 2025 12:02) (18 - 18)  SpO2: 98% (01 Apr 2025 12:02) (98% - 100%)    Parameters below as of 01 Apr 2025 12:02  Patient On (Oxygen Delivery Method): room air    
Vital Signs Last 24 Hrs  T(C): 36.6 (23 Mar 2025 11:28), Max: 36.6 (22 Mar 2025 22:15)  T(F): 97.9 (23 Mar 2025 11:28), Max: 97.9 (22 Mar 2025 22:15)  HR: 98 (23 Mar 2025 11:28) (72 - 98)  BP: 103/64 (23 Mar 2025 11:28) (103/64 - 114/65)  BP(mean): --  RR: 18 (23 Mar 2025 11:28) (18 - 18)  SpO2: 98% (23 Mar 2025 11:28) (97% - 99%)    Parameters below as of 23 Mar 2025 11:28  Patient On (Oxygen Delivery Method): room air    
Vital Signs Last 24 Hrs  T(C): 36.7 (24 Mar 2025 05:08), Max: 36.7 (24 Mar 2025 05:08)  T(F): 98.1 (24 Mar 2025 05:08), Max: 98.1 (24 Mar 2025 05:08)  HR: 75 (24 Mar 2025 05:08) (75 - 75)  BP: 146/75 (24 Mar 2025 05:08) (146/75 - 146/75)  BP(mean): --  RR: 18 (24 Mar 2025 05:08) (18 - 18)  SpO2: 99% (24 Mar 2025 05:08) (99% - 99%)    Parameters below as of 24 Mar 2025 05:08  Patient On (Oxygen Delivery Method): room air    
Vital Signs Last 24 Hrs  T(C): 37.1 (25 Mar 2025 14:00), Max: 37.5 (24 Mar 2025 19:10)  T(F): 98.8 (25 Mar 2025 14:00), Max: 99.5 (24 Mar 2025 19:10)  HR: 69 (25 Mar 2025 14:00) (69 - 92)  BP: 104/65 (25 Mar 2025 14:00) (104/65 - 140/80)  BP(mean): --  RR: 16 (25 Mar 2025 14:00) (16 - 18)  SpO2: 100% (25 Mar 2025 14:00) (100% - 100%)    Parameters below as of 25 Mar 2025 14:00  Patient On (Oxygen Delivery Method): room air

## 2025-04-02 NOTE — BH CONSULTATION LIAISON PROGRESS NOTE - NSBHATTESTCOMMENTATTENDFT_PSY_A_CORE
agree with above, no change, patient still confused, not pulling at PEG
agree with above, patient somnolent in AM, very confused still, pulling at lines, no si/hi, plan as above.

## 2025-04-02 NOTE — BH CONSULTATION LIAISON PROGRESS NOTE - NSICDXBHSECONDARYDX_PSY_ALL_CORE
Dementia with behavioral problem   F03.912  
Dementia with behavioral problem   F03.913  
Dementia with behavioral problem   F03.917  
Dementia with behavioral problem   F03.915  
Dementia with behavioral problem   F03.911  
Dementia with behavioral problem   F03.912  
Dementia with behavioral problem   F03.914  
Dementia with behavioral problem   F03.91  
Dementia with behavioral problem   F03.919

## 2025-04-02 NOTE — PROGRESS NOTE ADULT - SUBJECTIVE AND OBJECTIVE BOX
[facility] Division of Hospital Medicine  Jordana Mays MD  Pager (M-F, 8A-5P):  In-house pager [phone]; Long-range pager [phone]  Other Times:  Please page Hospitalist in Charge -  In-house pager [phone]    Patient is a 75y old  Male who presents with a chief complaint of odynophagia with decreased PO intake (01 Apr 2025 09:28)    SUBJECTIVE / OVERNIGHT EVENTS:  Mittens off since yesterday.    ADDITIONAL REVIEW OF SYSTEMS:    MEDICATIONS  (STANDING):  chlorhexidine 2% Cloths 1 Application(s) Topical <User Schedule>  enoxaparin Injectable 40 milliGRAM(s) SubCutaneous every 24 hours  famotidine   Suspension 40 milliGRAM(s) Oral two times a day  risperiDONE   Tablet 0.5 milliGRAM(s) Oral <User Schedule>  risperiDONE   Tablet 0.75 milliGRAM(s) Oral <User Schedule>    MEDICATIONS  (PRN):  aluminum hydroxide/magnesium hydroxide/simethicone Suspension 30 milliLiter(s) Enteral Tube every 4 hours PRN Dyspepsia  OLANZapine 2.5 milliGRAM(s) Oral every 6 hours PRN agitation, not redirectable  OLANZapine Injectable 2.5 milliGRAM(s) IntraMuscular every 6 hours PRN agitation, not redirectable  ondansetron Injectable 4 milliGRAM(s) IV Push every 8 hours PRN Nausea and/or Vomiting    I&O's Summary    01 Apr 2025 07:01  -  02 Apr 2025 07:00  --------------------------------------------------------  IN: 3190 mL / OUT: 0 mL / NET: 3190 mL    PHYSICAL EXAM:  Vital Signs Last 24 Hrs  T(C): 36.6 (02 Apr 2025 05:30), Max: 36.6 (01 Apr 2025 12:02)  T(F): 97.8 (02 Apr 2025 05:30), Max: 97.9 (01 Apr 2025 12:02)  HR: 88 (02 Apr 2025 05:30) (72 - 89)  BP: 130/60 (02 Apr 2025 05:30) (120/65 - 130/60)  BP(mean): --  RR: 18 (02 Apr 2025 05:30) (18 - 18)  SpO2: 95% (02 Apr 2025 05:30) (95% - 98%)    Parameters below as of 02 Apr 2025 05:30  Patient On (Oxygen Delivery Method): room air    GENERAL: sedated, snoring, in bed, B wrist restraints --> awake, talking to family, pleasantly confused  CHEST/LUNG: CTAB  HEART: Regular rate and rhythm  ABDOMEN: Soft, non-tender, non-distended; +PEG under abdominal binder  EXTREMITIES: No clubbing, cyanosis, or edema  SKIN: No rashes or lesions to visible skin    LABS:                        8.7    4.27  )-----------( 230      ( 31 Mar 2025 18:24 )             25.4     04-01    135  |  100  |  19  ----------------------------<  99  4.6   |  22  |  1.06    Ca    9.4      01 Apr 2025 07:07  Phos  3.6     04-01  Mg     2.10     04-01    TPro  6.8  /  Alb  3.7  /  TBili  0.6  /  DBili  x   /  AST  26  /  ALT  48[H]  /  AlkPhos  165[H]  04-01    RADIOLOGY & ADDITIONAL TESTS:  Results Reviewed:   Imaging Personally Reviewed:  Electrocardiogram Personally Reviewed:    COORDINATION OF CARE:  Care Discussed with Consultants/Other Providers [Y/N]: RN, SW, CM, ACP, Psych re overall care   Prior or Outpatient Records Reviewed [Y/N]:   [facility] Division of Hospital Medicine  Jordana Mays MD  Pager (M-F, 8A-5P):  In-house pager [phone]; Long-range pager [phone]  Other Times:  Please page Hospitalist in Charge -  In-house pager [phone]    Patient is a 75y old  Male who presents with a chief complaint of odynophagia with decreased PO intake (01 Apr 2025 09:28)    SUBJECTIVE / OVERNIGHT EVENTS:  Mittens off since yesterday.  Seen this am, awake, said he's hungry. PCA fed his full yogurt and few bites of other food. Seen later, he sang a song and tapped his foot and played drums with his hands on his thighs to the beat.  ADDITIONAL REVIEW OF SYSTEMS:    MEDICATIONS  (STANDING):  chlorhexidine 2% Cloths 1 Application(s) Topical <User Schedule>  enoxaparin Injectable 40 milliGRAM(s) SubCutaneous every 24 hours  famotidine   Suspension 40 milliGRAM(s) Oral two times a day  risperiDONE   Tablet 0.5 milliGRAM(s) Oral <User Schedule>  risperiDONE   Tablet 0.75 milliGRAM(s) Oral <User Schedule>    MEDICATIONS  (PRN):  aluminum hydroxide/magnesium hydroxide/simethicone Suspension 30 milliLiter(s) Enteral Tube every 4 hours PRN Dyspepsia  OLANZapine 2.5 milliGRAM(s) Oral every 6 hours PRN agitation, not redirectable  OLANZapine Injectable 2.5 milliGRAM(s) IntraMuscular every 6 hours PRN agitation, not redirectable  ondansetron Injectable 4 milliGRAM(s) IV Push every 8 hours PRN Nausea and/or Vomiting    I&O's Summary    01 Apr 2025 07:01  -  02 Apr 2025 07:00  --------------------------------------------------------  IN: 3190 mL / OUT: 0 mL / NET: 3190 mL    PHYSICAL EXAM:  Vital Signs Last 24 Hrs  T(C): 36.6 (02 Apr 2025 05:30), Max: 36.6 (01 Apr 2025 12:02)  T(F): 97.8 (02 Apr 2025 05:30), Max: 97.9 (01 Apr 2025 12:02)  HR: 88 (02 Apr 2025 05:30) (72 - 89)  BP: 130/60 (02 Apr 2025 05:30) (120/65 - 130/60)  BP(mean): --  RR: 18 (02 Apr 2025 05:30) (18 - 18)  SpO2: 95% (02 Apr 2025 05:30) (95% - 98%)    Parameters below as of 02 Apr 2025 05:30  Patient On (Oxygen Delivery Method): room air    GENERAL: awake, pleasantly confused  CHEST/LUNG: CTAB  HEART: Regular rate and rhythm  ABDOMEN: Soft, non-tender, non-distended; +PEG under abdominal binder  EXTREMITIES: No clubbing, cyanosis, or edema  SKIN: No rashes or lesions to visible skin    LABS:                        8.7    4.27  )-----------( 230      ( 31 Mar 2025 18:24 )             25.4     04-01    135  |  100  |  19  ----------------------------<  99  4.6   |  22  |  1.06    Ca    9.4      01 Apr 2025 07:07  Phos  3.6     04-01  Mg     2.10     04-01    TPro  6.8  /  Alb  3.7  /  TBili  0.6  /  DBili  x   /  AST  26  /  ALT  48[H]  /  AlkPhos  165[H]  04-01    RADIOLOGY & ADDITIONAL TESTS:  Results Reviewed:   Imaging Personally Reviewed:  Electrocardiogram Personally Reviewed:    COORDINATION OF CARE:  Care Discussed with Consultants/Other Providers [Y/N]: RN, SW, CM, ACP, Psych re overall care   Prior or Outpatient Records Reviewed [Y/N]:

## 2025-04-02 NOTE — PHYSICAL THERAPY INITIAL EVALUATION ADULT - ADDITIONAL COMMENTS
Per chart, pt. lives in a house alone. Pt. was previously independent with ambulation and ADLs, pt. did not use an assistive device.    Pt. was left in bed post PT Evaluation, no apparent distress, +bed alarm. RN made aware of pt. participation in PT.
Pt left semisupine in bed in NAD, all lines intact, call bell in reach, SPO2 100%, and RN Emelina aware.

## 2025-04-02 NOTE — BH CONSULTATION LIAISON PROGRESS NOTE - MSE UNSTRUCTURED FT
MYRNA, sedated/difficult to rouse. Restraints in place.
Skin normal color for race, warm, dry and intact. No evidence of rash.

## 2025-04-02 NOTE — BH CONSULTATION LIAISON PROGRESS NOTE - CURRENT MEDICATION
MEDICATIONS  (STANDING):  chlorhexidine 2% Cloths 1 Application(s) Topical <User Schedule>  enoxaparin Injectable 40 milliGRAM(s) SubCutaneous every 24 hours  famotidine   Suspension 40 milliGRAM(s) Oral two times a day  risperiDONE   Tablet 0.5 milliGRAM(s) Oral <User Schedule>  risperiDONE   Tablet 0.75 milliGRAM(s) Oral <User Schedule>    MEDICATIONS  (PRN):  aluminum hydroxide/magnesium hydroxide/simethicone Suspension 30 milliLiter(s) Enteral Tube every 4 hours PRN Dyspepsia  OLANZapine 2.5 milliGRAM(s) Oral every 6 hours PRN agitation, not redirectable  OLANZapine Injectable 2.5 milliGRAM(s) IntraMuscular every 6 hours PRN agitation, not redirectable  ondansetron Injectable 4 milliGRAM(s) IV Push every 8 hours PRN Nausea and/or Vomiting  
MEDICATIONS  (STANDING):  chlorhexidine 2% Cloths 1 Application(s) Topical <User Schedule>  enoxaparin Injectable 40 milliGRAM(s) SubCutaneous every 24 hours  famotidine   Suspension 40 milliGRAM(s) Oral two times a day  risperiDONE   Tablet 0.5 milliGRAM(s) Oral <User Schedule>  risperiDONE   Tablet 0.5 milliGRAM(s) Oral <User Schedule>  traZODone 25 milliGRAM(s) Oral <User Schedule>    MEDICATIONS  (PRN):  aluminum hydroxide/magnesium hydroxide/simethicone Suspension 30 milliLiter(s) Enteral Tube every 4 hours PRN Dyspepsia  OLANZapine Injectable 2.5 milliGRAM(s) IntraMuscular every 6 hours PRN agitation/combative  ondansetron Injectable 4 milliGRAM(s) IV Push every 8 hours PRN Nausea and/or Vomiting  
MEDICATIONS  (STANDING):  chlorhexidine 2% Cloths 1 Application(s) Topical <User Schedule>  enoxaparin Injectable 40 milliGRAM(s) SubCutaneous every 24 hours  famotidine Injectable 40 milliGRAM(s) IV Push daily  melatonin 3 milliGRAM(s) Oral at bedtime  nystatin    Suspension 439180 Unit(s) Oral every 6 hours  risperiDONE   Tablet 0.5 milliGRAM(s) Oral <User Schedule>  risperiDONE   Tablet 0.25 milliGRAM(s) Oral once  traZODone 25 milliGRAM(s) Oral <User Schedule>    MEDICATIONS  (PRN):  aluminum hydroxide/magnesium hydroxide/simethicone Suspension 30 milliLiter(s) Enteral Tube every 4 hours PRN Dyspepsia  OLANZapine Injectable 2.5 milliGRAM(s) IntraMuscular every 6 hours PRN agitation/combative  ondansetron Injectable 4 milliGRAM(s) IV Push every 8 hours PRN Nausea and/or Vomiting  
MEDICATIONS  (STANDING):  chlorhexidine 2% Cloths 1 Application(s) Topical <User Schedule>  enoxaparin Injectable 40 milliGRAM(s) SubCutaneous every 24 hours  famotidine   Suspension 40 milliGRAM(s) Oral two times a day  risperiDONE   Tablet 0.5 milliGRAM(s) Oral <User Schedule>  risperiDONE   Tablet 0.75 milliGRAM(s) Oral <User Schedule>    MEDICATIONS  (PRN):  aluminum hydroxide/magnesium hydroxide/simethicone Suspension 30 milliLiter(s) Enteral Tube every 4 hours PRN Dyspepsia  OLANZapine 2.5 milliGRAM(s) Oral every 6 hours PRN agitation, not redirectable  OLANZapine Injectable 2.5 milliGRAM(s) IntraMuscular every 6 hours PRN agitation, not redirectable  ondansetron Injectable 4 milliGRAM(s) IV Push every 8 hours PRN Nausea and/or Vomiting  
MEDICATIONS  (STANDING):  chlorhexidine 2% Cloths 1 Application(s) Topical <User Schedule>  dextrose 5% + sodium chloride 0.45%. 1000 milliLiter(s) (75 mL/Hr) IV Continuous <Continuous>  enoxaparin Injectable 40 milliGRAM(s) SubCutaneous every 24 hours  fluconAZOLE IVPB 100 milliGRAM(s) IV Intermittent every 24 hours  fluconAZOLE IVPB      melatonin 3 milliGRAM(s) Oral at bedtime  thiamine Injectable 100 milliGRAM(s) IV Push daily    MEDICATIONS  (PRN):  aluminum hydroxide/magnesium hydroxide/simethicone Suspension 30 milliLiter(s) Oral every 4 hours PRN Dyspepsia  haloperidol    Injectable 0.5 milliGRAM(s) IV Push every 6 hours PRN Combative/agitation  morphine  - Injectable 1 milliGRAM(s) IV Push every 4 hours PRN Severe Pain (7 - 10)  ondansetron Injectable 4 milliGRAM(s) IV Push every 8 hours PRN Nausea and/or Vomiting    
MEDICATIONS  (STANDING):  chlorhexidine 2% Cloths 1 Application(s) Topical <User Schedule>  enoxaparin Injectable 40 milliGRAM(s) SubCutaneous every 24 hours  famotidine Injectable 40 milliGRAM(s) IV Push daily  melatonin 3 milliGRAM(s) Oral at bedtime  nystatin    Suspension [id number] Unit(s) Oral every 6 hours  risperiDONE   Tablet 0.5 milliGRAM(s) Oral <User Schedule>  risperiDONE   Tablet 0.5 milliGRAM(s) Oral <User Schedule>  traZODone 25 milliGRAM(s) Oral <User Schedule>    MEDICATIONS  (PRN):  aluminum hydroxide/magnesium hydroxide/simethicone Suspension 30 milliLiter(s) Enteral Tube every 4 hours PRN Dyspepsia  OLANZapine Injectable 2.5 milliGRAM(s) IntraMuscular every 6 hours PRN agitation/combative  ondansetron Injectable 4 milliGRAM(s) IV Push every 8 hours PRN Nausea and/or Vomiting  
MEDICATIONS  (STANDING):  chlorhexidine 2% Cloths 1 Application(s) Topical <User Schedule>  enoxaparin Injectable 40 milliGRAM(s) SubCutaneous every 24 hours  famotidine Injectable 40 milliGRAM(s) IV Push daily  melatonin 3 milliGRAM(s) Oral at bedtime  nystatin    Suspension [id number] Unit(s) Oral every 6 hours  risperiDONE   Tablet 0.25 milliGRAM(s) Oral <User Schedule>    MEDICATIONS  (PRN):  aluminum hydroxide/magnesium hydroxide/simethicone Suspension 30 milliLiter(s) Enteral Tube every 4 hours PRN Dyspepsia  haloperidol    Injectable 0.5 milliGRAM(s) IV Push every 6 hours PRN Combative/agitation  ondansetron Injectable 4 milliGRAM(s) IV Push every 8 hours PRN Nausea and/or Vomiting  
MEDICATIONS  (STANDING):  chlorhexidine 2% Cloths 1 Application(s) Topical <User Schedule>  enoxaparin Injectable 40 milliGRAM(s) SubCutaneous every 24 hours  famotidine   Suspension 40 milliGRAM(s) Oral two times a day  melatonin 3 milliGRAM(s) Oral at bedtime  risperiDONE   Tablet 0.5 milliGRAM(s) Oral <User Schedule>  risperiDONE   Tablet 0.5 milliGRAM(s) Oral <User Schedule>  traZODone 25 milliGRAM(s) Oral <User Schedule>    MEDICATIONS  (PRN):  aluminum hydroxide/magnesium hydroxide/simethicone Suspension 30 milliLiter(s) Enteral Tube every 4 hours PRN Dyspepsia  OLANZapine Injectable 2.5 milliGRAM(s) IntraMuscular every 6 hours PRN agitation/combative  ondansetron Injectable 4 milliGRAM(s) IV Push every 8 hours PRN Nausea and/or Vomiting  
MEDICATIONS  (STANDING):  chlorhexidine 2% Cloths 1 Application(s) Topical <User Schedule>  enoxaparin Injectable 40 milliGRAM(s) SubCutaneous every 24 hours  famotidine Injectable 40 milliGRAM(s) IV Push daily  melatonin 3 milliGRAM(s) Oral at bedtime  nystatin    Suspension 636348 Unit(s) Oral every 6 hours  potassium phosphate IVPB 30 milliMole(s) IV Intermittent once    MEDICATIONS  (PRN):  aluminum hydroxide/magnesium hydroxide/simethicone Suspension 30 milliLiter(s) Enteral Tube every 4 hours PRN Dyspepsia  haloperidol    Injectable 0.5 milliGRAM(s) IV Push every 6 hours PRN Combative/agitation  ondansetron Injectable 4 milliGRAM(s) IV Push every 8 hours PRN Nausea and/or Vomiting

## 2025-04-02 NOTE — BH CONSULTATION LIAISON PROGRESS NOTE - NSBHCONSULTMEDPRNREASON_PSY_A_CORE
other prn reason...

## 2025-04-03 LAB
ALBUMIN SERPL ELPH-MCNC: 3.7 G/DL — SIGNIFICANT CHANGE UP (ref 3.3–5)
ALP SERPL-CCNC: 136 U/L — HIGH (ref 40–120)
ALT FLD-CCNC: 36 U/L — SIGNIFICANT CHANGE UP (ref 4–41)
ANION GAP SERPL CALC-SCNC: 15 MMOL/L — HIGH (ref 7–14)
AST SERPL-CCNC: 20 U/L — SIGNIFICANT CHANGE UP (ref 4–40)
BILIRUB SERPL-MCNC: 0.6 MG/DL — SIGNIFICANT CHANGE UP (ref 0.2–1.2)
BUN SERPL-MCNC: 27 MG/DL — HIGH (ref 7–23)
CALCIUM SERPL-MCNC: 9.6 MG/DL — SIGNIFICANT CHANGE UP (ref 8.4–10.5)
CHLORIDE SERPL-SCNC: 99 MMOL/L — SIGNIFICANT CHANGE UP (ref 98–107)
CO2 SERPL-SCNC: 22 MMOL/L — SIGNIFICANT CHANGE UP (ref 22–31)
CREAT SERPL-MCNC: 1.06 MG/DL — SIGNIFICANT CHANGE UP (ref 0.5–1.3)
EGFR: 73 ML/MIN/1.73M2 — SIGNIFICANT CHANGE UP
EGFR: 73 ML/MIN/1.73M2 — SIGNIFICANT CHANGE UP
GLUCOSE SERPL-MCNC: 81 MG/DL — SIGNIFICANT CHANGE UP (ref 70–99)
HCT VFR BLD CALC: 27.6 % — LOW (ref 39–50)
HGB BLD-MCNC: 9.3 G/DL — LOW (ref 13–17)
LACTATE SERPL-SCNC: 2 MMOL/L — SIGNIFICANT CHANGE UP (ref 0.5–2)
MAGNESIUM SERPL-MCNC: 2.3 MG/DL — SIGNIFICANT CHANGE UP (ref 1.6–2.6)
MCHC RBC-ENTMCNC: 32.7 PG — SIGNIFICANT CHANGE UP (ref 27–34)
MCHC RBC-ENTMCNC: 33.7 G/DL — SIGNIFICANT CHANGE UP (ref 32–36)
MCV RBC AUTO: 97.2 FL — SIGNIFICANT CHANGE UP (ref 80–100)
NRBC # BLD AUTO: 0 K/UL — SIGNIFICANT CHANGE UP (ref 0–0)
NRBC # FLD: 0 K/UL — SIGNIFICANT CHANGE UP (ref 0–0)
NRBC BLD AUTO-RTO: 0 /100 WBCS — SIGNIFICANT CHANGE UP (ref 0–0)
PHOSPHATE SERPL-MCNC: 3.8 MG/DL — SIGNIFICANT CHANGE UP (ref 2.5–4.5)
PLATELET # BLD AUTO: 237 K/UL — SIGNIFICANT CHANGE UP (ref 150–400)
POTASSIUM SERPL-MCNC: 4.6 MMOL/L — SIGNIFICANT CHANGE UP (ref 3.5–5.3)
POTASSIUM SERPL-SCNC: 4.6 MMOL/L — SIGNIFICANT CHANGE UP (ref 3.5–5.3)
PROT SERPL-MCNC: 6.8 G/DL — SIGNIFICANT CHANGE UP (ref 6–8.3)
RBC # BLD: 2.84 M/UL — LOW (ref 4.2–5.8)
RBC # FLD: 20.7 % — HIGH (ref 10.3–14.5)
SODIUM SERPL-SCNC: 136 MMOL/L — SIGNIFICANT CHANGE UP (ref 135–145)
WBC # BLD: 4.07 K/UL — SIGNIFICANT CHANGE UP (ref 3.8–10.5)
WBC # FLD AUTO: 4.07 K/UL — SIGNIFICANT CHANGE UP (ref 3.8–10.5)

## 2025-04-03 PROCEDURE — 99233 SBSQ HOSP IP/OBS HIGH 50: CPT

## 2025-04-03 RX ADMIN — Medication 40 MILLIGRAM(S): at 05:44

## 2025-04-03 RX ADMIN — Medication 0.75 MILLIGRAM(S): at 18:11

## 2025-04-03 RX ADMIN — ENOXAPARIN SODIUM 40 MILLIGRAM(S): 100 INJECTION SUBCUTANEOUS at 13:49

## 2025-04-03 RX ADMIN — Medication 40 MILLIGRAM(S): at 18:10

## 2025-04-03 RX ADMIN — Medication 1 APPLICATION(S): at 05:44

## 2025-04-03 RX ADMIN — Medication 0.5 MILLIGRAM(S): at 06:46

## 2025-04-03 NOTE — PROGRESS NOTE ADULT - SUBJECTIVE AND OBJECTIVE BOX
[facility] Division of Hospital Medicine  Jordana Mays MD  Pager (M-F, 8A-5P):  In-house pager [phone]; Long-range pager [phone]  Other Times:  Please page Hospitalist in Charge -  In-house pager [phone]    Patient is a 75y old  Male who presents with a chief complaint of odynophagia with decreased PO intake (02 Apr 2025 09:55)    SUBJECTIVE / OVERNIGHT EVENTS:  No problems reported over night.    ADDITIONAL REVIEW OF SYSTEMS:    MEDICATIONS  (STANDING):  chlorhexidine 2% Cloths 1 Application(s) Topical <User Schedule>  enoxaparin Injectable 40 milliGRAM(s) SubCutaneous every 24 hours  famotidine   Suspension 40 milliGRAM(s) Oral two times a day  risperiDONE   Tablet 0.5 milliGRAM(s) Oral <User Schedule>  risperiDONE   Tablet 0.75 milliGRAM(s) Oral <User Schedule>    MEDICATIONS  (PRN):  aluminum hydroxide/magnesium hydroxide/simethicone Suspension 30 milliLiter(s) Enteral Tube every 4 hours PRN Dyspepsia  OLANZapine 2.5 milliGRAM(s) Oral every 6 hours PRN agitation, not redirectable  OLANZapine Injectable 2.5 milliGRAM(s) IntraMuscular every 6 hours PRN agitation, not redirectable  ondansetron Injectable 4 milliGRAM(s) IV Push every 8 hours PRN Nausea and/or Vomiting      I&O's Summary    02 Apr 2025 07:01  -  03 Apr 2025 07:00  --------------------------------------------------------  IN: 750 mL / OUT: 0 mL / NET: 750 mL    PHYSICAL EXAM:  Vital Signs Last 24 Hrs  T(C): 36.3 (03 Apr 2025 04:52), Max: 36.6 (02 Apr 2025 21:00)  T(F): 97.4 (03 Apr 2025 04:52), Max: 97.8 (02 Apr 2025 21:00)  HR: 72 (03 Apr 2025 04:52) (72 - 85)  BP: 104/83 (03 Apr 2025 04:52) (104/83 - 123/78)  BP(mean): --  RR: 17 (03 Apr 2025 04:52) (17 - 19)  SpO2: 100% (03 Apr 2025 04:52) (100% - 100%)    Parameters below as of 03 Apr 2025 04:52  Patient On (Oxygen Delivery Method): room air    GENERAL: awake, pleasantly confused  CHEST/LUNG: CTAB  HEART: Regular rate and rhythm  ABDOMEN: Soft, non-tender, non-distended; +PEG under abdominal binder  EXTREMITIES: No clubbing, cyanosis, or edema  SKIN: No rashes or lesions to visible skin    LABS:                        9.3    4.07  )-----------( 237      ( 03 Apr 2025 06:30 )             27.6     04-02    138  |  101  |  20  ----------------------------<  108[H]  4.6   |  24  |  1.05    Ca    9.7      02 Apr 2025 09:45  Phos  3.4     04-02  Mg     2.30     04-02    TPro  6.8  /  Alb  3.8  /  TBili  0.6  /  DBili  x   /  AST  26  /  ALT  43[H]  /  AlkPhos  147[H]  04-02    RADIOLOGY & ADDITIONAL TESTS:  Results Reviewed:   Imaging Personally Reviewed:  Electrocardiogram Personally Reviewed:    COORDINATION OF CARE:  Care Discussed with Consultants/Other Providers [Y/N]: RN, SW, CM, ACP, Psych re overall care   Prior or Outpatient Records Reviewed [Y/N]:   [facility] Division of Hospital Medicine  Jordana Mays MD  Pager (M-F, 8A-5P):  In-house pager [phone]; Long-range pager [phone]  Other Times:  Please page Hospitalist in Charge -  In-house pager [phone]    Patient is a 75y old  Male who presents with a chief complaint of odynophagia with decreased PO intake (02 Apr 2025 09:55)    SUBJECTIVE / OVERNIGHT EVENTS:  No problems reported over night.  Seen resting in bed, cleaned up by team, was cooperative with care. No complaints.   ADDITIONAL REVIEW OF SYSTEMS:    MEDICATIONS  (STANDING):  chlorhexidine 2% Cloths 1 Application(s) Topical <User Schedule>  enoxaparin Injectable 40 milliGRAM(s) SubCutaneous every 24 hours  famotidine   Suspension 40 milliGRAM(s) Oral two times a day  risperiDONE   Tablet 0.5 milliGRAM(s) Oral <User Schedule>  risperiDONE   Tablet 0.75 milliGRAM(s) Oral <User Schedule>    MEDICATIONS  (PRN):  aluminum hydroxide/magnesium hydroxide/simethicone Suspension 30 milliLiter(s) Enteral Tube every 4 hours PRN Dyspepsia  OLANZapine 2.5 milliGRAM(s) Oral every 6 hours PRN agitation, not redirectable  OLANZapine Injectable 2.5 milliGRAM(s) IntraMuscular every 6 hours PRN agitation, not redirectable  ondansetron Injectable 4 milliGRAM(s) IV Push every 8 hours PRN Nausea and/or Vomiting      I&O's Summary    02 Apr 2025 07:01  -  03 Apr 2025 07:00  --------------------------------------------------------  IN: 750 mL / OUT: 0 mL / NET: 750 mL    PHYSICAL EXAM:  Vital Signs Last 24 Hrs  T(C): 36.3 (03 Apr 2025 04:52), Max: 36.6 (02 Apr 2025 21:00)  T(F): 97.4 (03 Apr 2025 04:52), Max: 97.8 (02 Apr 2025 21:00)  HR: 72 (03 Apr 2025 04:52) (72 - 85)  BP: 104/83 (03 Apr 2025 04:52) (104/83 - 123/78)  BP(mean): --  RR: 17 (03 Apr 2025 04:52) (17 - 19)  SpO2: 100% (03 Apr 2025 04:52) (100% - 100%)    Parameters below as of 03 Apr 2025 04:52  Patient On (Oxygen Delivery Method): room air    GENERAL: awake, pleasantly confused  CHEST/LUNG: CTAB  HEART: Regular rate and rhythm  ABDOMEN: Soft, non-tender, non-distended; +PEG under abdominal binder  EXTREMITIES: No clubbing, cyanosis, or edema  SKIN: No rashes or lesions to visible skin    LABS:                        9.3    4.07  )-----------( 237      ( 03 Apr 2025 06:30 )             27.6     04-02    138  |  101  |  20  ----------------------------<  108[H]  4.6   |  24  |  1.05    Ca    9.7      02 Apr 2025 09:45  Phos  3.4     04-02  Mg     2.30     04-02    TPro  6.8  /  Alb  3.8  /  TBili  0.6  /  DBili  x   /  AST  26  /  ALT  43[H]  /  AlkPhos  147[H]  04-02    RADIOLOGY & ADDITIONAL TESTS:  Results Reviewed:   Imaging Personally Reviewed:  Electrocardiogram Personally Reviewed:    COORDINATION OF CARE:  Care Discussed with Consultants/Other Providers [Y/N]: RN, SW, CM, ACP, Psych re overall care   Prior or Outpatient Records Reviewed [Y/N]:

## 2025-04-03 NOTE — CHART NOTE - NSCHARTNOTEFT_GEN_A_CORE
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     Pt f/u for malnutrition, continues on TF and Tray. As per RN pt does not eat as its very painful for him but tolerating his TF well which is providing 1440mL volume formula, 2160kcal, 92.2g protein, 1094mL free water. Noted wt. loss in past 2 weeks. Reweigh pt after zeroing the scale to confirm accuracy.    Weight: 73.8 KG (4/2/25), 81.3 (03-18 @ 08:10)    Labs:  04-03 Na 136 mmol/L Glu 81 mg/dL K+ 4.6 mmol/L Cr 1.06 mg/dL BUN 27 mg/dL[H] Phos 3.8 mg/dL      Current Diet: Diet, Pureed:   Tube Feeding Modality: Gastrostomy  Jevity 1.5 Moises (JEVITY1.5RTH)  Total Volume for 24 Hours (mL): 2880  Bolus  Total Volume of Bolus (mL):  360  Total # of Feeds: 4  Tube Feed Frequency: Every 3 hours   Tube Feed Start Time: 07:00  Bolus Feed Rate (mL per Hour): 360   Bolus Feed Duration (in Hours): 1  Bolus Feed Instructions:  Administer at 7AM, 11AM, 2PM, and 5PM (03-21-25 @ 02:54) [Active]    Skin- ecchymosis, no edema as per RN flow sheet    MEDICATIONS  (STANDING):  chlorhexidine 2% Cloths 1 Application(s) Topical <User Schedule>  enoxaparin Injectable 40 milliGRAM(s) SubCutaneous every 24 hours  famotidine   Suspension 40 milliGRAM(s) Oral two times a day  risperiDONE   Tablet 0.5 milliGRAM(s) Oral <User Schedule>  risperiDONE   Tablet 0.75 milliGRAM(s) Oral <User Schedule>    Estimated needs:  [X] No change since previous assessment  Weight Used: Ideal Body Weight 166lb / 75.2kg  Energy Needs: 2105-2406kcal (based on 28-32kcal/kg)  Protein Needs: 90..28gms (based on 1.2-1.4gms/kg)    Nutrition Diagnosis:  Ongoing- Malnutrition    RD to Remain Available:    Additional Recommendations:   Continue PO diet as per SLP, at medical team's discretion  Continue current enteral nutrition regimen: Jevity 1.5 Moises via PEG, bolus 360mL 4x daily (7am, 11am, 2pm, 5pm),  Monitor PO intake and tolerance to TF, weight trends, nutrition related lab values, BMs/GI distress, hydration status, skin integrity.     Alda Munguia RDN #[id number]

## 2025-04-03 NOTE — PROGRESS NOTE ADULT - ASSESSMENT
74M vascular dementia, tonsillar ca (s/p C6 of 7 of cisplatin and on RT -should've been completed on 3/7), vascular dementia p/w odynophagia/lack of appetite and overall FTT.     Active problems  SIRS  Vascular dementia w/ agitation   FTT requiring PEG tube   Tonsillar CA  Anemia in neoplastic disease  Hyponatremia (resolved)  Hyperkalemia  Hypophosphatemia  Hypercoagulable state secondary to neoplasm   Severe Protein-Calorie Malnutrition    #Vascular dementia with hallucinations at baseline, with worsening agitation in setting of prolonged hospitalization  - Pt frequently agitated. Family reporting continues to have hallucinations but this has been ongoing since diagnosis of dementia.   - CTH with chronic microvascular changes.   - For combative behavior - Zyprexa 2.5mg IM q6h PRN  appreciate psych input - adjust risperidone 0.5 at 0700, 0.75 at 1700; stop trazodone; may retry melatonin in future  --> doing well off mittens    Tonsillar cancer - reportedly stage 4 on dx, completed radiation, trouble with swallowing likely d/t radiation, s/p PEG 3/18  outpt f/u with Dr. Gibson. Seen by PC, full code at this point    #FTT/severe protein calorie malnutrition  -Pt reported odynophagia and dysphagia w/ regurgitation of food. Dysphagia to solids and liquids. Seems he holds the food and does not swallow due to fear of pain.   -Reviewed CT neck, mucosal thickening and enhancement in the larynx and oropharynx may be due to infectious laryngopharyngitis or sequela of previous radiation performed.  -Patient does not like viscous lidocaine or liquid gabapentin so that was discontinued   -Cineesophagogram completed - rec puree and thin liquid   -s/p empiric treatment of thrush/esophagitis with IV fluconazole- changed to Nystatin. Completed 7 days on 3/27  -Pt still with minimal PO intake -> PEG placed 3/18 by IR, now on bolus tube feeds but can take PO as well  - increased free water 250 q6 --> q4, f/u lytes....    #SIRS  Patient febrile to 101.9 and tachycardic on 3/26, no obvious signs or symptoms of infection  3/26 BCx- NGTD   UA neg, full RVP neg, CXR clear  Monitoring off abx.  Thus far was just one time spike...    #Electrolyte derangements  Lokelma for mild hyperkalemia (resolved)  Replete phos PRN (resolved)  Continue to trend    #Anemia in neoplastic disease  -Hb stable, continue to monitor    #Severe Protein calorie Malnutrition  - Starvation ketosis resolved  - Continue tube feeds     #Hypercoagulable state 2/2 malignancy  -Lovenox 40mg daily    #Dispo  -Likely DAX pending stable behavior  -PT re-eval 74M vascular dementia, tonsillar ca (s/p C6 of 7 of cisplatin and on RT -should've been completed on 3/7), vascular dementia p/w odynophagia/lack of appetite and overall FTT.     Active problems  SIRS  Vascular dementia w/ agitation   FTT requiring PEG tube   Tonsillar CA  Anemia in neoplastic disease  Hyponatremia (resolved)  Hyperkalemia  Hypophosphatemia  Hypercoagulable state secondary to neoplasm   Severe Protein-Calorie Malnutrition    #Vascular dementia with hallucinations at baseline, with worsening agitation in setting of prolonged hospitalization  - Pt frequently agitated. Family reporting continues to have hallucinations but this has been ongoing since diagnosis of dementia.   - CTH with chronic microvascular changes.   - For combative behavior - Zyprexa 2.5mg IM q6h PRN  appreciate psych input - adjusted risperidone 0.5 at 0700, 0.75 at 1700; stop trazodone; may retry melatonin in future  --> doing well off mittens, without PRNs    Tonsillar cancer - reportedly stage 4 on dx, completed radiation, trouble with swallowing likely d/t radiation, s/p PEG 3/18  outpt f/u with Dr. Gibson. Seen by PC, full code at this point    #FTT/severe protein calorie malnutrition  -Pt reported odynophagia and dysphagia w/ regurgitation of food. Dysphagia to solids and liquids. Seems he holds the food and does not swallow due to fear of pain.   -Reviewed CT neck, mucosal thickening and enhancement in the larynx and oropharynx may be due to infectious laryngopharyngitis or sequela of previous radiation performed.  -Patient does not like viscous lidocaine or liquid gabapentin so that was discontinued   -Cineesophagogram completed - rec puree and thin liquid   -s/p empiric treatment of thrush/esophagitis with IV fluconazole- changed to Nystatin. Completed 7 days on 3/27  -Pt still with minimal PO intake -> PEG placed 3/18 by IR, now on bolus tube feeds but can take PO as well  - increased free water 250 q6 --> q4, f/u lytes....    #SIRS  Patient febrile to 101.9 and tachycardic on 3/26, no obvious signs or symptoms of infection  3/26 BCx- NGTD   UA neg, full RVP neg, CXR clear  Monitoring off abx.  Thus far was just one time spike...    #Electrolyte derangements  Lokelma for mild hyperkalemia (resolved)  Replete phos PRN (resolved)  Continue to trend    #Anemia in neoplastic disease  -Hb stable, continue to monitor    #Severe Protein calorie Malnutrition  - Starvation ketosis resolved  - Continue tube feeds     #Hypercoagulable state 2/2 malignancy  -Lovenox 40mg daily    #Dispo  -Likely DAX pending stable behavior  -PT re-eval not applicable (Male)

## 2025-04-03 NOTE — CHART NOTE - NSCHARTNOTESELECT_GEN_ALL_CORE
ACP- MIKE/Event Note
CODE MIKE/Event Note
Event Note
Nutrition Services
CODE MIKE/Event Note
Follow-up/Nutrition Services
Follow-up/Nutrition Services
Hospitalist/Event Note
PSYCHIATRY RECOMMENDATIONS/Event Note
Palliative Care Social Work
Palliative Care Social Work
Pre-IR procedure note/Event Note

## 2025-04-04 LAB
ALBUMIN SERPL ELPH-MCNC: 3.8 G/DL — SIGNIFICANT CHANGE UP (ref 3.3–5)
ALP SERPL-CCNC: 119 U/L — SIGNIFICANT CHANGE UP (ref 40–120)
ALT FLD-CCNC: 29 U/L — SIGNIFICANT CHANGE UP (ref 4–41)
ANION GAP SERPL CALC-SCNC: 16 MMOL/L — HIGH (ref 7–14)
AST SERPL-CCNC: 18 U/L — SIGNIFICANT CHANGE UP (ref 4–40)
BILIRUB SERPL-MCNC: 0.6 MG/DL — SIGNIFICANT CHANGE UP (ref 0.2–1.2)
BUN SERPL-MCNC: 29 MG/DL — HIGH (ref 7–23)
CALCIUM SERPL-MCNC: 9 MG/DL — SIGNIFICANT CHANGE UP (ref 8.4–10.5)
CHLORIDE SERPL-SCNC: 98 MMOL/L — SIGNIFICANT CHANGE UP (ref 98–107)
CO2 SERPL-SCNC: 21 MMOL/L — LOW (ref 22–31)
CREAT SERPL-MCNC: 1.09 MG/DL — SIGNIFICANT CHANGE UP (ref 0.5–1.3)
EGFR: 70 ML/MIN/1.73M2 — SIGNIFICANT CHANGE UP
EGFR: 70 ML/MIN/1.73M2 — SIGNIFICANT CHANGE UP
GLUCOSE BLDC GLUCOMTR-MCNC: 107 MG/DL — HIGH (ref 70–99)
GLUCOSE SERPL-MCNC: 84 MG/DL — SIGNIFICANT CHANGE UP (ref 70–99)
HCT VFR BLD CALC: 24.6 % — LOW (ref 39–50)
HGB BLD-MCNC: 8.6 G/DL — LOW (ref 13–17)
MAGNESIUM SERPL-MCNC: 2.2 MG/DL — SIGNIFICANT CHANGE UP (ref 1.6–2.6)
MCHC RBC-ENTMCNC: 32.7 PG — SIGNIFICANT CHANGE UP (ref 27–34)
MCHC RBC-ENTMCNC: 35 G/DL — SIGNIFICANT CHANGE UP (ref 32–36)
MCV RBC AUTO: 93.5 FL — SIGNIFICANT CHANGE UP (ref 80–100)
NRBC # BLD AUTO: 0 K/UL — SIGNIFICANT CHANGE UP (ref 0–0)
NRBC # FLD: 0 K/UL — SIGNIFICANT CHANGE UP (ref 0–0)
NRBC BLD AUTO-RTO: 0 /100 WBCS — SIGNIFICANT CHANGE UP (ref 0–0)
PHOSPHATE SERPL-MCNC: 3.8 MG/DL — SIGNIFICANT CHANGE UP (ref 2.5–4.5)
PLATELET # BLD AUTO: 223 K/UL — SIGNIFICANT CHANGE UP (ref 150–400)
POTASSIUM SERPL-MCNC: 4.4 MMOL/L — SIGNIFICANT CHANGE UP (ref 3.5–5.3)
POTASSIUM SERPL-SCNC: 4.4 MMOL/L — SIGNIFICANT CHANGE UP (ref 3.5–5.3)
PROT SERPL-MCNC: 6.2 G/DL — SIGNIFICANT CHANGE UP (ref 6–8.3)
RBC # BLD: 2.63 M/UL — LOW (ref 4.2–5.8)
RBC # FLD: 20.4 % — HIGH (ref 10.3–14.5)
SODIUM SERPL-SCNC: 135 MMOL/L — SIGNIFICANT CHANGE UP (ref 135–145)
WBC # BLD: 3.32 K/UL — LOW (ref 3.8–10.5)
WBC # FLD AUTO: 3.32 K/UL — LOW (ref 3.8–10.5)

## 2025-04-04 PROCEDURE — 99233 SBSQ HOSP IP/OBS HIGH 50: CPT

## 2025-04-04 RX ORDER — POLYETHYLENE GLYCOL 3350 17 G/17G
17 POWDER, FOR SOLUTION ORAL EVERY 24 HOURS
Refills: 0 | Status: DISCONTINUED | OUTPATIENT
Start: 2025-04-04 | End: 2025-04-07

## 2025-04-04 RX ADMIN — Medication 40 MILLIGRAM(S): at 18:38

## 2025-04-04 RX ADMIN — Medication 40 MILLIGRAM(S): at 05:11

## 2025-04-04 RX ADMIN — Medication 0.5 MILLIGRAM(S): at 06:22

## 2025-04-04 RX ADMIN — Medication 0.75 MILLIGRAM(S): at 18:42

## 2025-04-04 RX ADMIN — Medication 1 APPLICATION(S): at 05:13

## 2025-04-04 RX ADMIN — ENOXAPARIN SODIUM 40 MILLIGRAM(S): 100 INJECTION SUBCUTANEOUS at 18:53

## 2025-04-04 NOTE — PROGRESS NOTE ADULT - NSPROGADDITIONALINFOA_GEN_ALL_CORE
d/w daughter Joselin at bedside re overall care Pt has stabilized, stable for d/c to rehab. Pending choices and auth Pt has stabilized, stable for d/c to rehab. Pending auth  Last BM 4/1...

## 2025-04-04 NOTE — PROGRESS NOTE ADULT - ASSESSMENT
74M vascular dementia, tonsillar ca (s/p C6 of 7 of cisplatin and on RT -should've been completed on 3/7), vascular dementia p/w odynophagia/lack of appetite and overall FTT.     Active problems  SIRS  Vascular dementia w/ agitation   FTT requiring PEG tube   Tonsillar CA  Anemia in neoplastic disease  Hyponatremia (resolved)  Hyperkalemia  Hypophosphatemia  Hypercoagulable state secondary to neoplasm   Severe Protein-Calorie Malnutrition    #Vascular dementia with hallucinations at baseline, with worsening agitation in setting of prolonged hospitalization  - Pt frequently agitated. Family reporting continues to have hallucinations but this has been ongoing since diagnosis of dementia.   - CTH with chronic microvascular changes.   - For combative behavior - Zyprexa 2.5mg IM q6h PRN  appreciate psych input - adjusted risperidone 0.5 at 0700, 0.75 at 1700; stop trazodone; may retry melatonin in future  --> doing well off mittens, without PRNs    Tonsillar cancer - reportedly stage 4 on dx, completed radiation, trouble with swallowing likely d/t radiation, s/p PEG 3/18  outpt f/u with Dr. Gibson. Seen by PC, full code at this point    #FTT/severe protein calorie malnutrition  -Pt reported odynophagia and dysphagia w/ regurgitation of food. Dysphagia to solids and liquids. Seems he holds the food and does not swallow due to fear of pain.   -Reviewed CT neck, mucosal thickening and enhancement in the larynx and oropharynx may be due to infectious laryngopharyngitis or sequela of previous radiation performed.  -Patient does not like viscous lidocaine or liquid gabapentin so that was discontinued   -Cineesophagogram completed - rec puree and thin liquid   -s/p empiric treatment of thrush/esophagitis with IV fluconazole- changed to Nystatin. Completed 7 days on 3/27  -Pt still with minimal PO intake -> PEG placed 3/18 by IR, now on bolus tube feeds but can take PO as well  - increased free water 250 q6 --> q4, f/u lytes....    #SIRS  Patient febrile to 101.9 and tachycardic on 3/26, no obvious signs or symptoms of infection  3/26 BCx- NGTD   UA neg, full RVP neg, CXR clear  Monitoring off abx.  Thus far was just one time spike...    #Electrolyte derangements  Lokelma for mild hyperkalemia (resolved)  Replete phos PRN (resolved)  Continue to trend    #Anemia in neoplastic disease  -Hb stable, continue to monitor    #Severe Protein calorie Malnutrition  - Starvation ketosis resolved  - Continue tube feeds     #Hypercoagulable state 2/2 malignancy  -Lovenox 40mg daily    #Dispo  -Likely DAX pending stable behavior  -PT re-eval 74M vascular dementia, tonsillar ca (s/p C6 of 7 of cisplatin and on RT -should've been completed on 3/7), vascular dementia p/w odynophagia/lack of appetite and overall FTT.     Active problems  SIRS  Vascular dementia w/ agitation   FTT requiring PEG tube   Tonsillar CA  Anemia in neoplastic disease  Hyponatremia (resolved)  Hyperkalemia  Hypophosphatemia  Hypercoagulable state secondary to neoplasm   Severe Protein-Calorie Malnutrition    #Vascular dementia with hallucinations at baseline, with worsening agitation in setting of prolonged hospitalization  - Pt frequently agitated. Family reporting continues to have hallucinations but this has been ongoing since diagnosis of dementia.   - CTH with chronic microvascular changes.   - For combative behavior - Zyprexa 2.5mg IM q6h PRN  appreciate psych input - adjusted risperidone 0.5 at 0700, 0.75 at 1700; stop trazodone; may retry melatonin in future  --> doing well off mittens, without PRNs    Tonsillar cancer - reportedly stage 4 on dx, completed radiation, trouble with swallowing likely d/t radiation, s/p PEG 3/18  outpt f/u with Dr. Gibson. Seen by PC, full code at this point    #FTT/severe protein calorie malnutrition  -Pt reported odynophagia and dysphagia w/ regurgitation of food. Dysphagia to solids and liquids. Seems he holds the food and does not swallow due to fear of pain.   -Reviewed CT neck, mucosal thickening and enhancement in the larynx and oropharynx may be due to infectious laryngopharyngitis or sequela of previous radiation performed.  -Patient does not like viscous lidocaine or liquid gabapentin so that was discontinued   -Cineesophagogram completed - rec puree and thin liquid   -s/p empiric treatment of thrush/esophagitis with IV fluconazole- changed to Nystatin. Completed 7 days on 3/27  -Pt still with minimal PO intake -> PEG placed 3/18 by IR, now on bolus tube feeds but can take PO as well  - increased free water 250 q6 --> q4, f/u lytes....  --> taking a small amount of PO lately....    #SIRS  Patient febrile to 101.9 and tachycardic on 3/26, no obvious signs or symptoms of infection  3/26 BCx- NGTD   UA neg, full RVP neg, CXR clear  Monitoring off abx.  Thus far was just one time spike...    #Electrolyte derangements  Lokelma for mild hyperkalemia (resolved)  Replete phos PRN (resolved)  Continue to trend    #Anemia in neoplastic disease  -Hb stable, continue to monitor    #Severe Protein calorie Malnutrition  - Starvation ketosis resolved  - Continue tube feeds     #Hypercoagulable state 2/2 malignancy  -Lovenox 40mg daily    #Dispo  -Likely DAX pending stable behavior  -PT re-eval

## 2025-04-04 NOTE — PROGRESS NOTE ADULT - SUBJECTIVE AND OBJECTIVE BOX
[facility] Division of Hospital Medicine  Jordana Mays MD  Pager (M-F, 8A-5P):  In-house pager [phone]; Long-range pager [phone]  Other Times:  Please page Hospitalist in Charge -  In-house pager [phone]    Patient is a 75y old  Male who presents with a chief complaint of odynophagia with decreased PO intake (03 Apr 2025 09:32)    SUBJECTIVE / OVERNIGHT EVENTS:  ....    ADDITIONAL REVIEW OF SYSTEMS:    MEDICATIONS  (STANDING):  chlorhexidine 2% Cloths 1 Application(s) Topical <User Schedule>  enoxaparin Injectable 40 milliGRAM(s) SubCutaneous every 24 hours  famotidine   Suspension 40 milliGRAM(s) Oral two times a day  risperiDONE   Tablet 0.5 milliGRAM(s) Oral <User Schedule>  risperiDONE   Tablet 0.75 milliGRAM(s) Oral <User Schedule>    MEDICATIONS  (PRN):  aluminum hydroxide/magnesium hydroxide/simethicone Suspension 30 milliLiter(s) Enteral Tube every 4 hours PRN Dyspepsia  OLANZapine 2.5 milliGRAM(s) Oral every 6 hours PRN agitation, not redirectable  OLANZapine Injectable 2.5 milliGRAM(s) IntraMuscular every 6 hours PRN agitation, not redirectable  ondansetron Injectable 4 milliGRAM(s) IV Push every 8 hours PRN Nausea and/or Vomiting    I&O's Summary    03 Apr 2025 07:01  -  04 Apr 2025 07:00  --------------------------------------------------------  IN: 750 mL / OUT: 0 mL / NET: 750 mL    PHYSICAL EXAM:  Vital Signs Last 24 Hrs  T(C): 36.3 (04 Apr 2025 05:00), Max: 36.3 (04 Apr 2025 05:00)  T(F): 97.3 (04 Apr 2025 05:00), Max: 97.3 (04 Apr 2025 05:00)  HR: 72 (04 Apr 2025 05:00) (72 - 72)  BP: 121/65 (04 Apr 2025 05:00) (121/65 - 121/65)  BP(mean): --  RR: 17 (04 Apr 2025 05:00) (17 - 17)  SpO2: 100% (04 Apr 2025 05:00) (100% - 100%)    Parameters below as of 04 Apr 2025 05:00  Patient On (Oxygen Delivery Method): room air    GENERAL: awake, pleasantly confused  CHEST/LUNG: CTAB  HEART: Regular rate and rhythm  ABDOMEN: Soft, non-tender, non-distended; +PEG under abdominal binder  EXTREMITIES: No clubbing, cyanosis, or edema  SKIN: No rashes or lesions to visible skin    LABS:                        8.6    3.32  )-----------( 223      ( 04 Apr 2025 06:00 )             24.6     04-04    135  |  98  |  29[H]  ----------------------------<  84  4.4   |  21[L]  |  1.09    Ca    9.0      04 Apr 2025 06:00  Phos  3.8     04-04  Mg     2.20     04-04    TPro  6.2  /  Alb  3.8  /  TBili  0.6  /  DBili  x   /  AST  18  /  ALT  29  /  AlkPhos  119  04-04    RADIOLOGY & ADDITIONAL TESTS:  Results Reviewed:   Imaging Personally Reviewed:  Electrocardiogram Personally Reviewed:    COORDINATION OF CARE:  Care Discussed with Consultants/Other Providers [Y/N]: RN, SW, CM, ACP, Psych re overall care   Prior or Outpatient Records Reviewed [Y/N]:   [facility] Division of Hospital Medicine  Jordana Mays MD  Pager (M-F, 8A-5P):  In-house pager [phone]; Long-range pager [phone]  Other Times:  Please page Hospitalist in Charge -  In-house pager [phone]    Patient is a 75y old  Male who presents with a chief complaint of odynophagia with decreased PO intake (03 Apr 2025 09:32)    SUBJECTIVE / OVERNIGHT EVENTS:  No problems reported over night.  PCA reports pt drank the orange juice. Sleeping after breakfast.   ADDITIONAL REVIEW OF SYSTEMS:    MEDICATIONS  (STANDING):  chlorhexidine 2% Cloths 1 Application(s) Topical <User Schedule>  enoxaparin Injectable 40 milliGRAM(s) SubCutaneous every 24 hours  famotidine   Suspension 40 milliGRAM(s) Oral two times a day  risperiDONE   Tablet 0.5 milliGRAM(s) Oral <User Schedule>  risperiDONE   Tablet 0.75 milliGRAM(s) Oral <User Schedule>    MEDICATIONS  (PRN):  aluminum hydroxide/magnesium hydroxide/simethicone Suspension 30 milliLiter(s) Enteral Tube every 4 hours PRN Dyspepsia  OLANZapine 2.5 milliGRAM(s) Oral every 6 hours PRN agitation, not redirectable  OLANZapine Injectable 2.5 milliGRAM(s) IntraMuscular every 6 hours PRN agitation, not redirectable  ondansetron Injectable 4 milliGRAM(s) IV Push every 8 hours PRN Nausea and/or Vomiting    I&O's Summary    03 Apr 2025 07:01  -  04 Apr 2025 07:00  --------------------------------------------------------  IN: 750 mL / OUT: 0 mL / NET: 750 mL    PHYSICAL EXAM:  Vital Signs Last 24 Hrs  T(C): 36.3 (04 Apr 2025 05:00), Max: 36.3 (04 Apr 2025 05:00)  T(F): 97.3 (04 Apr 2025 05:00), Max: 97.3 (04 Apr 2025 05:00)  HR: 72 (04 Apr 2025 05:00) (72 - 72)  BP: 121/65 (04 Apr 2025 05:00) (121/65 - 121/65)  BP(mean): --  RR: 17 (04 Apr 2025 05:00) (17 - 17)  SpO2: 100% (04 Apr 2025 05:00) (100% - 100%)    Parameters below as of 04 Apr 2025 05:00  Patient On (Oxygen Delivery Method): room air    GENERAL: awake, pleasantly confused  CHEST/LUNG: CTAB  HEART: Regular rate and rhythm  ABDOMEN: Soft, non-tender, non-distended; +PEG under abdominal binder  EXTREMITIES: No clubbing, cyanosis, or edema  SKIN: No rashes or lesions to visible skin    LABS:                        8.6    3.32  )-----------( 223      ( 04 Apr 2025 06:00 )             24.6     04-04    135  |  98  |  29[H]  ----------------------------<  84  4.4   |  21[L]  |  1.09    Ca    9.0      04 Apr 2025 06:00  Phos  3.8     04-04  Mg     2.20     04-04    TPro  6.2  /  Alb  3.8  /  TBili  0.6  /  DBili  x   /  AST  18  /  ALT  29  /  AlkPhos  119  04-04    RADIOLOGY & ADDITIONAL TESTS:  Results Reviewed:   Imaging Personally Reviewed:  Electrocardiogram Personally Reviewed:    COORDINATION OF CARE:  Care Discussed with Consultants/Other Providers [Y/N]: RN, SW, CM, ACP, Psych re overall care   Prior or Outpatient Records Reviewed [Y/N]:   [facility] Division of Hospital Medicine  Jordana Mays MD  Pager (M-F, 8A-5P):  In-house pager [phone]; Long-range pager [phone]  Other Times:  Please page Hospitalist in Charge -  In-house pager [phone]    Patient is a 75y old  Male who presents with a chief complaint of odynophagia with decreased PO intake (03 Apr 2025 09:32)    SUBJECTIVE / OVERNIGHT EVENTS:  No problems reported over night.  PCA reports pt drank the orange juice. Sleeping after breakfast. Later seen awake, alert. Asked about music, says he likes all types. Asked to sing a song, he tried to explain his brain doesn't keep the rhythm any more.   ADDITIONAL REVIEW OF SYSTEMS:    MEDICATIONS  (STANDING):  chlorhexidine 2% Cloths 1 Application(s) Topical <User Schedule>  enoxaparin Injectable 40 milliGRAM(s) SubCutaneous every 24 hours  famotidine   Suspension 40 milliGRAM(s) Oral two times a day  risperiDONE   Tablet 0.5 milliGRAM(s) Oral <User Schedule>  risperiDONE   Tablet 0.75 milliGRAM(s) Oral <User Schedule>    MEDICATIONS  (PRN):  aluminum hydroxide/magnesium hydroxide/simethicone Suspension 30 milliLiter(s) Enteral Tube every 4 hours PRN Dyspepsia  OLANZapine 2.5 milliGRAM(s) Oral every 6 hours PRN agitation, not redirectable  OLANZapine Injectable 2.5 milliGRAM(s) IntraMuscular every 6 hours PRN agitation, not redirectable  ondansetron Injectable 4 milliGRAM(s) IV Push every 8 hours PRN Nausea and/or Vomiting    I&O's Summary    03 Apr 2025 07:01  -  04 Apr 2025 07:00  --------------------------------------------------------  IN: 750 mL / OUT: 0 mL / NET: 750 mL    PHYSICAL EXAM:  Vital Signs Last 24 Hrs  T(C): 36.3 (04 Apr 2025 05:00), Max: 36.3 (04 Apr 2025 05:00)  T(F): 97.3 (04 Apr 2025 05:00), Max: 97.3 (04 Apr 2025 05:00)  HR: 72 (04 Apr 2025 05:00) (72 - 72)  BP: 121/65 (04 Apr 2025 05:00) (121/65 - 121/65)  BP(mean): --  RR: 17 (04 Apr 2025 05:00) (17 - 17)  SpO2: 100% (04 Apr 2025 05:00) (100% - 100%)    Parameters below as of 04 Apr 2025 05:00  Patient On (Oxygen Delivery Method): room air    GENERAL: awake, calm, pleasantly confused and interactive  CHEST/LUNG: CTAB  HEART: Regular rate and rhythm  ABDOMEN: Soft, non-tender, non-distended; +PEG under abdominal binder  EXTREMITIES: No clubbing, cyanosis, or edema  SKIN: No rashes or lesions to visible skin    LABS:                        8.6    3.32  )-----------( 223      ( 04 Apr 2025 06:00 )             24.6     04-04    135  |  98  |  29[H]  ----------------------------<  84  4.4   |  21[L]  |  1.09    Ca    9.0      04 Apr 2025 06:00  Phos  3.8     04-04  Mg     2.20     04-04    TPro  6.2  /  Alb  3.8  /  TBili  0.6  /  DBili  x   /  AST  18  /  ALT  29  /  AlkPhos  119  04-04    RADIOLOGY & ADDITIONAL TESTS:  Results Reviewed:   Imaging Personally Reviewed:  Electrocardiogram Personally Reviewed:    COORDINATION OF CARE:  Care Discussed with Consultants/Other Providers [Y/N]: RN, SW, CM, ACP, Psych re overall care   Prior or Outpatient Records Reviewed [Y/N]:

## 2025-04-05 LAB
ALBUMIN SERPL ELPH-MCNC: 4.1 G/DL — SIGNIFICANT CHANGE UP (ref 3.3–5)
ALP SERPL-CCNC: 130 U/L — HIGH (ref 40–120)
ALT FLD-CCNC: 32 U/L — SIGNIFICANT CHANGE UP (ref 4–41)
ANION GAP SERPL CALC-SCNC: 13 MMOL/L — SIGNIFICANT CHANGE UP (ref 7–14)
AST SERPL-CCNC: 20 U/L — SIGNIFICANT CHANGE UP (ref 4–40)
BILIRUB SERPL-MCNC: 0.8 MG/DL — SIGNIFICANT CHANGE UP (ref 0.2–1.2)
BUN SERPL-MCNC: 28 MG/DL — HIGH (ref 7–23)
CALCIUM SERPL-MCNC: 9.9 MG/DL — SIGNIFICANT CHANGE UP (ref 8.4–10.5)
CHLORIDE SERPL-SCNC: 98 MMOL/L — SIGNIFICANT CHANGE UP (ref 98–107)
CO2 SERPL-SCNC: 25 MMOL/L — SIGNIFICANT CHANGE UP (ref 22–31)
CREAT SERPL-MCNC: 1.21 MG/DL — SIGNIFICANT CHANGE UP (ref 0.5–1.3)
EGFR: 62 ML/MIN/1.73M2 — SIGNIFICANT CHANGE UP
EGFR: 62 ML/MIN/1.73M2 — SIGNIFICANT CHANGE UP
GLUCOSE SERPL-MCNC: 95 MG/DL — SIGNIFICANT CHANGE UP (ref 70–99)
LACTATE SERPL-SCNC: 0.9 MMOL/L — SIGNIFICANT CHANGE UP (ref 0.5–2)
MAGNESIUM SERPL-MCNC: 2.3 MG/DL — SIGNIFICANT CHANGE UP (ref 1.6–2.6)
PHOSPHATE SERPL-MCNC: 4 MG/DL — SIGNIFICANT CHANGE UP (ref 2.5–4.5)
POTASSIUM SERPL-MCNC: 4.8 MMOL/L — SIGNIFICANT CHANGE UP (ref 3.5–5.3)
POTASSIUM SERPL-SCNC: 4.8 MMOL/L — SIGNIFICANT CHANGE UP (ref 3.5–5.3)
PROT SERPL-MCNC: 7.1 G/DL — SIGNIFICANT CHANGE UP (ref 6–8.3)
SODIUM SERPL-SCNC: 136 MMOL/L — SIGNIFICANT CHANGE UP (ref 135–145)

## 2025-04-05 PROCEDURE — 99233 SBSQ HOSP IP/OBS HIGH 50: CPT

## 2025-04-05 RX ADMIN — Medication 0.75 MILLIGRAM(S): at 18:04

## 2025-04-05 RX ADMIN — Medication 40 MILLIGRAM(S): at 05:32

## 2025-04-05 RX ADMIN — Medication 1 APPLICATION(S): at 05:31

## 2025-04-05 RX ADMIN — POLYETHYLENE GLYCOL 3350 17 GRAM(S): 17 POWDER, FOR SOLUTION ORAL at 11:25

## 2025-04-05 RX ADMIN — Medication 40 MILLIGRAM(S): at 18:04

## 2025-04-05 RX ADMIN — Medication 0.5 MILLIGRAM(S): at 05:32

## 2025-04-05 RX ADMIN — ENOXAPARIN SODIUM 40 MILLIGRAM(S): 100 INJECTION SUBCUTANEOUS at 11:26

## 2025-04-05 NOTE — PROGRESS NOTE ADULT - NSPROGADDITIONALINFOA_GEN_ALL_CORE
Pt has stabilized, stable for d/c to rehab. Pending auth  Last BM 4/1... Pt has stabilized, stable for d/c to rehab. Pending auth  Last BM 4/1, 4/5...

## 2025-04-05 NOTE — PROGRESS NOTE ADULT - ASSESSMENT
74M vascular dementia, tonsillar ca (s/p C6 of 7 of cisplatin and on RT -should've been completed on 3/7), vascular dementia p/w odynophagia/lack of appetite and overall FTT.     Active problems  SIRS  Vascular dementia w/ agitation   FTT requiring PEG tube   Tonsillar CA  Anemia in neoplastic disease  Hyponatremia (resolved)  Hyperkalemia  Hypophosphatemia  Hypercoagulable state secondary to neoplasm   Severe Protein-Calorie Malnutrition    #Vascular dementia with hallucinations at baseline, with worsening agitation in setting of prolonged hospitalization  - Pt frequently agitated. Family reporting continues to have hallucinations but this has been ongoing since diagnosis of dementia.   - CTH with chronic microvascular changes.   - For combative behavior - Zyprexa 2.5mg IM q6h PRN  appreciate psych input - adjusted risperidone 0.5 at 0700, 0.75 at 1700; stop trazodone; may retry melatonin in future  --> doing well off mittens, without PRNs    Tonsillar cancer - reportedly stage 4 on dx, completed radiation, trouble with swallowing likely d/t radiation, s/p PEG 3/18  outpt f/u with Dr. Gibson. Seen by PC, full code at this point    #FTT/severe protein calorie malnutrition  -Pt reported odynophagia and dysphagia w/ regurgitation of food. Dysphagia to solids and liquids. Seems he holds the food and does not swallow due to fear of pain.   -Reviewed CT neck, mucosal thickening and enhancement in the larynx and oropharynx may be due to infectious laryngopharyngitis or sequela of previous radiation performed.  -Patient does not like viscous lidocaine or liquid gabapentin so that was discontinued   -Cineesophagogram completed - rec puree and thin liquid   -s/p empiric treatment of thrush/esophagitis with IV fluconazole- changed to Nystatin. Completed 7 days on 3/27  -Pt still with minimal PO intake -> PEG placed 3/18 by IR, now on bolus tube feeds but can take PO as well  - increased free water 250 q6 --> q4, f/u lytes....  --> taking a small amount of PO lately....    #SIRS  Patient febrile to 101.9 and tachycardic on 3/26, no obvious signs or symptoms of infection  3/26 BCx- NGTD   UA neg, full RVP neg, CXR clear  Monitoring off abx.  Thus far was just one time spike...    #Electrolyte derangements  Lokelma for mild hyperkalemia (resolved)  Replete phos PRN (resolved)  Continue to trend    #Anemia in neoplastic disease  -Hb stable, continue to monitor    #Severe Protein calorie Malnutrition  - Starvation ketosis resolved  - Continue tube feeds     #Hypercoagulable state 2/2 malignancy  -Lovenox 40mg daily    #Dispo  -Likely DAX pending stable behavior  -PT re-eval 74M vascular dementia, tonsillar ca (s/p C6 of 7 of cisplatin and on RT -should've been completed on 3/7), vascular dementia p/w odynophagia/lack of appetite and overall FTT.     Active problems  SIRS  Vascular dementia w/ agitation   FTT requiring PEG tube   Tonsillar CA  Anemia in neoplastic disease  Hyponatremia (resolved)  Hyperkalemia  Hypophosphatemia  Hypercoagulable state secondary to neoplasm   Severe Protein-Calorie Malnutrition    #Vascular dementia with hallucinations at baseline, with worsening agitation in setting of prolonged hospitalization  - Pt frequently agitated. Family reporting continues to have hallucinations but this has been ongoing since diagnosis of dementia.   - CTH with chronic microvascular changes.   - For combative behavior - Zyprexa 2.5mg IM q6h PRN  appreciate psych input - adjusted risperidone 0.5 at 0700, 0.75 at 1700; stop trazodone; may retry melatonin in future  --> doing well off mittens, without PRNs    Tonsillar cancer - reportedly stage 4 on dx, completed radiation, trouble with swallowing likely d/t radiation, s/p PEG 3/18  outpt f/u with Dr. Gibson. Seen by PC, full code at this point    #FTT/severe protein calorie malnutrition  -Pt reported odynophagia and dysphagia w/ regurgitation of food. Dysphagia to solids and liquids. Seems he holds the food and does not swallow due to fear of pain.   -Reviewed CT neck, mucosal thickening and enhancement in the larynx and oropharynx may be due to infectious laryngopharyngitis or sequela of previous radiation performed.  -Patient does not like viscous lidocaine or liquid gabapentin so that was discontinued   -Cineesophagogram completed - rec puree and thin liquid   -s/p empiric treatment of thrush/esophagitis with IV fluconazole- changed to Nystatin. Completed 7 days on 3/27  -Pt still with minimal PO intake -> PEG placed 3/18 by IR, now on bolus tube feeds but can take PO as well  - increased free water 250 q6 --> q4, f/u lytes....  --> taking a small amount of PO lately....    #SIRS  Patient febrile to 101.9 and tachycardic on 3/26, no obvious signs or symptoms of infection  3/26 BCx- NGTD   UA neg, full RVP neg, CXR clear  Monitoring off abx.  Thus far was just one time spike...    #Electrolyte derangements  Lokelma for mild hyperkalemia (resolved)  Replete phos PRN (resolved)  Continue to trend    #Anemia in neoplastic disease  -Hb stable, continue to monitor    #Severe Protein calorie Malnutrition  - Starvation ketosis resolved  - Continue tube feeds     #Hypercoagulable state 2/2 malignancy  -Lovenox 40mg daily    #Dispo  pending sadie

## 2025-04-05 NOTE — PROGRESS NOTE ADULT - SUBJECTIVE AND OBJECTIVE BOX
[facility] Division of Hospital Medicine  Jordana Mays MD  Pager (M-F, 8A-5P):  In-house pager [phone]; Long-range pager [phone]  Other Times:  Please page Hospitalist in Charge -  In-house pager [phone]    Patient is a 75y old  Male who presents with a chief complaint of odynophagia with decreased PO intake (04 Apr 2025 09:20)    SUBJECTIVE / OVERNIGHT EVENTS:  No problems reported over night.    ADDITIONAL REVIEW OF SYSTEMS:    MEDICATIONS  (STANDING):  chlorhexidine 2% Cloths 1 Application(s) Topical <User Schedule>  enoxaparin Injectable 40 milliGRAM(s) SubCutaneous every 24 hours  famotidine   Suspension 40 milliGRAM(s) Oral two times a day  polyethylene glycol 3350 17 Gram(s) Oral every 24 hours  risperiDONE   Tablet 0.5 milliGRAM(s) Oral <User Schedule>  risperiDONE   Tablet 0.75 milliGRAM(s) Oral <User Schedule>    MEDICATIONS  (PRN):  aluminum hydroxide/magnesium hydroxide/simethicone Suspension 30 milliLiter(s) Enteral Tube every 4 hours PRN Dyspepsia  OLANZapine 2.5 milliGRAM(s) Oral every 6 hours PRN agitation, not redirectable  OLANZapine Injectable 2.5 milliGRAM(s) IntraMuscular every 6 hours PRN agitation, not redirectable  ondansetron Injectable 4 milliGRAM(s) IV Push every 8 hours PRN Nausea and/or Vomiting    CAPILLARY BLOOD GLUCOSE  POCT Blood Glucose.: 107 mg/dL (04 Apr 2025 12:07)    I&O's Summary    04 Apr 2025 07:01  -  05 Apr 2025 07:00  --------------------------------------------------------  IN: 2580 mL / OUT: 0 mL / NET: 2580 mL    PHYSICAL EXAM:  Vital Signs Last 24 Hrs  T(C): 36.4 (05 Apr 2025 05:51), Max: 36.9 (04 Apr 2025 22:30)  T(F): 97.6 (05 Apr 2025 05:51), Max: 98.4 (04 Apr 2025 22:30)  HR: 73 (05 Apr 2025 05:51) (73 - 94)  BP: 109/65 (05 Apr 2025 05:51) (109/65 - 129/60)  BP(mean): --  RR: 18 (05 Apr 2025 05:51) (17 - 18)  SpO2: 100% (05 Apr 2025 05:51) (99% - 100%)    Parameters below as of 05 Apr 2025 05:51  Patient On (Oxygen Delivery Method): room air    GENERAL: awake, calm, pleasantly confused and interactive  CHEST/LUNG: CTAB  HEART: Regular rate and rhythm  ABDOMEN: Soft, non-tender, non-distended; +PEG under abdominal binder  EXTREMITIES: No clubbing, cyanosis, or edema  SKIN: No rashes or lesions to visible skin    LABS:                        8.6    3.32  )-----------( 223      ( 04 Apr 2025 06:00 )             24.6     04-05    136  |  98  |  28[H]  ----------------------------<  95  4.8   |  25  |  1.21    Ca    9.9      05 Apr 2025 05:41  Phos  4.0     04-05  Mg     2.30     04-05    TPro  7.1  /  Alb  4.1  /  TBili  0.8  /  DBili  x   /  AST  20  /  ALT  32  /  AlkPhos  130[H]  04-05    RADIOLOGY & ADDITIONAL TESTS:  Results Reviewed:   Imaging Personally Reviewed:  Electrocardiogram Personally Reviewed:    COORDINATION OF CARE:  Care Discussed with Consultants/Other Providers [Y/N]: RN, SW, CM, ACP, Psych re overall care   Prior or Outpatient Records Reviewed [Y/N]:   [facility] Division of Hospital Medicine  Jordana Mays MD  Pager (M-F, 8A-5P):  In-house pager [phone]; Long-range pager [phone]  Other Times:  Please page Hospitalist in Charge -  In-house pager [phone]    Patient is a 75y old  Male who presents with a chief complaint of odynophagia with decreased PO intake (04 Apr 2025 09:20)    SUBJECTIVE / OVERNIGHT EVENTS:  No problems reported over night.  Seen earlier this morning, sitting up in chair, smiling. in good spirits. Cooperative with RN when giving tube feeds. No complaints.  ADDITIONAL REVIEW OF SYSTEMS:    MEDICATIONS  (STANDING):  chlorhexidine 2% Cloths 1 Application(s) Topical <User Schedule>  enoxaparin Injectable 40 milliGRAM(s) SubCutaneous every 24 hours  famotidine   Suspension 40 milliGRAM(s) Oral two times a day  polyethylene glycol 3350 17 Gram(s) Oral every 24 hours  risperiDONE   Tablet 0.5 milliGRAM(s) Oral <User Schedule>  risperiDONE   Tablet 0.75 milliGRAM(s) Oral <User Schedule>    MEDICATIONS  (PRN):  aluminum hydroxide/magnesium hydroxide/simethicone Suspension 30 milliLiter(s) Enteral Tube every 4 hours PRN Dyspepsia  OLANZapine 2.5 milliGRAM(s) Oral every 6 hours PRN agitation, not redirectable  OLANZapine Injectable 2.5 milliGRAM(s) IntraMuscular every 6 hours PRN agitation, not redirectable  ondansetron Injectable 4 milliGRAM(s) IV Push every 8 hours PRN Nausea and/or Vomiting    CAPILLARY BLOOD GLUCOSE  POCT Blood Glucose.: 107 mg/dL (04 Apr 2025 12:07)    I&O's Summary    04 Apr 2025 07:01  -  05 Apr 2025 07:00  --------------------------------------------------------  IN: 2580 mL / OUT: 0 mL / NET: 2580 mL    PHYSICAL EXAM:  Vital Signs Last 24 Hrs  T(C): 36.4 (05 Apr 2025 05:51), Max: 36.9 (04 Apr 2025 22:30)  T(F): 97.6 (05 Apr 2025 05:51), Max: 98.4 (04 Apr 2025 22:30)  HR: 73 (05 Apr 2025 05:51) (73 - 94)  BP: 109/65 (05 Apr 2025 05:51) (109/65 - 129/60)  BP(mean): --  RR: 18 (05 Apr 2025 05:51) (17 - 18)  SpO2: 100% (05 Apr 2025 05:51) (99% - 100%)    Parameters below as of 05 Apr 2025 05:51  Patient On (Oxygen Delivery Method): room air    GENERAL: awake, calm, sitting up in chair, pleasantly confused and interactive  CHEST/LUNG: CTAB  HEART: Regular rate and rhythm  ABDOMEN: Soft, non-tender, non-distended; +PEG under abdominal binder  EXTREMITIES: No clubbing, cyanosis, or edema  SKIN: No rashes or lesions to visible skin; multiple tatoos    LABS:                        8.6    3.32  )-----------( 223      ( 04 Apr 2025 06:00 )             24.6     04-05    136  |  98  |  28[H]  ----------------------------<  95  4.8   |  25  |  1.21    Ca    9.9      05 Apr 2025 05:41  Phos  4.0     04-05  Mg     2.30     04-05    TPro  7.1  /  Alb  4.1  /  TBili  0.8  /  DBili  x   /  AST  20  /  ALT  32  /  AlkPhos  130[H]  04-05    RADIOLOGY & ADDITIONAL TESTS:  Results Reviewed:   Imaging Personally Reviewed:  Electrocardiogram Personally Reviewed:    COORDINATION OF CARE:  Care Discussed with Consultants/Other Providers [Y/N]: RN, SW, CM, ACP, Psych re overall care   Prior or Outpatient Records Reviewed [Y/N]:

## 2025-04-06 LAB
ALBUMIN SERPL ELPH-MCNC: 3.9 G/DL — SIGNIFICANT CHANGE UP (ref 3.3–5)
ALP SERPL-CCNC: 124 U/L — HIGH (ref 40–120)
ALT FLD-CCNC: 30 U/L — SIGNIFICANT CHANGE UP (ref 4–41)
ANION GAP SERPL CALC-SCNC: 13 MMOL/L — SIGNIFICANT CHANGE UP (ref 7–14)
AST SERPL-CCNC: 23 U/L — SIGNIFICANT CHANGE UP (ref 4–40)
BILIRUB SERPL-MCNC: 0.5 MG/DL — SIGNIFICANT CHANGE UP (ref 0.2–1.2)
BUN SERPL-MCNC: 27 MG/DL — HIGH (ref 7–23)
CALCIUM SERPL-MCNC: 9.5 MG/DL — SIGNIFICANT CHANGE UP (ref 8.4–10.5)
CHLORIDE SERPL-SCNC: 97 MMOL/L — LOW (ref 98–107)
CO2 SERPL-SCNC: 23 MMOL/L — SIGNIFICANT CHANGE UP (ref 22–31)
CREAT SERPL-MCNC: 1.08 MG/DL — SIGNIFICANT CHANGE UP (ref 0.5–1.3)
EGFR: 71 ML/MIN/1.73M2 — SIGNIFICANT CHANGE UP
EGFR: 71 ML/MIN/1.73M2 — SIGNIFICANT CHANGE UP
GLUCOSE SERPL-MCNC: 93 MG/DL — SIGNIFICANT CHANGE UP (ref 70–99)
MAGNESIUM SERPL-MCNC: 2.2 MG/DL — SIGNIFICANT CHANGE UP (ref 1.6–2.6)
PHOSPHATE SERPL-MCNC: 3.8 MG/DL — SIGNIFICANT CHANGE UP (ref 2.5–4.5)
POTASSIUM SERPL-MCNC: 4.5 MMOL/L — SIGNIFICANT CHANGE UP (ref 3.5–5.3)
POTASSIUM SERPL-SCNC: 4.5 MMOL/L — SIGNIFICANT CHANGE UP (ref 3.5–5.3)
PROT SERPL-MCNC: 6.8 G/DL — SIGNIFICANT CHANGE UP (ref 6–8.3)
SODIUM SERPL-SCNC: 133 MMOL/L — LOW (ref 135–145)

## 2025-04-06 PROCEDURE — 99232 SBSQ HOSP IP/OBS MODERATE 35: CPT

## 2025-04-06 PROCEDURE — 93010 ELECTROCARDIOGRAM REPORT: CPT

## 2025-04-06 RX ADMIN — Medication 40 MILLIGRAM(S): at 06:03

## 2025-04-06 RX ADMIN — ENOXAPARIN SODIUM 40 MILLIGRAM(S): 100 INJECTION SUBCUTANEOUS at 15:49

## 2025-04-06 RX ADMIN — Medication 0.75 MILLIGRAM(S): at 19:24

## 2025-04-06 RX ADMIN — Medication 40 MILLIGRAM(S): at 19:24

## 2025-04-06 RX ADMIN — Medication 1 APPLICATION(S): at 06:15

## 2025-04-06 RX ADMIN — Medication 0.5 MILLIGRAM(S): at 06:03

## 2025-04-06 RX ADMIN — POLYETHYLENE GLYCOL 3350 17 GRAM(S): 17 POWDER, FOR SOLUTION ORAL at 19:24

## 2025-04-06 NOTE — PROGRESS NOTE ADULT - SUBJECTIVE AND OBJECTIVE BOX
[facility] Division of Hospital Medicine  Jordana Mays MD  Pager (M-F, 8A-5P):  In-house pager [phone]; Long-range pager [phone]  Other Times:  Please page Hospitalist in Charge -  In-house pager [phone]    Patient is a 75y old  Male who presents with a chief complaint of odynophagia with decreased PO intake (05 Apr 2025 09:53)    SUBJECTIVE / OVERNIGHT EVENTS:  No problems reported over night.    ADDITIONAL REVIEW OF SYSTEMS:    MEDICATIONS  (STANDING):  chlorhexidine 2% Cloths 1 Application(s) Topical <User Schedule>  enoxaparin Injectable 40 milliGRAM(s) SubCutaneous every 24 hours  famotidine   Suspension 40 milliGRAM(s) Oral two times a day  polyethylene glycol 3350 17 Gram(s) Oral every 24 hours  risperiDONE   Tablet 0.5 milliGRAM(s) Oral <User Schedule>  risperiDONE   Tablet 0.75 milliGRAM(s) Oral <User Schedule>    MEDICATIONS  (PRN):  aluminum hydroxide/magnesium hydroxide/simethicone Suspension 30 milliLiter(s) Enteral Tube every 4 hours PRN Dyspepsia  OLANZapine 2.5 milliGRAM(s) Oral every 6 hours PRN agitation, not redirectable  OLANZapine Injectable 2.5 milliGRAM(s) IntraMuscular every 6 hours PRN agitation, not redirectable  ondansetron Injectable 4 milliGRAM(s) IV Push every 8 hours PRN Nausea and/or Vomiting    I&O's Summary    05 Apr 2025 07:01  -  06 Apr 2025 07:00  --------------------------------------------------------  IN: 1830 mL / OUT: 0 mL / NET: 1830 mL    PHYSICAL EXAM:  Vital Signs Last 24 Hrs  T(C): 36.9 (06 Apr 2025 06:00), Max: 36.9 (05 Apr 2025 13:35)  T(F): 98.4 (06 Apr 2025 06:00), Max: 98.4 (05 Apr 2025 13:35)  HR: 82 (06 Apr 2025 06:00) (66 - 84)  BP: 122/76 (06 Apr 2025 06:00) (104/65 - 122/76)  BP(mean): --  RR: 18 (06 Apr 2025 06:00) (18 - 18)  SpO2: 100% (06 Apr 2025 06:00) (99% - 100%)    Parameters below as of 06 Apr 2025 06:00  Patient On (Oxygen Delivery Method): room air    GENERAL: awake, calm, sitting up in chair, pleasantly confused and interactive  CHEST/LUNG: CTAB  HEART: Regular rate and rhythm  ABDOMEN: Soft, non-tender, non-distended; +PEG under abdominal binder  EXTREMITIES: No clubbing, cyanosis, or edema  SKIN: No rashes or lesions to visible skin; multiple tatoos    LABS:    04-06    133[L]  |  97[L]  |  27[H]  ----------------------------<  93  4.5   |  23  |  1.08    Ca    9.5      06 Apr 2025 06:46  Phos  3.8     04-06  Mg     2.20     04-06    TPro  6.8  /  Alb  3.9  /  TBili  0.5  /  DBili  x   /  AST  23  /  ALT  30  /  AlkPhos  124[H]  04-06    RADIOLOGY & ADDITIONAL TESTS:  Results Reviewed:   Imaging Personally Reviewed:  Electrocardiogram Personally Reviewed:    COORDINATION OF CARE:  Care Discussed with Consultants/Other Providers [Y/N]: RN, SW, CM, ACP re overall care   Prior or Outpatient Records Reviewed [Y/N]:   [facility] Division of Hospital Medicine  Jordana Mays MD  Pager (M-F, 8A-5P):  In-house pager [phone]; Long-range pager [phone]  Other Times:  Please page Hospitalist in Charge -  In-house pager [phone]    Patient is a 75y old  Male who presents with a chief complaint of odynophagia with decreased PO intake (05 Apr 2025 09:53)    SUBJECTIVE / OVERNIGHT EVENTS:  No problems reported over night.  Seen earlier this am, sleeping. Slowly woke after family came in. Pleasantly confused.   ADDITIONAL REVIEW OF SYSTEMS:    MEDICATIONS  (STANDING):  chlorhexidine 2% Cloths 1 Application(s) Topical <User Schedule>  enoxaparin Injectable 40 milliGRAM(s) SubCutaneous every 24 hours  famotidine   Suspension 40 milliGRAM(s) Oral two times a day  polyethylene glycol 3350 17 Gram(s) Oral every 24 hours  risperiDONE   Tablet 0.5 milliGRAM(s) Oral <User Schedule>  risperiDONE   Tablet 0.75 milliGRAM(s) Oral <User Schedule>    MEDICATIONS  (PRN):  aluminum hydroxide/magnesium hydroxide/simethicone Suspension 30 milliLiter(s) Enteral Tube every 4 hours PRN Dyspepsia  OLANZapine 2.5 milliGRAM(s) Oral every 6 hours PRN agitation, not redirectable  OLANZapine Injectable 2.5 milliGRAM(s) IntraMuscular every 6 hours PRN agitation, not redirectable  ondansetron Injectable 4 milliGRAM(s) IV Push every 8 hours PRN Nausea and/or Vomiting    I&O's Summary    05 Apr 2025 07:01  -  06 Apr 2025 07:00  --------------------------------------------------------  IN: 1830 mL / OUT: 0 mL / NET: 1830 mL    PHYSICAL EXAM:  Vital Signs Last 24 Hrs  T(C): 36.9 (06 Apr 2025 06:00), Max: 36.9 (05 Apr 2025 13:35)  T(F): 98.4 (06 Apr 2025 06:00), Max: 98.4 (05 Apr 2025 13:35)  HR: 82 (06 Apr 2025 06:00) (66 - 84)  BP: 122/76 (06 Apr 2025 06:00) (104/65 - 122/76)  BP(mean): --  RR: 18 (06 Apr 2025 06:00) (18 - 18)  SpO2: 100% (06 Apr 2025 06:00) (99% - 100%)    Parameters below as of 06 Apr 2025 06:00  Patient On (Oxygen Delivery Method): room air    GENERAL: awake, calm, resting in bed  CHEST/LUNG: CTAB  HEART: Regular rate and rhythm  ABDOMEN: Soft, non-tender, non-distended; +PEG under abdominal binder  EXTREMITIES: No clubbing, cyanosis, or edema  SKIN: No rashes or lesions to visible skin; multiple tatoos    LABS:    04-06    133[L]  |  97[L]  |  27[H]  ----------------------------<  93  4.5   |  23  |  1.08    Ca    9.5      06 Apr 2025 06:46  Phos  3.8     04-06  Mg     2.20     04-06    TPro  6.8  /  Alb  3.9  /  TBili  0.5  /  DBili  x   /  AST  23  /  ALT  30  /  AlkPhos  124[H]  04-06    RADIOLOGY & ADDITIONAL TESTS:  Results Reviewed:   Imaging Personally Reviewed:  Electrocardiogram Personally Reviewed:    COORDINATION OF CARE:  Care Discussed with Consultants/Other Providers [Y/N]: RN, SW, CM, ACP re overall care   Prior or Outpatient Records Reviewed [Y/N]:

## 2025-04-06 NOTE — PROGRESS NOTE ADULT - ASSESSMENT
74M vascular dementia, tonsillar ca (s/p C6 of 7 of cisplatin and on RT -should've been completed on 3/7), vascular dementia p/w odynophagia/lack of appetite and overall FTT.     Active problems  SIRS  Vascular dementia w/ agitation   FTT requiring PEG tube   Tonsillar CA  Anemia in neoplastic disease  Hyponatremia (resolved)  Hyperkalemia  Hypophosphatemia  Hypercoagulable state secondary to neoplasm   Severe Protein-Calorie Malnutrition    #Vascular dementia with hallucinations at baseline, with worsening agitation in setting of prolonged hospitalization  - Pt frequently agitated. Family reporting continues to have hallucinations but this has been ongoing since diagnosis of dementia.   - CTH with chronic microvascular changes.   - For combative behavior - Zyprexa 2.5mg IM q6h PRN  appreciate psych input - adjusted risperidone 0.5 at 0700, 0.75 at 1700; stop trazodone; may retry melatonin in future  --> doing well off mittens, without PRNs    Tonsillar cancer - reportedly stage 4 on dx, completed radiation, trouble with swallowing likely d/t radiation, s/p PEG 3/18  outpt f/u with Dr. Gibson. Seen by PC, full code at this point    #FTT/severe protein calorie malnutrition  -Pt reported odynophagia and dysphagia w/ regurgitation of food. Dysphagia to solids and liquids. Seems he holds the food and does not swallow due to fear of pain.   -Reviewed CT neck, mucosal thickening and enhancement in the larynx and oropharynx may be due to infectious laryngopharyngitis or sequela of previous radiation performed.  -Patient does not like viscous lidocaine or liquid gabapentin so that was discontinued   -Cineesophagogram completed - rec puree and thin liquid   -s/p empiric treatment of thrush/esophagitis with IV fluconazole- changed to Nystatin. Completed 7 days on 3/27  -Pt still with minimal PO intake -> PEG placed 3/18 by IR, now on bolus tube feeds but can take PO as well  - increased free water 250 q6 --> q4, f/u lytes....  --> taking a small amount of PO lately....    #SIRS  Patient febrile to 101.9 and tachycardic on 3/26, no obvious signs or symptoms of infection  3/26 BCx- NGTD   UA neg, full RVP neg, CXR clear  Monitoring off abx.  Thus far was just one time spike...    #Electrolyte derangements  Lokelma for mild hyperkalemia (resolved)  Replete phos PRN (resolved)  Continue to trend    #Anemia in neoplastic disease  -Hb stable, continue to monitor    #Severe Protein calorie Malnutrition  - Starvation ketosis resolved  - Continue tube feeds     #Hypercoagulable state 2/2 malignancy  -Lovenox 40mg daily    #Dispo  pending sadie 74M vascular dementia, tonsillar ca (s/p C6 of 7 of cisplatin and on RT -should've been completed on 3/7), vascular dementia p/w odynophagia/lack of appetite and overall FTT.     Active problems  SIRS  Vascular dementia w/ agitation   FTT requiring PEG tube   Tonsillar CA  Anemia in neoplastic disease  Hyponatremia (resolved)  Hyperkalemia  Hypophosphatemia  Hypercoagulable state secondary to neoplasm   Severe Protein-Calorie Malnutrition    #Vascular dementia with hallucinations at baseline, with worsening agitation in setting of prolonged hospitalization  - Pt frequently agitated. Family reporting continues to have hallucinations but this has been ongoing since diagnosis of dementia.   - CTH with chronic microvascular changes.   - For combative behavior - Zyprexa 2.5mg IM q6h PRN  appreciate psych input - adjusted risperidone 0.5 at 0700, 0.75 at 1700; stop trazodone; may retry melatonin in future  --> doing well off mittens, without PRNs    Tonsillar cancer - reportedly stage 4 on dx, completed radiation, trouble with swallowing likely d/t radiation, s/p PEG 3/18  outpt f/u with Dr. Gibson. Seen by PC, full code at this point    #FTT/severe protein calorie malnutrition  -Pt reported odynophagia and dysphagia w/ regurgitation of food. Dysphagia to solids and liquids. Seems he holds the food and does not swallow due to fear of pain.   -Reviewed CT neck, mucosal thickening and enhancement in the larynx and oropharynx may be due to infectious laryngopharyngitis or sequela of previous radiation performed.  -Patient does not like viscous lidocaine or liquid gabapentin so that was discontinued   -Cineesophagogram completed - rec puree and thin liquid   -s/p empiric treatment of thrush/esophagitis with IV fluconazole- changed to Nystatin. Completed 7 days on 3/27  -Pt still with minimal PO intake -> PEG placed 3/18 by IR, now on bolus tube feeds but can take PO as well  - increased free water 250 q6 --> q4 --> change back to q6 as na 133, f/u lytes....  --> taking a small amount of PO lately, PO as tolerates, family brings favorite foods    #SIRS  Patient febrile to 101.9 and tachycardic on 3/26, no obvious signs or symptoms of infection  3/26 BCx- NGTD   UA neg, full RVP neg, CXR clear  Monitoring off abx.  Thus far was just one time spike...    #Electrolyte derangements  Lokelma for mild hyperkalemia (resolved)  Replete phos PRN (resolved)  Continue to trend    #Anemia in neoplastic disease  -Hb stable, continue to monitor    #Severe Protein calorie Malnutrition  - Starvation ketosis resolved  - Continue tube feeds     #Hypercoagulable state 2/2 malignancy  -Lovenox 40mg daily    #Dispo  pending sadie

## 2025-04-07 VITALS
SYSTOLIC BLOOD PRESSURE: 122 MMHG | OXYGEN SATURATION: 100 % | TEMPERATURE: 98 F | DIASTOLIC BLOOD PRESSURE: 74 MMHG | HEART RATE: 92 BPM | RESPIRATION RATE: 18 BRPM

## 2025-04-07 DIAGNOSIS — R79.89 OTHER SPECIFIED ABNORMAL FINDINGS OF BLOOD CHEMISTRY: ICD-10-CM

## 2025-04-07 LAB
ALBUMIN SERPL ELPH-MCNC: 4 G/DL — SIGNIFICANT CHANGE UP (ref 3.3–5)
ALP SERPL-CCNC: 175 U/L — HIGH (ref 40–120)
ALT FLD-CCNC: 61 U/L — HIGH (ref 4–41)
ANION GAP SERPL CALC-SCNC: 12 MMOL/L — SIGNIFICANT CHANGE UP (ref 7–14)
AST SERPL-CCNC: 60 U/L — HIGH (ref 4–40)
BASOPHILS # BLD AUTO: 0.03 K/UL — SIGNIFICANT CHANGE UP (ref 0–0.2)
BASOPHILS NFR BLD AUTO: 0.6 % — SIGNIFICANT CHANGE UP (ref 0–2)
BILIRUB SERPL-MCNC: 0.6 MG/DL — SIGNIFICANT CHANGE UP (ref 0.2–1.2)
BUN SERPL-MCNC: 23 MG/DL — SIGNIFICANT CHANGE UP (ref 7–23)
CALCIUM SERPL-MCNC: 9.5 MG/DL — SIGNIFICANT CHANGE UP (ref 8.4–10.5)
CHLORIDE SERPL-SCNC: 98 MMOL/L — SIGNIFICANT CHANGE UP (ref 98–107)
CO2 SERPL-SCNC: 25 MMOL/L — SIGNIFICANT CHANGE UP (ref 22–31)
CREAT SERPL-MCNC: 0.94 MG/DL — SIGNIFICANT CHANGE UP (ref 0.5–1.3)
EGFR: 84 ML/MIN/1.73M2 — SIGNIFICANT CHANGE UP
EGFR: 84 ML/MIN/1.73M2 — SIGNIFICANT CHANGE UP
EOSINOPHIL # BLD AUTO: 0.04 K/UL — SIGNIFICANT CHANGE UP (ref 0–0.5)
EOSINOPHIL NFR BLD AUTO: 0.8 % — SIGNIFICANT CHANGE UP (ref 0–6)
GLUCOSE SERPL-MCNC: 85 MG/DL — SIGNIFICANT CHANGE UP (ref 70–99)
HCT VFR BLD CALC: 26.1 % — LOW (ref 39–50)
HGB BLD-MCNC: 9 G/DL — LOW (ref 13–17)
IANC: 3.7 K/UL — SIGNIFICANT CHANGE UP (ref 1.8–7.4)
IMM GRANULOCYTES NFR BLD AUTO: 0.4 % — SIGNIFICANT CHANGE UP (ref 0–0.9)
LYMPHOCYTES # BLD AUTO: 0.56 K/UL — LOW (ref 1–3.3)
LYMPHOCYTES # BLD AUTO: 11.9 % — LOW (ref 13–44)
MCHC RBC-ENTMCNC: 33.5 PG — SIGNIFICANT CHANGE UP (ref 27–34)
MCHC RBC-ENTMCNC: 34.5 G/DL — SIGNIFICANT CHANGE UP (ref 32–36)
MCV RBC AUTO: 97 FL — SIGNIFICANT CHANGE UP (ref 80–100)
MONOCYTES # BLD AUTO: 0.37 K/UL — SIGNIFICANT CHANGE UP (ref 0–0.9)
MONOCYTES NFR BLD AUTO: 7.8 % — SIGNIFICANT CHANGE UP (ref 2–14)
NEUTROPHILS # BLD AUTO: 3.7 K/UL — SIGNIFICANT CHANGE UP (ref 1.8–7.4)
NEUTROPHILS NFR BLD AUTO: 78.5 % — HIGH (ref 43–77)
NRBC # BLD AUTO: 0 K/UL — SIGNIFICANT CHANGE UP (ref 0–0)
NRBC # FLD: 0 K/UL — SIGNIFICANT CHANGE UP (ref 0–0)
NRBC BLD AUTO-RTO: 0 /100 WBCS — SIGNIFICANT CHANGE UP (ref 0–0)
PLATELET # BLD AUTO: 203 K/UL — SIGNIFICANT CHANGE UP (ref 150–400)
POTASSIUM SERPL-MCNC: 4.1 MMOL/L — SIGNIFICANT CHANGE UP (ref 3.5–5.3)
POTASSIUM SERPL-SCNC: 4.1 MMOL/L — SIGNIFICANT CHANGE UP (ref 3.5–5.3)
PROT SERPL-MCNC: 6.7 G/DL — SIGNIFICANT CHANGE UP (ref 6–8.3)
RBC # BLD: 2.69 M/UL — LOW (ref 4.2–5.8)
RBC # FLD: 20 % — HIGH (ref 10.3–14.5)
SODIUM SERPL-SCNC: 135 MMOL/L — SIGNIFICANT CHANGE UP (ref 135–145)
WBC # BLD: 4.72 K/UL — SIGNIFICANT CHANGE UP (ref 3.8–10.5)
WBC # FLD AUTO: 4.72 K/UL — SIGNIFICANT CHANGE UP (ref 3.8–10.5)

## 2025-04-07 PROCEDURE — 76705 ECHO EXAM OF ABDOMEN: CPT | Mod: 26

## 2025-04-07 PROCEDURE — 99239 HOSP IP/OBS DSCHRG MGMT >30: CPT

## 2025-04-07 RX ORDER — POLYETHYLENE GLYCOL 3350 17 G/17G
17 POWDER, FOR SOLUTION ORAL
Qty: 0 | Refills: 0 | DISCHARGE
Start: 2025-04-07

## 2025-04-07 RX ORDER — GABAPENTIN 400 MG/1
0 CAPSULE ORAL
Refills: 0 | DISCHARGE

## 2025-04-07 RX ORDER — RISPERIDONE 4 MG
1 TABLET ORAL
Qty: 0 | Refills: 0 | DISCHARGE
Start: 2025-04-07

## 2025-04-07 RX ORDER — MAGNESIUM, ALUMINUM HYDROXIDE 200-200 MG
30 TABLET,CHEWABLE ORAL
Qty: 0 | Refills: 0 | DISCHARGE
Start: 2025-04-07

## 2025-04-07 RX ORDER — RISPERIDONE 4 MG
3 TABLET ORAL
Qty: 0 | Refills: 0 | DISCHARGE
Start: 2025-04-07

## 2025-04-07 RX ADMIN — POLYETHYLENE GLYCOL 3350 17 GRAM(S): 17 POWDER, FOR SOLUTION ORAL at 13:07

## 2025-04-07 RX ADMIN — Medication 40 MILLIGRAM(S): at 06:07

## 2025-04-07 RX ADMIN — ENOXAPARIN SODIUM 40 MILLIGRAM(S): 100 INJECTION SUBCUTANEOUS at 13:08

## 2025-04-07 RX ADMIN — Medication 1 APPLICATION(S): at 06:09

## 2025-04-07 RX ADMIN — Medication 0.5 MILLIGRAM(S): at 06:07

## 2025-04-07 NOTE — PROGRESS NOTE ADULT - PROVIDER SPECIALTY LIST ADULT
Hospitalist
Intervent Radiology
Hospitalist
Palliative Care
Hospitalist
Palliative Care
Hospitalist
Palliative Care

## 2025-04-07 NOTE — PROGRESS NOTE ADULT - NUTRITIONAL ASSESSMENT
This patient has been assessed with a concern for Malnutrition and has been determined to have a diagnosis/diagnoses of Severe protein-calorie malnutrition.    This patient is being managed with:   Diet Pureed-  Tube Feeding Modality: Gastrostomy  Jevity 1.5 Moises (JEVITY1.5RTH)  Total Volume for 24 Hours (mL): 2880  Bolus  Total Volume of Bolus (mL):  360  Total # of Feeds: 4  Tube Feed Frequency: Every 3 hours   Tube Feed Start Time: 07:00  Bolus Feed Rate (mL per Hour): 360   Bolus Feed Duration (in Hours): 1  Bolus Feed Instructions:  Administer at 7AM 11AM 2PM and 5PM  Entered: Mar 21 2025  2:53AM  
This patient has been assessed with a concern for Malnutrition and has been determined to have a diagnosis/diagnoses of Severe protein-calorie malnutrition.    This patient is being managed with:   Diet Regular-  Soft and Bite Sized (SOFTBTSZ)  Supplement Feeding Modality:  Oral  Ensure Plus High Protein Cans or Servings Per Day:  1       Frequency:  Three Times a day  Entered: Mar  6 2025  7:36PM  
This patient has been assessed with a concern for Malnutrition and has been determined to have a diagnosis/diagnoses of Severe protein-calorie malnutrition.    This patient is being managed with:   Diet Pureed-  Supplement Feeding Modality:  Oral  Ensure Plus High Protein Cans or Servings Per Day:  1       Frequency:  Three Times a day  Entered: Mar 14 2025  9:30AM  
This patient has been assessed with a concern for Malnutrition and has been determined to have a diagnosis/diagnoses of Severe protein-calorie malnutrition.    This patient is being managed with:   Diet Pureed-  Tube Feeding Modality: Gastrostomy  Jevity 1.5 Moises (JEVITY1.5RTH)  Total Volume for 24 Hours (mL): 2880  Bolus  Total Volume of Bolus (mL):  360  Total # of Feeds: 4  Tube Feed Frequency: Every 3 hours   Tube Feed Start Time: 07:00  Bolus Feed Rate (mL per Hour): 360   Bolus Feed Duration (in Hours): 1  Bolus Feed Instructions:  Administer at 7AM 11AM 2PM and 5PM  Entered: Mar 21 2025  2:53AM  
This patient has been assessed with a concern for Malnutrition and has been determined to have a diagnosis/diagnoses of Severe protein-calorie malnutrition.    This patient is being managed with:   Diet Regular-  Soft and Bite Sized (SOFTBTSZ)  Supplement Feeding Modality:  Oral  Ensure Plus High Protein Cans or Servings Per Day:  1       Frequency:  Three Times a day  Entered: Mar  6 2025  7:36PM  
This patient has been assessed with a concern for Malnutrition and has been determined to have a diagnosis/diagnoses of Severe protein-calorie malnutrition.    This patient is being managed with:   Diet NPO after Midnight-     NPO Start Date: 17-Mar-2025   NPO Start Time: 23:59  Except Medications  Entered: Mar 17 2025  7:22AM    Diet Pureed-  Supplement Feeding Modality:  Oral  Ensure Plus High Protein Cans or Servings Per Day:  1       Frequency:  Three Times a day  Entered: Mar 14 2025  9:30AM  
This patient has been assessed with a concern for Malnutrition and has been determined to have a diagnosis/diagnoses of Severe protein-calorie malnutrition.    This patient is being managed with:   Diet NPO with Tube Feed-  Tube Feeding Modality: Gastrostomy  Jevity 1.5 Moises (JEVITY1.5RTH)  Total Volume for 24 Hours (mL): 1440  Continuous  Starting Tube Feed Rate {mL per Hour}: 20  Increase Tube Feed Rate by (mL): 10     Every 6 hours  Until Goal Tube Feed Rate (mL per Hour): 60  Tube Feed Duration (in Hours): 24  Tube Feed Start Time: 15:00  Free Water Flush  Bolus   Total Volume per Flush (mL): 150   Frequency: Every 6 Hours  Entered: Mar 19 2025  3:21PM  
This patient has been assessed with a concern for Malnutrition and has been determined to have a diagnosis/diagnoses of Severe protein-calorie malnutrition.    This patient is being managed with:   Diet Pureed-  Tube Feeding Modality: Gastrostomy  Jevity 1.5 Moises (JEVITY1.5RTH)  Total Volume for 24 Hours (mL): 2880  Bolus  Total Volume of Bolus (mL):  360  Total # of Feeds: 4  Tube Feed Frequency: Every 3 hours   Tube Feed Start Time: 07:00  Bolus Feed Rate (mL per Hour): 360   Bolus Feed Duration (in Hours): 1  Bolus Feed Instructions:  Administer at 7AM 11AM 2PM and 5PM  Entered: Mar 21 2025  2:53AM  
This patient has been assessed with a concern for Malnutrition and has been determined to have a diagnosis/diagnoses of Severe protein-calorie malnutrition.    This patient is being managed with:   Diet Pureed-  Tube Feeding Modality: Gastrostomy  Jevity 1.5 Moises (JEVITY1.5RTH)  Total Volume for 24 Hours (mL): 2880  Bolus  Total Volume of Bolus (mL):  360  Total # of Feeds: 4  Tube Feed Frequency: Every 3 hours   Tube Feed Start Time: 07:00  Bolus Feed Rate (mL per Hour): 360   Bolus Feed Duration (in Hours): 1  Bolus Feed Instructions:  Administer at 7AM 11AM 2PM and 5PM  Entered: Mar 21 2025  2:53AM  
This patient has been assessed with a concern for Malnutrition and has been determined to have a diagnosis/diagnoses of Severe protein-calorie malnutrition.    This patient is being managed with:   Diet Regular-  Soft and Bite Sized (SOFTBTSZ)  Supplement Feeding Modality:  Oral  Ensure Plus High Protein Cans or Servings Per Day:  1       Frequency:  Three Times a day  Entered: Mar  6 2025  7:36PM  
This patient has been assessed with a concern for Malnutrition and has been determined to have a diagnosis/diagnoses of Severe protein-calorie malnutrition.    This patient is being managed with:   Diet NPO after Midnight-     NPO Start Date: 17-Mar-2025   NPO Start Time: 23:59  Except Medications  Entered: Mar 17 2025  7:22AM    Diet Pureed-  Supplement Feeding Modality:  Oral  Ensure Plus High Protein Cans or Servings Per Day:  1       Frequency:  Three Times a day  Entered: Mar 14 2025  9:30AM  
This patient has been assessed with a concern for Malnutrition and has been determined to have a diagnosis/diagnoses of Severe protein-calorie malnutrition.    This patient is being managed with:   Diet NPO-  With Ice Chips/Sips of Water  Entered: Mar 18 2025 12:51PM  
This patient has been assessed with a concern for Malnutrition and has been determined to have a diagnosis/diagnoses of Severe protein-calorie malnutrition.    This patient is being managed with:   Diet Regular-  Soft and Bite Sized (SOFTBTSZ)  Supplement Feeding Modality:  Oral  Ensure Plus High Protein Cans or Servings Per Day:  1       Frequency:  Three Times a day  Entered: Mar  6 2025  7:36PM  
This patient has been assessed with a concern for Malnutrition and has been determined to have a diagnosis/diagnoses of Severe protein-calorie malnutrition.    This patient is being managed with:   Diet Regular-  Soft and Bite Sized (SOFTBTSZ)  Supplement Feeding Modality:  Oral  Ensure Plus High Protein Cans or Servings Per Day:  1       Frequency:  Three Times a day  Entered: Mar  6 2025  7:36PM  
This patient has been assessed with a concern for Malnutrition and has been determined to have a diagnosis/diagnoses of Severe protein-calorie malnutrition.    This patient is being managed with:   Diet Pureed-  Tube Feeding Modality: Gastrostomy  Jevity 1.5 Moises (JEVITY1.5RTH)  Total Volume for 24 Hours (mL): 2880  Bolus  Total Volume of Bolus (mL):  360  Total # of Feeds: 4  Tube Feed Frequency: Every 3 hours   Tube Feed Start Time: 07:00  Bolus Feed Rate (mL per Hour): 360   Bolus Feed Duration (in Hours): 1  Bolus Feed Instructions:  Administer at 7AM 11AM 2PM and 5PM  Entered: Mar 21 2025  2:53AM  

## 2025-04-07 NOTE — PROGRESS NOTE ADULT - REASON FOR ADMISSION
odynophagia with decreased PO intake

## 2025-04-07 NOTE — PROGRESS NOTE ADULT - NSPROGADDITIONALINFOA_GEN_ALL_CORE
Pt has stabilized, stable for d/c to rehab. Pending auth  Last BM 4/1, 4/5... Pt has stabilized, stable for d/c to rehab. Auth obtained.  Last BM 4/1, 4/5    Spent 60 minutes counseling and coordinating discharge care.

## 2025-04-07 NOTE — PROGRESS NOTE ADULT - ASSESSMENT
74M vascular dementia, tonsillar ca (s/p C6 of 7 of cisplatin and on RT -should've been completed on 3/7), vascular dementia p/w odynophagia/lack of appetite and overall FTT.     Active problems  SIRS  Vascular dementia w/ agitation   FTT requiring PEG tube   Tonsillar CA  Anemia in neoplastic disease  Hyponatremia (resolved)  Hyperkalemia  Hypophosphatemia  Hypercoagulable state secondary to neoplasm   Severe Protein-Calorie Malnutrition    #Vascular dementia with hallucinations at baseline, with worsening agitation in setting of prolonged hospitalization  - Pt frequently agitated. Family reporting continues to have hallucinations but this has been ongoing since diagnosis of dementia.   - CTH with chronic microvascular changes.   - For combative behavior - Zyprexa 2.5mg IM q6h PRN  appreciate psych input - adjusted risperidone 0.5 at 0700, 0.75 at 1700; stop trazodone; may retry melatonin in future  --> doing well off mittens, without PRNs    Tonsillar cancer - reportedly stage 4 on dx, completed radiation, trouble with swallowing likely d/t radiation, s/p PEG 3/18  outpt f/u with Dr. Gibson. Seen by PC, full code at this point    #FTT/severe protein calorie malnutrition  -Pt reported odynophagia and dysphagia w/ regurgitation of food. Dysphagia to solids and liquids. Seems he holds the food and does not swallow due to fear of pain.   -Reviewed CT neck, mucosal thickening and enhancement in the larynx and oropharynx may be due to infectious laryngopharyngitis or sequela of previous radiation performed.  -Patient does not like viscous lidocaine or liquid gabapentin so that was discontinued   -Cineesophagogram completed - rec puree and thin liquid   -s/p empiric treatment of thrush/esophagitis with IV fluconazole- changed to Nystatin. Completed 7 days on 3/27  -Pt still with minimal PO intake -> PEG placed 3/18 by IR, now on bolus tube feeds but can take PO as well  - increased free water 250 q6 --> q4 --> change back to q6 as na 133, f/u lytes....  --> taking a small amount of PO lately, PO as tolerates, family brings favorite foods    #SIRS  Patient febrile to 101.9 and tachycardic on 3/26, no obvious signs or symptoms of infection  3/26 BCx- NGTD   UA neg, full RVP neg, CXR clear  Monitoring off abx.  Thus far was just one time spike...    #Electrolyte derangements  Lokelma for mild hyperkalemia (resolved)  Replete phos PRN (resolved)  Continue to trend    #Anemia in neoplastic disease  -Hb stable, continue to monitor    #Severe Protein calorie Malnutrition  - Starvation ketosis resolved  - Continue tube feeds     #Hypercoagulable state 2/2 malignancy  -Lovenox 40mg daily    #Dispo  pending sadie 74M vascular dementia, tonsillar ca (s/p C6 of 7 of cisplatin and on RT -should've been completed on 3/7), vascular dementia p/w odynophagia/lack of appetite and overall FTT.     Active problems  SIRS  Vascular dementia w/ agitation   FTT requiring PEG tube   Tonsillar CA  Anemia in neoplastic disease  Hyponatremia (resolved)  Hyperkalemia  Hypophosphatemia  Hypercoagulable state secondary to neoplasm   Severe Protein-Calorie Malnutrition    #Vascular dementia with hallucinations at baseline, with worsening agitation in setting of prolonged hospitalization  - Pt frequently agitated. Family reporting continues to have hallucinations but this has been ongoing since diagnosis of dementia.   - CTH with chronic microvascular changes.   - For combative behavior - Zyprexa 2.5mg IM q6h PRN  appreciate psych input - adjusted risperidone 0.5 at 0700, 0.75 at 1700; stop trazodone; may retry melatonin in future  --> doing well off mittens, without PRNs    Tonsillar cancer - reportedly stage 4 on dx, completed radiation, trouble with swallowing likely d/t radiation, s/p PEG 3/18  outpt f/u with Dr. Gibson. Seen by PC, full code at this point    #FTT/severe protein calorie malnutrition  -Pt reported odynophagia and dysphagia w/ regurgitation of food. Dysphagia to solids and liquids. Seems he holds the food and does not swallow due to fear of pain.   -Reviewed CT neck, mucosal thickening and enhancement in the larynx and oropharynx may be due to infectious laryngopharyngitis or sequela of previous radiation performed.  -Patient does not like viscous lidocaine or liquid gabapentin so that was discontinued   -Cineesophagogram completed - rec puree and thin liquid   -s/p empiric treatment of thrush/esophagitis with IV fluconazole- changed to Nystatin. Completed 7 days on 3/27  -Pt still with minimal PO intake -> PEG placed 3/18 by IR, now on bolus tube feeds but can take PO as well  - increased free water 250 q6 --> q4 --> change back to q6 as na 133, f/u lytes....  --> taking a small amount of PO lately, PO as tolerates, family brings favorite foods    #elevated LFTs  were higher earlier in admission, were trending down almost to normal, slight bump today  US Gallbladder sludge. No sonographic evidence of acute cholecystitis.  asymptomatic, tolerating tube feeds  --> outpt f/u     #SIRS  Patient febrile to 101.9 and tachycardic on 3/26, no obvious signs or symptoms of infection  3/26 BCx- NGTD   UA neg, full RVP neg, CXR clear  Monitoring off abx.  Thus far was just one time spike...    #Electrolyte derangements  Lokelma for mild hyperkalemia (resolved)  Replete phos PRN (resolved)  Continue to trend    #Anemia in neoplastic disease  -Hb stable, continue to monitor    #Severe Protein calorie Malnutrition  - Starvation ketosis resolved  - Continue tube feeds     #Hypercoagulable state 2/2 malignancy  -Lovenox 40mg daily    #Dispo  pending sadie

## 2025-04-07 NOTE — PROGRESS NOTE ADULT - SUBJECTIVE AND OBJECTIVE BOX
[facility] Division of Hospital Medicine  Jordana Mays MD  Pager (M-F, 8A-5P):  In-house pager [phone]; Long-range pager [phone]  Other Times:  Please page Hospitalist in Charge -  In-house pager [phone]    Patient is a 75y old  Male who presents with a chief complaint of odynophagia with decreased PO intake (06 Apr 2025 09:50)    SUBJECTIVE / OVERNIGHT EVENTS:  No problems reported over night.    ADDITIONAL REVIEW OF SYSTEMS:    MEDICATIONS  (STANDING):  chlorhexidine 2% Cloths 1 Application(s) Topical <User Schedule>  enoxaparin Injectable 40 milliGRAM(s) SubCutaneous every 24 hours  famotidine   Suspension 40 milliGRAM(s) Oral two times a day  polyethylene glycol 3350 17 Gram(s) Oral every 24 hours  risperiDONE   Tablet 0.5 milliGRAM(s) Oral <User Schedule>  risperiDONE   Tablet 0.75 milliGRAM(s) Oral <User Schedule>    MEDICATIONS  (PRN):  aluminum hydroxide/magnesium hydroxide/simethicone Suspension 30 milliLiter(s) Enteral Tube every 4 hours PRN Dyspepsia  OLANZapine 2.5 milliGRAM(s) Oral every 6 hours PRN agitation, not redirectable  OLANZapine Injectable 2.5 milliGRAM(s) IntraMuscular every 6 hours PRN agitation, not redirectable  ondansetron Injectable 4 milliGRAM(s) IV Push every 8 hours PRN Nausea and/or Vomiting    I&O's Summary    06 Apr 2025 07:01  -  07 Apr 2025 07:00  --------------------------------------------------------  IN: 1470 mL / OUT: 0 mL / NET: 1470 mL    PHYSICAL EXAM:  Vital Signs Last 24 Hrs  T(C): 36.4 (07 Apr 2025 05:55), Max: 36.9 (06 Apr 2025 11:58)  T(F): 97.5 (07 Apr 2025 05:55), Max: 98.5 (06 Apr 2025 11:58)  HR: 73 (07 Apr 2025 05:55) (73 - 84)  BP: 110/62 (07 Apr 2025 05:55) (110/62 - 134/66)  BP(mean): --  RR: 18 (07 Apr 2025 05:55) (18 - 18)  SpO2: 100% (07 Apr 2025 05:55) (98% - 100%)    Parameters below as of 07 Apr 2025 05:55  Patient On (Oxygen Delivery Method): room air    GENERAL: awake, calm, resting in bed  CHEST/LUNG: CTAB  HEART: Regular rate and rhythm  ABDOMEN: Soft, non-tender, non-distended; +PEG under abdominal binder  EXTREMITIES: No clubbing, cyanosis, or edema  SKIN: No rashes or lesions to visible skin; multiple tatoos    LABS:                        9.0    4.72  )-----------( 203      ( 07 Apr 2025 06:23 )             26.1     04-07    135  |  98  |  23  ----------------------------<  85  4.1   |  25  |  0.94    Ca    9.5      07 Apr 2025 06:23  Phos  3.8     04-06  Mg     2.20     04-06    TPro  6.7  /  Alb  4.0  /  TBili  0.6  /  DBili  x   /  AST  60[H]  /  ALT  61[H]  /  AlkPhos  175[H]  04-07    RADIOLOGY & ADDITIONAL TESTS:  Results Reviewed:   Imaging Personally Reviewed:  Electrocardiogram Personally Reviewed:    COORDINATION OF CARE:  Care Discussed with Consultants/Other Providers [Y/N]: RN, SW, CM, ACP, Psych re overall care   Prior or Outpatient Records Reviewed [Y/N]:   [facility] Division of Hospital Medicine  Jordana Mays MD  Pager (M-F, 8A-5P):  In-house pager [phone]; Long-range pager [phone]  Other Times:  Please page Hospitalist in Charge -  In-house pager [phone]    Patient is a 75y old  Male who presents with a chief complaint of odynophagia with decreased PO intake (06 Apr 2025 09:50)    SUBJECTIVE / OVERNIGHT EVENTS:  No problems reported over night.  In good spirits  ADDITIONAL REVIEW OF SYSTEMS:    MEDICATIONS  (STANDING):  chlorhexidine 2% Cloths 1 Application(s) Topical <User Schedule>  enoxaparin Injectable 40 milliGRAM(s) SubCutaneous every 24 hours  famotidine   Suspension 40 milliGRAM(s) Oral two times a day  polyethylene glycol 3350 17 Gram(s) Oral every 24 hours  risperiDONE   Tablet 0.5 milliGRAM(s) Oral <User Schedule>  risperiDONE   Tablet 0.75 milliGRAM(s) Oral <User Schedule>    MEDICATIONS  (PRN):  aluminum hydroxide/magnesium hydroxide/simethicone Suspension 30 milliLiter(s) Enteral Tube every 4 hours PRN Dyspepsia  OLANZapine 2.5 milliGRAM(s) Oral every 6 hours PRN agitation, not redirectable  OLANZapine Injectable 2.5 milliGRAM(s) IntraMuscular every 6 hours PRN agitation, not redirectable  ondansetron Injectable 4 milliGRAM(s) IV Push every 8 hours PRN Nausea and/or Vomiting    I&O's Summary    06 Apr 2025 07:01  -  07 Apr 2025 07:00  --------------------------------------------------------  IN: 1470 mL / OUT: 0 mL / NET: 1470 mL    PHYSICAL EXAM:  Vital Signs Last 24 Hrs  T(C): 36.4 (07 Apr 2025 05:55), Max: 36.9 (06 Apr 2025 11:58)  T(F): 97.5 (07 Apr 2025 05:55), Max: 98.5 (06 Apr 2025 11:58)  HR: 73 (07 Apr 2025 05:55) (73 - 84)  BP: 110/62 (07 Apr 2025 05:55) (110/62 - 134/66)  BP(mean): --  RR: 18 (07 Apr 2025 05:55) (18 - 18)  SpO2: 100% (07 Apr 2025 05:55) (98% - 100%)    Parameters below as of 07 Apr 2025 05:55  Patient On (Oxygen Delivery Method): room air    GENERAL: awake, calm, resting in bed  CHEST/LUNG: CTAB  HEART: Regular rate and rhythm  ABDOMEN: Soft, non-tender, non-distended; +PEG under abdominal binder  EXTREMITIES: No clubbing, cyanosis, or edema  SKIN: No rashes or lesions to visible skin; multiple tatoos    LABS:                        9.0    4.72  )-----------( 203      ( 07 Apr 2025 06:23 )             26.1     04-07    135  |  98  |  23  ----------------------------<  85  4.1   |  25  |  0.94    Ca    9.5      07 Apr 2025 06:23  Phos  3.8     04-06  Mg     2.20     04-06    TPro  6.7  /  Alb  4.0  /  TBili  0.6  /  DBili  x   /  AST  60[H]  /  ALT  61[H]  /  AlkPhos  175[H]  04-07    RADIOLOGY & ADDITIONAL TESTS:  Results Reviewed:   Imaging Personally Reviewed:  Electrocardiogram Personally Reviewed:    COORDINATION OF CARE:  Care Discussed with Consultants/Other Providers [Y/N]: RN, SW, CM, ACP, Psych re overall care   Prior or Outpatient Records Reviewed [Y/N]:   [facility] Division of Hospital Medicine  Jordana Mays MD  Pager (M-F, 8A-5P):  In-house pager [phone]; Long-range pager [phone]  Other Times:  Please page Hospitalist in Charge -  In-house pager [phone]    Patient is a 75y old  Male who presents with a chief complaint of odynophagia with decreased PO intake (06 Apr 2025 09:50)    SUBJECTIVE / OVERNIGHT EVENTS:  No problems reported over night.  Seen earlier, in good spirits, talking to his son and son in law. Denies pain, SOB. Pleasantly confused.   ADDITIONAL REVIEW OF SYSTEMS:    MEDICATIONS  (STANDING):  chlorhexidine 2% Cloths 1 Application(s) Topical <User Schedule>  enoxaparin Injectable 40 milliGRAM(s) SubCutaneous every 24 hours  famotidine   Suspension 40 milliGRAM(s) Oral two times a day  polyethylene glycol 3350 17 Gram(s) Oral every 24 hours  risperiDONE   Tablet 0.5 milliGRAM(s) Oral <User Schedule>  risperiDONE   Tablet 0.75 milliGRAM(s) Oral <User Schedule>    MEDICATIONS  (PRN):  aluminum hydroxide/magnesium hydroxide/simethicone Suspension 30 milliLiter(s) Enteral Tube every 4 hours PRN Dyspepsia  OLANZapine 2.5 milliGRAM(s) Oral every 6 hours PRN agitation, not redirectable  OLANZapine Injectable 2.5 milliGRAM(s) IntraMuscular every 6 hours PRN agitation, not redirectable  ondansetron Injectable 4 milliGRAM(s) IV Push every 8 hours PRN Nausea and/or Vomiting    I&O's Summary    06 Apr 2025 07:01  -  07 Apr 2025 07:00  --------------------------------------------------------  IN: 1470 mL / OUT: 0 mL / NET: 1470 mL    PHYSICAL EXAM:  Vital Signs Last 24 Hrs  T(C): 36.4 (07 Apr 2025 05:55), Max: 36.9 (06 Apr 2025 11:58)  T(F): 97.5 (07 Apr 2025 05:55), Max: 98.5 (06 Apr 2025 11:58)  HR: 73 (07 Apr 2025 05:55) (73 - 84)  BP: 110/62 (07 Apr 2025 05:55) (110/62 - 134/66)  BP(mean): --  RR: 18 (07 Apr 2025 05:55) (18 - 18)  SpO2: 100% (07 Apr 2025 05:55) (98% - 100%)    Parameters below as of 07 Apr 2025 05:55  Patient On (Oxygen Delivery Method): room air    GENERAL: awake, calm, resting in bed  CHEST/LUNG: CTAB  HEART: Regular rate and rhythm  ABDOMEN: Soft, non-tender, non-distended; +PEG under abdominal binder  EXTREMITIES: No clubbing, cyanosis, or edema  SKIN: No rashes or lesions to visible skin; multiple tatoos    LABS:                        9.0    4.72  )-----------( 203      ( 07 Apr 2025 06:23 )             26.1     04-07    135  |  98  |  23  ----------------------------<  85  4.1   |  25  |  0.94    Ca    9.5      07 Apr 2025 06:23  Phos  3.8     04-06  Mg     2.20     04-06    TPro  6.7  /  Alb  4.0  /  TBili  0.6  /  DBili  x   /  AST  60[H]  /  ALT  61[H]  /  AlkPhos  175[H]  04-07    RADIOLOGY & ADDITIONAL TESTS:  Results Reviewed:   Imaging Personally Reviewed:  Electrocardiogram Personally Reviewed:    COORDINATION OF CARE:  Care Discussed with Consultants/Other Providers [Y/N]: RN, SW, CM, ACP, Psych re overall care   Prior or Outpatient Records Reviewed [Y/N]:

## 2025-04-11 ENCOUNTER — APPOINTMENT (OUTPATIENT)
Dept: RADIATION ONCOLOGY | Facility: CLINIC | Age: 75
End: 2025-04-11

## 2025-04-15 ENCOUNTER — APPOINTMENT (OUTPATIENT)
Dept: HEMATOLOGY ONCOLOGY | Facility: CLINIC | Age: 75
End: 2025-04-15

## 2025-04-23 ENCOUNTER — NON-APPOINTMENT (OUTPATIENT)
Age: 75
End: 2025-04-23

## 2025-05-22 ENCOUNTER — APPOINTMENT (OUTPATIENT)
Dept: INTERNAL MEDICINE | Facility: CLINIC | Age: 75
End: 2025-05-22

## 2025-06-11 DIAGNOSIS — R13.10 DYSPHAGIA, UNSPECIFIED: ICD-10-CM

## 2025-07-03 ENCOUNTER — APPOINTMENT (OUTPATIENT)
Dept: RADIOLOGY | Facility: HOSPITAL | Age: 75
End: 2025-07-03

## 2025-08-08 ENCOUNTER — APPOINTMENT (OUTPATIENT)
Dept: RADIOLOGY | Facility: HOSPITAL | Age: 75
End: 2025-08-08